# Patient Record
Sex: FEMALE | Race: OTHER | Employment: UNEMPLOYED | URBAN - METROPOLITAN AREA
[De-identification: names, ages, dates, MRNs, and addresses within clinical notes are randomized per-mention and may not be internally consistent; named-entity substitution may affect disease eponyms.]

---

## 2024-11-10 ENCOUNTER — APPOINTMENT (EMERGENCY)
Dept: RADIOLOGY | Facility: HOSPITAL | Age: 74
DRG: 240 | End: 2024-11-10
Payer: COMMERCIAL

## 2024-11-10 ENCOUNTER — HOSPITAL ENCOUNTER (INPATIENT)
Facility: HOSPITAL | Age: 74
LOS: 2 days | Discharge: HOME | DRG: 240 | End: 2024-11-14
Attending: EMERGENCY MEDICINE | Admitting: FAMILY MEDICINE
Payer: COMMERCIAL

## 2024-11-10 DIAGNOSIS — K92.1 BLOOD IN STOOL: ICD-10-CM

## 2024-11-10 DIAGNOSIS — K62.89 RECTAL PAIN: ICD-10-CM

## 2024-11-10 DIAGNOSIS — R82.90 ABNORMAL URINALYSIS: ICD-10-CM

## 2024-11-10 DIAGNOSIS — K62.89 RECTAL MASS: Primary | ICD-10-CM

## 2024-11-10 DIAGNOSIS — Z87.19 HISTORY OF ANAL FISSURES: ICD-10-CM

## 2024-11-10 DIAGNOSIS — K62.89 PAIN, RECTAL: ICD-10-CM

## 2024-11-10 DIAGNOSIS — E11.9 TYPE 2 DIABETES MELLITUS WITHOUT COMPLICATION, WITHOUT LONG-TERM CURRENT USE OF INSULIN (HCC): ICD-10-CM

## 2024-11-10 LAB
ALBUMIN SERPL BCG-MCNC: 3.4 G/DL (ref 3.5–5)
ALP SERPL-CCNC: 47 U/L (ref 34–104)
ALT SERPL W P-5'-P-CCNC: 14 U/L (ref 7–52)
ANION GAP SERPL CALCULATED.3IONS-SCNC: 7 MMOL/L (ref 4–13)
AST SERPL W P-5'-P-CCNC: 12 U/L (ref 13–39)
BASOPHILS # BLD AUTO: 0.03 THOUSANDS/ÂΜL (ref 0–0.1)
BASOPHILS NFR BLD AUTO: 0 % (ref 0–1)
BILIRUB SERPL-MCNC: 0.25 MG/DL (ref 0.2–1)
BUN SERPL-MCNC: 27 MG/DL (ref 5–25)
CALCIUM ALBUM COR SERPL-MCNC: 9.2 MG/DL (ref 8.3–10.1)
CALCIUM SERPL-MCNC: 8.7 MG/DL (ref 8.4–10.2)
CHLORIDE SERPL-SCNC: 104 MMOL/L (ref 96–108)
CO2 SERPL-SCNC: 27 MMOL/L (ref 21–32)
CREAT SERPL-MCNC: 0.77 MG/DL (ref 0.6–1.3)
EOSINOPHIL # BLD AUTO: 0.03 THOUSAND/ÂΜL (ref 0–0.61)
EOSINOPHIL NFR BLD AUTO: 0 % (ref 0–6)
ERYTHROCYTE [DISTWIDTH] IN BLOOD BY AUTOMATED COUNT: 15.5 % (ref 11.6–15.1)
GFR SERPL CREATININE-BSD FRML MDRD: 76 ML/MIN/1.73SQ M
GLUCOSE SERPL-MCNC: 155 MG/DL (ref 65–140)
GLUCOSE SERPL-MCNC: 220 MG/DL (ref 65–140)
HCT VFR BLD AUTO: 32.9 % (ref 34.8–46.1)
HGB BLD-MCNC: 10.5 G/DL (ref 11.5–15.4)
IMM GRANULOCYTES # BLD AUTO: 0.05 THOUSAND/UL (ref 0–0.2)
IMM GRANULOCYTES NFR BLD AUTO: 0 % (ref 0–2)
LYMPHOCYTES # BLD AUTO: 1 THOUSANDS/ÂΜL (ref 0.6–4.47)
LYMPHOCYTES NFR BLD AUTO: 7 % (ref 14–44)
MAGNESIUM SERPL-MCNC: 1.8 MG/DL (ref 1.9–2.7)
MCH RBC QN AUTO: 27.1 PG (ref 26.8–34.3)
MCHC RBC AUTO-ENTMCNC: 31.9 G/DL (ref 31.4–37.4)
MCV RBC AUTO: 85 FL (ref 82–98)
MONOCYTES # BLD AUTO: 0.9 THOUSAND/ÂΜL (ref 0.17–1.22)
MONOCYTES NFR BLD AUTO: 7 % (ref 4–12)
NEUTROPHILS # BLD AUTO: 11.69 THOUSANDS/ÂΜL (ref 1.85–7.62)
NEUTS SEG NFR BLD AUTO: 86 % (ref 43–75)
NRBC BLD AUTO-RTO: 0 /100 WBCS
PLATELET # BLD AUTO: 309 THOUSANDS/UL (ref 149–390)
PMV BLD AUTO: 8.7 FL (ref 8.9–12.7)
POTASSIUM SERPL-SCNC: 4.2 MMOL/L (ref 3.5–5.3)
PROT SERPL-MCNC: 6.8 G/DL (ref 6.4–8.4)
RBC # BLD AUTO: 3.87 MILLION/UL (ref 3.81–5.12)
SODIUM SERPL-SCNC: 138 MMOL/L (ref 135–147)
WBC # BLD AUTO: 13.7 THOUSAND/UL (ref 4.31–10.16)

## 2024-11-10 PROCEDURE — 74177 CT ABD & PELVIS W/CONTRAST: CPT

## 2024-11-10 PROCEDURE — 83735 ASSAY OF MAGNESIUM: CPT

## 2024-11-10 PROCEDURE — 99285 EMERGENCY DEPT VISIT HI MDM: CPT | Performed by: EMERGENCY MEDICINE

## 2024-11-10 PROCEDURE — 80053 COMPREHEN METABOLIC PANEL: CPT | Performed by: EMERGENCY MEDICINE

## 2024-11-10 PROCEDURE — 85025 COMPLETE CBC W/AUTO DIFF WBC: CPT | Performed by: EMERGENCY MEDICINE

## 2024-11-10 PROCEDURE — 99285 EMERGENCY DEPT VISIT HI MDM: CPT

## 2024-11-10 PROCEDURE — 82948 REAGENT STRIP/BLOOD GLUCOSE: CPT

## 2024-11-10 PROCEDURE — 36415 COLL VENOUS BLD VENIPUNCTURE: CPT | Performed by: EMERGENCY MEDICINE

## 2024-11-10 PROCEDURE — 83036 HEMOGLOBIN GLYCOSYLATED A1C: CPT

## 2024-11-10 RX ORDER — SODIUM CHLORIDE 9 MG/ML
100 INJECTION, SOLUTION INTRAVENOUS CONTINUOUS
Status: DISCONTINUED | OUTPATIENT
Start: 2024-11-10 | End: 2024-11-12

## 2024-11-10 RX ORDER — DIPHENHYDRAMINE HCL 25 MG
12.5 TABLET ORAL EVERY 6 HOURS PRN
Status: DISCONTINUED | OUTPATIENT
Start: 2024-11-10 | End: 2024-11-14 | Stop reason: HOSPADM

## 2024-11-10 RX ORDER — INSULIN LISPRO 100 [IU]/ML
1-5 INJECTION, SOLUTION INTRAVENOUS; SUBCUTANEOUS
Status: DISCONTINUED | OUTPATIENT
Start: 2024-11-10 | End: 2024-11-14 | Stop reason: HOSPADM

## 2024-11-10 RX ORDER — KETOROLAC TROMETHAMINE 30 MG/ML
15 INJECTION, SOLUTION INTRAMUSCULAR; INTRAVENOUS ONCE
Status: COMPLETED | OUTPATIENT
Start: 2024-11-10 | End: 2024-11-10

## 2024-11-10 RX ORDER — POLYETHYLENE GLYCOL 3350 17 G/17G
17 POWDER, FOR SOLUTION ORAL DAILY PRN
Status: DISCONTINUED | OUTPATIENT
Start: 2024-11-10 | End: 2024-11-14 | Stop reason: HOSPADM

## 2024-11-10 RX ADMIN — SODIUM CHLORIDE 100 ML/HR: 0.9 INJECTION, SOLUTION INTRAVENOUS at 23:37

## 2024-11-10 RX ADMIN — KETOROLAC TROMETHAMINE 15 MG: 30 INJECTION, SOLUTION INTRAMUSCULAR; INTRAVENOUS at 23:37

## 2024-11-10 RX ADMIN — INSULIN LISPRO 1 UNITS: 100 INJECTION, SOLUTION INTRAVENOUS; SUBCUTANEOUS at 23:44

## 2024-11-10 RX ADMIN — IOHEXOL 100 ML: 350 INJECTION, SOLUTION INTRAVENOUS at 19:27

## 2024-11-10 NOTE — ED PROVIDER NOTES
Time reflects when diagnosis was documented in both MDM as applicable and the Disposition within this note       Time User Action Codes Description Comment    11/10/2024 10:00 PM Viviana Moody Add [K62.89] Rectal mass     11/10/2024 10:01 PM Viviana Moody Add [K62.89] Rectal pain     11/10/2024 10:01 PM Viviana Moody Add [K92.1] Blood in stool           ED Disposition       ED Disposition   Admit    Condition   Stable    Date/Time   Sun Nov 10, 2024 10:18 PM    Comment   Case was discussed with FP and the patient's admission status was agreed to be Admission Status: observation status to the service of Dr. Orosco.               Assessment & Plan       Medical Decision Making  Pt is a 73yo F who presents with rectal pain.    Differential diagnosis to include but not limited to abscess, hemorrhoid, fissure.  Will plan for labs and imaging.  See ED course for results and details.    Plan to admit pt to FP. Pt discussed with admitting team and admission orders placed. Pt admitted without incident.       Amount and/or Complexity of Data Reviewed  Labs: ordered. Decision-making details documented in ED Course.  Radiology: ordered. Decision-making details documented in ED Course.    Risk  Prescription drug management.  Decision regarding hospitalization.        ED Course as of 11/10/24 2220   Sun Nov 10, 2024   1847 WBC(!): 13.70  Mildly elevated. Non-diagnostic.    1848 Hemoglobin(!): 10.5  Mild anemia. No prior available for comparison.    1848 CBC and differential(!)  Reviewed and without actionable derangement.    1903 Comprehensive metabolic panel(!)  Reviewed and without actionable derangement.    2054 Awaiting CT read.    2142 CT being read.    2148 Per rads, necrotic mass of the rectum invading gluteal space.    2156 CT abdomen pelvis with contrast  Heterogeneous peripherally enhancing mass/malignancy involving the rectum and infiltrating the left ischiorectal and gluteal fat measuring approximately  7.7 x 7.5 x 8.6 cm with areas of fluid density compatible with internal necrosis.   2200 Colorectal contacted via [facility] for recs.    2210 Per colorectal, next step is colonoscopy. Does not require transfer. Will plan for admission here for GI consult.    2214 Pt and family made aware of all results and plan for admission. All agreeable.        Medications   iohexol (OMNIPAQUE) 350 MG/ML injection (MULTI-DOSE) 100 mL (100 mL Intravenous Given 11/10/24 1927)       ED Risk Strat Scores                           SBIRT 22yo+      Flowsheet Row Most Recent Value   Initial Alcohol Screen: US AUDIT-C     1. How often do you have a drink containing alcohol? 0 Filed at: 11/10/2024 1941   2. How many drinks containing alcohol do you have on a typical day you are drinking?  0 Filed at: 11/10/2024 1941   3b. FEMALE Any Age, or MALE 65+: How often do you have 4 or more drinks on one occassion? 0 Filed at: 11/10/2024 1941   Audit-C Score 0 Filed at: 11/10/2024 1941   FELA: How many times in the past year have you...    Used an illegal drug or used a prescription medication for non-medical reasons? Never Filed at: 11/10/2024 1941                            History of Present Illness       Chief Complaint   Patient presents with    Rectal Problems     Dx with anal fissure a about a month ago that seemed to get better, now having some bleeding and pain in area. Able to move bowels. No vomiting, also having weight loss per family       Past Medical History:   Diagnosis Date    Diabetes mellitus (HCC)       History reviewed. No pertinent surgical history.   History reviewed. No pertinent family history.   Social History     Tobacco Use    Smoking status: Never    Smokeless tobacco: Never   Vaping Use    Vaping status: Never Used   Substance Use Topics    Alcohol use: Not Currently    Drug use: Not Currently      E-Cigarette/Vaping    E-Cigarette Use Never User       E-Cigarette/Vaping Substances      I have reviewed and agree with  the history as documented.     Pt is a 73yo F who presents for rectal pain.  Much of history provided by patient's family.  Family reports that approximately 1 month ago patient was diagnosed with an anal fissure while she was living in [place].  Patient was given some topical medication and had improvement but now it is worsening again.  Patient reporting rectal pain particularly on the left side.  She also reports blood with stool.  They have also noticed some drainage from the area that is foul-smelling.  Patient has not had any constipation or diarrhea.  Patient does not have any urinary symptoms.  Patient does not have any abdominal pain, nausea, or vomiting.  She has not had any fevers or chills.  Patient has been taking etoricoxib for pain which was initially helping but now is not.  Patient is not currently on any medications as approximately 1 year ago she got Alex Argueta's from a sulfa antibiotic and has been wary about medication since that time.  Patient is now living here with family but has not yet established care here.  Daughter also reports that patient's brother had colon cancer.    Pt is [place]ian speaking only and opted for son in law to translate.            Objective       ED Triage Vitals [11/10/24 1735]   Temperature Pulse Blood Pressure Respirations SpO2 Patient Position - Orthostatic VS   99.5 °F (37.5 °C) 86 158/71 20 97 % Sitting      Temp Source Heart Rate Source BP Location FiO2 (%) Pain Score    Tympanic Monitor Right arm -- 10 - Worst Possible Pain      Vitals      Date and Time Temp Pulse SpO2 Resp BP Pain Score FACES Pain Rating User   11/10/24 2145 -- 80 95 % 16 150/69 -- -- AM   11/10/24 2115 -- 80 96 % 18 172/72 -- -- AM   11/10/24 2045 -- 80 96 % 16 161/67 -- -- AM   11/10/24 1908 -- 78 96 % 18 143/67 -- -- AM   11/10/24 1735 99.5 °F (37.5 °C) 86 97 % 20 158/71 10 - Worst Possible Pain -- LS            Physical Exam  Vitals reviewed.   Constitutional:       General: She is not  in acute distress.     Appearance: She is well-developed. She is not toxic-appearing or diaphoretic.   HENT:      Head: Normocephalic and atraumatic.      Right Ear: External ear normal.      Left Ear: External ear normal.      Nose: Nose normal.      Mouth/Throat:      Pharynx: Oropharynx is clear.   Eyes:      Extraocular Movements: Extraocular movements intact.      Pupils: Pupils are equal, round, and reactive to light.   Cardiovascular:      Rate and Rhythm: Normal rate and regular rhythm.      Heart sounds: Normal heart sounds. No murmur heard.  Pulmonary:      Effort: Pulmonary effort is normal. No respiratory distress.      Breath sounds: Normal breath sounds.   Abdominal:      General: There is no distension.      Palpations: Abdomen is soft.      Tenderness: There is no abdominal tenderness.   Genitourinary:     Rectum: External hemorrhoid (small, non-thrombosed) present.   Musculoskeletal:         General: Normal range of motion.      Cervical back: Normal range of motion and neck supple.   Skin:     General: Skin is warm and dry.      Capillary Refill: Capillary refill takes less than 2 seconds.          Neurological:      Mental Status: She is alert and oriented to person, place, and time.   Psychiatric:         Speech: Speech normal.         Behavior: Behavior is cooperative.         Results Reviewed       Procedure Component Value Units Date/Time    Comprehensive metabolic panel [[id number]]  (Abnormal) Collected: 11/10/24 1842    Lab Status: Final result Specimen: Blood from Arm, Left Updated: 11/10/24 1902     Sodium 138 mmol/L      Potassium 4.2 mmol/L      Chloride 104 mmol/L      CO2 27 mmol/L      ANION GAP 7 mmol/L      BUN 27 mg/dL      Creatinine 0.77 mg/dL      Glucose 220 mg/dL      Calcium 8.7 mg/dL      Corrected Calcium 9.2 mg/dL      AST 12 U/L      ALT 14 U/L      Alkaline Phosphatase 47 U/L      Total Protein 6.8 g/dL      Albumin 3.4 g/dL      Total Bilirubin 0.25 mg/dL      eGFR 76  ml/min/1.73sq m     Narrative:      National Kidney Disease Foundation guidelines for Chronic Kidney Disease (CKD):     Stage 1 with normal or high GFR (GFR > 90 mL/min/1.73 square meters)    Stage 2 Mild CKD (GFR = 60-89 mL/min/1.73 square meters)    Stage 3A Moderate CKD (GFR = 45-59 mL/min/1.73 square meters)    Stage 3B Moderate CKD (GFR = 30-44 mL/min/1.73 square meters)    Stage 4 Severe CKD (GFR = 15-29 mL/min/1.73 square meters)    Stage 5 End Stage CKD (GFR <15 mL/min/1.73 square meters)  Note: GFR calculation is accurate only with a steady state creatinine    CBC and differential [[id number]]  (Abnormal) Collected: 11/10/24 1842    Lab Status: Final result Specimen: Blood from Arm, Left Updated: 11/10/24 1846     WBC 13.70 Thousand/uL      RBC 3.87 Million/uL      Hemoglobin 10.5 g/dL      Hematocrit 32.9 %      MCV 85 fL      MCH 27.1 pg      MCHC 31.9 g/dL      RDW 15.5 %      MPV 8.7 fL      Platelets 309 Thousands/uL      nRBC 0 /100 WBCs      Segmented % 86 %      Immature Grans % 0 %      Lymphocytes % 7 %      Monocytes % 7 %      Eosinophils Relative 0 %      Basophils Relative 0 %      Absolute Neutrophils 11.69 Thousands/µL      Absolute Immature Grans 0.05 Thousand/uL      Absolute Lymphocytes 1.00 Thousands/µL      Absolute Monocytes 0.90 Thousand/µL      Eosinophils Absolute 0.03 Thousand/µL      Basophils Absolute 0.03 Thousands/µL             CT abdomen pelvis with contrast   Final Interpretation by Steve Jewell MD (11/10 2153)      Heterogeneous peripherally enhancing mass/malignancy involving the rectum and infiltrating the left ischiorectal and gluteal fat measuring approximately 7.7 x 7.5 x 8.6 cm with areas of fluid density compatible with internal necrosis.      I personally discussed this study with ALMA ROSA ROMAN on 11/10/2024 9:49 PM.      Workstation performed: [id number]             Procedures    ED Medication and Procedure Management   None     Patient's Medications    No  medications on file     No discharge procedures on file.  ED SEPSIS DOCUMENTATION   Time reflects when diagnosis was documented in both MDM as applicable and the Disposition within this note       Time User Action Codes Description Comment    11/10/2024 10:00 PM Viviana Moody [K62.89] Rectal mass     11/10/2024 10:01 PM Viviana Moody [K62.89] Rectal pain     11/10/2024 10:01 PM Viviana Moody [K92.1] Blood in stool                  Viviana Moody MD  11/10/24 8854

## 2024-11-11 PROBLEM — E87.8 ELECTROLYTE ABNORMALITY: Status: ACTIVE | Noted: 2024-11-11

## 2024-11-11 PROBLEM — Z87.19 HISTORY OF ANAL FISSURES: Status: ACTIVE | Noted: 2024-11-11

## 2024-11-11 PROBLEM — K62.5 RECTAL BLEEDING: Status: ACTIVE | Noted: 2024-11-11

## 2024-11-11 PROBLEM — R10.9 ABDOMINAL PAIN: Status: ACTIVE | Noted: 2024-11-11

## 2024-11-11 PROBLEM — K62.89 PAIN, RECTAL: Status: ACTIVE | Noted: 2024-11-11

## 2024-11-11 PROBLEM — D72.829 LEUKOCYTOSIS: Status: ACTIVE | Noted: 2024-11-11

## 2024-11-11 PROBLEM — D50.9 IRON DEFICIENCY ANEMIA: Status: ACTIVE | Noted: 2024-11-11

## 2024-11-11 LAB
ANION GAP SERPL CALCULATED.3IONS-SCNC: 6 MMOL/L (ref 4–13)
BASOPHILS # BLD AUTO: 0.03 THOUSANDS/ÂΜL (ref 0–0.1)
BASOPHILS NFR BLD AUTO: 0 % (ref 0–1)
BUN SERPL-MCNC: 17 MG/DL (ref 5–25)
CALCIUM SERPL-MCNC: 7.9 MG/DL (ref 8.4–10.2)
CHLORIDE SERPL-SCNC: 109 MMOL/L (ref 96–108)
CO2 SERPL-SCNC: 23 MMOL/L (ref 21–32)
CREAT SERPL-MCNC: 0.55 MG/DL (ref 0.6–1.3)
EOSINOPHIL # BLD AUTO: 0.13 THOUSAND/ÂΜL (ref 0–0.61)
EOSINOPHIL NFR BLD AUTO: 1 % (ref 0–6)
ERYTHROCYTE [DISTWIDTH] IN BLOOD BY AUTOMATED COUNT: 15.4 % (ref 11.6–15.1)
EST. AVERAGE GLUCOSE BLD GHB EST-MCNC: 192 MG/DL
FERRITIN SERPL-MCNC: 80 NG/ML (ref 11–307)
GFR SERPL CREATININE-BSD FRML MDRD: 92 ML/MIN/1.73SQ M
GLUCOSE P FAST SERPL-MCNC: 134 MG/DL (ref 65–99)
GLUCOSE SERPL-MCNC: 111 MG/DL (ref 65–140)
GLUCOSE SERPL-MCNC: 116 MG/DL (ref 65–140)
GLUCOSE SERPL-MCNC: 134 MG/DL (ref 65–140)
GLUCOSE SERPL-MCNC: 154 MG/DL (ref 65–140)
GLUCOSE SERPL-MCNC: 158 MG/DL (ref 65–140)
HBA1C MFR BLD: 8.3 %
HCT VFR BLD AUTO: 31.9 % (ref 34.8–46.1)
HGB BLD-MCNC: 10 G/DL (ref 11.5–15.4)
IMM GRANULOCYTES # BLD AUTO: 0.03 THOUSAND/UL (ref 0–0.2)
IMM GRANULOCYTES NFR BLD AUTO: 0 % (ref 0–2)
IRON SATN MFR SERPL: 5 % (ref 15–50)
IRON SERPL-MCNC: 11 UG/DL (ref 50–212)
LYMPHOCYTES # BLD AUTO: 0.93 THOUSANDS/ÂΜL (ref 0.6–4.47)
LYMPHOCYTES NFR BLD AUTO: 7 % (ref 14–44)
MAGNESIUM SERPL-MCNC: 2.1 MG/DL (ref 1.9–2.7)
MCH RBC QN AUTO: 27 PG (ref 26.8–34.3)
MCHC RBC AUTO-ENTMCNC: 31.3 G/DL (ref 31.4–37.4)
MCV RBC AUTO: 86 FL (ref 82–98)
MONOCYTES # BLD AUTO: 0.93 THOUSAND/ÂΜL (ref 0.17–1.22)
MONOCYTES NFR BLD AUTO: 7 % (ref 4–12)
NEUTROPHILS # BLD AUTO: 10.54 THOUSANDS/ÂΜL (ref 1.85–7.62)
NEUTS SEG NFR BLD AUTO: 85 % (ref 43–75)
NRBC BLD AUTO-RTO: 0 /100 WBCS
PLATELET # BLD AUTO: 296 THOUSANDS/UL (ref 149–390)
PMV BLD AUTO: 8.8 FL (ref 8.9–12.7)
POTASSIUM SERPL-SCNC: 3.7 MMOL/L (ref 3.5–5.3)
RBC # BLD AUTO: 3.7 MILLION/UL (ref 3.81–5.12)
SODIUM SERPL-SCNC: 138 MMOL/L (ref 135–147)
TIBC SERPL-MCNC: 240 UG/DL (ref 250–450)
UIBC SERPL-MCNC: 229 UG/DL (ref 155–355)
WBC # BLD AUTO: 12.59 THOUSAND/UL (ref 4.31–10.16)

## 2024-11-11 PROCEDURE — 83735 ASSAY OF MAGNESIUM: CPT

## 2024-11-11 PROCEDURE — 83550 IRON BINDING TEST: CPT

## 2024-11-11 PROCEDURE — 80048 BASIC METABOLIC PNL TOTAL CA: CPT

## 2024-11-11 PROCEDURE — 99244 OFF/OP CNSLTJ NEW/EST MOD 40: CPT | Performed by: INTERNAL MEDICINE

## 2024-11-11 PROCEDURE — 83540 ASSAY OF IRON: CPT

## 2024-11-11 PROCEDURE — 82948 REAGENT STRIP/BLOOD GLUCOSE: CPT

## 2024-11-11 PROCEDURE — 82728 ASSAY OF FERRITIN: CPT

## 2024-11-11 PROCEDURE — 85025 COMPLETE CBC W/AUTO DIFF WBC: CPT

## 2024-11-11 RX ORDER — MAGNESIUM CARB/ALUMINUM HYDROX 105-160MG
296 TABLET,CHEWABLE ORAL ONCE
Status: COMPLETED | OUTPATIENT
Start: 2024-11-11 | End: 2024-11-11

## 2024-11-11 RX ORDER — POTASSIUM CHLORIDE 1500 MG/1
40 TABLET, EXTENDED RELEASE ORAL ONCE
Status: COMPLETED | OUTPATIENT
Start: 2024-11-11 | End: 2024-11-11

## 2024-11-11 RX ORDER — POTASSIUM CHLORIDE 1500 MG/1
20 TABLET, EXTENDED RELEASE ORAL ONCE
Status: COMPLETED | OUTPATIENT
Start: 2024-11-11 | End: 2024-11-11

## 2024-11-11 RX ORDER — MAGNESIUM SULFATE 1 G/100ML
1 INJECTION INTRAVENOUS ONCE
Status: COMPLETED | OUTPATIENT
Start: 2024-11-11 | End: 2024-11-11

## 2024-11-11 RX ORDER — SODIUM CHLORIDE, SODIUM LACTATE, POTASSIUM CHLORIDE, CALCIUM CHLORIDE 600; 310; 30; 20 MG/100ML; MG/100ML; MG/100ML; MG/100ML
75 INJECTION, SOLUTION INTRAVENOUS CONTINUOUS
Status: CANCELLED | OUTPATIENT
Start: 2024-11-11

## 2024-11-11 RX ORDER — PANTOPRAZOLE SODIUM 40 MG/10ML
40 INJECTION, POWDER, LYOPHILIZED, FOR SOLUTION INTRAVENOUS EVERY 12 HOURS SCHEDULED
Status: DISCONTINUED | OUTPATIENT
Start: 2024-11-11 | End: 2024-11-14 | Stop reason: HOSPADM

## 2024-11-11 RX ADMIN — INSULIN LISPRO 1 UNITS: 100 INJECTION, SOLUTION INTRAVENOUS; SUBCUTANEOUS at 16:50

## 2024-11-11 RX ADMIN — MAJOR MAGNESIUM CITRATE ORAL SOLUTION - LEMON 296 ML: 1.75 LIQUID ORAL at 12:47

## 2024-11-11 RX ADMIN — PANTOPRAZOLE SODIUM 40 MG: 40 INJECTION, POWDER, FOR SOLUTION INTRAVENOUS at 02:29

## 2024-11-11 RX ADMIN — POLYETHYLENE GLYCOL 3350, SODIUM SULFATE ANHYDROUS, SODIUM BICARBONATE, SODIUM CHLORIDE, POTASSIUM CHLORIDE 4000 ML: 236; 22.74; 6.74; 5.86; 2.97 POWDER, FOR SOLUTION ORAL at 16:49

## 2024-11-11 RX ADMIN — MAJOR MAGNESIUM CITRATE ORAL SOLUTION - LEMON 296 ML: 1.75 LIQUID ORAL at 14:42

## 2024-11-11 RX ADMIN — POTASSIUM CHLORIDE 40 MEQ: 1500 TABLET, EXTENDED RELEASE ORAL at 09:14

## 2024-11-11 RX ADMIN — SODIUM CHLORIDE 100 ML/HR: 0.9 INJECTION, SOLUTION INTRAVENOUS at 10:29

## 2024-11-11 RX ADMIN — PANTOPRAZOLE SODIUM 40 MG: 40 INJECTION, POWDER, FOR SOLUTION INTRAVENOUS at 14:37

## 2024-11-11 RX ADMIN — INSULIN LISPRO 1 UNITS: 100 INJECTION, SOLUTION INTRAVENOUS; SUBCUTANEOUS at 22:09

## 2024-11-11 RX ADMIN — POTASSIUM CHLORIDE 20 MEQ: 1500 TABLET, EXTENDED RELEASE ORAL at 06:59

## 2024-11-11 RX ADMIN — SODIUM CHLORIDE 100 ML/HR: 0.9 INJECTION, SOLUTION INTRAVENOUS at 22:14

## 2024-11-11 RX ADMIN — PSYLLIUM HUSK 1 PACKET: 3.4 POWDER ORAL at 09:15

## 2024-11-11 RX ADMIN — MAGNESIUM SULFATE HEPTAHYDRATE 1 G: 1 INJECTION, SOLUTION INTRAVENOUS at 01:18

## 2024-11-11 NOTE — ASSESSMENT & PLAN NOTE
Chronic - noting 2 month hx of regular rectal bleeding unrelated to bowel movements  Endorsing that she was seen by GI in [place] with colonoscopy but did not receive results. No baseline labs to compare, was diagnosed with anal fissures previously. In ED her Hgb was 10.5 and MCV/Cr WNL, BUN 27.    Plan:  -GI consulted, appreciate recommendations   -NPO  -Pending iron panel  -IV Protonix 40 mg BID  -Monitor Hgb, record stool at bedside

## 2024-11-11 NOTE — ASSESSMENT & PLAN NOTE
Chronic - noting 2 month hx of regular rectal bleeding unrelated to bowel movements  Endorsing that she was seen by GI in [place] with colonoscopy but did not receive results. No baseline labs to compare, was diagnosed with anal fissures previously    In ED: BUN 27, Cr WNL, Hgb 10.5, MCV WNL    Plan:  -GI consulted, appreciate recommendations   -NPO  -Pending iron panel  -IV Protonix 40 mg BID  -Monitor Hgb, record stool at bedside

## 2024-11-11 NOTE — ASSESSMENT & PLAN NOTE
Chronic, no known HgbA1c - home mediations are unknown as patient has recently immigrated from [place] - endorses taking unknown diabetic medication.    Plan:  -Carb control when diet started   -Insulin sliding scale   -monitor for hypoglycemia  -Hold oral agents during hospitalization

## 2024-11-11 NOTE — ASSESSMENT & PLAN NOTE
Patient endorses history of anal fissures as diagnosed with [place], noting she applied unknown topical jelly to her rectum with some relief from discomfort.     Plan:  -Continue metamucil   -GI consulted, appreciate recommendations

## 2024-11-11 NOTE — ASSESSMENT & PLAN NOTE
Patient presented with rectal pain and rectal bleeding which has been going on for 2 months but has had increasing severity of symptoms. Patient reports blood is on toilet tissue when she wipes. Denies any blood in stool or black tarry stool. Patient reports that she had a bowel movement today. Patient reports colonoscopy in [place] approximately 1 month ago she is not aware of the results.CT abdomen and pelvis with contrast showed Heterogeneous peripherally enhancing mass/malignancy involving the rectum and infiltrating the left ischiorectal and gluteal fat measuring approximately 7.7 x 7.5 x 8.6 cm with areas of fluid density compatible with internal necrosis.      -Monitor hemoglobin  -Transfuse blood products as needed to keep hemoglobin delayed greater than 7  -Monitor for signs of rectal bleeding

## 2024-11-11 NOTE — PROGRESS NOTES
"Progress Note - Family Medicine   Name: Miriam Smith 74 y.o. female I MRN: [id number]  Unit/Bed#: [address] [place] [address] I Date of Admission: 11/10/2024   Date of Service: 11/11/2024 I Hospital Day: 0     Assessment & Plan  Rectal mass  Patient endorsing gluteal tenderness x 2 months with \"feeling something\", abdominal pain, weight loss, rectal bleeding.     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    Plan:  -GI consulted, appreciate recommendations   -Getting bowel prep today and will do colonoscopy tomorrow  Pain, rectal  Patient presented to ED endorsing abdominal pain, rectal bleeding, weight loss x 2 months - recently immigrated from [place] and was getting care there. Reporting reluctance to take medications in setting of hx of drug intolerance/allergies. Denies fevers/chills/N/V/D.     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 Fluid-filled distal small bowel loops which may be due to superimposed enteritis.    Plan:  -Continue PRN Toradol  -Rest of plan per rectal mass  Rectal bleeding  Chronic - noting 2 month hx of regular rectal bleeding unrelated to bowel movements  Endorsing that she was seen by GI in [place] with colonoscopy but did not receive results. No baseline labs to compare, was diagnosed with anal fissures previously. In ED her Hgb was 10.5 and MCV/Cr WNL, BUN 27.    Plan:  -GI consulted, appreciate recommendations   -NPO  -Pending iron panel  -IV Protonix 40 mg BID  -Monitor Hgb, record stool at bedside  Type 2 diabetes mellitus (HCC)  Chronic, no known HgbA1c - " home mediations are unknown as patient has recently immigrated from [place] - endorses taking unknown diabetic medication.    Plan:  -Carb control when diet started   -Insulin sliding scale   -monitor for hypoglycemia  -Hold oral agents during hospitalization  History of anal fissures  Patient endorses history of anal fissures as diagnosed with [place], noting she applied unknown topical jelly to her rectum with some relief from discomfort.     Plan:  -Continue metamucil   -GI consulted, appreciate recommendations  Electrolyte abnormality  Mg 1.8 on admission, continue to monitor and replete as indicated.    VTE Pharmacologic Prophylaxis:    prophylaxis contraindicated in setting of rectal bleeding.    Mobility:   Basic Mobility Inpatient Raw Score: 24  JH-HLM Goal: 8: Walk 250 feet or more  JH-HLM Achieved: 8: Walk 250 feet ot more  JH-HLM Goal achieved. Continue to encourage appropriate mobility.    Patient Centered Rounds: I performed bedside rounds with nursing staff today.   Discussions with Specialists or Other Care Team Provider: GI  Discussions with Family Members: Discussed with daughter at bedside.  Current Length of Stay: 0 day(s)  Current Patient Status: Observation   Certification Statement: The patient will continue to require additional inpatient hospital stay due to further workup needed.  Discharge Plan: Anticipate discharge in 48-72 hrs to home.    Code Status: Level 1 - Full Code    Subjective   Patient was resting comfortably in bed while being assessed this morning. Needed . She endorses no pain and feels much better this morning. Denies H/A, CP, N/B, SOB, abdominal pain, urinary symptoms, last bowel movement was 2 days ago. She notes there is minimal pink blood when she wipes but no gross, red blood in toilet bowl.    Review of Systems   Constitutional:  Negative for chills, fatigue and fever.   HENT:  Negative for hearing loss and sore throat.    Eyes:  Negative for visual  disturbance.   Respiratory:  Negative for cough, chest tightness and shortness of breath.    Cardiovascular:  Negative for chest pain.   Gastrointestinal:  Positive for anal bleeding (blood on toilet paper when she wipes). Negative for abdominal pain, constipation, diarrhea, nausea and vomiting.   Genitourinary:  Negative for dysuria and hematuria.   Musculoskeletal:  Negative for back pain.   Skin:  Negative for rash and wound.   Neurological:  Negative for weakness, light-headedness and headaches.   All other systems reviewed and are negative.      Objective :  Temp:  [97.9 °F (36.6 °C)-99.5 °F (37.5 °C)] 97.9 °F (36.6 °C)  HR:  [69-86] 69  BP: (126-172)/(56-87) 126/56  Resp:  [16-20] 18  SpO2:  [93 %-97 %] 93 %  O2 Device: None (Room air)    Body mass index is 20.96 kg/m².     Input and Output Summary (last 24 hours):   No intake or output data in the 24 hours ending 11/11/24 1331    Physical Exam  Vitals and nursing note reviewed.   Constitutional:       General: She is not in acute distress.     Appearance: Normal appearance.   HENT:      Head: Normocephalic and atraumatic.      Right Ear: External ear normal.      Left Ear: External ear normal.      Nose: Nose normal.      Mouth/Throat:      Mouth: Mucous membranes are moist.      Pharynx: Oropharynx is clear.   Eyes:      Extraocular Movements: Extraocular movements intact.   Cardiovascular:      Rate and Rhythm: Normal rate and regular rhythm.      Pulses: Normal pulses.      Heart sounds: Normal heart sounds.   Pulmonary:      Effort: Pulmonary effort is normal.      Breath sounds: Normal breath sounds.   Abdominal:      General: Abdomen is flat. Bowel sounds are normal. There is no distension.      Palpations: Abdomen is soft.      Tenderness: There is no abdominal tenderness.   Musculoskeletal:         General: No tenderness. Normal range of motion.      Right lower leg: No edema.      Left lower leg: No edema.   Skin:     General: Skin is warm and dry.       Capillary Refill: Capillary refill takes less than 2 seconds.   Neurological:      General: No focal deficit present.      Mental Status: She is alert and oriented to person, place, and time.   Psychiatric:         Mood and Affect: Mood normal.         Behavior: Behavior normal.         Lab Results: I have reviewed the following results:   Results from last 7 days   Lab Units 11/11/24  0442   WBC Thousand/uL 12.59*   HEMOGLOBIN g/dL 10.0*   HEMATOCRIT % 31.9*   PLATELETS Thousands/uL 296   SEGS PCT % 85*   LYMPHO PCT % 7*   MONO PCT % 7   EOS PCT % 1     Results from last 7 days   Lab Units 11/11/24  0442 11/10/24  1842   SODIUM mmol/L 138 138   POTASSIUM mmol/L 3.7 4.2   CHLORIDE mmol/L 109* 104   CO2 mmol/L 23 27   BUN mg/dL 17 27*   CREATININE mg/dL 0.55* 0.77   ANION GAP mmol/L 6 7   CALCIUM mg/dL 7.9* 8.7   ALBUMIN g/dL  --  3.4*   TOTAL BILIRUBIN mg/dL  --  0.25   ALK PHOS U/L  --  47   ALT U/L  --  14   AST U/L  --  12*   GLUCOSE RANDOM mg/dL 134 220*         Results from last 7 days   Lab Units 11/11/24  1215 11/11/24  0731 11/10/24  2342   POC GLUCOSE mg/dl 116 111 155*       Imaging Results Review: I reviewed radiology reports from this admission including: CT abdomen/pelvis.  Other Study Results Review: No additional pertinent studies reviewed.    Last 24 Hours Medication List:     Current Facility-Administered Medications:     diphenhydrAMINE (BENADRYL) tablet 12.5 mg, Q6H PRN    insulin lispro (HumALOG/ADMELOG) 100 units/mL subcutaneous injection 1-5 Units, 4x Daily (AC & HS) **AND** Fingerstick Glucose (POCT), 4x Daily AC and at bedtime    magnesium citrate (CITROMA) oral solution 296 mL, Once    pantoprazole (PROTONIX) injection 40 mg, Q12H TEGAN    polyethylene glycol (GOLYTELY) bowel prep 4,000 mL, Once    polyethylene glycol (MIRALAX) packet 17 g, Daily PRN    psyllium (METAMUCIL) 1 packet, Daily    sodium chloride 0.9 % infusion, Continuous, Last Rate: 100 mL/hr (11/11/24  1029)    Administrative Statements   Today, Patient Was Seen By: Dalton Leblanc, DO  I have spent a total time of >30 minutes in caring for this patient on the day of the visit/encounter including Counseling / Coordination of care, Documenting in the medical record, Reviewing / ordering tests, medicine, procedures  , Obtaining or reviewing history  , and Communicating with other healthcare professionals .    **Please Note: This note may have been constructed using a voice recognition system.**

## 2024-11-11 NOTE — PLAN OF CARE
Problem: PAIN - ADULT  Goal: Verbalizes/displays adequate comfort level or baseline comfort level  Description: Interventions:  - Encourage patient to monitor pain and request assistance  - Assess pain using appropriate pain scale  - Administer analgesics based on type and severity of pain and evaluate response  - Implement non-pharmacological measures as appropriate and evaluate response  - Consider cultural and social influences on pain and pain management  - Notify physician/advanced practitioner if interventions unsuccessful or patient reports new pain  Outcome: Progressing     Problem: INFECTION - ADULT  Goal: Absence or prevention of progression during hospitalization  Description: INTERVENTIONS:  - Assess and monitor for signs and symptoms of infection  - Monitor lab/diagnostic results  - Monitor all insertion sites, i.e. indwelling lines, tubes, and drains  - Monitor endotracheal if appropriate and nasal secretions for changes in amount and color  - [place] appropriate cooling/warming therapies per order  - Administer medications as ordered  - Instruct and encourage patient and family to use good hand hygiene technique  - Identify and instruct in appropriate isolation precautions for identified infection/condition  Outcome: Progressing  Goal: Absence of fever/infection during neutropenic period  Description: INTERVENTIONS:  - Monitor WBC    Outcome: Progressing     Problem: SAFETY ADULT  Goal: Patient will remain free of falls  Description: INTERVENTIONS:  - Educate patient/family on patient safety including physical limitations  - Instruct patient to call for assistance with activity   - Consult OT/PT to assist with strengthening/mobility   - Keep Call bell within reach  - Keep bed low and locked with side rails adjusted as appropriate  - Keep care items and personal belongings within reach  - Initiate and maintain comfort rounds  - Make Fall Risk Sign visible to staff  - Offer Toileting every 2 Hours,  in advance of need  - Initiate/Maintain bed alarm  - Obtain necessary fall risk management equipment:   - Apply yellow socks and bracelet for high fall risk patients  - Consider moving patient to room near nurses station  Outcome: Progressing  Goal: Maintain or return to baseline ADL function  Description: INTERVENTIONS:  -  Assess patient's ability to carry out ADLs; assess patient's baseline for ADL function and identify physical deficits which impact ability to perform ADLs (bathing, care of mouth/teeth, toileting, grooming, dressing, etc.)  - Assess/evaluate cause of self-care deficits   - Assess range of motion  - Assess patient's mobility; develop plan if impaired  - Assess patient's need for assistive devices and provide as appropriate  - Encourage maximum independence but intervene and supervise when necessary  - Involve family in performance of ADLs  - Assess for home care needs following discharge   - Consider OT consult to assist with ADL evaluation and planning for discharge  - Provide patient education as appropriate  Outcome: Progressing  Goal: Maintains/Returns to pre admission functional level  Description: INTERVENTIONS:  - Perform AM-PAC 6 Click Basic Mobility/ Daily Activity assessment daily.  - Set and communicate daily mobility goal to care team and patient/family/caregiver.   - Collaborate with rehabilitation services on mobility goals if consulted  - Perform Range of Motion 3 times a day.  - Reposition patient every 3 hours.  - Dangle patient 3 times a day  - Stand patient 3 times a day  - Ambulate patient 3 times a day  - Out of bed to chair 3 times a day   - Out of bed for meals 3 times a day  - Out of bed for toileting  - Record patient progress and toleration of activity level   Outcome: Progressing     Problem: DISCHARGE PLANNING  Goal: Discharge to home or other facility with appropriate resources  Description: INTERVENTIONS:  - Identify barriers to discharge w/patient and caregiver  -  Arrange for needed discharge resources and transportation as appropriate  - Identify discharge learning needs (meds, wound care, etc.)  - Arrange for interpretive services to assist at discharge as needed  - Refer to Case Management Department for coordinating discharge planning if the patient needs post-hospital services based on physician/advanced practitioner order or complex needs related to functional status, cognitive ability, or social support system  Outcome: Progressing     Problem: Knowledge Deficit  Goal: Patient/family/caregiver demonstrates understanding of disease process, treatment plan, medications, and discharge instructions  Description: Complete learning assessment and assess knowledge base.  Interventions:  - Provide teaching at level of understanding  - Provide teaching via preferred learning methods  Outcome: Progressing

## 2024-11-11 NOTE — CONSULTS
Consultation - Gastroenterology   Name: Miriam Smith 74 y.o. 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     -Clear liquid diet today no red food.  -Magnesium citrate 11:00 and 15:00 daily.  -GoLytely starting at 17:00 today  -Scheduled for colonoscopy tomorrow.  Prep and procedure explained to patient and daughter further recommendations pending results of colonoscopy.  -Patient most likely will need colorectal consult and transfer.   Rectal bleeding  Patient presented with rectal pain and rectal bleeding which has been going on for 2 months but has had increasing severity of symptoms. Patient reports blood is on toilet tissue when she wipes. Denies any blood in stool or black tarry stool. Patient reports that she had a bowel movement today. 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      -Monitor hemoglobin  -Transfuse blood products as needed to keep hemoglobin delayed greater than 7  -Monitor for signs of rectal bleeding  Pain,  rectal  Patient presented with rectal pain and rectal bleeding which has been going on for 2 months but has had increasing severity of symptoms.  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      -Pain management per attending team        History of Present Illness   HPI:  Miriam Smith is a 74 y.o. female with past medical history of anal fissure, diabetes mellitus type 2, and  who presents to hospital on 11/10 with report of rectal pain and rectal bleeding x 2 months.  Patient speaks Telugu only daughter is at bedside and is interpreting for patient.  Patient recently immigrated to the  from [place].  Patient reports 2-month history of worsening of symptoms with pain unrelieved by Etoricoxib.  Patient did have a colonoscopy in [place] approximately 1 month ago but was never notified of results of colonoscopy.  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  Labs done 11/10 showed hemoglobin 10.5, white blood count 13.70 platelets 309.  CMP with in normal limits except glucose 220, BUN 27,  AST 12, and albumin 3.4.  Iron panel done today pending.      Patient denies any upper GI symptoms.  Patient denies nausea, vomiting, acid reflux, heartburn, epigastric or  abdomen pain.  Denies fever, chills, or shortness of breath.  Patient does report rectal bleeding and rectal pain.  Patient reports blood is on toilet tissue when she wipes.  Denies any  blood in stool or black tarry stool.  Patient reports that she had a bowel movement today.  Patient is on no blood thinners or antiplatelet medication at home. Patient denies any overuse of NSAIDs.    Patient does not smoke.  Patient does not drink alcohol.  Patient denies illicit drug use or marijuana use..  Positive family history of colon cancer sister and stomach cancer Sister.      Review of Systems   Constitutional:  Negative for chills and fever.   HENT:  Negative for ear pain and sore throat.    Eyes:  Negative for pain and visual disturbance.   Respiratory:  Negative for cough and shortness of breath.    Cardiovascular:  Negative for chest pain and palpitations.   Gastrointestinal:  Positive for anal bleeding. Negative for abdominal pain and vomiting.        Rectal pain   Genitourinary:  Negative for dysuria and hematuria.   Musculoskeletal:  Negative for arthralgias and back pain.   Skin:  Negative for color change and rash.   Neurological:  Negative for seizures and syncope.   All other systems reviewed and are negative.    I have reviewed the patient's PMH, PSH, Social History, Family History, Meds, and Allergies  Historical Information   Past Medical History:   Diagnosis Date    Diabetes mellitus (HCC)      History reviewed. No pertinent surgical history.  Social History     Tobacco Use    Smoking status: Never    Smokeless tobacco: Never   Vaping Use    Vaping status: Never Used   Substance and Sexual Activity    Alcohol use: Not Currently    Drug use: Not Currently    Sexual activity: Not on file     E-Cigarette/Vaping    E-Cigarette Use Never User      E-Cigarette/Vaping Substances     History reviewed. No pertinent family history.  Social History     Tobacco Use    Smoking status: Never    Smokeless tobacco: Never   Vaping Use    Vaping status: Never Used   Substance and Sexual Activity    Alcohol use: Not Currently    Drug use: Not Currently    Sexual activity: Not on file       Current  Facility-Administered Medications:     diphenhydrAMINE (BENADRYL) tablet 12.5 mg, Q6H PRN    insulin lispro (HumALOG/ADMELOG) 100 units/mL subcutaneous injection 1-5 Units, 4x Daily (AC & HS) **AND** Fingerstick Glucose (POCT), 4x Daily AC and at bedtime    magnesium citrate (CITROMA) oral solution 296 mL, Once    magnesium citrate (CITROMA) oral solution 296 mL, Once    pantoprazole (PROTONIX) injection 40 mg, Q12H TEGAN    polyethylene glycol (GOLYTELY) bowel prep 4,000 mL, Once    polyethylene glycol (MIRALAX) packet 17 g, Daily PRN    psyllium (METAMUCIL) 1 packet, Daily    sodium chloride 0.9 % infusion, Continuous, Last Rate: 100 mL/hr (11/11/24 1029)  None       Objective :  Temp:  [97.9 °F (36.6 °C)-99.5 °F (37.5 °C)] 97.9 °F (36.6 °C)  HR:  [69-86] 69  BP: (126-172)/(56-87) 126/56  Resp:  [16-20] 18  SpO2:  [93 %-97 %] 93 %  O2 Device: None (Room air)    Physical Exam  Vitals and nursing note reviewed.   Constitutional:       General: She is not in acute distress.     Appearance: She is well-developed.   HENT:      Head: Normocephalic and atraumatic.   Eyes:      General: No scleral icterus.     Conjunctiva/sclera: Conjunctivae normal.   Cardiovascular:      Rate and Rhythm: Normal rate and regular rhythm.      Heart sounds: No murmur heard.  Pulmonary:      Effort: Pulmonary effort is normal. No respiratory distress.      Breath sounds: Normal breath sounds. No stridor. No wheezing, rhonchi or rales.   Abdominal:      General: Bowel sounds are normal. There is no distension.      Palpations: Abdomen is soft. There is no mass.      Tenderness: There is no abdominal tenderness. There is no guarding or rebound.   Musculoskeletal:         General: No swelling.      Cervical back: Neck supple.      Right lower leg: No edema.      Left lower leg: No edema.   Skin:     General: Skin is warm and dry.      Capillary Refill: Capillary refill takes less than 2 seconds.      Coloration: Skin is not jaundiced or pale.  "  Neurological:      Mental Status: She is alert and oriented to person, place, and time.   Psychiatric:         Mood and Affect: Mood normal.           Lab Results: I have reviewed the following results:CBC/BMP:   .     11/11/24  0442   WBC 12.59*   HGB 10.0*   HCT 31.9*      SODIUM 138   K 3.7   *   CO2 23   BUN 17   CREATININE 0.55*   GLUC 134   MG 2.1    , LFTs:   .     11/10/24  1842   AST 12*   ALT 14   ALB 3.4*   TBILI 0.25   ALKPHOS 47    , PTT/INR:No new results in last 24 hours. , Procalcitonin: No results found for: \"PROCALCITONI\"    Imaging Results Review: I reviewed radiology reports from this admission including: CT abdomen/pelvis.        "

## 2024-11-11 NOTE — ASSESSMENT & PLAN NOTE
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     -Clear liquid diet today no red food.  -Magnesium citrate 11:00 and 15:00 daily.  -GoLytely starting at 17:00 today  -Scheduled for colonoscopy tomorrow.  Prep and procedure explained to patient and daughter further recommendations pending results of colonoscopy.  -Patient most likely will need colorectal consult and transfer.

## 2024-11-11 NOTE — ASSESSMENT & PLAN NOTE
Patient presented to ED endorsing abdominal pain, rectal bleeding, weight loss x 2 months - recently immigrated from [place] and was getting care there. Reporting reluctance to take medications in setting of hx of drug intolerance/allergies. Denies fevers/chills/N/V/D.     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 Fluid-filled distal small bowel loops which may be due to superimposed enteritis.    Plan:  -Continue PRN Toradol  -Rest of plan per rectal mass

## 2024-11-11 NOTE — PLAN OF CARE
Problem: PAIN - ADULT  Goal: Verbalizes/displays adequate comfort level or baseline comfort level  Description: Interventions:  - Encourage patient to monitor pain and request assistance  - Assess pain using appropriate pain scale  - Administer analgesics based on type and severity of pain and evaluate response  - Implement non-pharmacological measures as appropriate and evaluate response  - Consider cultural and social influences on pain and pain management  - Notify physician/advanced practitioner if interventions unsuccessful or patient reports new pain  Outcome: Progressing     Problem: INFECTION - ADULT  Goal: Absence or prevention of progression during hospitalization  Description: INTERVENTIONS:  - Assess and monitor for signs and symptoms of infection  - Monitor lab/diagnostic results  - Monitor all insertion sites, i.e. indwelling lines, tubes, and drains  - Monitor endotracheal if appropriate and nasal secretions for changes in amount and color  - [place] appropriate cooling/warming therapies per order  - Administer medications as ordered  - Instruct and encourage patient and family to use good hand hygiene technique  - Identify and instruct in appropriate isolation precautions for identified infection/condition  Outcome: Progressing  Goal: Absence of fever/infection during neutropenic period  Description: INTERVENTIONS:  - Monitor WBC    Outcome: Progressing     Problem: SAFETY ADULT  Goal: Patient will remain free of falls  Description: INTERVENTIONS:  - Educate patient/family on patient safety including physical limitations  - Instruct patient to call for assistance with activity   - Consult OT/PT to assist with strengthening/mobility   - Keep Call bell within reach  - Keep bed low and locked with side rails adjusted as appropriate  - Keep care items and personal belongings within reach  - Initiate and maintain comfort rounds  - Make Fall Risk Sign visible to staff  - Offer Toileting every 2 Hours,  in advance of need  - Initiate/Maintain bedalarm  - Obtain necessary fall risk management equipment:   - Apply yellow socks and bracelet for high fall risk patients  - Consider moving patient to room near nurses station  Outcome: Progressing  Goal: Maintain or return to baseline ADL function  Description: INTERVENTIONS:  -  Assess patient's ability to carry out ADLs; assess patient's baseline for ADL function and identify physical deficits which impact ability to perform ADLs (bathing, care of mouth/teeth, toileting, grooming, dressing, etc.)  - Assess/evaluate cause of self-care deficits   - Assess range of motion  - Assess patient's mobility; develop plan if impaired  - Assess patient's need for assistive devices and provide as appropriate  - Encourage maximum independence but intervene and supervise when necessary  - Involve family in performance of ADLs  - Assess for home care needs following discharge   - Consider OT consult to assist with ADL evaluation and planning for discharge  - Provide patient education as appropriate  Outcome: Progressing     Problem: DISCHARGE PLANNING  Goal: Discharge to home or other facility with appropriate resources  Description: INTERVENTIONS:  - Identify barriers to discharge w/patient and caregiver  - Arrange for needed discharge resources and transportation as appropriate  - Identify discharge learning needs (meds, wound care, etc.)  - Arrange for interpretive services to assist at discharge as needed  - Refer to Case Management Department for coordinating discharge planning if the patient needs post-hospital services based on physician/advanced practitioner order or complex needs related to functional status, cognitive ability, or social support system  Outcome: Progressing     Problem: Knowledge Deficit  Goal: Patient/family/caregiver demonstrates understanding of disease process, treatment plan, medications, and discharge instructions  Description: Complete learning  assessment and assess knowledge base.  Interventions:  - Provide teaching at level of understanding  - Provide teaching via preferred learning methods  Outcome: Progressing

## 2024-11-11 NOTE — H&P
"H&P - Family Medicine   Name: Miriam Smith 74 y.o. female I MRN: [id number]  Unit/Bed#: [address] [place] [address] Date of Admission: 11/10/2024   Date of Service: 11/11/2024 I Hospital Day: 0     Assessment & Plan  Rectal mass  Patient endorsing gluteal tenderness x 2 months with \"feeling something\", abdominal pain, weight loss, rectal bleeding.     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    Plan:  -GI consulted, appreciate recommendations   -NPO after midnight   Type 2 diabetes mellitus (HCC)  Chronic, no known HgbA1c - home mediations are unknown as patient has recently immigrated from [place] - endorses taking unknown diabetic medication.    Plan:  -Carb control when diet started   -Insulin sliding scale   -monitor for hypoglycemia  -Hold oral agents during hospitalization  Rectal bleeding  Chronic - noting 2 month hx of regular rectal bleeding unrelated to bowel movements  Endorsing that she was seen by GI in [place] with colonoscopy but did not receive results. No baseline labs to compare, was diagnosed with anal fissures previously    In ED: BUN 27, Cr WNL, Hgb 10.5, MCV WNL    Plan:  -GI consulted, appreciate recommendations   -NPO  -Pending iron panel  -IV Protonix 40 mg BID  -Monitor Hgb, record stool at bedside  History of anal fissures  Patient endorses history of anal fissures as diagnosed with [place], noting she applied unknown topical jelly to her rectum with some relief from discomfort.     Plan:  -Continue metamucil   -GI consulted, appreciate recommendations  Electrolyte abnormality  Mag 1.8 on admission, continue to monitor and replete as indicated  Abdominal pain  Patient presented to ED endorsing abdominal pain, rectal bleeding, weight loss x 2 months - recently immigrated from " [place] and was getting care there. Reporting reluctance to take medications in setting of hx of drug intolerance/allergies. Denies fevers/chills/N/V/D.     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 Fluid-filled distal small bowel loops which may be due to superimposed enteritis.    Plan:  -Continue PRN Toradol  -Rest of plan per rectal mass    Code Status: Level 1 - Full Code  Admission Status: INPATIENT   Disposition: Patient requires Med Surg      History of Present Illness   Miriam Smith is a 74 year old female who presents to the ED with abdominal pain, weight loss, rectal bleeding, gluteal tenderness x 2 months. Past medical history includes anal fissures, T2DM, otherwise limited as patient is a recent immigrant to the  from [place]. Patient endorsing 2 month history of worsening hx of symptoms with pain unrelieved by Etoricoxib. Noting she has a hx of consistent rectal bleeding unrelated to BM and rectal tenderness which she attributed to fissures. Noting she had a colonoscopy in [place] but is unaware of results. Surgical hx includes  only, denies any other surgeries. Endorses strong family hx of colon cancer. Denies fevers, chills, N/V, melena, hematemesis.     Review of Systems   Constitutional:  Positive for unexpected weight change. Negative for activity change, chills, fatigue and fever.   HENT: Negative.     Respiratory: Negative.     Cardiovascular: Negative.    Gastrointestinal:  Positive for abdominal pain, anal bleeding and rectal pain. Negative for blood in stool, constipation, diarrhea, nausea and vomiting.   Musculoskeletal: Negative.    Skin: Negative.        Objective :  Temp:  [98.3 °F (36.8 °C)-99.5 °F (37.5 °C)] 98.3 °F (36.8 °C)  HR:  [70-86] 70  BP:  "(136-172)/(67-87) 136/87  Resp:  [16-20] 18  SpO2:  [94 %-97 %] 96 %  O2 Device: None (Room air)    Intake & Output:  I/O       None          Weights:   IBW (Ideal Body Weight): 50.1 kg    Body mass index is 20.96 kg/m².  Weight (last 2 days)       Date/Time Weight    11/10/24 2300 52 (114.6)    11/10/24 1735 53.7 (118.4)          Physical Exam  Constitutional:       Appearance: Normal appearance.   HENT:      Head: Normocephalic and atraumatic.      Right Ear: External ear normal.      Left Ear: External ear normal.      Mouth/Throat:      Pharynx: Oropharynx is clear.   Eyes:      Conjunctiva/sclera: Conjunctivae normal.   Cardiovascular:      Rate and Rhythm: Normal rate and regular rhythm.      Heart sounds: Normal heart sounds.   Pulmonary:      Effort: Pulmonary effort is normal.      Breath sounds: Normal breath sounds.   Abdominal:      General: There is no distension.      Palpations: Abdomen is soft.      Tenderness: There is no abdominal tenderness.   Musculoskeletal:      Right lower leg: No edema.      Left lower leg: No edema.   Skin:     General: Skin is warm and dry.      Capillary Refill: Capillary refill takes less than 2 seconds.   Neurological:      General: No focal deficit present.      Mental Status: She is alert.         Lab Results: I have reviewed the following results:  Recent Labs     11/10/24  1842   WBC 13.70*   HGB 10.5*   HCT 32.9*      SODIUM 138   K 4.2      CO2 27   BUN 27*   CREATININE 0.77   GLUC 220*   MG 1.8*   AST 12*   ALT 14   ALB 3.4*   TBILI 0.25   ALKPHOS 47     Micro:  No results found for: \"BLOODCX\", \"URINECX\", \"WOUNDCULT\", \"SPUTUMCULTUR\"          Currently Ordered Meds:   Current Facility-Administered Medications:     diphenhydrAMINE (BENADRYL) tablet 12.5 mg, Q6H PRN    insulin lispro (HumALOG/ADMELOG) 100 units/mL subcutaneous injection 1-5 Units, 4x Daily (AC & HS) **AND** Fingerstick Glucose (POCT), 4x Daily AC and at bedtime    pantoprazole (PROTONIX) " "injection 40 mg, Q12H TEGAN    polyethylene glycol (MIRALAX) packet 17 g, Daily PRN    psyllium (METAMUCIL) 1 packet, Daily    sodium chloride 0.9 % infusion, Continuous, Last Rate: 100 mL/hr (11/10/24 4157)  VTE Pharmacologic Prophylaxis: Reason for no pharmacologic prophylaxis contraindicated in setting of rectal bleeding  VTE Mechanical Prophylaxis: sequential compression device    Administrative Statements     Portions of the record may have been created with voice recognition software.  Occasional wrong word or \"sound a like\" substitutions may have occurred due to the inherent limitations of voice recognition software.  Read the chart carefully and recognize, using context, where substitutions have occurred.  ==  Suki Merlos DO  [facility]  [facility] Residency PGY-2  "

## 2024-11-11 NOTE — CASE MANAGEMENT
Case Management Assessment & Discharge Planning Note    Patient name Miriam Smith  Location [address] [place] [address] MRN [id number]  : 1950 Date 2024       Current Admission Date: 11/10/2024  Current Admission Diagnosis:Rectal mass   Patient Active Problem List    Diagnosis Date Noted Date Diagnosed    Rectal bleeding 2024     History of anal fissures 2024     Electrolyte abnormality 2024     Pain, rectal 2024     Rectal mass 11/10/2024     Type 2 diabetes mellitus (HCC) 11/10/2024       LOS (days): 0  Geometric Mean LOS (GMLOS) (days):   Days to GMLOS:     OBJECTIVE:         Current admission status: Observation  Referral Reason: Other (Discharge planning)    Preferred Pharmacy:   [id number] - [place] - [address] [facility]  [address] [facility] [address]  Phone: [phone] Fax: [phone]    Primary Care Provider: No primary care provider on file.    Primary Insurance:   Secondary Insurance:     ASSESSMENT:  Active Health Care Proxies    There are no active Health Care Proxies on file.        Obs Notice Signed: 11/10/24    Readmission Root Cause  30 Day Readmission: No    Patient Information  Admitted from:: Home  Mental Status: Alert  During Assessment patient was accompanied by: Daughter  Assessment information provided by:: Daughter  Primary Caregiver: Self  Support Systems: Daughter, Family members  County of Residence: [place]  What city do you live in?: [place]  Home entry access options. Select all that apply.: Stairs  Number of steps to enter home.: 3  Do the steps have railings?: No  Type of Current Residence: [address] [place] [address] home  Upon entering residence, is there a bedroom on the main floor (no further steps)?: No  A bedroom is located on the following floor levels of residence (select all that apply):: Basement  Upon entering residence, is there a bathroom on the main floor (no further steps)?: Yes  Number of steps to  basement from main floor: One Flight (Bedroom and bathroom in finished basement)  Living Arrangements: Lives w/ Daughter  Is patient a ?: No    Activities of Daily Living Prior to Admission  Functional Status: Independent  Completes ADLs independently?: Yes  Ambulates independently?: Yes  Does patient use assisted devices?: No  Does patient currently own DME?: No  Does patient have a history of Outpatient Therapy (PT/OT)?: No  Does the patient have a history of Short-Term Rehab?: No  Does patient have a history of HHC?: No  Does patient currently have HHC?: No    Patient Information Continued  Income Source: Unemployed  Does patient have prescription coverage?: No  Does patient receive dialysis treatments?: No    Means of Transportation  Means of Transport to Appts:: Family transport      DISCHARGE DETAILS:    Discharge planning discussed with:: Daughter Coco  Freedom of Choice: Yes    Comments - Freedom of Choice: CAL spoke with daughter Coco at bedside to introduce role of CM, conduct assessment and discuss discharge planning.  Patient arrived in the [place] recently from [place] and is currently living with her daughter in [place] while she completes immigration paperwork.  She is uninsured and [facility] will reach out per the hospital financial counselor.  SW notified daughter that [facility] will contact her regarding patient's eligibility for shayna care and Coco verbalized understanding.      Patient is independent at baseline and plan is for patient to return home with daughter when medically cleared.  Primary team was made aware of patient's uninsured status.  SW will continue to follow.      CM contacted family/caregiver?: Yes  Were Treatment Team discharge recommendations reviewed with patient/caregiver?: Yes  Did patient/caregiver verbalize understanding of patient care needs?: Yes  Were patient/caregiver advised of the risks associated with not following Treatment Team discharge recommendations?:  Yes    Contacts  Patient Contacts: Coco Weir (daughter)  Relationship to Patient:: Family  Contact Method: In Person  Reason/Outcome: Emergency Contact, Discharge Planning    Requested Home Health Care         Is the patient interested in HHC at discharge?: No    DME Referral Provided  Referral made for DME?: No    Other Referral/Resources/Interventions Provided:  Interventions: None Indicated    Would you like to participate in our [facility] service program?  : No - Declined    Treatment Team Recommendation: Home  Discharge Destination Plan:: Home  Transport at Discharge : Family

## 2024-11-11 NOTE — ASSESSMENT & PLAN NOTE
On admission WBC 13.7. Although mildly elevated white count, most likely 2/2 rectal mass which was suspicious for necrosis. WBC decreased to 12.59 without antibiotics.    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    Plan:  Pending UA  Will continue to monitor labs

## 2024-11-11 NOTE — ASSESSMENT & PLAN NOTE
Patient presented to ED endorsing abdominal pain, rectal bleeding, weight loss x 2 months - recently immigrated from [place] and was getting care there. Reporting reluctance to take medications in setting of hx of drug intolerance/allergies. Denies fevers/chills/N/V/D.     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 Fluid-filled distal small bowel loops which may be due to superimposed enteritis.    Plan:  -Continue PRN Toradol  -Rest of plan per rectal mass

## 2024-11-11 NOTE — UTILIZATION REVIEW
Initial Clinical Review    Observation 11/10/24 @ 2218, converted to inpatient admission 11/12/24 @ 1249 for continued care & tx for rectal mass.    Admission: Date/Time/Statement:   Admission Orders (From admission, onward)       Ordered        11/12/24 1249  INPATIENT ADMISSION  Once            11/10/24 2218  Place in Observation  Once                          Orders Placed This Encounter   Procedures    INPATIENT ADMISSION     Standing Status:   Standing     Number of Occurrences:   1     Order Specific Question:   Level of Care     Answer:   Med Surg [16]     Order Specific Question:   Estimated length of stay     Answer:   More than 2 Midnights     Order Specific Question:   Certification     Answer:   I certify that inpatient services are medically necessary for this patient for a duration of greater than two midnights. See H&P and MD Progress Notes for additional information about the patient's course of treatment.     ED Arrival Information       Expected   -    Arrival   11/10/2024 17:23    Acuity   Urgent              Means of arrival   Walk-In    Escorted by   Family Member    Service   Family Medicine    Admission type   Emergency              Arrival complaint   rectal pain             Chief Complaint   Patient presents with    Rectal Problems     Dx with anal fissure a about a month ago that seemed to get better, now having some bleeding and pain in area. Able to move bowels. No vomiting, also having weight loss per family       Initial Presentation:  74 yof to ER from home for rectal pain, blood in stool, foul smelling drainage. Family reports that approximately 1 month ago patient was diagnosed with an anal fissure while she was living in [place]. Patient was given some topical medication and had improvement but now it is worsening again. Presents with low grade fever, external hemorrhoid, non-thrombosed. Admission CT a/p: Heterogeneous peripherally enhancing mass/malignancy involving the rectum and  infiltrating the left ischiorectal and gluteal fat measuring approximately 7.7 x 7.5 x 8.6 cm with areas of fluid density compatible with internal necrosis. Labs: WBC 13.70, Hgb 10.5, BUN 27, Mg 1.8, ast 12, alb 3.4.  Placed in observation status for rectal mass. Started on IV PPI, IVF. GI consulted.    11/11/24- observation:  Rectal pain 2nd rectal mass, GI consulted, colonoscopy planned for tomorrow.     Observation to  admission 11/12/24:  Bowel prep completed. Colonoscopy planned for this afternoon.  IV PPI continued. IVF maintained. K repletion given. IV Venofer infusion given for iron deficiency anemia.     Date: 11/13/24  DAY 2:  S/p colonoscopy. Reports rectal pain and there was some dried blood on the pad. Using IV Toradol for pain. MRI rectum ordered, oncology consulted. IV Venofer infusions continued, IVF maintained.       ED Treatment-Medication Administration from 11/10/2024 1723 to 11/10/2024 2301         Date/Time Order Dose Route Action     11/10/2024 1927 iohexol (OMNIPAQUE) 350 MG/ML injection (MULTI-DOSE) 100 mL 100 mL Intravenous Given            Scheduled Medications:  Medications 11/03 11/04 11/05 11/06 11/07 11/08 11/09 11/10 11/11 11/12    insulin lispro (HumALOG/ADMELOG) 100 units/mL subcutaneous injection 1-5 Units  Dose: 1-5 Units  Freq: 4 times daily (before meals and at bedtime) Route: SC  Start: 11/10/24 2300   Admin Instructions:              2344      (0810)     (1234)     1650     2209      (0803)     1210)     1600     2205        iron sucrose (VENOFER) 200 mg in sodium chloride 0.9 % 100 mL IVPB  Dose: 200 mg  Freq: Daily Route: IV  Last Dose: 200 mg (11/12/24 0911)  Start: 11/12/24 0900 End: 11/15/24 0859             0911        ketorolac (TORADOL) injection 15 mg  Dose: 15 mg  Freq: Once Route: IV  Start: 11/10/24 2315 End: 11/10/24 2337   Admin Instructions:              2337          lidocaine (LIDODERM) 5 % patch 1 patch  Dose: 1 patch  Freq: Daily Route: TP  Start:  11/12/24 0900   Admin Instructions:      Order specific questions:                0911 [C]     2111        magnesium citrate (CITROMA) oral solution 296 mL  Dose: 296 mL  Freq: Once Route: PO  Indications of Use: COLONOSCOPY  Start: 11/11/24 1500 End: 11/11/24 1442   Admin Instructions:               1442         magnesium citrate (CITROMA) oral solution 296 mL  Dose: 296 mL  Freq: Once Route: PO  Indications of Use: COLONOSCOPY  Start: 11/11/24 1115 End: 11/11/24 1247   Admin Instructions:               1247         magnesium sulfate IVPB (premix) SOLN 1 g  Dose: 1 g  Freq: Once Route: IV  Start: 11/11/24 0100 End: 11/11/24 0218   Admin Instructions:               0118         pantoprazole (PROTONIX) injection 40 mg  Dose: 40 mg  Freq: Every 12 hours scheduled Route: IV  Start: 11/11/24 0200   Admin Instructions:               0229     1437      0300     1400        polyethylene glycol (GOLYTELY) bowel prep 4,000 mL  Dose: 4,000 mL  Freq: Once Route: PO  Indications of Use: COLONOSCOPY  Start: 11/11/24 1700 End: 11/11/24 1649   Admin Instructions:               1649         potassium chloride (Klor-Con M20) CR tablet 20 mEq  Dose: 20 mEq  Freq: Once Route: PO  Start: 11/11/24 0645 End: 11/11/24 0659   Admin Instructions:               0659         potassium chloride (Klor-Con M20) CR tablet 40 mEq  Dose: 40 mEq  Freq: Once Route: PO  Start: 11/12/24 0615 End: 11/12/24 0645   Admin Instructions:                0645        potassium chloride (Klor-Con M20) CR tablet 40 mEq  Dose: 40 mEq  Freq: Once Route: PO  Start: 11/11/24 0800 End: 11/11/24 0914   Admin Instructions:               0914         psyllium (METAMUCIL) 1 packet  Dose: 1 packet  Freq: Daily Route: PO  Start: 11/11/24 0900   Admin Instructions:               0915 0859        Legend:       [id number]/0611/0711/0811/0911/1011/1111/12        Continuous Meds Sorted by Name  for Miriam Dunham as of 11/03/24 through  11/12/24  Legend:       Medications 11/03 11/04 11/05 11/06 11/07 11/08 11/09 11/10 11/11 11/12   multi-electrolyte (PLASMALYTE-A/ISOLYTE-S PH 7.4) IV solution  Rate: 100 mL/hr Dose: 100 mL/hr  Freq: Continuous Route: IV  Indications of Use: IV Hydration  Last Dose: 100 mL/hr (11/12/24 1146)  Start: 11/12/24 1015             1146        sodium chloride 0.9 % infusion  Rate: 100 mL/hr Dose: 100 mL/hr  Freq: Continuous Route: IV  Indications of Use: IV Hydration  Last Dose: Stopped (11/12/24 1147)  Start: 11/10/24 2300 End: 11/12/24 1009           2337      1029     2214      1009-D/C'd  1147                    PRN Meds Sorted by Name  for Miriam Dunham as of 11/03/24 through 11/12/24  Legend:       Medications 11/03 11/04 11/05 11/06 11/07 11/08 11/09 11/10 11/11 11/12   diphenhydrAMINE (BENADRYL) tablet 12.5 mg  Dose: 12.5 mg  Freq: Every 6 hours PRN Route: PO  PRN Reason: itching  Start: 11/10/24 2253                iohexol (OMNIPAQUE) 350 MG/ML injection (MULTI-DOSE) 100 mL  Dose: 100 mL  Freq: Once in imaging Route: IV  PRN Reason: contrast  Start: 11/10/24 1927 End: 11/10/24 1927           1927          polyethylene glycol (MIRALAX) packet 17 g  Dose: 17 g  Freq: Daily PRN Route: PO  PRN Reason: constipation  Start: 11/10/24 2249   Admin Instructions:                                 ED Triage Vitals [11/10/24 1735]   Temperature Pulse Respirations Blood Pressure SpO2 Pain Score   99.5 °F (37.5 °C) 86 20 158/71 97 % 10 - Worst Possible Pain     Weight (last 2 days)       None            Vital Signs (last 3 days)       Date/Time Temp Pulse Resp BP MAP (mmHg) SpO2 O2 Device Patient Position - Orthostatic VS [place] Coma Scale Score Pain    11/13/24 1300 -- -- -- -- -- -- -- -- 15 --    11/13/24 0850 -- -- -- -- -- -- -- -- -- 7    11/13/24 07:52:49 98.1 °F (36.7 °C) 76 -- 133/50 78 94 % -- -- -- --    11/12/24 22:15:51 98 °F (36.7 °C) 75 -- 133/50 78 95 % -- -- -- --    11/12/24 2112 -- -- -- -- -- -- --  -- -- 5    11/12/24 2010 -- -- -- -- -- -- None (Room air) -- 15 --    11/12/24 19:48:33 98.4 °F (36.9 °C) 81 -- 137/48 78 97 % -- -- -- --    11/12/24 18:11:12 98.1 °F (36.7 °C) 74 19 138/59 85 95 % -- -- -- --    11/12/24 15:30:54 97.3 °F (36.3 °C) 64 19 102/81 88 92 % -- -- -- --    11/12/24 1511 -- 68 18 146/66 -- 98 % -- -- -- --    11/12/24 1502 -- 82 18 163/69 -- 97 % None (Room air) -- -- --    11/12/24 1452 -- 78 18 188/84 -- 97 % None (Room air) -- -- --    11/12/24 1342 98 °F (36.7 °C) 68 18 147/65 -- 96 % None (Room air) -- -- No Pain    11/12/24 0859 98.2 °F (36.8 °C) 75 18 133/58 83 94 % None (Room air) Lying -- No Pain    11/12/24 08:58:35 98.2 °F (36.8 °C) 86 -- 133/58 83 94 % -- -- -- --    11/11/24 23:01:23 98 °F (36.7 °C) 86 -- 142/58 86 98 % -- -- -- --    11/11/24 20:21:25 98.6 °F (37 °C) 93 -- 129/60 83 97 % None (Room air) -- 15 No Pain    11/11/24 18:04:30 98.6 °F (37 °C) 77 18 131/60 84 95 % -- -- -- --    11/11/24 15:15:55 98.5 °F (36.9 °C) 80 20 130/58 82 95 % -- -- -- --    11/11/24 0732 -- -- -- -- -- -- None (Room air) -- 15 No Pain    11/11/24 07:30:14 97.9 °F (36.6 °C) 69 18 126/56 79 93 % -- -- -- --    11/11/24 01:18:16 98.3 °F (36.8 °C) 70 18 136/87 103 96 % -- -- -- --    11/11/24 00:48:40 -- 73 -- -- -- 94 % -- -- -- --    11/11/24 0039 -- -- -- -- -- -- -- -- 15 --    11/10/24 2337 -- -- -- -- -- -- -- -- -- 3    11/10/24 2300 -- -- -- -- -- -- -- -- -- No Pain    11/10/24 2245 -- 82 16 167/74 107 97 % None (Room air) Lying -- --    11/10/24 2215 -- 80 18 168/83 119 97 % None (Room air) Lying -- --    11/10/24 2145 -- 80 16 150/69 99 95 % None (Room air) Lying -- --    11/10/24 2115 -- 80 18 172/72 103 96 % -- Lying -- --    11/10/24 2045 -- 80 16 161/67 96 96 % None (Room air) Lying -- --    11/10/24 1917 -- -- -- -- -- -- None (Room air) -- -- --    11/10/24 1916 -- -- -- -- -- -- -- -- 15 --    11/10/24 1908 -- 78 18 143/67 97 96 % None (Room air) Lying -- --    11/10/24 1735  99.5 °F (37.5 °C) 86 20 158/71 -- 97 % None (Room air) Sitting -- 10 - Worst Possible Pain              Pertinent Labs/Diagnostic Test Results:   Radiology:  CT chest wo contrast   Final Interpretation by Chastity Sher MD (11/13 3374)      No lung metastases.      Severe coronary artery calcification indicating atherosclerotic heart disease.      Cholelithiasis.         Workstation performed: [id number]         CT abdomen pelvis with contrast   Final Interpretation by Steve Jewell MD (11/10 3133)      Heterogeneous peripherally enhancing mass/malignancy involving the rectum and infiltrating the left ischiorectal and gluteal fat measuring approximately 7.7 x 7.5 x 8.6 cm with areas of fluid density compatible with internal necrosis.      I personally discussed this study with ALMA ROSA ROMAN on 11/10/2024 9:49 PM.      Workstation performed: [id number]         MRI inpatient order    (Results Pending)     Cardiology:  No orders to display     GI:  Colonoscopy   Final Result by Morris Tom MD (11/12 1480)   Single malignant-appearing, friable, fungating and ulcerated mass    measuring 15 cm in the rectum and anal region, covering one half of the    circumference; performed cold forceps biopsy with partial removal            RECOMMENDATION:   Await pathology results    Repeat colonoscopy in 1 year, due: 11/12/2025   Inadequate bowel preparation          Check CEA levels.   Patient will need colorectal surgery evaluation.   Resume diet as tolerated.   Communicated exam results with patient's daughter who was at bedside.             Morris Tom MD                  Results from last 7 days   Lab Units 11/13/24  0512 11/12/24  0436 11/11/24  0442 11/10/24  1842   WBC Thousand/uL 9.91 13.47* 12.59* 13.70*   HEMOGLOBIN g/dL 9.4* 10.5* 10.0* 10.5*   HEMATOCRIT % 29.7* 33.2* 31.9* 32.9*   PLATELETS Thousands/uL 288 315 296 309   TOTAL NEUT ABS Thousands/µL 7.94* 11.41* 10.54* 11.69*         Results from last  "7 days   Lab Units 11/13/24  0512 11/12/24  0436 11/11/24  0442 11/10/24  1842   SODIUM mmol/L 138 141 138 138   POTASSIUM mmol/L 4.1 3.7 3.7 4.2   CHLORIDE mmol/L 109* 109* 109* 104   CO2 mmol/L 18* 19* 23 27   ANION GAP mmol/L 11 13 6 7   BUN mg/dL 12 12 17 27*   CREATININE mg/dL 0.37* 0.42* 0.55* 0.77   EGFR ml/min/1.73sq m 105 101 92 76   CALCIUM mg/dL 7.2* 8.3* 7.9* 8.7   MAGNESIUM mg/dL  --  2.0 2.1 1.8*     Results from last 7 days   Lab Units 11/10/24  1842   AST U/L 12*   ALT U/L 14   ALK PHOS U/L 47   TOTAL PROTEIN g/dL 6.8   ALBUMIN g/dL 3.4*   TOTAL BILIRUBIN mg/dL 0.25     Results from last 7 days   Lab Units 11/13/24  1136 11/13/24  0727 11/12/24  1952 11/12/24  1538 11/12/24  1215 11/12/24  0722 11/11/24  2018 11/11/24  1608 11/11/24  1215 11/11/24  0731 11/10/24  2342   POC GLUCOSE mg/dl 235* 87 172* 122 117 126 158* 154* 116 111 155*     Results from last 7 days   Lab Units 11/13/24  0512 11/12/24  0436 11/11/24  0442 11/10/24  1842   GLUCOSE RANDOM mg/dL 79 99 134 220*         Results from last 7 days   Lab Units 11/10/24  1842   HEMOGLOBIN A1C % 8.3*   EAG mg/dl 192     No results found for: \"BETA-HYDROXYBUTYRATE\"                           Results from last 7 days   Lab Units 11/12/24  0436   PROTIME seconds 15.4*   INR  1.17                             Results from last 7 days   Lab Units 11/11/24  0442   FERRITIN ng/mL 80   IRON SATURATION % 5*   IRON ug/dL 11*   TIBC ug/dL 240*                         Results from last 7 days   Lab Units 11/13/24  0512   CEA ng/mL 5.6*                                                           Past Medical History:   Diagnosis Date    Diabetes mellitus (HCC)      Present on Admission:  **None**      Admitting Diagnosis: Blood in stool [K92.1]  Rectal pain [K62.89]  Rectal mass [K62.89]  Rectal discomfort [K62.89]  Age/Sex: 74 y.o. female    Network Utilization Review Department  ATTENTION: Please call with any questions or concerns to [phone] and carefully " listen to the prompts so that you are directed to the right person. All voicemails are confidential.   For Discharge needs, contact Care Management DC Support Team at [phone] opt. 2  Send all requests for admission clinical reviews, approved or denied determinations and any other requests to dedicated fax number below belonging to the campus where the patient is receiving treatment. List of dedicated fax numbers for the Facilities:  FACILITY NAME UR FAX NUMBER   ADMISSION DENIALS (Administrative/Medical Necessity) [phone]   DISCHARGE SUPPORT TEAM (NETWORK) [phone]   PARENT CHILD HEALTH (Maternity/NICU/Pediatrics) [phone]   [facility] [phone]   [facility] [phone]   [facility] [phone]   [facility] [phone]   [facility] [phone]   [facility] [phone]   [facility] [phone]   [facility] [phone]   [facility] [phone]   [facility] [phone]   [facility] [phone]   [facility] [phone]

## 2024-11-11 NOTE — ASSESSMENT & PLAN NOTE
"Patient endorsing gluteal tenderness x 2 months with \"feeling something\", abdominal pain, weight loss, rectal bleeding.     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    Plan:  -GI consulted, appreciate recommendations   -NPO after midnight   "

## 2024-11-11 NOTE — ASSESSMENT & PLAN NOTE
"Patient endorsing gluteal tenderness x 2 months with \"feeling something\", abdominal pain, weight loss, rectal bleeding.     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    Plan:  -GI consulted, appreciate recommendations   -Getting bowel prep today and will do colonoscopy tomorrow  "

## 2024-11-11 NOTE — ASSESSMENT & PLAN NOTE
Hgb on admission 10.5. Unsure of baseline hgb d/t recent move from [place] and having no access of previous medical records.     Iron panel: Iron sat 5 %, TIBC 240, Iron 11    Plan:  Ordered Venofer infusions (11/11-)  Continue to monitor labs and symptoms

## 2024-11-11 NOTE — ASSESSMENT & PLAN NOTE
Patient presented with rectal pain and rectal bleeding which has been going on for 2 months but has had increasing severity of symptoms.  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      -Pain management per attending team

## 2024-11-12 ENCOUNTER — ANESTHESIA (INPATIENT)
Dept: PERIOP | Facility: HOSPITAL | Age: 74
DRG: 240 | End: 2024-11-12
Payer: COMMERCIAL

## 2024-11-12 ENCOUNTER — APPOINTMENT (OUTPATIENT)
Dept: PERIOP | Facility: HOSPITAL | Age: 74
DRG: 240 | End: 2024-11-12
Payer: COMMERCIAL

## 2024-11-12 ENCOUNTER — APPOINTMENT (INPATIENT)
Dept: RADIOLOGY | Facility: HOSPITAL | Age: 74
DRG: 240 | End: 2024-11-12
Payer: COMMERCIAL

## 2024-11-12 ENCOUNTER — ANESTHESIA EVENT (INPATIENT)
Dept: PERIOP | Facility: HOSPITAL | Age: 74
DRG: 240 | End: 2024-11-12
Payer: COMMERCIAL

## 2024-11-12 PROBLEM — M54.2 NECK PAIN: Status: ACTIVE | Noted: 2024-11-12

## 2024-11-12 LAB
ANION GAP SERPL CALCULATED.3IONS-SCNC: 13 MMOL/L (ref 4–13)
BASOPHILS # BLD AUTO: 0.04 THOUSANDS/ÂΜL (ref 0–0.1)
BASOPHILS NFR BLD AUTO: 0 % (ref 0–1)
BUN SERPL-MCNC: 12 MG/DL (ref 5–25)
CALCIUM SERPL-MCNC: 8.3 MG/DL (ref 8.4–10.2)
CHLORIDE SERPL-SCNC: 109 MMOL/L (ref 96–108)
CO2 SERPL-SCNC: 19 MMOL/L (ref 21–32)
CREAT SERPL-MCNC: 0.42 MG/DL (ref 0.6–1.3)
EOSINOPHIL # BLD AUTO: 0.06 THOUSAND/ÂΜL (ref 0–0.61)
EOSINOPHIL NFR BLD AUTO: 0 % (ref 0–6)
ERYTHROCYTE [DISTWIDTH] IN BLOOD BY AUTOMATED COUNT: 15.4 % (ref 11.6–15.1)
GFR SERPL CREATININE-BSD FRML MDRD: 101 ML/MIN/1.73SQ M
GLUCOSE P FAST SERPL-MCNC: 99 MG/DL (ref 65–99)
GLUCOSE SERPL-MCNC: 117 MG/DL (ref 65–140)
GLUCOSE SERPL-MCNC: 122 MG/DL (ref 65–140)
GLUCOSE SERPL-MCNC: 126 MG/DL (ref 65–140)
GLUCOSE SERPL-MCNC: 172 MG/DL (ref 65–140)
GLUCOSE SERPL-MCNC: 99 MG/DL (ref 65–140)
HCT VFR BLD AUTO: 33.2 % (ref 34.8–46.1)
HGB BLD-MCNC: 10.5 G/DL (ref 11.5–15.4)
IMM GRANULOCYTES # BLD AUTO: 0.05 THOUSAND/UL (ref 0–0.2)
IMM GRANULOCYTES NFR BLD AUTO: 0 % (ref 0–2)
INR PPP: 1.17 (ref 0.85–1.19)
LYMPHOCYTES # BLD AUTO: 0.89 THOUSANDS/ÂΜL (ref 0.6–4.47)
LYMPHOCYTES NFR BLD AUTO: 7 % (ref 14–44)
MAGNESIUM SERPL-MCNC: 2 MG/DL (ref 1.9–2.7)
MCH RBC QN AUTO: 27.7 PG (ref 26.8–34.3)
MCHC RBC AUTO-ENTMCNC: 31.6 G/DL (ref 31.4–37.4)
MCV RBC AUTO: 88 FL (ref 82–98)
MONOCYTES # BLD AUTO: 1.02 THOUSAND/ÂΜL (ref 0.17–1.22)
MONOCYTES NFR BLD AUTO: 8 % (ref 4–12)
NEUTROPHILS # BLD AUTO: 11.41 THOUSANDS/ÂΜL (ref 1.85–7.62)
NEUTS SEG NFR BLD AUTO: 85 % (ref 43–75)
NRBC BLD AUTO-RTO: 0 /100 WBCS
PLATELET # BLD AUTO: 315 THOUSANDS/UL (ref 149–390)
PMV BLD AUTO: 8.9 FL (ref 8.9–12.7)
POTASSIUM SERPL-SCNC: 3.7 MMOL/L (ref 3.5–5.3)
PROTHROMBIN TIME: 15.4 SECONDS (ref 12.3–15)
RBC # BLD AUTO: 3.79 MILLION/UL (ref 3.81–5.12)
SODIUM SERPL-SCNC: 141 MMOL/L (ref 135–147)
WBC # BLD AUTO: 13.47 THOUSAND/UL (ref 4.31–10.16)

## 2024-11-12 PROCEDURE — 88305 TISSUE EXAM BY PATHOLOGIST: CPT | Performed by: STUDENT IN AN ORGANIZED HEALTH CARE EDUCATION/TRAINING PROGRAM

## 2024-11-12 PROCEDURE — 99232 SBSQ HOSP IP/OBS MODERATE 35: CPT | Performed by: FAMILY MEDICINE

## 2024-11-12 PROCEDURE — 45380 COLONOSCOPY AND BIOPSY: CPT | Performed by: INTERNAL MEDICINE

## 2024-11-12 PROCEDURE — 80048 BASIC METABOLIC PNL TOTAL CA: CPT

## 2024-11-12 PROCEDURE — 0DBP8ZX EXCISION OF RECTUM, VIA NATURAL OR ARTIFICIAL OPENING ENDOSCOPIC, DIAGNOSTIC: ICD-10-PCS | Performed by: INTERNAL MEDICINE

## 2024-11-12 PROCEDURE — 83735 ASSAY OF MAGNESIUM: CPT

## 2024-11-12 PROCEDURE — 85610 PROTHROMBIN TIME: CPT | Performed by: NURSE PRACTITIONER

## 2024-11-12 PROCEDURE — 85025 COMPLETE CBC W/AUTO DIFF WBC: CPT

## 2024-11-12 PROCEDURE — 0DJDXZZ INSPECTION OF LOWER INTESTINAL TRACT, EXTERNAL APPROACH: ICD-10-PCS | Performed by: INTERNAL MEDICINE

## 2024-11-12 PROCEDURE — 71250 CT THORAX DX C-: CPT

## 2024-11-12 PROCEDURE — 82948 REAGENT STRIP/BLOOD GLUCOSE: CPT

## 2024-11-12 RX ORDER — SODIUM CHLORIDE, SODIUM LACTATE, POTASSIUM CHLORIDE, CALCIUM CHLORIDE 600; 310; 30; 20 MG/100ML; MG/100ML; MG/100ML; MG/100ML
INJECTION, SOLUTION INTRAVENOUS CONTINUOUS PRN
Status: DISCONTINUED | OUTPATIENT
Start: 2024-11-12 | End: 2024-11-12

## 2024-11-12 RX ORDER — PROPOFOL 10 MG/ML
INJECTION, EMULSION INTRAVENOUS AS NEEDED
Status: DISCONTINUED | OUTPATIENT
Start: 2024-11-12 | End: 2024-11-12

## 2024-11-12 RX ORDER — POTASSIUM CHLORIDE 1500 MG/1
40 TABLET, EXTENDED RELEASE ORAL ONCE
Status: COMPLETED | OUTPATIENT
Start: 2024-11-12 | End: 2024-11-12

## 2024-11-12 RX ORDER — SODIUM CHLORIDE, SODIUM LACTATE, POTASSIUM CHLORIDE, CALCIUM CHLORIDE 600; 310; 30; 20 MG/100ML; MG/100ML; MG/100ML; MG/100ML
125 INJECTION, SOLUTION INTRAVENOUS CONTINUOUS
Status: DISCONTINUED | OUTPATIENT
Start: 2024-11-12 | End: 2024-11-12

## 2024-11-12 RX ORDER — LIDOCAINE HYDROCHLORIDE 10 MG/ML
INJECTION, SOLUTION EPIDURAL; INFILTRATION; INTRACAUDAL; PERINEURAL AS NEEDED
Status: DISCONTINUED | OUTPATIENT
Start: 2024-11-12 | End: 2024-11-12

## 2024-11-12 RX ORDER — LIDOCAINE 50 MG/G
1 PATCH TOPICAL DAILY
Status: DISCONTINUED | OUTPATIENT
Start: 2024-11-12 | End: 2024-11-14 | Stop reason: HOSPADM

## 2024-11-12 RX ORDER — SODIUM CHLORIDE, SODIUM LACTATE, POTASSIUM CHLORIDE, CALCIUM CHLORIDE 600; 310; 30; 20 MG/100ML; MG/100ML; MG/100ML; MG/100ML
75 INJECTION, SOLUTION INTRAVENOUS CONTINUOUS
Status: DISCONTINUED | OUTPATIENT
Start: 2024-11-12 | End: 2024-11-12

## 2024-11-12 RX ORDER — PROPOFOL 10 MG/ML
INJECTION, EMULSION INTRAVENOUS CONTINUOUS PRN
Status: DISCONTINUED | OUTPATIENT
Start: 2024-11-12 | End: 2024-11-12

## 2024-11-12 RX ORDER — SODIUM CHLORIDE, SODIUM GLUCONATE, SODIUM ACETATE, POTASSIUM CHLORIDE, MAGNESIUM CHLORIDE, SODIUM PHOSPHATE, DIBASIC, AND POTASSIUM PHOSPHATE .53; .5; .37; .037; .03; .012; .00082 G/100ML; G/100ML; G/100ML; G/100ML; G/100ML; G/100ML; G/100ML
75 INJECTION, SOLUTION INTRAVENOUS CONTINUOUS
Status: DISCONTINUED | OUTPATIENT
Start: 2024-11-12 | End: 2024-11-14 | Stop reason: HOSPADM

## 2024-11-12 RX ORDER — KETOROLAC TROMETHAMINE 30 MG/ML
15 INJECTION, SOLUTION INTRAMUSCULAR; INTRAVENOUS EVERY 6 HOURS PRN
Status: DISCONTINUED | OUTPATIENT
Start: 2024-11-12 | End: 2024-11-14 | Stop reason: HOSPADM

## 2024-11-12 RX ADMIN — PROPOFOL 50 MG: 10 INJECTION, EMULSION INTRAVENOUS at 14:41

## 2024-11-12 RX ADMIN — INSULIN LISPRO 1 UNITS: 100 INJECTION, SOLUTION INTRAVENOUS; SUBCUTANEOUS at 21:17

## 2024-11-12 RX ADMIN — POTASSIUM CHLORIDE 40 MEQ: 1500 TABLET, EXTENDED RELEASE ORAL at 06:45

## 2024-11-12 RX ADMIN — KETOROLAC TROMETHAMINE 15 MG: 30 INJECTION, SOLUTION INTRAMUSCULAR at 21:12

## 2024-11-12 RX ADMIN — IRON SUCROSE 200 MG: 20 INJECTION, SOLUTION INTRAVENOUS at 09:11

## 2024-11-12 RX ADMIN — SODIUM CHLORIDE, SODIUM LACTATE, POTASSIUM CHLORIDE, AND CALCIUM CHLORIDE: .6; .31; .03; .02 INJECTION, SOLUTION INTRAVENOUS at 14:11

## 2024-11-12 RX ADMIN — PROPOFOL 100 MG: 10 INJECTION, EMULSION INTRAVENOUS at 14:14

## 2024-11-12 RX ADMIN — PANTOPRAZOLE SODIUM 40 MG: 40 INJECTION, POWDER, FOR SOLUTION INTRAVENOUS at 03:00

## 2024-11-12 RX ADMIN — SODIUM CHLORIDE, SODIUM GLUCONATE, SODIUM ACETATE, POTASSIUM CHLORIDE, MAGNESIUM CHLORIDE, SODIUM PHOSPHATE, DIBASIC, AND POTASSIUM PHOSPHATE 100 ML/HR: .53; .5; .37; .037; .03; .012; .00082 INJECTION, SOLUTION INTRAVENOUS at 11:46

## 2024-11-12 RX ADMIN — PANTOPRAZOLE SODIUM 40 MG: 40 INJECTION, POWDER, FOR SOLUTION INTRAVENOUS at 15:25

## 2024-11-12 RX ADMIN — LIDOCAINE HYDROCHLORIDE 50 MG: 10 INJECTION, SOLUTION EPIDURAL; INFILTRATION; INTRACAUDAL; PERINEURAL at 14:14

## 2024-11-12 RX ADMIN — LIDOCAINE 1 PATCH: 50 PATCH CUTANEOUS at 09:11

## 2024-11-12 RX ADMIN — PROPOFOL 140 MCG/KG/MIN: 10 INJECTION, EMULSION INTRAVENOUS at 14:14

## 2024-11-12 NOTE — ANESTHESIA POSTPROCEDURE EVALUATION
Post-Op Assessment Note    CV Status:  Stable  Pain Score: 0    Pain management: adequate       Mental Status:  Sleepy   Hydration Status:  Stable   PONV Controlled:  None   Airway Patency:  Patent     Post Op Vitals Reviewed: Yes    No anethesia notable event occurred.    Staff: Anesthesiologist, CRNA           Last Filed PACU Vitals:  Vitals Value Taken Time   Temp     Pulse 78 11/12/24 1452   /84 11/12/24 1452   Resp 18 11/12/24 1452   SpO2 97 % 11/12/24 1452       Modified Ivette:  Activity: 2 (11/12/2024  1:41 PM)  Respiration: 2 (11/12/2024  1:41 PM)  Circulation: 2 (11/12/2024  1:41 PM)  Consciousness: 2 (11/12/2024  1:41 PM)  Oxygen Saturation: 2 (11/12/2024  1:41 PM)  Modified Ivette Score: 10 (11/12/2024  1:41 PM)

## 2024-11-12 NOTE — ASSESSMENT & PLAN NOTE
Mg 1.8 on admission, asymptomatic.    Plan:  Ordered IV Plasmalyte 100 ml/hr.  Continue to monitor labs and replete as indicated.

## 2024-11-12 NOTE — PLAN OF CARE
Problem: PAIN - ADULT  Goal: Verbalizes/displays adequate comfort level or baseline comfort level  Description: Interventions:  - Encourage patient to monitor pain and request assistance  - Assess pain using appropriate pain scale  - Administer analgesics based on type and severity of pain and evaluate response  - Implement non-pharmacological measures as appropriate and evaluate response  - Consider cultural and social influences on pain and pain management  - Notify physician/advanced practitioner if interventions unsuccessful or patient reports new pain  Outcome: Progressing     Problem: INFECTION - ADULT  Goal: Absence or prevention of progression during hospitalization  Description: INTERVENTIONS:  - Assess and monitor for signs and symptoms of infection  - Monitor lab/diagnostic results  - Monitor all insertion sites, i.e. indwelling lines, tubes, and drains  - Monitor endotracheal if appropriate and nasal secretions for changes in amount and color  - [place] appropriate cooling/warming therapies per order  - Administer medications as ordered  - Instruct and encourage patient and family to use good hand hygiene technique  - Identify and instruct in appropriate isolation precautions for identified infection/condition  Outcome: Progressing     Problem: SAFETY ADULT  Goal: Patient will remain free of falls  Description: INTERVENTIONS:  - Educate patient/family on patient safety including physical limitations  - Instruct patient to call for assistance with activity   - Consult OT/PT to assist with strengthening/mobility   - Keep Call bell within reach  - Keep bed low and locked with side rails adjusted as appropriate  - Keep care items and personal belongings within reach  - Initiate and maintain comfort rounds  - Make Fall Risk Sign visible to staff  - Offer Toileting every 2  Problem: DISCHARGE PLANNING  Goal: Discharge to home or other facility with appropriate resources  Description: INTERVENTIONS:  -  Identify barriers to discharge w/patient and caregiver  - Arrange for needed discharge resources and transportation as appropriate  - Identify discharge learning needs (meds, wound care, etc.)  - Arrange for interpretive services to assist at discharge as needed  - Refer to Case Management Department for coordinating discharge planning if the patient needs post-hospital services based on physician/advanced practitioner order or complex needs related to functional status, cognitive ability, or social support system  Outcome: Progressing     Problem: Knowledge Deficit  Goal: Patient/family/caregiver demonstrates understanding of disease process, treatment plan, medications, and discharge instructions  Description: Complete learning assessment and assess knowledge base.  Interventions:  - Provide teaching at level of understanding  - Provide teaching via preferred learning methods  Outcome: Progressing     Problem: GASTROINTESTINAL - ADULT  Goal: Minimal or absence of nausea and/or vomiting  Description: INTERVENTIONS:  - Administer IV fluids if ordered to ensure adequate hydration  - Maintain NPO status until nausea and vomiting are resolved  - Nasogastric tube if ordered  - Administer ordered antiemetic medications as needed  - Provide nonpharmacologic comfort measures as appropriate  - Advance diet as tolerated, if ordered  - Consider nutrition services referral to assist patient with adequate nutrition and appropriate food choices  Outcome: Progressing  Goal: Maintains or returns to baseline bowel function  Description: INTERVENTIONS:  - Assess bowel function  - Encourage oral fluids to ensure adequate hydration  - Administer IV fluids if ordered to ensure adequate hydration  - Administer ordered medications as needed  - Encourage mobilization and activity  - Consider nutritional services referral to assist patient with adequate nutrition and appropriate food choices  Outcome: Progressing  Goal: Maintains adequate  nutritional intake  Description: INTERVENTIONS:  - Monitor percentage of each meal consumed  - Identify factors contributing to decreased intake, treat as appropriate  - Assist with meals as needed  - Monitor I&O, weight, and lab values if indicated  - Obtain nutrition services referral as needed  Outcome: Progressing     Problem: HEMATOLOGIC - ADULT  Goal: Maintains hematologic stability  Description: INTERVENTIONS  - Assess for signs and symptoms of bleeding or hemorrhage  - Monitor labs  - Administer supportive blood products/factors as ordered and appropriate  Outcome: Progressing    Hours, in advance of need  - Initiate/Maintain bed alarm  - Apply yellow socks and bracelet for high fall risk patients  - Consider moving patient to room near nurses station  Outcome: Progressing  Goal: Maintain or return to baseline ADL function  Description: INTERVENTIONS:  -  Assess patient's ability to carry out ADLs; assess patient's baseline for ADL function and identify physical deficits which impact ability to perform ADLs (bathing, care of mouth/teeth, toileting, grooming, dressing, etc.)  - Assess/evaluate cause of self-care deficits   - Assess range of motion  - Assess patient's mobility; develop plan if impaired  - Assess patient's need for assistive devices and provide as appropriate  - Encourage maximum independence but intervene and supervise when necessary  - Involve family in performance of ADLs  - Assess for home care needs following discharge   - Consider OT consult to assist with ADL evaluation and planning for discharge  - Provide patient education as appropriate  Outcome: Progressing  Goal: Maintains/Returns to pre admission functional level  Description: INTERVENTIONS:  - Perform AM-PAC 6 Click Basic Mobility/ Daily Activity assessment daily.  - Set and communicate daily mobility goal to care team and patient/family/caregiver.   - Collaborate with rehabilitation services on mobility goals if consulted  - Out  of bed for meals 3 times a day  - Out of bed for toileting  - Record patient progress and toleration of activity level   Outcome: Progressing

## 2024-11-12 NOTE — PLAN OF CARE
Problem: PAIN - ADULT  Goal: Verbalizes/displays adequate comfort level or baseline comfort level  Description: Interventions:  - Encourage patient to monitor pain and request assistance  - Assess pain using appropriate pain scale  - Administer analgesics based on type and severity of pain and evaluate response  - Implement non-pharmacological measures as appropriate and evaluate response  - Consider cultural and social influences on pain and pain management  - Notify physician/advanced practitioner if interventions unsuccessful or patient reports new pain  Outcome: Progressing     Problem: INFECTION - ADULT  Goal: Absence or prevention of progression during hospitalization  Description: INTERVENTIONS:  - Assess and monitor for signs and symptoms of infection  - Monitor lab/diagnostic results  - Monitor all insertion sites, i.e. indwelling lines, tubes, and drains  - Monitor endotracheal if appropriate and nasal secretions for changes in amount and color  - [place] appropriate cooling/warming therapies per order  - Administer medications as ordered  - Instruct and encourage patient and family to use good hand hygiene technique  - Identify and instruct in appropriate isolation precautions for identified infection/condition  Outcome: Progressing  Goal: Absence of fever/infection during neutropenic period  Description: INTERVENTIONS:  - Monitor WBC    Outcome: Progressing     Problem: SAFETY ADULT  Goal: Patient will remain free of falls  Description: INTERVENTIONS:  - Educate patient/family on patient safety including physical limitations  - Instruct patient to call for assistance with activity   - Consult OT/PT to assist with strengthening/mobility   - Keep Call bell within reach  - Keep bed low and locked with side rails adjusted as appropriate  - Keep care items and personal belongings within reach  - Initiate and maintain comfort rounds  - Make Fall Risk Sign visible to staff  - Offer Toileting every 2 Hours,  in advance of need  - Initiate/Maintain bed alarm  - Obtain necessary fall risk management equipment: no slip socks   - Apply yellow socks and bracelet for high fall risk patients  - Consider moving patient to room near nurses station  Outcome: Progressing  Goal: Maintain or return to baseline ADL function  Description: INTERVENTIONS:  -  Assess patient's ability to carry out ADLs; assess patient's baseline for ADL function and identify physical deficits which impact ability to perform ADLs (bathing, care of mouth/teeth, toileting, grooming, dressing, etc.)  - Assess/evaluate cause of self-care deficits   - Assess range of motion  - Assess patient's mobility; develop plan if impaired  - Assess patient's need for assistive devices and provide as appropriate  - Encourage maximum independence but intervene and supervise when necessary  - Involve family in performance of ADLs  - Assess for home care needs following discharge   - Consider OT consult to assist with ADL evaluation and planning for discharge  - Provide patient education as appropriate  Outcome: Progressing  Goal: Maintains/Returns to pre admission functional level  Description: INTERVENTIONS:  - Perform AM-PAC 6 Click Basic Mobility/ Daily Activity assessment daily.  - Set and communicate daily mobility goal to care team and patient/family/caregiver.   - Collaborate with rehabilitation services on mobility goals if consulted  - Perform Range of Motion 3 times a day.  - Reposition patient every 2 hours.  - Dangle patient 3 times a day  - Stand patient 3 times a day  - Ambulate patient 3 times a day  - Out of bed to chair 3 times a day   - Out of bed for meals 3 times a day  - Out of bed for toileting  - Record patient progress and toleration of activity level   Outcome: Progressing     Problem: DISCHARGE PLANNING  Goal: Discharge to home or other facility with appropriate resources  Description: INTERVENTIONS:  - Identify barriers to discharge w/patient and  caregiver  - Arrange for needed discharge resources and transportation as appropriate  - Identify discharge learning needs (meds, wound care, etc.)  - Arrange for interpretive services to assist at discharge as needed  - Refer to Case Management Department for coordinating discharge planning if the patient needs post-hospital services based on physician/advanced practitioner order or complex needs related to functional status, cognitive ability, or social support system  Outcome: Progressing     Problem: Knowledge Deficit  Goal: Patient/family/caregiver demonstrates understanding of disease process, treatment plan, medications, and discharge instructions  Description: Complete learning assessment and assess knowledge base.  Interventions:  - Provide teaching at level of understanding  - Provide teaching via preferred learning methods  Outcome: Progressing

## 2024-11-12 NOTE — ANESTHESIA POSTPROCEDURE EVALUATION
Post-Op Assessment Note    CV Status:  Stable  Pain Score: 0    Pain management: adequate       Mental Status:  Awake   Hydration Status:  Stable   PONV Controlled:  None   Airway Patency:  Patent     Post Op Vitals Reviewed: Yes    No anethesia notable event occurred.    Staff: Anesthesiologist           Last Filed PACU Vitals:  Vitals Value Taken Time   Temp 97.3 °F (36.3 °C) 11/12/24 1530   Pulse 74 11/12/24 1659   /81 11/12/24 1530   Resp 19 11/12/24 1530   SpO2 95 % 11/12/24 1659   Vitals shown include unfiled device data.    Modified Ivette:  Activity: 2 (11/12/2024  3:06 PM)  Respiration: 2 (11/12/2024  3:06 PM)  Circulation: 2 (11/12/2024  3:06 PM)  Consciousness: 1 (11/12/2024  3:06 PM)  Oxygen Saturation: 2 (11/12/2024  3:06 PM)  Modified Ivette Score: 9 (11/12/2024  3:06 PM)

## 2024-11-12 NOTE — ANESTHESIA PREPROCEDURE EVALUATION
Procedure:  COLONOSCOPY    Relevant Problems   ENDO   (+) Type 2 diabetes mellitus (HCC)      GI/HEPATIC   (+) Rectal bleeding      HEMATOLOGY   (+) Iron deficiency anemia      Other   (+) Rectal mass        Physical Exam    Airway    Mallampati score: II  TM Distance: >3 FB  Neck ROM: full     Dental       Cardiovascular  Rhythm: regular, Rate: normal    Pulmonary   Breath sounds clear to auscultation    Other Findings  post-pubertal.      Anesthesia Plan  ASA Score- 2     Anesthesia Type- IV sedation with anesthesia with ASA Monitors.         Additional Monitors:     Airway Plan:            Plan Factors-    Chart reviewed.        Patient is not a current smoker.              Induction- intravenous.    Postoperative Plan-     Perioperative Resuscitation Plan - Level 1 - Full Code.       Informed Consent- Anesthetic plan and risks discussed with patient and daughter.  I personally reviewed this patient with the CRNA. Discussed and agreed on the Anesthesia Plan with the CRNA..

## 2024-11-12 NOTE — ASSESSMENT & PLAN NOTE
Chronic - noting 2 month h/p regular rectal bleeding unrelated to bowel movements  Endorsing that she was seen by GI in [place] with colonoscopy but did not receive results. No baseline labs to compare, was diagnosed with anal fissures previously. In ED her Hgb was 10.5 and MCV/Cr WNL, BUN 27.    Plan:  -GI consulted, appreciate recommendations   -iron panel - consistent with iron deficiency anemia  -IV Protonix 40 mg BID  -Monitor Hgb, record stool at bedside

## 2024-11-12 NOTE — PROGRESS NOTES
"Progress Note - Family Medicine   Name: Miriam Smith 74 y.o. female I MRN: [id number]  Unit/Bed#: [address] [place] [address] I Date of Admission: 11/10/2024   Date of Service: 11/12/2024 I Hospital Day: 0     Assessment & Plan  Rectal mass  Patient endorsing gluteal tenderness x 2 months with \"feeling something\", abdominal pain, weight loss, rectal bleeding.     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    Plan:  -GI consulted, appreciate recommendations   -colonoscopy today   -will update plan based on biopsy results - may need to transfer to [place] for Colorectal  Pain, rectal  Patient presented to ED endorsing abdominal pain, rectal bleeding, weight loss x 2 months - recently immigrated from [place] and was getting care there. Reporting reluctance to take medications in setting of hx of drug intolerance/allergies. Continues to deny fevers/chills/N/V/D.     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 Fluid-filled distal small bowel loops which may be due to superimposed enteritis.    Plan:  -Continue PRN Toradol  -Rest of plan per rectal mass  Rectal bleeding  Chronic - noting 2 month h/p regular rectal bleeding unrelated to bowel movements  Endorsing that she was seen by GI in [place] with colonoscopy but did not receive results. No baseline labs to compare, was diagnosed with anal fissures previously. In ED her Hgb was 10.5 and MCV/Cr WNL, BUN 27.    Plan:  -GI consulted, appreciate recommendations   -iron panel - consistent with iron deficiency anemia  -IV Protonix 40 mg " BID  -Monitor Hgb, record stool at bedside  History of anal fissures  Patient endorses history of anal fissures as diagnosed with [place], noting she applied unknown topical jelly to her rectum with some relief from discomfort.     Plan:  -Continue metamucil   -GI consulted, appreciate recommendations  Iron deficiency anemia  Hgb on admission 10.5. Unsure of baseline hgb d/t recent move from [place] and having no access of previous medical records.     Iron panel: Iron sat 5 %, TIBC 240, Iron 11    Plan:  Ordered Venofer infusions (11/11-)  Continue to monitor labs and symptoms   Leukocytosis  On admission WBC 13.7. Although mildly elevated white count, most likely 2/2 rectal mass which was suspicious for necrosis. WBC decreased to 12.59 without antibiotics.    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    Plan:  Pending UA  Will continue to monitor labs  Type 2 diabetes mellitus (HCC)  Chronic, no known HgbA1c - home mediations are unknown as patient has recently immigrated from [place] - endorses taking unknown diabetic medication.    Plan:  -Carb control when diet started   -Insulin sliding scale   -monitor for hypoglycemia  -Hold oral agents during hospitalization  Electrolyte abnormality  Mg 1.8 on admission, asymptomatic.    Plan:  Ordered IV Plasmalyte 100 ml/hr.  Continue to monitor labs and replete as indicated.  Neck pain  Chronic, stable. She had her wisdom teeth removed many years ago and since then she will have flares of neck pain.    Plan:  Ordered lidocaine patch.  Will continue to monitor symptoms.     VTE Pharmacologic Prophylaxis:  prophylaxis contraindicated in setting of rectal bleeding.    Mobility:   Basic Mobility Inpatient Raw Score: 24  -[facility] Goal: 8: Walk 250 feet or  more  JH-HLM Achieved: 7: Walk 25 feet or more  JH-HLM Goal achieved. Continue to encourage appropriate mobility.    Patient Centered Rounds: I performed bedside rounds with nursing staff today.   Discussions with Specialists or Other Care Team Provider: GI  Discussions with Family Members: Discussed with daughter who was at bedside.  Current Length of Stay: 0 day(s)  Current Patient Status: Observation   Certification Statement: The patient will continue to require additional inpatient hospital stay due to further medical management.  Discharge Plan: Anticipate discharge in 24-48 hrs to home.    Code Status: Level 1 - Full Code    Subjective   Patient was resting comfortably in bed while being assessed this morning.  She has some residual neck pain from a wisdom tooth procedure years ago and would like something for the pain.  Otherwise, no notable overnight events.  She was able to complete the bowel prep and denies CP, fever, chills, H/A, SOB, N/V.  We reviewed today's agenda of getting a colonoscopy and waiting for results.  She was agreeable and understanding of the plan.    Review of Systems   Constitutional:  Negative for chills, fatigue and fever.   HENT:  Negative for sore throat.    Eyes:  Negative for visual disturbance.   Respiratory:  Negative for cough, chest tightness and shortness of breath.    Cardiovascular:  Negative for chest pain, palpitations and leg swelling.   Gastrointestinal:  Positive for anal bleeding. Negative for abdominal pain, constipation, diarrhea, nausea and vomiting.   Genitourinary:  Negative for dysuria, hematuria and pelvic pain.   Musculoskeletal:  Positive for neck pain. Negative for back pain.   Neurological:  Negative for dizziness, weakness, light-headedness and headaches.   All other systems reviewed and are negative.      Objective :  Temp:  [98 °F (36.7 °C)-98.6 °F (37 °C)] 98.2 °F (36.8 °C)  HR:  [75-93] 75  BP: (129-142)/(58-60) 133/58  Resp:  [18-20] 18  SpO2:  [94  %-98 %] 94 %  O2 Device: None (Room air)    Body mass index is 20.96 kg/m².     Input and Output Summary (last 24 hours):   No intake or output data in the 24 hours ending 11/12/24 1212    Physical Exam  Vitals and nursing note reviewed.   Constitutional:       General: She is not in acute distress.     Appearance: Normal appearance.   HENT:      Head: Normocephalic and atraumatic.      Right Ear: External ear normal.      Left Ear: External ear normal.      Nose: Nose normal.      Mouth/Throat:      Mouth: Mucous membranes are dry.      Pharynx: Oropharynx is clear.   Eyes:      Extraocular Movements: Extraocular movements intact.   Cardiovascular:      Rate and Rhythm: Normal rate and regular rhythm.      Pulses: Normal pulses.      Heart sounds: Normal heart sounds.   Pulmonary:      Effort: Pulmonary effort is normal.      Breath sounds: Normal breath sounds.   Abdominal:      General: Abdomen is flat. Bowel sounds are normal. There is no distension.      Palpations: Abdomen is soft.      Tenderness: There is no abdominal tenderness. There is no guarding.   Musculoskeletal:         General: No tenderness. Normal range of motion.      Cervical back: Tenderness: posterior neck pain.      Right lower leg: No edema.      Left lower leg: No edema.   Skin:     General: Skin is warm and dry.      Capillary Refill: Capillary refill takes less than 2 seconds.   Neurological:      General: No focal deficit present.      Mental Status: She is alert and oriented to person, place, and time.   Psychiatric:         Mood and Affect: Mood normal.         Behavior: Behavior normal.         Lab Results: I have reviewed the following results:   Results from last 7 days   Lab Units 11/12/24  0436   WBC Thousand/uL 13.47*   HEMOGLOBIN g/dL 10.5*   HEMATOCRIT % 33.2*   PLATELETS Thousands/uL 315   SEGS PCT % 85*   LYMPHO PCT % 7*   MONO PCT % 8   EOS PCT % 0     Results from last 7 days   Lab Units 11/12/24  0436 11/11/24  0442  11/10/24  1842   SODIUM mmol/L 141   < > 138   POTASSIUM mmol/L 3.7   < > 4.2   CHLORIDE mmol/L 109*   < > 104   CO2 mmol/L 19*   < > 27   BUN mg/dL 12   < > 27*   CREATININE mg/dL 0.42*   < > 0.77   ANION GAP mmol/L 13   < > 7   CALCIUM mg/dL 8.3*   < > 8.7   ALBUMIN g/dL  --   --  3.4*   TOTAL BILIRUBIN mg/dL  --   --  0.25   ALK PHOS U/L  --   --  47   ALT U/L  --   --  14   AST U/L  --   --  12*   GLUCOSE RANDOM mg/dL 99   < > 220*    < > = values in this interval not displayed.     Results from last 7 days   Lab Units 11/12/24  0436   INR  1.17     Results from last 7 days   Lab Units 11/12/24  0722 11/11/24 2018 11/11/24  1608 11/11/24  1215 11/11/24  0731 11/10/24  2342   POC GLUCOSE mg/dl 126 158* 154* 116 111 155*     Results from last 7 days   Lab Units 11/10/24  1842   HEMOGLOBIN A1C % 8.3*         Imaging Results Review: I reviewed radiology reports from this admission including: CT abdomen/pelvis.  Other Study Results Review: No additional pertinent studies reviewed.    Last 24 Hours Medication List:     Current Facility-Administered Medications:     diphenhydrAMINE (BENADRYL) tablet 12.5 mg, Q6H PRN    insulin lispro (HumALOG/ADMELOG) 100 units/mL subcutaneous injection 1-5 Units, 4x Daily (AC & HS) **AND** Fingerstick Glucose (POCT), 4x Daily AC and at bedtime    iron sucrose (VENOFER) 200 mg in sodium chloride 0.9 % 100 mL IVPB, Daily, Last Rate: 200 mg (11/12/24 0911)    lidocaine (LIDODERM) 5 % patch 1 patch, Daily    multi-electrolyte (PLASMALYTE-A/ISOLYTE-S PH 7.4) IV solution, Continuous, Last Rate: 100 mL/hr (11/12/24 1146)    pantoprazole (PROTONIX) injection 40 mg, Q12H TEGAN    polyethylene glycol (MIRALAX) packet 17 g, Daily PRN    psyllium (METAMUCIL) 1 packet, Daily    Administrative Statements   Today, Patient Was Seen By: Dalton Leblanc, DO  I have spent a total time of >30 minutes in caring for this patient on the day of the visit/encounter including Instructions for management, Patient and  family education, Counseling / Coordination of care, Documenting in the medical record, Reviewing / ordering tests, medicine, procedures  , Obtaining or reviewing history  , and Communicating with other healthcare professionals .    **Please Note: This note may have been constructed using a voice recognition system.**

## 2024-11-12 NOTE — ASSESSMENT & PLAN NOTE
"Patient endorsing gluteal tenderness x 2 months with \"feeling something\", abdominal pain, weight loss, rectal bleeding.     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    Plan:  -GI consulted, appreciate recommendations   -colonoscopy today   -will update plan based on biopsy results - may need to transfer to [place] for Colorectal  "

## 2024-11-12 NOTE — ASSESSMENT & PLAN NOTE
Patient presented to ED endorsing abdominal pain, rectal bleeding, weight loss x 2 months - recently immigrated from [place] and was getting care there. Reporting reluctance to take medications in setting of hx of drug intolerance/allergies. Continues to deny fevers/chills/N/V/D.     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 Fluid-filled distal small bowel loops which may be due to superimposed enteritis.    Plan:  -Continue PRN Toradol  -Rest of plan per rectal mass

## 2024-11-12 NOTE — ASSESSMENT & PLAN NOTE
Chronic, stable. She had her wisdom teeth removed many years ago and since then she will have flares of neck pain.    Plan:  Ordered lidocaine patch.  Will continue to monitor symptoms.

## 2024-11-13 PROBLEM — M54.2 NECK PAIN: Status: RESOLVED | Noted: 2024-11-12 | Resolved: 2024-11-13

## 2024-11-13 LAB
ANION GAP SERPL CALCULATED.3IONS-SCNC: 11 MMOL/L (ref 4–13)
BASOPHILS # BLD AUTO: 0.03 THOUSANDS/ÂΜL (ref 0–0.1)
BASOPHILS NFR BLD AUTO: 0 % (ref 0–1)
BUN SERPL-MCNC: 12 MG/DL (ref 5–25)
CALCIUM SERPL-MCNC: 7.2 MG/DL (ref 8.4–10.2)
CEA SERPL-MCNC: 5.6 NG/ML (ref 0–3)
CHLORIDE SERPL-SCNC: 109 MMOL/L (ref 96–108)
CO2 SERPL-SCNC: 18 MMOL/L (ref 21–32)
CREAT SERPL-MCNC: 0.37 MG/DL (ref 0.6–1.3)
EOSINOPHIL # BLD AUTO: 0.09 THOUSAND/ÂΜL (ref 0–0.61)
EOSINOPHIL NFR BLD AUTO: 1 % (ref 0–6)
ERYTHROCYTE [DISTWIDTH] IN BLOOD BY AUTOMATED COUNT: 15.3 % (ref 11.6–15.1)
GFR SERPL CREATININE-BSD FRML MDRD: 105 ML/MIN/1.73SQ M
GLUCOSE SERPL-MCNC: 204 MG/DL (ref 65–140)
GLUCOSE SERPL-MCNC: 235 MG/DL (ref 65–140)
GLUCOSE SERPL-MCNC: 242 MG/DL (ref 65–140)
GLUCOSE SERPL-MCNC: 79 MG/DL (ref 65–140)
GLUCOSE SERPL-MCNC: 87 MG/DL (ref 65–140)
HCT VFR BLD AUTO: 29.7 % (ref 34.8–46.1)
HGB BLD-MCNC: 9.4 G/DL (ref 11.5–15.4)
IMM GRANULOCYTES # BLD AUTO: 0.05 THOUSAND/UL (ref 0–0.2)
IMM GRANULOCYTES NFR BLD AUTO: 1 % (ref 0–2)
LYMPHOCYTES # BLD AUTO: 0.98 THOUSANDS/ÂΜL (ref 0.6–4.47)
LYMPHOCYTES NFR BLD AUTO: 10 % (ref 14–44)
MCH RBC QN AUTO: 27.3 PG (ref 26.8–34.3)
MCHC RBC AUTO-ENTMCNC: 31.6 G/DL (ref 31.4–37.4)
MCV RBC AUTO: 86 FL (ref 82–98)
MONOCYTES # BLD AUTO: 0.82 THOUSAND/ÂΜL (ref 0.17–1.22)
MONOCYTES NFR BLD AUTO: 8 % (ref 4–12)
NEUTROPHILS # BLD AUTO: 7.94 THOUSANDS/ÂΜL (ref 1.85–7.62)
NEUTS SEG NFR BLD AUTO: 80 % (ref 43–75)
NRBC BLD AUTO-RTO: 0 /100 WBCS
PLATELET # BLD AUTO: 288 THOUSANDS/UL (ref 149–390)
PMV BLD AUTO: 8.6 FL (ref 8.9–12.7)
POTASSIUM SERPL-SCNC: 4.1 MMOL/L (ref 3.5–5.3)
RBC # BLD AUTO: 3.44 MILLION/UL (ref 3.81–5.12)
SODIUM SERPL-SCNC: 138 MMOL/L (ref 135–147)
WBC # BLD AUTO: 9.91 THOUSAND/UL (ref 4.31–10.16)

## 2024-11-13 PROCEDURE — 82948 REAGENT STRIP/BLOOD GLUCOSE: CPT

## 2024-11-13 PROCEDURE — 82378 CARCINOEMBRYONIC ANTIGEN: CPT

## 2024-11-13 PROCEDURE — 85025 COMPLETE CBC W/AUTO DIFF WBC: CPT

## 2024-11-13 PROCEDURE — 99223 1ST HOSP IP/OBS HIGH 75: CPT | Performed by: INTERNAL MEDICINE

## 2024-11-13 PROCEDURE — 80048 BASIC METABOLIC PNL TOTAL CA: CPT

## 2024-11-13 PROCEDURE — 99232 SBSQ HOSP IP/OBS MODERATE 35: CPT | Performed by: FAMILY MEDICINE

## 2024-11-13 RX ADMIN — INSULIN LISPRO 1 UNITS: 100 INJECTION, SOLUTION INTRAVENOUS; SUBCUTANEOUS at 22:16

## 2024-11-13 RX ADMIN — KETOROLAC TROMETHAMINE 15 MG: 30 INJECTION, SOLUTION INTRAMUSCULAR at 22:16

## 2024-11-13 RX ADMIN — SODIUM CHLORIDE, SODIUM GLUCONATE, SODIUM ACETATE, POTASSIUM CHLORIDE, MAGNESIUM CHLORIDE, SODIUM PHOSPHATE, DIBASIC, AND POTASSIUM PHOSPHATE 100 ML/HR: .53; .5; .37; .037; .03; .012; .00082 INJECTION, SOLUTION INTRAVENOUS at 02:36

## 2024-11-13 RX ADMIN — PSYLLIUM HUSK 1 PACKET: 3.4 POWDER ORAL at 09:02

## 2024-11-13 RX ADMIN — INSULIN LISPRO 2 UNITS: 100 INJECTION, SOLUTION INTRAVENOUS; SUBCUTANEOUS at 12:13

## 2024-11-13 RX ADMIN — PANTOPRAZOLE SODIUM 40 MG: 40 INJECTION, POWDER, FOR SOLUTION INTRAVENOUS at 02:41

## 2024-11-13 RX ADMIN — INSULIN LISPRO 2 UNITS: 100 INJECTION, SOLUTION INTRAVENOUS; SUBCUTANEOUS at 16:48

## 2024-11-13 RX ADMIN — KETOROLAC TROMETHAMINE 15 MG: 30 INJECTION, SOLUTION INTRAMUSCULAR at 08:50

## 2024-11-13 RX ADMIN — PANTOPRAZOLE SODIUM 40 MG: 40 INJECTION, POWDER, FOR SOLUTION INTRAVENOUS at 15:39

## 2024-11-13 RX ADMIN — LIDOCAINE 1 PATCH: 50 PATCH CUTANEOUS at 08:51

## 2024-11-13 RX ADMIN — IRON SUCROSE 200 MG: 20 INJECTION, SOLUTION INTRAVENOUS at 08:51

## 2024-11-13 NOTE — ASSESSMENT & PLAN NOTE
On admission WBC 13.7. Although mildly elevated white count, most likely 2/2 rectal mass which was suspicious for necrosis. WBC decreased to 12.59 without antibiotics.    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    Plan:  Pending UA  Will continue to monitor labs. WBC decreased to 9.91 today.

## 2024-11-13 NOTE — ASSESSMENT & PLAN NOTE
Hgb on admission 10.5. Unsure of baseline hgb d/t recent move from [place] and having no access of previous medical records.     Iron panel: Iron sat 5 %, TIBC 240, Iron 11    Plan:  Ordered Venofer infusions (11/11-) x3  Continue to monitor labs and symptoms

## 2024-11-13 NOTE — PROGRESS NOTES
"Progress Note - Family Medicine   Name: Miriam Smith 74 y.o. female I MRN: [id number]  Unit/Bed#: [address] [place] [address] Date of Admission: 11/10/2024   Date of Service: 11/13/2024 I Hospital Day: 1     Assessment & Plan  Rectal mass  Patient endorsing gluteal tenderness x 2 months with \"feeling something\", abdominal pain, weight loss, rectal bleeding.     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    CEA: 5.6    Plan:  Colorectal Surgery consulted, appreciate recommendations  Colonoscopy 11/11/24: Single malignant-appearing, friable, fungating and ulcerated mass (traversable) measuring 15 cm in the rectum and anal region, covering one half of the circumference; performed cold forceps biopsy with partial removal. The mass was extending up to the dentate line.   Consult oncology and follow rectal cancer protocol  Order MRI of rectum  GI consulted, appreciate recommendations  Will proceed with rectal cancer protocol  Pending labs and imaging  Pain, rectal  Patient presented to ED endorsing abdominal pain, rectal bleeding, weight loss x 2 months - recently immigrated from [place] and was getting care there. Reporting reluctance to take medications in setting of hx of drug intolerance/allergies. Continues to deny fevers/chills/N/V/D.     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 Fluid-filled distal small bowel loops which may be due to superimposed enteritis.    Plan:  -Continue PRN Toradol  -Rest of plan per rectal mass  Rectal bleeding  Chronic - noting 2 month h/p regular rectal " bleeding unrelated to bowel movements  Endorsing that she was seen by GI in [place] with colonoscopy but did not receive results. No baseline labs to compare, was diagnosed with anal fissures previously. In ED her Hgb was 10.5 and MCV/Cr WNL, BUN 27.    Plan:  -GI consulted, appreciate recommendations   -Iron panel consistent with iron deficiency anemia  -IV Protonix 40 mg BID  -Monitor Hgb, record stool at bedside  Leukocytosis  On admission WBC 13.7. Although mildly elevated white count, most likely 2/2 rectal mass which was suspicious for necrosis. WBC decreased to 12.59 without antibiotics.    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    Plan:  Pending UA  Will continue to monitor labs. WBC decreased to 9.91 today.  Iron deficiency anemia  Hgb on admission 10.5. Unsure of baseline hgb d/t recent move from [place] and having no access of previous medical records.     Iron panel: Iron sat 5 %, TIBC 240, Iron 11    Plan:  Ordered Venofer infusions (11/11-) x3  Continue to monitor labs and symptoms   History of anal fissures  Patient endorses history of anal fissures as diagnosed with [place], noting she applied unknown topical jelly to her rectum with some relief from discomfort.     Plan:  -Continue metamucil  -GI consulted, appreciate recommendations  Type 2 diabetes mellitus (HCC)  Chronic, no known HgbA1c - home mediations are unknown as patient has recently immigrated from [place] - endorses taking unknown diabetic medication.    Plan:  -Carb control diet  -Insulin sliding scale   -monitor for hypoglycemia  -Hold oral agents during hospitalization  Electrolyte abnormality  Mg 1.8 on admission, asymptomatic.    Plan:  IV Plasmalyte 100 ml/hr.  Continue to monitor labs and replete as indicated.  Neck  pain (Resolved: 11/13/2024)  Chronic, stable. She had her wisdom teeth removed many years ago and since then she will have flares of neck pain.    Plan:  Ordered lidocaine patch.  Will continue to monitor symptoms.     VTE Pharmacologic Prophylaxis:    prophylaxis contraindicated d/t rectal bleeding.    Mobility:   Basic Mobility Inpatient Raw Score: 24  JH-HLM Goal: 8: Walk 250 feet or more  JH-HLM Achieved: 8: Walk 250 feet ot more  JH-HLM Goal achieved. Continue to encourage appropriate mobility.    Patient Centered Rounds: I performed bedside rounds with nursing staff today.   Discussions with Specialists or Other Care Team Provider: GI, Colorectal, Oncology  Education and Discussions with Family / Patient: Updated  (daughter) at bedside.  Current Length of Stay: 1 day(s)  Current Patient Status: Inpatient   Certification Statement: The patient will continue to require additional inpatient hospital stay due to further management of symptoms.  Discharge Plan: Anticipate discharge in 24-48 hrs to home.    Code Status: Level 1 - Full Code    Subjective   Patient was resting comfortably in bed this morning.  No overnight events to note.  This morning she endorses some rectal pain and there was some dried blood on the pad.  She denies fever, chills, H/A, SOB, CP, N/V, urinary symptoms.  She no longer notices a pink tinge on the toilet paper when she wipes.     Review of Systems   Constitutional:  Negative for chills, fatigue and fever.   HENT:  Negative for hearing loss and sore throat.    Respiratory:  Negative for choking, chest tightness and shortness of breath.    Cardiovascular:  Negative for chest pain and leg swelling.   Gastrointestinal:  Positive for anal bleeding. Negative for abdominal pain, constipation, diarrhea, nausea and vomiting.   Genitourinary:  Negative for dysuria and hematuria.   Musculoskeletal:  Negative for arthralgias and back pain.   Neurological:  Negative for dizziness,  weakness, light-headedness and headaches.     Objective :  Temp:  [97.3 °F (36.3 °C)-98.4 °F (36.9 °C)] 98.1 °F (36.7 °C)  HR:  [64-82] 76  BP: (102-188)/(48-84) 133/50  Resp:  [18-19] 19  SpO2:  [92 %-98 %] 94 %  O2 Device: None (Room air)    Body mass index is 20.96 kg/m².     Input and Output Summary (last 24 hours):     Intake/Output Summary (Last 24 hours) at 11/13/2024 1306  Last data filed at 11/12/2024 1455  Gross per 24 hour   Intake 400 ml   Output 0 ml   Net 400 ml       Physical Exam  Vitals and nursing note reviewed.   Constitutional:       General: She is not in acute distress.     Appearance: Normal appearance.   HENT:      Head: Normocephalic and atraumatic.      Right Ear: External ear normal.      Left Ear: External ear normal.      Nose: Nose normal.      Mouth/Throat:      Mouth: Mucous membranes are moist.      Pharynx: Oropharynx is clear.   Eyes:      Extraocular Movements: Extraocular movements intact.   Cardiovascular:      Rate and Rhythm: Normal rate and regular rhythm.      Pulses: Normal pulses.      Heart sounds: Normal heart sounds.   Pulmonary:      Effort: Pulmonary effort is normal.      Breath sounds: Normal breath sounds.   Abdominal:      General: Abdomen is flat. Bowel sounds are normal. There is no distension.      Palpations: Abdomen is soft.      Tenderness: There is no abdominal tenderness. There is no guarding.   Musculoskeletal:         General: No tenderness. Normal range of motion.      Cervical back: Normal range of motion.      Right lower leg: No edema.      Left lower leg: No edema.   Skin:     General: Skin is warm and dry.      Capillary Refill: Capillary refill takes less than 2 seconds.   Neurological:      General: No focal deficit present.      Mental Status: She is alert and oriented to person, place, and time.   Psychiatric:         Mood and Affect: Mood normal.         Behavior: Behavior normal.         Lab Results: I have reviewed the following results:    Results from last 7 days   Lab Units 11/13/24  0512   WBC Thousand/uL 9.91   HEMOGLOBIN g/dL 9.4*   HEMATOCRIT % 29.7*   PLATELETS Thousands/uL 288   SEGS PCT % 80*   LYMPHO PCT % 10*   MONO PCT % 8   EOS PCT % 1     Results from last 7 days   Lab Units 11/13/24  0512 11/11/24  0442 11/10/24  1842   SODIUM mmol/L 138   < > 138   POTASSIUM mmol/L 4.1   < > 4.2   CHLORIDE mmol/L 109*   < > 104   CO2 mmol/L 18*   < > 27   BUN mg/dL 12   < > 27*   CREATININE mg/dL 0.37*   < > 0.77   ANION GAP mmol/L 11   < > 7   CALCIUM mg/dL 7.2*   < > 8.7   ALBUMIN g/dL  --   --  3.4*   TOTAL BILIRUBIN mg/dL  --   --  0.25   ALK PHOS U/L  --   --  47   ALT U/L  --   --  14   AST U/L  --   --  12*   GLUCOSE RANDOM mg/dL 79   < > 220*    < > = values in this interval not displayed.     Results from last 7 days   Lab Units 11/12/24  0436   INR  1.17     Results from last 7 days   Lab Units 11/13/24  1136 11/13/24  0727 11/12/24  1952 11/12/24  1538 11/12/24  1215 11/12/24  0722 11/11/24  2018 11/11/24  1608 11/11/24  1215 11/11/24  0731 11/10/24  2342   POC GLUCOSE mg/dl 235* 87 172* 122 117 126 158* 154* 116 111 155*     Results from last 7 days   Lab Units 11/10/24  1842   HEMOGLOBIN A1C % 8.3*       Imaging Results Review: I reviewed radiology reports from this admission including: CT chest and CT abdomen/pelvis.  Other Study Results Review: Other studies reviewed include: Colonoscopy report. Pathology reports reviewed.    Last 24 Hours Medication List:     Current Facility-Administered Medications:     diphenhydrAMINE (BENADRYL) tablet 12.5 mg, Q6H PRN    insulin lispro (HumALOG/ADMELOG) 100 units/mL subcutaneous injection 1-5 Units, 4x Daily (AC & HS) **AND** Fingerstick Glucose (POCT), 4x Daily AC and at bedtime    iron sucrose (VENOFER) 200 mg in sodium chloride 0.9 % 100 mL IVPB, Daily, Last Rate: 200 mg (11/13/24 0861)    ketorolac (TORADOL) injection 15 mg, Q6H PRN    lidocaine (LIDODERM) 5 % patch 1 patch, Daily     multi-electrolyte (PLASMALYTE-A/ISOLYTE-S PH 7.4) IV solution, Continuous, Last Rate: 100 mL/hr (11/13/24 0236)    pantoprazole (PROTONIX) injection 40 mg, Q12H TEGAN    polyethylene glycol (MIRALAX) packet 17 g, Daily PRN    psyllium (METAMUCIL) 1 packet, Daily    Administrative Statements   Today, Patient Was Seen By: Dalton Leblanc, DO  I have spent a total time of >30 minutes in caring for this patient on the day of the visit/encounter including Instructions for management, Patient and family education, Importance of tx compliance, Counseling / Coordination of care, Documenting in the medical record, Reviewing / ordering tests, medicine, procedures  , Obtaining or reviewing history  , and Communicating with other healthcare professionals .    **Please Note: This note may have been constructed using a voice recognition system.**

## 2024-11-13 NOTE — CONSULTS
Miriam Smith  1950    HEMATOLOGY/ONCOLOGY CONSULTATION REPORT    DISCUSSION/SUMMARY:    74-year-old female with weight loss, low abdominal pain, GI bleeding, rectal mass (7.7 x 7.5 x 8.6 cm).  Patient has been seen by GI and colonoscopy is pending.  Obvious concern is a malignancy.    Recent CAT scans do not demonstrate evidence of widespread metastatic disease.  The CEA is not terribly elevated.  We will wait for the pathology results on the colonoscopy/biopsy, possibly this is an anal cancer.  The iron studies and MCV are not consistent with iron deficiency (from bleeding).  There may be other causes for the anemia including the mass.    As above, daughter is working on obtaining information from "[employer].  Pain control is ongoing.    Additional workup and treatment options can be offered when the pathology results are available.    The above was discussed with the patient and daughter; all questions were answered.  Patient and daughter both demonstrated an excellent understanding of the situation.  Will follow with you.  Please do not hesitate to contact me if you have any questions or need additional information.  Thank you for this consult.  ________________________________________________________________________________    Chief Complaint   Patient presents with    Rectal Problems     Dx with anal fissure a about a month ago that seemed to get better, now having some bleeding and pain in area. Able to move bowels. No vomiting, also having weight loss per family     History of Present Illness: 74-year-old female admitted on November 11, 2024.  Patient presented to the emergency room with abdominal pain, history of weight loss, rectal bleeding, lower abdominal tenderness for 2 months.  Patient with a history of anal fissures, diabetes, recently living in [place].  Patient was admitted for workup.    Patient stated feeling about the same as before.  Weight loss is approximately 30 pounds.  No  fevers, chills or sweats.  Patient has had significant change in her bowel movements and blood in the stools.  Lower abdominal pain is about the same as before, tolerable.  Activities are limited, no respiratory issues.      Daughter states that some workup was already performed in [place]; she is working on getting copy of those results.    Review of Systems   Constitutional:  Positive for appetite change, fatigue and unexpected weight change.   HENT: Negative.     Eyes: Negative.    Respiratory: Negative.     Cardiovascular: Negative.    Gastrointestinal: Negative.    Endocrine: Negative.    Genitourinary: Negative.    Musculoskeletal: Negative.    Skin: Negative.    Allergic/Immunologic: Negative.    Neurological: Negative.    Hematological: Negative.    Psychiatric/Behavioral: Negative.     All other systems reviewed and are negative.    Patient Active Problem List   Diagnosis    Rectal mass    Type 2 diabetes mellitus (HCC)    Rectal bleeding    History of anal fissures    Electrolyte abnormality    Pain, rectal    Iron deficiency anemia    Leukocytosis     Past Medical History:   Diagnosis Date    Diabetes mellitus (HCC)      History reviewed. No pertinent surgical history.  History reviewed. No pertinent family history.  Social History     Socioeconomic History    Marital status: /Civil Union     Spouse name: Not on file    Number of children: Not on file    Years of education: Not on file    Highest education level: Not on file   Occupational History    Not on file   Tobacco Use    Smoking status: Never    Smokeless tobacco: Never   Vaping Use    Vaping status: Never Used   Substance and Sexual Activity    Alcohol use: Not Currently    Drug use: Not Currently    Sexual activity: Not on file   Other Topics Concern    Not on file   Social History Narrative    Not on file     Social Drivers of Health     Financial Resource Strain: Not on file   Food Insecurity: Not on file   Transportation Needs: Not on  file   Physical Activity: Not on file   Stress: Not on file   Social Connections: Not on file   Intimate Partner Violence: Not on file   Housing Stability: Not on file       Current Facility-Administered Medications:     diphenhydrAMINE (BENADRYL) tablet 12.5 mg, 12.5 mg, Oral, Q6H PRN, Suki Merlos DO    insulin lispro (HumALOG/ADMELOG) 100 units/mL subcutaneous injection 1-5 Units, 1-5 Units, Subcutaneous, 4x Daily (AC & HS), 2 Units at 11/13/24 1213 **AND** Fingerstick Glucose (POCT), , , 4x Daily AC and at bedtime, Edmond Haider MD    iron sucrose (VENOFER) 200 mg in sodium chloride 0.9 % 100 mL IVPB, 200 mg, Intravenous, Daily, Lina Charles MD, Last Rate: 100 mL/hr at 11/13/24 0851, 200 mg at 11/13/24 0851    ketorolac (TORADOL) injection 15 mg, 15 mg, Intravenous, Q6H PRN, Suki Merlos DO, 15 mg at 11/13/24 0850    lidocaine (LIDODERM) 5 % patch 1 patch, 1 patch, Topical, Daily, Dalton Leblanc DO, 1 patch at 11/13/24 0851    multi-electrolyte (PLASMALYTE-A/ISOLYTE-S PH 7.4) IV solution, 100 mL/hr, Intravenous, Continuous, Lina Charles MD, Last Rate: 100 mL/hr at 11/13/24 0236, 100 mL/hr at 11/13/24 0236    pantoprazole (PROTONIX) injection 40 mg, 40 mg, Intravenous, Q12H TEGAN, Suki Merlos DO, 40 mg at 11/13/24 0241    polyethylene glycol (MIRALAX) packet 17 g, 17 g, Oral, Daily PRN, Edmond Haider MD    psyllium (METAMUCIL) 1 packet, 1 packet, Oral, Daily, Suki Merlos DO, 1 packet at 11/13/24 0902    Allergies   Allergen Reactions    Motrin [Ibuprofen] Itching    Tylenol [Acetaminophen] Itching    Penicillins Rash     Rash and bruise    Sulfa Antibiotics Rash     Price velia syndrome     Vitals:    11/13/24 0752   BP: 133/50   Pulse: 76   Resp:    Temp: 98.1 °F (36.7 °C)   SpO2: 94%     Physical Exam  Constitutional:       Appearance: She is well-developed.      Comments: Relatively well-nourished female, no respiratory distress, no signs of pain   HENT:      Head:  Normocephalic and atraumatic.      Right Ear: External ear normal.      Left Ear: External ear normal.   Eyes:      Conjunctiva/sclera: Conjunctivae normal.      Pupils: Pupils are equal, round, and reactive to light.   Cardiovascular:      Rate and Rhythm: Normal rate and regular rhythm.      Heart sounds: Normal heart sounds.   Pulmonary:      Comments: Fair entry bilaterally, few scattered rhonchi  Abdominal:      General: Bowel sounds are normal.      Palpations: Abdomen is soft.      Comments: + Bowel sounds, nontender, soft, no guarding   Musculoskeletal:         General: Normal range of motion.      Cervical back: Normal range of motion and neck supple.   Skin:     General: Skin is warm.   Neurological:      Mental Status: She is alert and oriented to person, place, and time.      Deep Tendon Reflexes: Reflexes are normal and symmetric.   Psychiatric:         Behavior: Behavior normal.         Thought Content: Thought content normal.         Judgment: Judgment normal.   Extremities: 0-1+ bilateral lower extremity edema, no cords, pulses are 1+  Lymphatics: No adenopathy in the neck, supraclavicular region, axilla bilaterally    Labs    11/13/2024 CEA = 5.6    11/13/2024 WBC = 9.91 hemoglobin = 9.4 hematocrit = 29.7 MCV = 86 platelet = 288 neutrophil = 80% bands = 1% lymphocyte = 10% monocyte = 8%    11/11/2024 iron = 11 iron saturation = 5% TIBC = 240 ferritin = 80    11/10/2024 BUN = 27 creatinine = 0.77 calcium = 8.7 AST = 12 ALT = 14 alkaline phosphatase = 47    Imaging    11/12/2024 CAT scan chest without contrast.  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  Cholelithiasis.    11/10/2024 CAT scan abdomen pelvis with contrast.    IMPRESSION:     Heterogeneous peripherally enhancing mass/malignancy involving the rectum and infiltrating the left ischiorectal and gluteal fat measuring approximately 7.7 x 7.5 x 8.6 cm with areas of  fluid density compatible with internal necrosis.    Pathology    No pathology results presently available yet

## 2024-11-13 NOTE — PLAN OF CARE
Problem: PAIN - ADULT  Goal: Verbalizes/displays adequate comfort level or baseline comfort level  Description: Interventions:  - Encourage patient to monitor pain and request assistance  - Assess pain using appropriate pain scale  - Administer analgesics based on type and severity of pain and evaluate response  - Implement non-pharmacological measures as appropriate and evaluate response  - Consider cultural and social influences on pain and pain management  - Notify physician/advanced practitioner if interventions unsuccessful or patient reports new pain  Outcome: Progressing     Problem: INFECTION - ADULT  Goal: Absence or prevention of progression during hospitalization  Description: INTERVENTIONS:  - Assess and monitor for signs and symptoms of infection  - Monitor lab/diagnostic results  - Monitor all insertion sites, i.e. indwelling lines, tubes, and drains  - Monitor endotracheal if appropriate and nasal secretions for changes in amount and color  - [place] appropriate cooling/warming therapies per order  - Administer medications as ordered  - Instruct and encourage patient and family to use good hand hygiene technique  - Identify and instruct in appropriate isolation precautions for identified infection/condition  Outcome: Progressing  Goal: Absence of fever/infection during neutropenic period  Description: INTERVENTIONS:  - Monitor WBC    Outcome: Progressing     Problem: SAFETY ADULT  Goal: Patient will remain free of falls  Description: INTERVENTIONS:  - Educate patient/family on patient safety including physical limitations  - Instruct patient to call for assistance with activity   - Consult OT/PT to assist with strengthening/mobility   - Keep Call bell within reach  - Keep bed low and locked with side rails adjusted as appropriate  - Keep care items and personal belongings within reach  - Initiate and maintain comfort rounds  - Make Fall Risk Sign visible to staff  - Offer Toileting every 2 Hours,  in advance of need  - Initiate/Maintain call bell for assistance   - Obtain necessary fall risk management equipment: no slip socks, personal items close to patient  - Apply yellow socks and bracelet for high fall risk patients  - Consider moving patient to room near nurses station  Outcome: Progressing  Goal: Maintain or return to baseline ADL function  Description: INTERVENTIONS:  -  Assess patient's ability to carry out ADLs; assess patient's baseline for ADL function and identify physical deficits which impact ability to perform ADLs (bathing, care of mouth/teeth, toileting, grooming, dressing, etc.)  - Assess/evaluate cause of self-care deficits   - Assess range of motion  - Assess patient's mobility; develop plan if impaired  - Assess patient's need for assistive devices and provide as appropriate  - Encourage maximum independence but intervene and supervise when necessary  - Involve family in performance of ADLs  - Assess for home care needs following discharge   - Consider OT consult to assist with ADL evaluation and planning for discharge  - Provide patient education as appropriate  Outcome: Progressing  Goal: Maintains/Returns to pre admission functional level  Description: INTERVENTIONS:  - Perform AM-PAC 6 Click Basic Mobility/ Daily Activity assessment daily.  - Set and communicate daily mobility goal to care team and patient/family/caregiver.   - Collaborate with rehabilitation services on mobility goals if consulted  - Perform Range of Motion 3 times a day.  - Reposition patient every 2 hours.  - Dangle patient 3 times a day  - Stand patient 3 times a day  - Ambulate patient 3 times a day  - Out of bed to chair 3 times a day   - Out of bed for meals 3 times a day  - Out of bed for toileting  - Record patient progress and toleration of activity level   Outcome: Progressing     Problem: DISCHARGE PLANNING  Goal: Discharge to home or other facility with appropriate resources  Description:  INTERVENTIONS:  - Identify barriers to discharge w/patient and caregiver  - Arrange for needed discharge resources and transportation as appropriate  - Identify discharge learning needs (meds, wound care, etc.)  - Arrange for interpretive services to assist at discharge as needed  - Refer to Case Management Department for coordinating discharge planning if the patient needs post-hospital services based on physician/advanced practitioner order or complex needs related to functional status, cognitive ability, or social support system  Outcome: Progressing     Problem: Knowledge Deficit  Goal: Patient/family/caregiver demonstrates understanding of disease process, treatment plan, medications, and discharge instructions  Description: Complete learning assessment and assess knowledge base.  Interventions:  - Provide teaching at level of understanding  - Provide teaching via preferred learning methods  Outcome: Progressing     Problem: GASTROINTESTINAL - ADULT  Goal: Minimal or absence of nausea and/or vomiting  Description: INTERVENTIONS:  - Administer IV fluids if ordered to ensure adequate hydration  - Maintain NPO status until nausea and vomiting are resolved  - Nasogastric tube if ordered  - Administer ordered antiemetic medications as needed  - Provide nonpharmacologic comfort measures as appropriate  - Advance diet as tolerated, if ordered  - Consider nutrition services referral to assist patient with adequate nutrition and appropriate food choices  Outcome: Progressing  Goal: Maintains or returns to baseline bowel function  Description: INTERVENTIONS:  - Assess bowel function  - Encourage oral fluids to ensure adequate hydration  - Administer IV fluids if ordered to ensure adequate hydration  - Administer ordered medications as needed  - Encourage mobilization and activity  - Consider nutritional services referral to assist patient with adequate nutrition and appropriate food choices  Outcome: Progressing  Goal:  Maintains adequate nutritional intake  Description: INTERVENTIONS:  - Monitor percentage of each meal consumed  - Identify factors contributing to decreased intake, treat as appropriate  - Assist with meals as needed  - Monitor I&O, weight, and lab values if indicated  - Obtain nutrition services referral as needed  Outcome: Progressing     Problem: HEMATOLOGIC - ADULT  Goal: Maintains hematologic stability  Description: INTERVENTIONS  - Assess for signs and symptoms of bleeding or hemorrhage  - Monitor labs  - Administer supportive blood products/factors as ordered and appropriate  Outcome: Progressing

## 2024-11-13 NOTE — ASSESSMENT & PLAN NOTE
Mg 1.8 on admission, asymptomatic.    Plan:  IV Plasmalyte 100 ml/hr.  Continue to monitor labs and replete as indicated.

## 2024-11-13 NOTE — ASSESSMENT & PLAN NOTE
Chronic, stable. She had her wisdom teeth removed many years ago and since then she will have flares of neck pain.    Plan:  Ordered lidocaine patch.  Will continue to monitor symptoms.    Detail Level: Detailed

## 2024-11-13 NOTE — ASSESSMENT & PLAN NOTE
Chronic, no known HgbA1c - home mediations are unknown as patient has recently immigrated from [place] - endorses taking unknown diabetic medication.    Plan:  -Carb control diet  -Insulin sliding scale   -monitor for hypoglycemia  -Hold oral agents during hospitalization

## 2024-11-13 NOTE — ASSESSMENT & PLAN NOTE
Chronic - noting 2 month h/p regular rectal bleeding unrelated to bowel movements  Endorsing that she was seen by GI in [place] with colonoscopy but did not receive results. No baseline labs to compare, was diagnosed with anal fissures previously. In ED her Hgb was 10.5 and MCV/Cr WNL, BUN 27.    Plan:  -GI consulted, appreciate recommendations   -Iron panel consistent with iron deficiency anemia  -IV Protonix 40 mg BID  -Monitor Hgb, record stool at bedside   Electrodesiccation Text: The wound bed was treated with electrodesiccation after the biopsy was performed.

## 2024-11-13 NOTE — ASSESSMENT & PLAN NOTE
"Patient endorsing gluteal tenderness x 2 months with \"feeling something\", abdominal pain, weight loss, rectal bleeding.     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    CEA: 5.6    Plan:  Colorectal Surgery consulted, appreciate recommendations  Colonoscopy 11/11/24: Single malignant-appearing, friable, fungating and ulcerated mass (traversable) measuring 15 cm in the rectum and anal region, covering one half of the circumference; performed cold forceps biopsy with partial removal. The mass was extending up to the dentate line.   Consult oncology and follow rectal cancer protocol  Order MRI of rectum  GI consulted, appreciate recommendations  Will proceed with rectal cancer protocol  Pending labs and imaging  "

## 2024-11-13 NOTE — PLAN OF CARE
Problem: PAIN - ADULT  Goal: Verbalizes/displays adequate comfort level or baseline comfort level  Description: Interventions:  - Encourage patient to monitor pain and request assistance  - Assess pain using appropriate pain scale  - Administer analgesics based on type and severity of pain and evaluate response  - Implement non-pharmacological measures as appropriate and evaluate response  - Consider cultural and social influences on pain and pain management  - Notify physician/advanced practitioner if interventions unsuccessful or patient reports new pain  Outcome: Progressing     Problem: INFECTION - ADULT  Goal: Absence or prevention of progression during hospitalization  Description: INTERVENTIONS:  - Assess and monitor for signs and symptoms of infection  - Monitor lab/diagnostic results  - Monitor all insertion sites, i.e. indwelling lines, tubes, and drains  - Monitor endotracheal if appropriate and nasal secretions for changes in amount and color  - [place] appropriate cooling/warming therapies per order  - Administer medications as ordered  - Instruct and encourage patient and family to use good hand hygiene technique  - Identify and instruct in appropriate isolation precautions for identified infection/condition  Outcome: Progressing  Goal: Absence of fever/infection during neutropenic period  Description: INTERVENTIONS:  - Monitor WBC    Outcome: Progressing     Problem: SAFETY ADULT  Goal: Patient will remain free of falls  Description: INTERVENTIONS:  - Educate patient/family on patient safety including physical limitations  - Instruct patient to call for assistance with activity   - Consult OT/PT to assist with strengthening/mobility   - Keep Call bell within reach  - Keep bed low and locked with side rails adjusted as appropriate  - Keep care items and personal belongings within reach  - Initiate and maintain comfort rounds  - Make Fall Risk Sign visible to staff  - Offer Toileting every 2 Hours,  in advance of need  - Initiate/Maintain bed alarm  - Obtain necessary fall risk management equipment: call bell   - Apply yellow socks and bracelet for high fall risk patients  - Consider moving patient to room near nurses station  Outcome: Progressing  Goal: Maintain or return to baseline ADL function  Description: INTERVENTIONS:  -  Assess patient's ability to carry out ADLs; assess patient's baseline for ADL function and identify physical deficits which impact ability to perform ADLs (bathing, care of mouth/teeth, toileting, grooming, dressing, etc.)  - Assess/evaluate cause of self-care deficits   - Assess range of motion  - Assess patient's mobility; develop plan if impaired  - Assess patient's need for assistive devices and provide as appropriate  - Encourage maximum independence but intervene and supervise when necessary  - Involve family in performance of ADLs  - Assess for home care needs following discharge   - Consider OT consult to assist with ADL evaluation and planning for discharge  - Provide patient education as appropriate  Outcome: Progressing  Goal: Maintains/Returns to pre admission functional level  Description: INTERVENTIONS:  - Perform AM-PAC 6 Click Basic Mobility/ Daily Activity assessment daily.  - Set and communicate daily mobility goal to care team and patient/family/caregiver.   - Collaborate with rehabilitation services on mobility goals if consulted  - Perform Range of Motion 3 times a day.  - Reposition patient every 3 hours.  - Dangle patient 3 times a day  - Stand patient 3 times a day  - Ambulate patient 3 times a day  - Out of bed to chair 3 times a day   - Out of bed for meals 3 times a day  - Out of bed for toileting  - Record patient progress and toleration of activity level   Outcome: Progressing     Problem: DISCHARGE PLANNING  Goal: Discharge to home or other facility with appropriate resources  Description: INTERVENTIONS:  - Identify barriers to discharge w/patient and  caregiver  - Arrange for needed discharge resources and transportation as appropriate  - Identify discharge learning needs (meds, wound care, etc.)  - Arrange for interpretive services to assist at discharge as needed  - Refer to Case Management Department for coordinating discharge planning if the patient needs post-hospital services based on physician/advanced practitioner order or complex needs related to functional status, cognitive ability, or social support system  Outcome: Progressing     Problem: Knowledge Deficit  Goal: Patient/family/caregiver demonstrates understanding of disease process, treatment plan, medications, and discharge instructions  Description: Complete learning assessment and assess knowledge base.  Interventions:  - Provide teaching at level of understanding  - Provide teaching via preferred learning methods  Outcome: Progressing     Problem: GASTROINTESTINAL - ADULT  Goal: Minimal or absence of nausea and/or vomiting  Description: INTERVENTIONS:  - Administer IV fluids if ordered to ensure adequate hydration  - Maintain NPO status until nausea and vomiting are resolved  - Nasogastric tube if ordered  - Administer ordered antiemetic medications as needed  - Provide nonpharmacologic comfort measures as appropriate  - Advance diet as tolerated, if ordered  - Consider nutrition services referral to assist patient with adequate nutrition and appropriate food choices  Outcome: Progressing  Goal: Maintains or returns to baseline bowel function  Description: INTERVENTIONS:  - Assess bowel function  - Encourage oral fluids to ensure adequate hydration  - Administer IV fluids if ordered to ensure adequate hydration  - Administer ordered medications as needed  - Encourage mobilization and activity  - Consider nutritional services referral to assist patient with adequate nutrition and appropriate food choices  Outcome: Progressing  Goal: Maintains adequate nutritional intake  Description:  INTERVENTIONS:  - Monitor percentage of each meal consumed  - Identify factors contributing to decreased intake, treat as appropriate  - Assist with meals as needed  - Monitor I&O, weight, and lab values if indicated  - Obtain nutrition services referral as needed  Outcome: Progressing     Problem: HEMATOLOGIC - ADULT  Goal: Maintains hematologic stability  Description: INTERVENTIONS  - Assess for signs and symptoms of bleeding or hemorrhage  - Monitor labs  - Administer supportive blood products/factors as ordered and appropriate  Outcome: Progressing

## 2024-11-13 NOTE — ASSESSMENT & PLAN NOTE
Patient presented to ED endorsing abdominal pain, rectal bleeding, weight loss x 2 months - recently immigrated from [place] and was getting care there. Reporting reluctance to take medications in setting of hx of drug intolerance/allergies. Continues to deny fevers/chills/N/V/D.     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 Fluid-filled distal small bowel loops which may be due to superimposed enteritis.    Plan:  -Continue PRN Toradol  -Rest of plan per rectal mass

## 2024-11-14 ENCOUNTER — TELEPHONE (OUTPATIENT)
Dept: HEMATOLOGY ONCOLOGY | Facility: CLINIC | Age: 74
End: 2024-11-14

## 2024-11-14 ENCOUNTER — DOCUMENTATION (OUTPATIENT)
Dept: HEMATOLOGY ONCOLOGY | Facility: CLINIC | Age: 74
End: 2024-11-14

## 2024-11-14 VITALS
SYSTOLIC BLOOD PRESSURE: 140 MMHG | WEIGHT: 114.6 LBS | BODY MASS INDEX: 21.09 KG/M2 | HEART RATE: 84 BPM | TEMPERATURE: 99.8 F | OXYGEN SATURATION: 93 % | DIASTOLIC BLOOD PRESSURE: 66 MMHG | HEIGHT: 62 IN | RESPIRATION RATE: 18 BRPM

## 2024-11-14 LAB
ALBUMIN SERPL BCG-MCNC: 2.7 G/DL (ref 3.5–5)
ALP SERPL-CCNC: 55 U/L (ref 34–104)
ALT SERPL W P-5'-P-CCNC: 18 U/L (ref 7–52)
ANION GAP SERPL CALCULATED.3IONS-SCNC: 6 MMOL/L (ref 4–13)
AST SERPL W P-5'-P-CCNC: 9 U/L (ref 13–39)
BACTERIA UR QL AUTO: ABNORMAL /HPF
BASOPHILS # BLD AUTO: 0.03 THOUSANDS/ÂΜL (ref 0–0.1)
BASOPHILS NFR BLD AUTO: 0 % (ref 0–1)
BILIRUB SERPL-MCNC: 0.23 MG/DL (ref 0.2–1)
BILIRUB UR QL STRIP: NEGATIVE
BUN SERPL-MCNC: 12 MG/DL (ref 5–25)
CALCIUM ALBUM COR SERPL-MCNC: 8.9 MG/DL (ref 8.3–10.1)
CALCIUM SERPL-MCNC: 7.9 MG/DL (ref 8.4–10.2)
CHLORIDE SERPL-SCNC: 107 MMOL/L (ref 96–108)
CLARITY UR: ABNORMAL
CO2 SERPL-SCNC: 25 MMOL/L (ref 21–32)
COLOR UR: YELLOW
CREAT SERPL-MCNC: 0.52 MG/DL (ref 0.6–1.3)
EOSINOPHIL # BLD AUTO: 0.13 THOUSAND/ÂΜL (ref 0–0.61)
EOSINOPHIL NFR BLD AUTO: 1 % (ref 0–6)
ERYTHROCYTE [DISTWIDTH] IN BLOOD BY AUTOMATED COUNT: 15.1 % (ref 11.6–15.1)
FLUAV RNA RESP QL NAA+PROBE: NEGATIVE
FLUBV RNA RESP QL NAA+PROBE: NEGATIVE
GFR SERPL CREATININE-BSD FRML MDRD: 94 ML/MIN/1.73SQ M
GLUCOSE SERPL-MCNC: 135 MG/DL (ref 65–140)
GLUCOSE SERPL-MCNC: 165 MG/DL (ref 65–140)
GLUCOSE SERPL-MCNC: 272 MG/DL (ref 65–140)
GLUCOSE SERPL-MCNC: 273 MG/DL (ref 65–140)
GLUCOSE UR STRIP-MCNC: ABNORMAL MG/DL
HCT VFR BLD AUTO: 30.3 % (ref 34.8–46.1)
HGB BLD-MCNC: 10 G/DL (ref 11.5–15.4)
HGB UR QL STRIP.AUTO: ABNORMAL
IMM GRANULOCYTES # BLD AUTO: 0.04 THOUSAND/UL (ref 0–0.2)
IMM GRANULOCYTES NFR BLD AUTO: 0 % (ref 0–2)
KETONES UR STRIP-MCNC: NEGATIVE MG/DL
LEUKOCYTE ESTERASE UR QL STRIP: ABNORMAL
LYMPHOCYTES # BLD AUTO: 1.13 THOUSANDS/ÂΜL (ref 0.6–4.47)
LYMPHOCYTES NFR BLD AUTO: 12 % (ref 14–44)
MAGNESIUM SERPL-MCNC: 1.7 MG/DL (ref 1.9–2.7)
MCH RBC QN AUTO: 28.1 PG (ref 26.8–34.3)
MCHC RBC AUTO-ENTMCNC: 33 G/DL (ref 31.4–37.4)
MCV RBC AUTO: 85 FL (ref 82–98)
MONOCYTES # BLD AUTO: 1.01 THOUSAND/ÂΜL (ref 0.17–1.22)
MONOCYTES NFR BLD AUTO: 10 % (ref 4–12)
MUCOUS THREADS UR QL AUTO: ABNORMAL
NEUTROPHILS # BLD AUTO: 7.52 THOUSANDS/ÂΜL (ref 1.85–7.62)
NEUTS SEG NFR BLD AUTO: 77 % (ref 43–75)
NITRITE UR QL STRIP: NEGATIVE
NON-SQ EPI CELLS URNS QL MICRO: ABNORMAL /HPF
NRBC BLD AUTO-RTO: 0 /100 WBCS
PH UR STRIP.AUTO: 7.5 [PH]
PLATELET # BLD AUTO: 323 THOUSANDS/UL (ref 149–390)
PMV BLD AUTO: 9 FL (ref 8.9–12.7)
POTASSIUM SERPL-SCNC: 3.7 MMOL/L (ref 3.5–5.3)
PROCALCITONIN SERPL-MCNC: 0.3 NG/ML
PROT SERPL-MCNC: 5.9 G/DL (ref 6.4–8.4)
PROT UR STRIP-MCNC: NEGATIVE MG/DL
RBC # BLD AUTO: 3.56 MILLION/UL (ref 3.81–5.12)
RBC #/AREA URNS AUTO: ABNORMAL /HPF
RSV RNA RESP QL NAA+PROBE: NEGATIVE
SARS-COV-2 RNA RESP QL NAA+PROBE: NEGATIVE
SODIUM SERPL-SCNC: 138 MMOL/L (ref 135–147)
SP GR UR STRIP.AUTO: 1.02 (ref 1–1.03)
UROBILINOGEN UR STRIP-ACNC: <2 MG/DL
WBC # BLD AUTO: 9.86 THOUSAND/UL (ref 4.31–10.16)
WBC #/AREA URNS AUTO: ABNORMAL /HPF
WBC CLUMPS # UR AUTO: PRESENT /UL

## 2024-11-14 PROCEDURE — 85025 COMPLETE CBC W/AUTO DIFF WBC: CPT

## 2024-11-14 PROCEDURE — 84145 PROCALCITONIN (PCT): CPT | Performed by: FAMILY MEDICINE

## 2024-11-14 PROCEDURE — 87077 CULTURE AEROBIC IDENTIFY: CPT

## 2024-11-14 PROCEDURE — 87186 SC STD MICRODIL/AGAR DIL: CPT

## 2024-11-14 PROCEDURE — 0241U HB NFCT DS VIR RESP RNA 4 TRGT: CPT

## 2024-11-14 PROCEDURE — 87086 URINE CULTURE/COLONY COUNT: CPT

## 2024-11-14 PROCEDURE — 83735 ASSAY OF MAGNESIUM: CPT

## 2024-11-14 PROCEDURE — 99239 HOSP IP/OBS DSCHRG MGMT >30: CPT | Performed by: FAMILY MEDICINE

## 2024-11-14 PROCEDURE — 82948 REAGENT STRIP/BLOOD GLUCOSE: CPT

## 2024-11-14 PROCEDURE — 81001 URINALYSIS AUTO W/SCOPE: CPT

## 2024-11-14 PROCEDURE — 80053 COMPREHEN METABOLIC PANEL: CPT

## 2024-11-14 RX ORDER — CEFTRIAXONE 1 G/50ML
1000 INJECTION, SOLUTION INTRAVENOUS ONCE
Status: DISCONTINUED | OUTPATIENT
Start: 2024-11-14 | End: 2024-11-14

## 2024-11-14 RX ORDER — POTASSIUM CHLORIDE 1500 MG/1
40 TABLET, EXTENDED RELEASE ORAL ONCE
Status: COMPLETED | OUTPATIENT
Start: 2024-11-14 | End: 2024-11-14

## 2024-11-14 RX ORDER — CEFTRIAXONE 1 G/50ML
1000 INJECTION, SOLUTION INTRAVENOUS EVERY 24 HOURS
Status: DISCONTINUED | OUTPATIENT
Start: 2024-11-14 | End: 2024-11-14

## 2024-11-14 RX ORDER — POLYETHYLENE GLYCOL 3350 17 G/17G
17 POWDER, FOR SOLUTION ORAL DAILY
Start: 2024-11-14

## 2024-11-14 RX ORDER — CEFDINIR 300 MG/1
300 CAPSULE ORAL EVERY 12 HOURS SCHEDULED
Qty: 8 CAPSULE | Refills: 0 | Status: SHIPPED | OUTPATIENT
Start: 2024-11-14 | End: 2024-11-18

## 2024-11-14 RX ORDER — MAGNESIUM SULFATE HEPTAHYDRATE 40 MG/ML
4 INJECTION, SOLUTION INTRAVENOUS ONCE
Status: COMPLETED | OUTPATIENT
Start: 2024-11-14 | End: 2024-11-14

## 2024-11-14 RX ADMIN — INSULIN LISPRO 2 UNITS: 100 INJECTION, SOLUTION INTRAVENOUS; SUBCUTANEOUS at 12:00

## 2024-11-14 RX ADMIN — POTASSIUM CHLORIDE 40 MEQ: 1500 TABLET, EXTENDED RELEASE ORAL at 08:27

## 2024-11-14 RX ADMIN — MAGNESIUM SULFATE HEPTAHYDRATE 4 G: 40 INJECTION, SOLUTION INTRAVENOUS at 08:27

## 2024-11-14 RX ADMIN — PANTOPRAZOLE SODIUM 40 MG: 40 INJECTION, POWDER, FOR SOLUTION INTRAVENOUS at 15:35

## 2024-11-14 RX ADMIN — INSULIN LISPRO 2 UNITS: 100 INJECTION, SOLUTION INTRAVENOUS; SUBCUTANEOUS at 16:59

## 2024-11-14 RX ADMIN — SODIUM CHLORIDE, SODIUM GLUCONATE, SODIUM ACETATE, POTASSIUM CHLORIDE, MAGNESIUM CHLORIDE, SODIUM PHOSPHATE, DIBASIC, AND POTASSIUM PHOSPHATE 75 ML/HR: .53; .5; .37; .037; .03; .012; .00082 INJECTION, SOLUTION INTRAVENOUS at 02:15

## 2024-11-14 RX ADMIN — IRON SUCROSE 200 MG: 20 INJECTION, SOLUTION INTRAVENOUS at 11:59

## 2024-11-14 RX ADMIN — CEFTRIAXONE 1000 MG: 1 INJECTION, SOLUTION INTRAVENOUS at 15:36

## 2024-11-14 RX ADMIN — LIDOCAINE 1 PATCH: 50 PATCH CUTANEOUS at 08:41

## 2024-11-14 RX ADMIN — PSYLLIUM HUSK 1 PACKET: 3.4 POWDER ORAL at 08:41

## 2024-11-14 RX ADMIN — PANTOPRAZOLE SODIUM 40 MG: 40 INJECTION, POWDER, FOR SOLUTION INTRAVENOUS at 02:14

## 2024-11-14 NOTE — CASE MANAGEMENT
Case Management Progress Note    Patient name Miriam Smith  Location [address] [address]/[address]-* MRN [id number]  : 1950 Date 2024       LOS (days): 2  Geometric Mean LOS (GMLOS) (days): 3.6  Days to GMLOS:1.5        OBJECTIVE:        Current admission status: Inpatient  Preferred Pharmacy:   [id number] [place] [facility] [address] [address]  Phone: [phone] Fax: [phone]    Primary Care Provider: No primary care provider on file.    Primary Insurance: HEALTHCARE ASSISTANCE PENDING  Secondary Insurance:     PROGRESS NOTE:    E-mail sent to oncology patient  regarding patient's uninsured status and need for outpatient follow-up.  E-mail was routed to oncology financial counselor/advocate Carolina Calderon and she will reach out to family.

## 2024-11-14 NOTE — ASSESSMENT & PLAN NOTE
Chronic, no known HgbA1c - home mediations are unknown as patient has recently immigrated from [place] - endorses taking unknown diabetic medication.    Plan:  Carb control diet  Insulin sliding scale  Monitor for hypoglycemia  Hold oral agents during hospitalization  Initiate regimen once discharged

## 2024-11-14 NOTE — PROGRESS NOTES
"Progress Note - Family Medicine   Name: Miriam Smith 74 y.o. female I MRN: [id number]  Unit/Bed#: [address] [place] [address] I Date of Admission: 11/10/2024   Date of Service: 11/14/2024 I Hospital Day: 2   { ?Quick Links I Problem List I JASMIN I Billing Tip:59421}  Assessment & Plan  Rectal mass  Patient endorsing gluteal tenderness x 2 months with \"feeling something\", abdominal pain, weight loss, rectal bleeding.     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    CEA: 5.6    Plan:  Heme Onc consulted, appreciate recommendations  Wait for biopsy results and then additional workup and treatment options can be discussed.  Continue pain control regimen.  Colorectal Surgery consulted, appreciate recommendations  Colonoscopy 11/11/24: Single malignant-appearing, friable, fungating and ulcerated mass (traversable) measuring 15 cm in the rectum and anal region, covering one half of the circumference; performed cold forceps biopsy with partial removal. The mass was extending up to the dentate line.   Waiting for biopsy results and Heme/Onc recommendations  GI consulted, appreciate recommendations  Will proceed with rectal cancer protocol  Pending labs and imaging  Pain, rectal  Patient presented to ED endorsing abdominal pain, rectal bleeding, weight loss x 2 months - recently immigrated from [place] and was getting care there. Reporting reluctance to take medications in setting of hx of drug intolerance/allergies. Continues to deny fevers/chills/N/V/D.     -CT abd/pelvis w contrast: Heterogeneous peripherally enhancing mass/malignancy involving the rectum and infiltrating the left ischiorectal and gluteal fat measuring approximately 7.7 x 7.5 x 8.6 cm with areas of fluid density compatible with internal " necrosis. Fluid-filled distal small bowel loops which may be due to superimposed enteritis.    Plan:  Continue PRN Toradol  Rest of plan per rectal mass  Rectal bleeding  Chronic - noting 2 month h/p regular rectal bleeding unrelated to bowel movements  Endorsing that she was seen by GI in [place] with colonoscopy but did not receive results. No baseline labs to compare, was diagnosed with anal fissures previously. In ED her Hgb was 10.5 and MCV/Cr WNL, BUN 27.    Plan:  GI consulted, appreciate recommendations   Iron panel consistent with iron deficiency anemia  IV Protonix 40 mg BID  Monitor Hgb, record stool at bedside  Leukocytosis  On admission WBC 13.7. Although mildly elevated white count, most likely 2/2 rectal mass which was suspicious for necrosis. WBC decreased to 12.59 without antibiotics.    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    Plan:  Pending UA  Will continue to monitor labs. WBC decreased to 9.91 today.  Iron deficiency anemia  Hgb on admission 10.5. Unsure of baseline hgb d/t recent move from [place] and having no access of previous medical records. Per Heme/Onc note, labs not consistent with iron deficiency anemia d/t bleeding and could be more so d/t rectal mass or chronic disease.    Iron panel: Iron sat 5 %, TIBC 240, Iron 11    Plan:  Received 3 doses of Venofer - today is last day  Continue to monitor labs and symptoms   History of anal fissures  Patient endorses history of anal fissures as diagnosed with [place], noting she applied unknown topical jelly to her rectum with some relief from discomfort.     Plan:  Continue metamucil  GI consulted, appreciate recommendations  Type 2 diabetes mellitus (HCC)  Chronic, no known HgbA1c - home mediations are unknown as patient has  recently immigrated from [place] - endorses taking unknown diabetic medication.    Plan:  Carb control diet  Insulin sliding scale  Monitor for hypoglycemia  Hold oral agents during hospitalization  Initiate regimen once discharged  Electrolyte abnormality  Mg 1.8 on admission, asymptomatic.    Plan:  IV Plasmalyte 75 ml/hr.  Continue to monitor labs and replete as indicated.    VTE Pharmacologic Prophylaxis:    prophylaxis contraindicated in setting of rectal bleeding.    Mobility:   Basic Mobility Inpatient Raw Score: 24  JH-HLM Goal: 8: Walk 250 feet or more  JH-HLM Achieved: 8: Walk 250 feet ot more  JH-HLM Goal achieved. Continue to encourage appropriate mobility.    Patient Centered Rounds: I performed bedside rounds with nursing staff today.   Discussions with Specialists or Other Care Team Provider: Oncology    Education and Discussions with Family / Patient: Updated  (son) at bedside.    Current Length of Stay: 2 day(s)  Current Patient Status: Inpatient   Certification Statement: The patient will continue to require additional inpatient hospital stay due to further management of cancer.  Discharge Plan: Anticipate discharge in 24-48 hrs to home.    Code Status: Level 1 - Full Code    Subjective   Patient was resting comfortably in bed this morning with son at bedside.  She continues to have rectal pain and noticed blood in the toilet bowl when she went to the bathroom yesterday.  Discussed that the pain and blood are normal following the procedure.  Reassured patient that we will continue to monitor and if she has any other complaints we can address them.  Denies H/A, fever, chills, vomiting, nausea, urinary symptoms, CP, SOB.    Review of Systems   Constitutional:  Negative for chills, fatigue and fever.   HENT:  Negative for hearing loss, rhinorrhea and sore throat.    Eyes:  Negative for visual disturbance.   Respiratory:  Negative for cough and shortness of breath.    Cardiovascular:   Negative for chest pain, palpitations and leg swelling.   Gastrointestinal:  Positive for rectal pain. Negative for abdominal pain, constipation, diarrhea, nausea and vomiting.   Genitourinary:  Negative for dysuria and hematuria.   Musculoskeletal:  Negative for arthralgias and back pain.   Skin:  Negative for rash and wound.   Neurological:  Negative for weakness, light-headedness and headaches.   All other systems reviewed and are negative.    Objective :{?Quick Links I ICU Summary I Vitals I I/Os I LDAs I Mobility (PT/OT) I Code Status / ACP   ?Quick Links I Active Meds I Pain Meds I Antibiotics I Anticoagulants:30469}  Temp:  [98.4 °F (36.9 °C)-100.9 °F (38.3 °C)] 99.1 °F (37.3 °C)  HR:  [81-90] 81  BP: (122-145)/(53-64) 122/57  Resp:  [18] 18  SpO2:  [92 %-95 %] 93 %  O2 Device: None (Room air)    Body mass index is 20.96 kg/m².     Input and Output Summary (last 24 hours):     Intake/Output Summary (Last 24 hours) at 11/14/2024 0817  Last data filed at 11/13/2024 2000  Gross per 24 hour   Intake 500 ml   Output 650 ml   Net -150 ml       Physical Exam  Vitals and nursing note reviewed.   Constitutional:       General: She is not in acute distress.     Appearance: Normal appearance.   HENT:      Head: Normocephalic and atraumatic.      Right Ear: External ear normal.      Left Ear: External ear normal.      Nose: Nose normal.      Mouth/Throat:      Mouth: Mucous membranes are dry.      Pharynx: Oropharynx is clear.   Eyes:      Extraocular Movements: Extraocular movements intact.   Cardiovascular:      Rate and Rhythm: Normal rate and regular rhythm.      Pulses: Normal pulses.      Heart sounds: Normal heart sounds.   Pulmonary:      Effort: Pulmonary effort is normal.      Breath sounds: Normal breath sounds.   Abdominal:      General: Abdomen is flat. Bowel sounds are normal. There is no distension.      Palpations: Abdomen is soft.      Tenderness: There is no abdominal tenderness. There is no guarding.    Musculoskeletal:         General: No tenderness. Normal range of motion.      Cervical back: Normal range of motion.      Right lower leg: No edema.      Left lower leg: No edema.   Skin:     General: Skin is warm and dry.      Capillary Refill: Capillary refill takes less than 2 seconds.   Neurological:      General: No focal deficit present.      Mental Status: She is alert and oriented to person, place, and time.   Psychiatric:         Mood and Affect: Mood normal.         Behavior: Behavior normal.       {?Quick Links I Lab Review I Micro Results I Radiology I Cardiology:46104}  Lab Results: I have reviewed the following results:   Results from last 7 days   Lab Units 11/14/24  0402   WBC Thousand/uL 9.86   HEMOGLOBIN g/dL 10.0*   HEMATOCRIT % 30.3*   PLATELETS Thousands/uL 323   SEGS PCT % 77*   LYMPHO PCT % 12*   MONO PCT % 10   EOS PCT % 1     Results from last 7 days   Lab Units 11/14/24  0402   SODIUM mmol/L 138   POTASSIUM mmol/L 3.7   CHLORIDE mmol/L 107   CO2 mmol/L 25   BUN mg/dL 12   CREATININE mg/dL 0.52*   ANION GAP mmol/L 6   CALCIUM mg/dL 7.9*   ALBUMIN g/dL 2.7*   TOTAL BILIRUBIN mg/dL 0.23   ALK PHOS U/L 55   ALT U/L 18   AST U/L 9*   GLUCOSE RANDOM mg/dL 165*     Results from last 7 days   Lab Units 11/12/24  0436   INR  1.17     Results from last 7 days   Lab Units 11/14/24  0744 11/13/24  2117 11/13/24  1603 11/13/24  1136 11/13/24  0727 11/12/24  1952 11/12/24  1538 11/12/24  1215 11/12/24  0722 11/11/24  2018 11/11/24  1608 11/11/24  1215   POC GLUCOSE mg/dl 135 204* 242* 235* 87 172* 122 117 126 158* 154* 116     Results from last 7 days   Lab Units 11/10/24  1842   HEMOGLOBIN A1C % 8.3*       Imaging Results Review: I reviewed radiology reports from this admission including: CT chest, CT abdomen/pelvis, and MRI rectum.  Other Study Results Review: Other studies reviewed include: Colonoscopy    Last 24 Hours Medication List:     Current Facility-Administered Medications:      diphenhydrAMINE (BENADRYL) tablet 12.5 mg, Q6H PRN    insulin lispro (HumALOG/ADMELOG) 100 units/mL subcutaneous injection 1-5 Units, 4x Daily (AC & HS) **AND** Fingerstick Glucose (POCT), 4x Daily AC and at bedtime    iron sucrose (VENOFER) 200 mg in sodium chloride 0.9 % 100 mL IVPB, Daily, Last Rate: 200 mg (11/13/24 0851)    ketorolac (TORADOL) injection 15 mg, Q6H PRN    lidocaine (LIDODERM) 5 % patch 1 patch, Daily    magnesium sulfate 4 g/100 mL IVPB (premix) 4 g, Once    multi-electrolyte (PLASMALYTE-A/ISOLYTE-S PH 7.4) IV solution, Continuous, Last Rate: 75 mL/hr (11/14/24 0215)    pantoprazole (PROTONIX) injection 40 mg, Q12H TEGAN    polyethylene glycol (MIRALAX) packet 17 g, Daily PRN    potassium chloride (Klor-Con M20) CR tablet 40 mEq, Once    psyllium (METAMUCIL) 1 packet, Daily    Administrative Statements   Today, Patient Was Seen By: Dalton Leblanc, DO  I have spent a total time of >30 minutes in caring for this patient on the day of the visit/encounter including Instructions for management, Patient and family education, Counseling / Coordination of care, Documenting in the medical record, Reviewing / ordering tests, medicine, procedures  , Obtaining or reviewing history  , and Communicating with other healthcare professionals .    **Please Note: This note may have been constructed using a voice recognition system.**

## 2024-11-14 NOTE — NURSING NOTE
Pt discharged home. All pt education completed with pt, son, and daughter. All gave verbal understanding. All belongings given to pt.

## 2024-11-14 NOTE — PLAN OF CARE
Problem: PAIN - ADULT  Goal: Verbalizes/displays adequate comfort level or baseline comfort level  Description: Interventions:  - Encourage patient to monitor pain and request assistance  - Assess pain using appropriate pain scale  - Administer analgesics based on type and severity of pain and evaluate response  - Implement non-pharmacological measures as appropriate and evaluate response  - Consider cultural and social influences on pain and pain management  - Notify physician/advanced practitioner if interventions unsuccessful or patient reports new pain  Outcome: Progressing     Problem: INFECTION - ADULT  Goal: Absence or prevention of progression during hospitalization  Description: INTERVENTIONS:  - Assess and monitor for signs and symptoms of infection  - Monitor lab/diagnostic results  - Monitor all insertion sites, i.e. indwelling lines, tubes, and drains  - Monitor endotracheal if appropriate and nasal secretions for changes in amount and color  - [place] appropriate cooling/warming therapies per order  - Administer medications as ordered  - Instruct and encourage patient and family to use good hand hygiene technique  - Identify and instruct in appropriate isolation precautions for identified infection/condition  Outcome: Progressing  Goal: Absence of fever/infection during neutropenic period  Description: INTERVENTIONS:  - Monitor WBC    Outcome: Progressing     Problem: SAFETY ADULT  Goal: Patient will remain free of falls  Description: INTERVENTIONS:  - Educate patient/family on patient safety including physical limitations  - Instruct patient to call for assistance with activity   - Consult OT/PT to assist with strengthening/mobility   - Keep Call bell within reach  - Keep bed low and locked with side rails adjusted as appropriate  - Keep care items and personal belongings within reach  - Initiate and maintain comfort rounds  - Make Fall Risk Sign visible to staff  - Offer Toileting every 2 Hours,  in advance of need  - Initiate/Maintain bed and chair alarm  - Obtain necessary fall risk management equipment: bed and chair alarm/yellow socks and bracelet   - Apply yellow socks and bracelet for high fall risk patients  - Consider moving patient to room near nurses station  Outcome: Progressing  Goal: Maintain or return to baseline ADL function  Description: INTERVENTIONS:  -  Assess patient's ability to carry out ADLs; assess patient's baseline for ADL function and identify physical deficits which impact ability to perform ADLs (bathing, care of mouth/teeth, toileting, grooming, dressing, etc.)  - Assess/evaluate cause of self-care deficits   - Assess range of motion  - Assess patient's mobility; develop plan if impaired  - Assess patient's need for assistive devices and provide as appropriate  - Encourage maximum independence but intervene and supervise when necessary  - Involve family in performance of ADLs  - Assess for home care needs following discharge   - Consider OT consult to assist with ADL evaluation and planning for discharge  - Provide patient education as appropriate  Outcome: Progressing  Goal: Maintains/Returns to pre admission functional level  Description: INTERVENTIONS:  - Perform AM-PAC 6 Click Basic Mobility/ Daily Activity assessment daily.  - Set and communicate daily mobility goal to care team and patient/family/caregiver.   - Collaborate with rehabilitation services on mobility goals if consulted  - Perform Range of Motion 3 times a day.  - Reposition patient every 2 hours.  - Dangle patient 3 times a day  - Stand patient 3 times a day  - Ambulate patient 3 times a day  - Out of bed to chair 3 times a day   - Out of bed for meals 3 times a day  - Out of bed for toileting  - Record patient progress and toleration of activity level   Outcome: Progressing     Problem: DISCHARGE PLANNING  Goal: Discharge to home or other facility with appropriate resources  Description: INTERVENTIONS:  -  Identify barriers to discharge w/patient and caregiver  - Arrange for needed discharge resources and transportation as appropriate  - Identify discharge learning needs (meds, wound care, etc.)  - Arrange for interpretive services to assist at discharge as needed  - Refer to Case Management Department for coordinating discharge planning if the patient needs post-hospital services based on physician/advanced practitioner order or complex needs related to functional status, cognitive ability, or social support system  Outcome: Progressing     Problem: Knowledge Deficit  Goal: Patient/family/caregiver demonstrates understanding of disease process, treatment plan, medications, and discharge instructions  Description: Complete learning assessment and assess knowledge base.  Interventions:  - Provide teaching at level of understanding  - Provide teaching via preferred learning methods  Outcome: Progressing     Problem: GASTROINTESTINAL - ADULT  Goal: Minimal or absence of nausea and/or vomiting  Description: INTERVENTIONS:  - Administer IV fluids if ordered to ensure adequate hydration  - Maintain NPO status until nausea and vomiting are resolved  - Nasogastric tube if ordered  - Administer ordered antiemetic medications as needed  - Provide nonpharmacologic comfort measures as appropriate  - Advance diet as tolerated, if ordered  - Consider nutrition services referral to assist patient with adequate nutrition and appropriate food choices  Outcome: Progressing  Goal: Maintains or returns to baseline bowel function  Description: INTERVENTIONS:  - Assess bowel function  - Encourage oral fluids to ensure adequate hydration  - Administer IV fluids if ordered to ensure adequate hydration  - Administer ordered medications as needed  - Encourage mobilization and activity  - Consider nutritional services referral to assist patient with adequate nutrition and appropriate food choices  Outcome: Progressing  Goal: Maintains adequate  nutritional intake  Description: INTERVENTIONS:  - Monitor percentage of each meal consumed  - Identify factors contributing to decreased intake, treat as appropriate  - Assist with meals as needed  - Monitor I&O, weight, and lab values if indicated  - Obtain nutrition services referral as needed  Outcome: Progressing     Problem: HEMATOLOGIC - ADULT  Goal: Maintains hematologic stability  Description: INTERVENTIONS  - Assess for signs and symptoms of bleeding or hemorrhage  - Monitor labs  - Administer supportive blood products/factors as ordered and appropriate  Outcome: Progressing

## 2024-11-14 NOTE — ASSESSMENT & PLAN NOTE
"Patient endorsing gluteal tenderness x 2 months with \"feeling something\", abdominal pain, weight loss, rectal bleeding.     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    CEA: 5.6    Plan:  Heme Onc consulted, appreciate recommendations  Wait for biopsy results and then additional workup and treatment options can be discussed.  Continue pain control regimen.  Colorectal Surgery consulted, appreciate recommendations  Colonoscopy 11/11/24: Single malignant-appearing, friable, fungating and ulcerated mass (traversable) measuring 15 cm in the rectum and anal region, covering one half of the circumference; performed cold forceps biopsy with partial removal. The mass was extending up to the dentate line.   Waiting for biopsy results and Heme/Onc recommendations  GI consulted, appreciate recommendations  Will proceed with rectal cancer protocol  Pending labs and imaging  "

## 2024-11-14 NOTE — ASSESSMENT & PLAN NOTE
Patient endorses history of anal fissures as diagnosed with [place], noting she applied unknown topical jelly to her rectum with some relief from discomfort.     Plan:  Continue metamucil  GI consulted, appreciate recommendations

## 2024-11-14 NOTE — HOSPITAL COURSE
Miriam Smith is a 74-year-old female w/ PMH of T2DM, chronic gastritis, anal fissures who presented to the hospital on 11/10/2024 with progressively worsening ~2 month history of abdominal pain, weight loss, rectal bleeding, and gluteal tenderness. CT scan on initial presentation revealed a rectal mass. GI was consulted and recommended further evaluation with a colonoscopy. The colonoscopy was performed on 11/12/2024 without complication. Oncology was consulted, and recommended outpatient follow up and MRI of rectum.  Patient was assessed and determined to be stable for discharge on 11/14/24, with instructions for close follow-up outpatient.

## 2024-11-14 NOTE — DISCHARGE SUMMARY
Discharge Summary - Internal Medicine   Name: Miriam Smith 74 y.o. female I MRN: [id number]  Unit/Bed#: [address] [place] [address] Date of Admission: 11/10/2024   Date of Service: 11/14/2024 I Hospital Day: 2    Medical Problems       Resolved Problems  Never Reviewed          Resolved    Neck pain 11/13/2024     Resolved by  Dalton Leblanc DO        Discharging Physician / Practitioner: Dalton Leblanc DO  PCP: No primary care provider on file.  Admission Date:   Admission Orders (From admission, onward)       Ordered        11/12/24 1249  INPATIENT ADMISSION  Once            11/10/24 2218  Place in Observation  Once                          Discharge Date: 11/14/24    Consultations During Hospital Stay:  GI, Colorectal, Heme Oncology    Procedures Performed:   Colonoscopy    Significant Findings / Test Results:   11/14: Mg 1.7 K 3.7 Cr 0.52 Hgb 10.0 Procal 0.30  11/13: CEA 5.6, Cl 109, Ca 7.2, Cr 0.37, Hgb 9.4  11/12: Mg 2.0 WBC 13.47 hgb 10.5 Cr 0.42 PT 15.4 INR WNL PT 15.4 INR 1.17  11/11: A1c 8.3 WBC 12.59 hgb 10.0 TIBC/iron/iron sat LOW ferritin WNL  11/10: WBC 13.70, hgb 10.5, MCV WNL, BUN 27, glucose 220, mag 1.8   CT abd/pelvis: Heterogeneous peripherally enhancing mass/malignancy involving the rectum and infiltrating the left ischiorectal and gluteal fat measuring approximately 7.7 x 7.5 x 8.6 cm with areas of fluid density compatible with internal necrosis.  FLU/COVID/RSV: negative  UA: large leukocytes, 1% glucose, small occult blood  Urine Micro: WBC 20-30, moderate bacteria, occasional mucus threads    Incidental Findings:   CT abdomen pelvis with contrast: Heterogeneous peripherally enhancing mass/malignancy involving the rectum and infiltrating the left ischiorectal and gluteal fat measuring approximately 7.7 x 7.5 x 8.6 cm with areas of fluid density compatible with internal necrosis.  I reviewed the above mentioned incidental findings with the patient and/or family and they expressed understanding.    Test  "Results Pending at Discharge (will require follow up):   Urine culture, Biopsy report     Outpatient Tests Requested:  MRI rectum    Complications:  None    Reason for Admission: rectal bleeding 2/2 rectal mass    Things to Follow Up On:  Have you followed up with Hematology Oncology? Have you followed up with Colorectal specialists?   Have you established care with a new PCP?  Have you been able to get financial support?  Has the bleeding resolved or have your symptoms worsened?    Hospital Course:   Miriam Smith is a 74 y.o. female patient who originally presented to the hospital on 11/10/2024 due to rectal bleeding 2/2 rectal mass.    Hospital Course: Miriam Smith is a 74-year-old female w/ PMH of T2DM, chronic gastritis, anal fissures who presented to the hospital on 11/10/2024 with progressively worsening ~2 month history of abdominal pain, weight loss, rectal bleeding, and gluteal tenderness. CT scan on initial presentation revealed a rectal mass. GI was consulted and recommended further evaluation with a colonoscopy. The colonoscopy was performed on 11/12/2024 without complication. Oncology was consulted, and recommended outpatient follow up and MRI of rectum.  Patient was assessed and determined to be stable for discharge on 11/14/24, with instructions for close follow-up outpatient.     Rectal mass  Patient endorsing gluteal tenderness x 2 months with \"feeling something\", abdominal pain, weight loss, rectal bleeding.     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    CEA: 5.6    Plan:  Heme Onc consulted, appreciate recommendations  Wait for biopsy results and then additional workup and treatment options can be discussed.  Continue pain control regimen.  Follow up outpatient " for care and imaging.  Colorectal Surgery consulted, appreciate recommendations  Colonoscopy 11/11/24: Single malignant-appearing, friable, fungating and ulcerated mass (traversable) measuring 15 cm in the rectum and anal region, covering one half of the circumference; performed cold forceps biopsy with partial removal. The mass was extending up to the dentate line.   Waiting for biopsy results and Heme/Onc recommendations.  GI consulted, appreciate recommendations  Will proceed with rectal cancer protocol    Pain, rectal  Patient presented to ED endorsing abdominal pain, rectal bleeding, weight loss x 2 months - recently immigrated from [place] and was getting care there. Reporting reluctance to take medications in setting of hx of drug intolerance/allergies. Continues to deny fevers/chills/N/V/D.     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 Fluid-filled distal small bowel loops which may be due to superimposed enteritis.    Plan:  Plan per rectal mass    Rectal bleeding  Chronic - noting 2 month h/p regular rectal bleeding unrelated to bowel movements  Endorsing that she was seen by GI in [place] with colonoscopy but did not receive results. No baseline labs to compare, was diagnosed with anal fissures previously. In ED her Hgb was 10.5 and MCV/Cr WNL, BUN 27.    Leukocytosis  On admission WBC 13.7. Although mildly elevated white count, most likely 2/2 rectal mass which was suspicious for necrosis. WBC decreased to 12.59 without antibiotics.    CT abd pelvis w contrast : Heterogeneous peripherally enhancing mass/malignancy involving the rectum and infiltrating the left ischiorectal and gluteal fat measuring approximately 7.7 x 7.5 x 8.6 cm with areas of fluid density compatible with internal  necrosis.    Plan:  Pending UA    Abnormal Urinalysis  UA: large leukocytes, 1% glucose, small occult blood  Urine Micro: WBC 20-30, moderate bacteria, occasional mucus threads    Currently asymptomatic.    Plan:  Received 1 dose of IV Rocephin (11/14/24).  Will be discharged on Cefdinir 300 mg for 4 days.    Iron deficiency anemia  Hgb on admission 10.5. Unsure of baseline hgb d/t recent move from [place] and having no access of previous medical records. Per Heme/Onc note, labs not consistent with iron deficiency anemia d/t bleeding and could be more so d/t rectal mass or chronic disease.    Iron panel: Iron sat 5 %, TIBC 240, Iron 11    Plan:  Received 3 doses of Venofer     History of anal fissures  Patient endorses history of anal fissures as diagnosed with [place], noting she applied unknown topical jelly to her rectum with some relief from discomfort.     Plan:  Continue metamucil    Type 2 diabetes mellitus (HCC)  Chronic, no known HgbA1c - home mediations are unknown as patient has recently immigrated from [place] - endorses taking unknown diabetic medication.    Plan:  Carb control diet  Initiate new diabetic regimen - Metformin 1,000 mg BID.    Electrolyte abnormality  Mg 1.8 on admission, asymptomatic and repleted during hospital stay.    Please see above list of diagnoses and related plan for additional information.     Condition at Discharge: stable    Discharge Day Visit / Exam:   Subjective:  Patient was resting comfortably in bed this morning with son at bedside.  She continues to have rectal pain and noticed blood in the toilet bowl when she went to the bathroom yesterday.  Discussed that the pain and blood are normal following the procedure.  Reassured patient that we will continue to monitor and if she has any other complaints we can address them.  Denies H/A, fever, chills, vomiting, nausea, urinary symptoms, CP, SOB.    Review of Systems  Constitutional:  Negative for chills, fatigue and fever.  "  HENT:  Negative for hearing loss, rhinorrhea and sore throat.    Eyes:  Negative for visual disturbance.   Respiratory:  Negative for cough and shortness of breath.    Cardiovascular:  Negative for chest pain, palpitations and leg swelling.   Gastrointestinal:  Positive for rectal pain. Negative for abdominal pain, constipation, diarrhea, nausea and vomiting.   Genitourinary:  Negative for dysuria and hematuria.   Musculoskeletal:  Negative for arthralgias and back pain.   Skin:  Negative for rash and wound.   Neurological:  Negative for weakness, light-headedness and headaches.   All other systems reviewed and are negative.    Vitals: Blood Pressure: 140/66 (11/14/24 1628)  Pulse: 84 (11/14/24 1628)  Temperature: 99.8 °F (37.7 °C) (11/14/24 1628)  Temp Source: Oral (11/12/24 1948)  Respirations: 18 (11/13/24 2206)  Height: 5' 2\" (157.5 cm) (11/10/24 2300)  Weight - Scale: 52 kg (114 lb 9.6 oz) (11/10/24 2300)  SpO2: 93 % (11/14/24 1628)    Physical Exam   Vitals and nursing note reviewed.   Constitutional:       General: She is not in acute distress.     Appearance: Normal appearance.   HENT:      Head: Normocephalic and atraumatic.      Right Ear: External ear normal.      Left Ear: External ear normal.      Nose: Nose normal.      Mouth/Throat:      Mouth: Mucous membranes are dry.      Pharynx: Oropharynx is clear.   Eyes:      Extraocular Movements: Extraocular movements intact.   Cardiovascular:      Rate and Rhythm: Normal rate and regular rhythm.      Pulses: Normal pulses.      Heart sounds: Normal heart sounds.   Pulmonary:      Effort: Pulmonary effort is normal.      Breath sounds: Normal breath sounds.   Abdominal:      General: Abdomen is flat. Bowel sounds are normal. There is no distension.      Palpations: Abdomen is soft.      Tenderness: There is no abdominal tenderness. There is no guarding.   Musculoskeletal:         General: No tenderness. Normal range of motion.      Cervical back: Normal " range of motion.      Right lower leg: No edema.      Left lower leg: No edema.   Skin:     General: Skin is warm and dry.      Capillary Refill: Capillary refill takes less than 2 seconds.   Neurological:      General: No focal deficit present.      Mental Status: She is alert and oriented to person, place, and time.   Psychiatric:         Mood and Affect: Mood normal.         Behavior: Behavior normal.     Discussion with Family: Updated  (son) at bedside.    Discharge instructions/Information to patient and family:   See after visit summary for information provided to patient and family.      Provisions for Follow-Up Care:  See after visit summary for information related to follow-up care and any pertinent home health orders.      Mobility at time of Discharge:   Basic Mobility Inpatient Raw Score: 24  JH-HLM Goal: 8: Walk 250 feet or more  JH-HLM Achieved: 8: Walk 250 feet ot more  HLM Goal achieved. Continue to encourage appropriate mobility.     Disposition:   Home    Planned Readmission: No    Discharge Medications:  See after visit summary for reconciled discharge medications provided to patient and/or family.      Administrative Statements   Discharge Statement:  I have spent a total time of >30 minutes in caring for this patient on the day of the visit/encounter. >30 minutes of time was spent on: Diagnostic results, Prognosis, Instructions for management, Patient and family education, Counseling / Coordination of care, Documenting in the medical record, Reviewing / ordering tests, medicine, procedures  , and Communicating with other healthcare professionals .    **Please Note: This note may have been constructed using a voice recognition system**

## 2024-11-14 NOTE — ASSESSMENT & PLAN NOTE
Mg 1.8 on admission, asymptomatic.    Plan:  IV Plasmalyte 75 ml/hr.  Continue to monitor labs and replete as indicated.

## 2024-11-14 NOTE — DISCHARGE INSTR - AVS FIRST PAGE
Please follow up with Heme Oncology, [facility], and GI.    Please make sure to get your MRI of the rectum within 1 weeks of discharge.    Please take the antibiotics for the full course of 4 days. Please pick them up from [facility]. Please start omnicef on 11/15/24.    Please  you Metformin 1000 mg 60 tablets from [facility].     If your symptoms worsen please return to the ED.

## 2024-11-14 NOTE — INCIDENTAL FINDINGS
Finding: CT abdomen pelvis with contrast: Heterogeneous peripherally enhancing mass/malignancy involving the rectum and infiltrating the left ischiorectal and gluteal fat measuring approximately 7.7 x 7.5 x 8.6 cm with areas of fluid density compatible with internal necrosis.     Discussed findings with family and colonoscopy was done during this inpatient admission which confirmed the presence of a single malignant-appearing, friable, fungating and ulcerated mass (traversable) measuring 15 cm in the rectum and anal region, covering one half of the circumference. Family and patient are understanding of findings and will be following up with Heme Onc outpatient and getting imaging outpatient.

## 2024-11-14 NOTE — ASSESSMENT & PLAN NOTE
Chronic - noting 2 month h/p regular rectal bleeding unrelated to bowel movements  Endorsing that she was seen by GI in [place] with colonoscopy but did not receive results. No baseline labs to compare, was diagnosed with anal fissures previously. In ED her Hgb was 10.5 and MCV/Cr WNL, BUN 27.    Plan:  GI consulted, appreciate recommendations   Iron panel consistent with iron deficiency anemia  IV Protonix 40 mg BID  Monitor Hgb, record stool at bedside

## 2024-11-14 NOTE — PROGRESS NOTES
Oncology Finance Advocacy Intake and Intervention  Oncology Finance Counselor/Advocate placed call to patient. This writer informed patient that this writer is here to assist patient with billing questions, financial assistance, payment/payment plans, quotes, copayment assistance, insurance optimization, and insurance navigation.    This writer spoke with patients daughter Coco who stated they are currently working with Rafael Rodrigez regarding CC application as the patient is not eligible for [facility] due to residency. She feels all her concerns are currently being addressed and will contact me with future concerns.    Information above was review thoroughly with patient and patient was advise of possible assistance programs/interventions. If any question arise patient can contact this writer at below information. This information was given to patient at time of contact.      Carolina Calderon  Phone:[phone]  Email: [email or web address]

## 2024-11-14 NOTE — TELEPHONE ENCOUNTER
"Soft Intake Form   Patient Details   Miriam Smith     1950     Reason For Appointment   Who is Calling? Ashley Whitten   If not patient, Name? NA    DID YOU CONFIRM INSURANCE WITH PATIENT? Okd per finance, routed to finance. Pt must be seen in [place]   Who is the Referring Doctor? Dr. Perea   What is the diagnosis? weight loss, low abdominal pain, GI bleeding, rectal mass (7.7 x 7.5 x 8.6 cm)   Has this diagnosis been confirmed by a biopsy/surgery?    If yes, what is the date it was done? Rectal bx taken on 11/12   Biopsy done at [facility]?  If not, where was it done? Yes   Was imaging done, and was it done at [facility]?  If not, where was it done? Yes-[facility]. Also, per the notes, the patient had some work up in [place] and the family is working on getting copies.    Have you been seen by another Oncologist?  If so, who and where (name of facility, city and state) No   For 2nd Opinions Only: Are you currently undergoing treatment, or are you scheduled to start treatment?  If yes, name of facility, city and state  NA   For \"History Of\" only: Have you completed treatment?  NA   Have you had Genetic Testing done in the past?  If so, advise to bring test results to their visit NA   Record Gathering Information   Did you advise to have records faxed to [phone]?  See above   Did you advise to have disks sent to the proper address with imaging? (\"History of\" Patients)  5 years of imaging for breast patients-Mammos, US etc See above   Scheduling Information   Did you send new patient paperwork?  Email or mail? NA   What is the best call back number?   (If the [facility] is calling, please use their number) NA   Miscellaneous Information      The patient is scheduled for her HFU appointment with Dr. Shaw on 11/27 at 0900 in the [place] office.      "

## 2024-11-14 NOTE — ASSESSMENT & PLAN NOTE
Patient presented to ED endorsing abdominal pain, rectal bleeding, weight loss x 2 months - recently immigrated from [place] and was getting care there. Reporting reluctance to take medications in setting of hx of drug intolerance/allergies. Continues to deny fevers/chills/N/V/D.     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 Fluid-filled distal small bowel loops which may be due to superimposed enteritis.    Plan:  Continue PRN Toradol  Rest of plan per rectal mass

## 2024-11-14 NOTE — ASSESSMENT & PLAN NOTE
Hgb on admission 10.5. Unsure of baseline hgb d/t recent move from [place] and having no access of previous medical records. Per Heme/Onc note, labs not consistent with iron deficiency anemia d/t bleeding and could be more so d/t rectal mass or chronic disease.    Iron panel: Iron sat 5 %, TIBC 240, Iron 11    Plan:  Received 3 doses of Venofer - today is last day  Continue to monitor labs and symptoms

## 2024-11-16 LAB — BACTERIA UR CULT: ABNORMAL

## 2024-11-18 ENCOUNTER — PATIENT OUTREACH (OUTPATIENT)
Dept: HEMATOLOGY ONCOLOGY | Facility: CLINIC | Age: 74
End: 2024-11-18

## 2024-11-18 NOTE — PROGRESS NOTES
Left a message with pts daughter to touch base on retrieving pts records from [place]. Left my contact information for her to call me back

## 2024-11-20 ENCOUNTER — RESULTS FOLLOW-UP (OUTPATIENT)
Dept: GASTROENTEROLOGY | Facility: CLINIC | Age: 74
End: 2024-11-20

## 2024-11-20 ENCOUNTER — DOCUMENTATION (OUTPATIENT)
Dept: HEMATOLOGY ONCOLOGY | Facility: CLINIC | Age: 74
End: 2024-11-20

## 2024-11-20 PROCEDURE — 88305 TISSUE EXAM BY PATHOLOGIST: CPT | Performed by: STUDENT IN AN ORGANIZED HEALTH CARE EDUCATION/TRAINING PROGRAM

## 2024-11-20 NOTE — PROGRESS NOTES
"Chart rvw'd on 11/20/2024    Referring provider-  HFU patient    Colonoscopy date-  11/12/2024    Impression-  Single malignant-appearing, friable, fungating and ulcerated mass measuring 15 cm in the rectum and anal region, covering one half of the circumference; performed cold forceps biopsy with partial removal     Pathology-  11/12/2024-  Final Diagnosis   A. 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     11/12/2024 CT Chest wo contrast-  No lung metastases.     Severe coronary artery calcification indicating atherosclerotic heart disease.     Cholelithiasis.    Scheduled appointments-  MRI pelvis rectal cancer staging wo contrast scheduled on 12/6/2024  Pt is scheduled with Dr. Twan Perea (medical oncology) on 11/27/2024    Surgical date-  N/A    Post surgical pathology-  N/A  "

## 2024-11-21 ENCOUNTER — OFFICE VISIT (OUTPATIENT)
Dept: GASTROENTEROLOGY | Facility: CLINIC | Age: 74
End: 2024-11-21

## 2024-11-21 VITALS
HEART RATE: 73 BPM | WEIGHT: 118 LBS | SYSTOLIC BLOOD PRESSURE: 117 MMHG | HEIGHT: 60 IN | BODY MASS INDEX: 23.16 KG/M2 | DIASTOLIC BLOOD PRESSURE: 52 MMHG

## 2024-11-21 DIAGNOSIS — K62.89 PAIN, RECTAL: ICD-10-CM

## 2024-11-21 DIAGNOSIS — K62.89 RECTAL MASS: Primary | ICD-10-CM

## 2024-11-21 DIAGNOSIS — K62.5 RECTAL BLEEDING: ICD-10-CM

## 2024-11-21 PROCEDURE — 99214 OFFICE O/P EST MOD 30 MIN: CPT | Performed by: NURSE PRACTITIONER

## 2024-11-21 NOTE — PROGRESS NOTES
Name: Miriam Smith      : 1950      MRN: [id number]  Encounter Provider: CHIP Qiu  Encounter Date: 2024   Encounter department: [facility] GASTROENTEROLOGY [address]  :  Assessment & Plan  Rectal mass  Patient has history of rectal mass.  Patient present with family who is  for patient.  Reviewed results of colonoscopy, colonoscopy biopsy and CT imaging with family and patient.  Biopsy of colon rectal mass showed fragments of tubular villous adenoma with features suggestive of traditional serrated adenoma.  Negative for high-grade dysplasia or carcinoma.  Orders:    Ambulatory Referral to Colorectal Surgery; Future  -Follow-up with Dr. Perea oncologist  -Obtain MRI of rectum as previously ordered  -Patient will need repeat colonoscopy in 1 year placed on recall list  Pain, rectal  Patient denies any rectal pain.  Patient was having rectal pain and discomfort while in hospital. Patient reports that rectal pain has resolved.       Rectal bleeding  Patient was having rectal bleeding for approximately 2 months prior to hospitalization.  Patient reported that she would see blood on toilet tissue when she wiped.  Currently patient is denying any blood in stool, blood from rectal area, or black tarry stool.       Follow-up in 1 year prior to colonoscopy    History of Present Illness     HPI  Miriam Smith is a 74 y.o. female with past medical history of anal fissure, diabetes mellitus type 2 and  who presents to office for follow-up after hospitalization.  Patient was recently in the hospital due to rectal pain associated with rectal bleeding and rectal mass was found on CT of the abdomen and pelvis.CT abdomen and pelvis with contrast showed Heterogeneous peripherally enhancing mass/malignancy involving the rectum and infiltrating the left ischiorectal and gluteal fat measuring approximately 7.7 x 7.5 x 8.6 cm with areas of  fluid density compatible with internal necrosis.     Patient reports that rectal pain and bleeding has since resolved.  Patient denies nausea, vomiting, acid reflux, heartburn, epigastric or abdominal pain.  Patient denies blood in stool, blood from rectal area, or black tarry stool.  Patient is moving her bowels daily and taking MiraLAX and Metamucil to keep bowels regular.  Patient denies any overuse of NSAIDs she is on no antiplatelet or  Anticoagulation medication.  Hemoglobin 10 on 11/14/2024 and stable throughout hospitalization.  Iron panel showed iron sat 5, TIBC 240, iron 11, UIBC 229 and ferritin 80 done 11/11/2024    Positive family history of colon cancer sister.  Colonoscopy done 11/12/2024 showed single malignant-appearing, friable, fungating and ulcerated mass (traversable) measuring 15 cm in the rectum and anal region, covering one half of the circumference; performed cold forceps biopsy with partial removal. The mass was extending up to the dentate line. Biopsy of colon rectal mass showed fragments of tubular villous adenoma with features suggestive of traditional serrated adenoma.  Negative for high-grade dysplasia or carcinoma.        Review of Systems   Constitutional:  Negative for chills and fever.   HENT:  Negative for ear pain and sore throat.    Eyes:  Negative for pain and visual disturbance.   Respiratory:  Negative for cough and shortness of breath.    Cardiovascular:  Negative for chest pain and palpitations.   Gastrointestinal:  Negative for abdominal pain and vomiting.   Genitourinary:  Negative for dysuria and hematuria.   Musculoskeletal:  Negative for arthralgias and back pain.   Skin:  Negative for color change and rash.   Neurological:  Negative for seizures and syncope.   All other systems reviewed and are negative.    Medical History Reviewed by provider this encounter:     .  Past Medical History   Past Medical History:   Diagnosis Date    Diabetes mellitus (HCC)      Past  Surgical History:   Procedure Laterality Date    COLONOSCOPY       Family History   Problem Relation Age of Onset    Colon cancer Sister       reports that she has never smoked. She has never used smokeless tobacco. She reports that she does not currently use alcohol. She reports that she does not currently use drugs.  Current Outpatient Medications on File Prior to Visit   Medication Sig Dispense Refill    metFORMIN (GLUCOPHAGE) 1000 MG tablet Take 1 tablet (1,000 mg total) by mouth 2 (two) times a day with meals 60 tablet 0    polyethylene glycol (MIRALAX) 17 g packet Take 17 g by mouth daily      psyllium (METAMUCIL) packet Take 1 packet by mouth daily       No current facility-administered medications on file prior to visit.     Allergies   Allergen Reactions    Motrin [Ibuprofen] Itching    Tylenol [Acetaminophen] Itching    Penicillins Rash     Rash and bruise    Sulfa Antibiotics Rash     Price velia syndrome      Current Outpatient Medications on File Prior to Visit   Medication Sig Dispense Refill    metFORMIN (GLUCOPHAGE) 1000 MG tablet Take 1 tablet (1,000 mg total) by mouth 2 (two) times a day with meals 60 tablet 0    polyethylene glycol (MIRALAX) 17 g packet Take 17 g by mouth daily      psyllium (METAMUCIL) packet Take 1 packet by mouth daily       No current facility-administered medications on file prior to visit.      Social History     Tobacco Use    Smoking status: Never    Smokeless tobacco: Never   Vaping Use    Vaping status: Never Used   Substance and Sexual Activity    Alcohol use: Not Currently    Drug use: Not Currently    Sexual activity: Not on file        Objective   /52 (Patient Position: Sitting, Cuff Size: Standard)   Pulse 73   Ht 5' (1.524 m)   Wt 53.5 kg (118 lb)   BMI 23.05 kg/m²      Physical Exam  Vitals and nursing note reviewed.   Constitutional:       General: She is not in acute distress.     Appearance: She is well-developed.   HENT:      Head: Normocephalic  and atraumatic.   Eyes:      Conjunctiva/sclera: Conjunctivae normal.   Cardiovascular:      Rate and Rhythm: Normal rate and regular rhythm.      Pulses: Normal pulses.      Heart sounds: Normal heart sounds. No murmur heard.  Pulmonary:      Effort: Pulmonary effort is normal. No respiratory distress.      Breath sounds: Normal breath sounds. No stridor. No wheezing, rhonchi or rales.   Abdominal:      General: There is no distension.      Palpations: Abdomen is soft. There is no mass.      Tenderness: There is no abdominal tenderness. There is no guarding or rebound.   Musculoskeletal:         General: No swelling.      Cervical back: Neck supple.      Right lower leg: No edema.      Left lower leg: No edema.   Skin:     General: Skin is warm and dry.      Capillary Refill: Capillary refill takes less than 2 seconds.   Neurological:      Mental Status: She is alert and oriented to person, place, and time.   Psychiatric:         Mood and Affect: Mood normal.

## 2024-11-21 NOTE — TELEPHONE ENCOUNTER
Patient son in law returned office call about appt she is korey for today at [address]

## 2024-11-21 NOTE — ASSESSMENT & PLAN NOTE
Patient was having rectal bleeding for approximately 2 months prior to hospitalization.  Patient reported that she would see blood on toilet tissue when she wiped.  Currently patient is denying any blood in stool, blood from rectal area, or black tarry stool.

## 2024-11-21 NOTE — TELEPHONE ENCOUNTER
Patients GI provider:  Dr. Tom    Number to return call: [phone]    Reason for call: Pt's son in law calling to get results. Patient does not have a consent on file. Advised him we can only speak with her until e have one on file.  Please return call to patient herself to give results   needed    Scheduled procedure/appointment date if applicable: 11/12/24

## 2024-11-21 NOTE — RESULT ENCOUNTER NOTE
I called patient's emergency contact and daughter Coco but could only reach her voicemail.  I left her message to call us back at the office at [phone] so we can discuss the test results.  I will try to reach the patient's daughter again later this week.

## 2024-11-21 NOTE — ASSESSMENT & PLAN NOTE
Patient has history of rectal mass.  Patient present with family who is  for patient.  Reviewed results of colonoscopy, colonoscopy biopsy and CT imaging with family and patient.  Biopsy of colon rectal mass showed fragments of tubular villous adenoma with features suggestive of traditional serrated adenoma.  Negative for high-grade dysplasia or carcinoma.  Orders:    Ambulatory Referral to Colorectal Surgery; Future  -Follow-up with Dr. Perea oncologist  -Obtain MRI of rectum as previously ordered  -Patient will need repeat colonoscopy in 1 year placed on recall list

## 2024-11-21 NOTE — ASSESSMENT & PLAN NOTE
Patient denies any rectal pain.  Patient was having rectal pain and discomfort while in hospital. Patient reports that rectal pain has resolved.

## 2024-11-22 NOTE — RESULT ENCOUNTER NOTE
Patient had an appointment with Martha Borden earlier today in the office.  I discussed the test results with Martha prior to the office appointment earlier today as well as further management plan including referral to colorectal surgery.through [facility] secure chat.  The results of the pathology report were discussed with the patient during today's office visit and further management plan was discussed with the patient as well.

## 2024-11-25 ENCOUNTER — NURSE TRIAGE (OUTPATIENT)
Age: 74
End: 2024-11-25

## 2024-11-25 NOTE — TELEPHONE ENCOUNTER
Regarding: rectal mass  ----- Message from Janice MILLS sent at 11/25/2024  4:53 PM EST -----  Pt's daughter called to schedule an appointment with [facility] for rectal mass. First available appointments are in January. Pt was referred by Martha Borden.

## 2024-11-26 NOTE — TELEPHONE ENCOUNTER
Left voicemail on Aurora's phone with date and time for appt. Requested a call back to confirm appt.

## 2024-11-27 ENCOUNTER — OFFICE VISIT (OUTPATIENT)
Dept: HEMATOLOGY ONCOLOGY | Facility: MEDICAL CENTER | Age: 74
End: 2024-11-27

## 2024-11-27 VITALS
SYSTOLIC BLOOD PRESSURE: 102 MMHG | TEMPERATURE: 98.4 F | HEART RATE: 72 BPM | HEIGHT: 60 IN | DIASTOLIC BLOOD PRESSURE: 56 MMHG | BODY MASS INDEX: 22.78 KG/M2 | WEIGHT: 116 LBS | RESPIRATION RATE: 17 BRPM | OXYGEN SATURATION: 100 %

## 2024-11-27 DIAGNOSIS — K62.89 RECTAL MASS: Primary | ICD-10-CM

## 2024-11-27 PROCEDURE — 99215 OFFICE O/P EST HI 40 MIN: CPT | Performed by: INTERNAL MEDICINE

## 2024-11-27 NOTE — PROGRESS NOTES
Miriam Smith  1950  [facility]  [address] [place] [id number]    DISCUSSION/SUMMARY:    74-year-old female recently admitted to the hospital with abdominal pain and weight loss.  Workup demonstrated a rectal mass (the mass was originally diagnosed in [place]).  Patient was seen by GI and biopsy (pathology results are listed below) did not demonstrate rectal or anal carcinoma: Results were consistent with adenoma.    Mrs. José Miguel Smith feels well and clinically there are no concerning findings.  Patient already has a pending appointment with rectal surgery.  Patient has not had a GYN exam in a long time.  To be absolutely certain, patient has been referred to gynecology for evaluation.    The above was discussed at length with patient, son and daughter.  The son and daughter speak Gambian and English quite well, demonstrated an excellent understanding of the situation.  In Gambian, patient also understood quite well.  Family understands that additional biopsies may demonstrate a different pathology result, possibly a malignant tumor requiring additional workup, different treatment etc.    Recent CAT scan the chest demonstrated severe coronary artery calcification.  Patient needs to establish care with a PCP here, may also may need to be seen by a cardiologist.  Patient denies any chest pain or prior cardiac issues.    Patient was given a 6-week follow-up visit but this may change depending upon the above.  Patient/family know to call the hematology/oncology office if there are any other questions or concerns.    Carefully review your medication list and verify that the list is accurate and up-to-date. Please call the hematology/oncology office if there are medications missing from the list, medications on the list that you are not currently taking or if there is a dosage or instruction that is different from how you're  taking that medication.    Patient goals and areas of care: See colorectal surgery, see gynecology, establish care with PCP  Barriers to care: None  Patient is able to self-care with help of family members  _____________________________________________________________________________________    Chief Complaint   Patient presents with    Follow-up    Rectal mass     History of Present Illness: 74-year-old female first seen in the hospital on November 13, 2024.  Patient was admitted with weight loss, low abdominal pain.  Workup demonstrated a rectal mass.  Patient was being seen by GI and a colonoscopy was pending.  Patient returns for follow-up.    Presently Mrs. José Miguel Smith states feeling okay, baseline.  Pain is controlled.  Patient still sees occasional blood in her stools.  No  bleeding.  Appetite is okay, weight is more or less stable.  No respiratory problems.  No shortness of breath or dyspnea exertion, no chest pain or pressure.  No abdominal pain.    Review of Systems   Constitutional:  Positive for fatigue.   HENT: Negative.     Eyes: Negative.    Respiratory: Negative.     Cardiovascular: Negative.    Gastrointestinal: Negative.    Endocrine: Negative.    Genitourinary: Negative.    Musculoskeletal: Negative.    Skin: Negative.    Allergic/Immunologic: Negative.    Neurological: Negative.    Hematological: Negative.    Psychiatric/Behavioral: Negative.     All other systems reviewed and are negative.    Patient Active Problem List   Diagnosis    Rectal mass    Type 2 diabetes mellitus (HCC)    Rectal bleeding    History of anal fissures    Electrolyte abnormality    Pain, rectal    Iron deficiency anemia    Leukocytosis     Past Medical History:   Diagnosis Date    Diabetes mellitus (HCC)      Past Surgical History:   Procedure Laterality Date    COLONOSCOPY       Family History   Problem Relation Age of Onset    Colon cancer Sister      Social History     Socioeconomic History    Marital status:  /Civil Union     Spouse name: Not on file    Number of children: Not on file    Years of education: Not on file    Highest education level: Not on file   Occupational History    Not on file   Tobacco Use    Smoking status: Never    Smokeless tobacco: Never   Vaping Use    Vaping status: Never Used   Substance and Sexual Activity    Alcohol use: Not Currently    Drug use: Not Currently    Sexual activity: Not on file   Other Topics Concern    Not on file   Social History Narrative    Not on file     Social Drivers of Health     Financial Resource Strain: Not on file   Food Insecurity: No Food Insecurity (2024)    Nursing - Inadequate Food Risk Classification     Worried About Running Out of Food in the Last Year: Not on file     Ran Out of Food in the Last Year: Not on file     Ran Out of Food in the Last Year: 1   Transportation Needs: No Transportation Needs (2024)    Nursing - Transportation Risk Classification     Lack of Transportation: Not on file     Lack of Transportation: 2   Physical Activity: Not on file   Stress: Not on file   Social Connections: Not on file   Intimate Partner Violence: Unknown (2024)    Nursing IPS     Feels Physically and Emotionally Safe: Not on file     Physically Hurt by Someone: Not on file     Humiliated or Emotionally Abused by Someone: Not on file     Physically Hurt by Someone: 2     Hurt or Threatened by Someone: 2   Housing Stability: Unknown (2024)    Nursing: Inadequate Housing Risk Classification     Has Housing: Not on file     Worried About Losing Housing: Not on file     Unable to Get Utilities: Not on file     Unable to Pay for Housing in the Last Year: 2     Has Housin       Current Outpatient Medications:     metFORMIN (GLUCOPHAGE) 1000 MG tablet, Take 1 tablet (1,000 mg total) by mouth 2 (two) times a day with meals, Disp: 60 tablet, Rfl: 0    polyethylene glycol (MIRALAX) 17 g packet, Take 17 g by mouth daily, Disp: , Rfl:      psyllium (METAMUCIL) packet, Take 1 packet by mouth daily, Disp: , Rfl:     Allergies   Allergen Reactions    Motrin [Ibuprofen] Itching    Tylenol [Acetaminophen] Itching    Penicillins Rash     Rash and bruise    Sulfa Antibiotics Rash     Price velia syndrome       Vitals:    11/27/24 0846   BP: 102/56   Pulse: 72   Resp: 17   Temp: 98.4 °F (36.9 °C)   SpO2: 100%     Physical Exam  Constitutional:       Appearance: She is well-developed.   HENT:      Head: Normocephalic and atraumatic.      Right Ear: External ear normal.      Left Ear: External ear normal.   Eyes:      Conjunctiva/sclera: Conjunctivae normal.      Pupils: Pupils are equal, round, and reactive to light.   Cardiovascular:      Rate and Rhythm: Normal rate and regular rhythm.      Heart sounds: Normal heart sounds.   Pulmonary:      Effort: Pulmonary effort is normal.      Breath sounds: Normal breath sounds.   Abdominal:      General: Bowel sounds are normal.      Palpations: Abdomen is soft.   Musculoskeletal:         General: Normal range of motion.      Cervical back: Normal range of motion and neck supple.   Skin:     General: Skin is warm.   Neurological:      Mental Status: She is alert and oriented to person, place, and time.      Deep Tendon Reflexes: Reflexes are normal and symmetric.   Psychiatric:         Behavior: Behavior normal.         Thought Content: Thought content normal.         Judgment: Judgment normal.     Extremities: No lower extreme edema bilaterally, no cords, pulses 1+  Lymphatics: No adenopathy in the neck, supraclavicular region, axilla bilaterally    Performance Status: 1 - Symptomatic but completely ambulatory    Labs    11/14/2024 WBC = 9.86 hemoglobin = 10 hematocrit = 30 MCV = 85 RDW = 15.1 platelet = 323 neutrophil = 77% lymphocyte = 12% monocyte = 10% eosinophil = 1%    11/14/2024 BUN = 12 creatinine = 0.52 calcium = 7.9 AST = 9 ALT = 18 alkaline phosphatase = 55 total protein = 5.9 albumin = 2.7 total  "bilirubin = 0.23    11/13/2024 CEA = 5.6    Imaging    11/12/2024 CT chest without contrast.  Impression stated no lung metastases, severe coronary artery calcification indicating atherosclerotic heart disease, cholelithiasis.    11/10/2024 CT abdomen pelvis with contrast    IMPRESSION:     Heterogeneous peripherally enhancing mass/malignancy involving the rectum and infiltrating the left ischiorectal and gluteal fat measuring approximately 7.7 x 7.5 x 8.6 cm with areas of fluid density compatible with internal necrosis.    Pathology    Case Report   Surgical Pathology Report                         Case: [id number]                                   Authorizing Provider:  Morris Tom MD         Collected:           11/12/2024 1439               Ordering Location:     [facility] hospital Received:            11/12/2024 1649                                      [address]                                                                       Pathologist:           Nemo Peterson MD                                                         Specimen:    Rectum, rectal mass r/o malignancy , cancer                                                Final Diagnosis   A. 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 Deeper sections of the specimen have been evaluated. A higher grade lesion cannot be excluded due to the superficial nature of the biopsy; repeat biopsy is recommended as clinically indicated.        "

## 2024-12-02 ENCOUNTER — TELEPHONE (OUTPATIENT)
Age: 74
End: 2024-12-02

## 2024-12-02 NOTE — TELEPHONE ENCOUNTER
Called and spoke to Rocky. Provided patient a sooner appointment with Dr. Quinonez on 12/6/2024 at 9:00 am in [place].

## 2024-12-02 NOTE — TELEPHONE ENCOUNTER
Rocky, verified on consent list.  Called back to schedule consult for patient. Referral requests urgent available slot in 7 days. Next available was 12/13 at 1515. Please see if a sooner appt is available within referral guidelines, thank you.

## 2024-12-03 ENCOUNTER — PATIENT OUTREACH (OUTPATIENT)
Dept: HEMATOLOGY ONCOLOGY | Facility: CLINIC | Age: 74
End: 2024-12-03

## 2024-12-03 DIAGNOSIS — Z76.89 ENCOUNTER TO ESTABLISH CARE: Primary | ICD-10-CM

## 2024-12-03 NOTE — PROGRESS NOTES
#[id number]    Outreach to pt with use of .  Voicemail left stating she was scheduled with the wrong specialty and appt for 12/6 is cancelled and new referral for Gynecology has been placed.  Provided number to call and schedule.

## 2024-12-06 ENCOUNTER — HOSPITAL ENCOUNTER (OUTPATIENT)
Dept: RADIOLOGY | Facility: HOSPITAL | Age: 74
Discharge: HOME | End: 2024-12-06
Payer: SUBSIDIZED

## 2024-12-06 DIAGNOSIS — K62.89 RECTAL PAIN: ICD-10-CM

## 2024-12-06 DIAGNOSIS — K62.89 PAIN, RECTAL: ICD-10-CM

## 2024-12-06 DIAGNOSIS — K92.1 BLOOD IN STOOL: ICD-10-CM

## 2024-12-06 DIAGNOSIS — K62.89 RECTAL MASS: ICD-10-CM

## 2024-12-06 PROCEDURE — 72195 MRI PELVIS W/O DYE: CPT

## 2024-12-12 NOTE — PROGRESS NOTES
Name: Miriam Smith      : 1950      MRN: [id number]  Encounter Provider: Trino Knox MD  Encounter Date: 2024   Encounter department: [facility]  :  Assessment & Plan           History of Present Illness {?Quick Links Encounters * My Last Note * Last Note in Specialty * Snapshot * Since Last Visit * History :69013}    Miriam Smith is a 74 y.o. year old female who presents for a rectal mass. Referred by CHIP Patel.    Patient was seen by Hematology / Oncology on 2024    CT abdomen pelvis - 11/10/2024  Impression:   - Heterogeneous peripherally enhancing mass/malignancy involving the rectum and infiltrating the left ischiorectal and gluteal fat measuring approximately 7.7 x 7.5 x 8.6 cm with areas of fluid density compatible with internal necrosis.    MRI pelvis rectal cancer staging - 2024  Impression:  Please note that although the current biopsy results does not demonstrate definite cancer, the imaging characteristics of this tumor is clearly invasive and will be described as a rectal cancer.  Large polypoid, predominately mucinous low rectal cancer, 2.2 cm from the anal verge, 7.5 cm long (series 2 image 19, series 3 images 9-32.)  On the left tumor invades the top of the internal sphincter and extends into intersphincteric space (ISS) (series 9 images 19-22.)  Also separately, the tumor broadly penetrates through the posterior rectal wall (series 3 images 11-18) fills the posterior mesorectal space, penetrates through the left posterolateral levator ani (series 3 images 21-26,) and extends into the left ischioanal fossa and invades the left gluteus shae (series 3 images 26-31.)  Right anterolaterally, tumor penetrates through the mesorectal fascia to involve the right posterolateral vaginal wall (series 3 image 21 and series 2 image 27.)  Overall MRI stage: T4b, N2, Low rectal cancer  MRF: Not applicable for  "T4 tumor.  Sphincter involvement: Yes.  Suspicious extra mesorectal lymph nodes: No.  EMVI: No.  For the purpose of radiation therapy planning, series 3 would be most useful.    Last colonoscopy was performed on 11/12/2024 by Dr. Morris Tom with a 1 year recall.  - Single malignant-appearing, friable, fungating and ulcerated mass measuring 15 cm in the rectum and anal region, covering one half of the circumference; performed cold forceps biopsy with partial removal     Final Diagnosis  A. Colonic mucosa, \"rectal mass\"; biopsy:    - Fragments of tubulovillous adenoma with features suggestive of traditional serrated adenoma, see note    - Negative for high-grade dysplasia or carcinoma    Lab Results   Component Value Date    CEA 5.6 (H) 11/13/2024     Lab Results   Component Value Date    SODIUM 138 11/14/2024    K 3.7 11/14/2024     11/14/2024    CO2 25 11/14/2024    AGAP 6 11/14/2024    BUN 12 11/14/2024    CREATININE 0.52 (L) 11/14/2024    GLUC 165 (H) 11/14/2024    GLUF 99 11/12/2024    CALCIUM 7.9 (L) 11/14/2024    AST 9 (L) 11/14/2024    ALT 18 11/14/2024    ALKPHOS 55 11/14/2024    TP 5.9 (L) 11/14/2024    TBILI 0.23 11/14/2024    EGFR 94 11/14/2024     Lab Results   Component Value Date    WBC 9.86 11/14/2024    HGB 10.0 (L) 11/14/2024    HCT 30.3 (L) 11/14/2024    MCV 85 11/14/2024     11/14/2024     HPI  Review of Systems    Objective {?Quick Links Trend Vitals * Enter New Vitals * Results Review * Timeline (Adult) * Labs * Imaging * Cardiology * Procedures * Lung Cancer Screening * Surgical eConsent :44830}  There were no vitals taken for this visit.     Physical Exam  {Administrative / Billing Section (Optional):75242}  "

## 2024-12-16 NOTE — PROGRESS NOTES
Name: Miriam Smith      : 1950      MRN: [id number]  Encounter Provider: Trino Knox MD  Encounter Date: 2024   Encounter department: [facility]  :  Assessment & Plan  Rectal mass    Orders:    Ambulatory Referral to Colorectal Surgery    Ambulatory Referral to Palliative Care; Future    Rectal cancer (HCC)  74-year-old female, Persian speaking with family in full translation today, 20lb weight loss.  She states that in October she was diagnosed with rectal mass/tumor in [place].  This started after some changes in bowel habits in 2024.    Sister with colon cancer by family report, she has not had prior colonoscopy.    She had colonoscopy last month with long rectal mass extending to dentate line.    CT scan with concerning findings for tumor, MRI T4b N2 with invasion into the left levator, possible posterior vaginal wall.    On examination she has left buttock swelling due to likely tumor ingrowth into the ischiorectal space.  Tumor extends to the dentate line with sphincter involvement on digital rectal examination.    She has a quite a lot of cancer related pain, family states that she has reaction to Tylenol/Advil.  She has seen oncology last month.    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    Of note we also discussed the superficial biopsies with adenomatous findings.  We did discuss that there is an obvious cancer diagnosis by imaging findings, I would like to save her an additional procedure with additional biopsies and have asked that they relay  "the [facility] pathology records if this can help expedite her start of care.    -I have sent messages by secure chat to nurse navigation and her oncologist of record.  -Pain/palliative care stat referral placed  -Tumor conference discussion will be posted as soon as possible  CC:  CHIP Dawn Dr., CANDELAIRA Camarena  GI Tumor Conference                History of Present Illness     Miriam Smith is a 74 y.o. year old female who presents for a rectal mass. Referred by CHIP Patel.    CT abdomen pelvis 11/10/2024  Impression:   - Heterogeneous peripherally enhancing mass/malignancy involving the rectum and infiltrating the left ischiorectal and gluteal fat measuring approximately 7.7 x 7.5 x 8.6 cm with areas of fluid density compatible with internal necrosis.    Colonoscopy was performed on 11/12/2024 by Dr. Morris Tom with a 1 year recall.  - Single malignant-appearing, friable, fungating and ulcerated mass measuring 15 cm in the rectum and anal region, covering one half of the circumference; performed cold forceps biopsy with partial removal   Final Diagnosis:  A. Colonic mucosa, \"rectal mass\"; biopsy:    - Fragments of tubulovillous adenoma with features suggestive of traditional serrated adenoma, see note    - Negative for high-grade dysplasia or carcinoma    Patient was seen by Hematology / Oncology on 11/27/2024.    MRI pelvis rectal cancer staging 12/6/2024  Impression:  Please note that although the current biopsy results does not demonstrate definite cancer, the imaging characteristics of this tumor is clearly invasive and will be described as a rectal cancer.  Large polypoid, predominately mucinous low rectal cancer, 2.2 cm from the anal verge, 7.5 cm long (series 2 image 19, series 3 images 9-32.)  On the left tumor invades the top of the internal sphincter and extends into intersphincteric space (ISS) (series 9 images 19-22.)  Also separately, the " tumor broadly penetrates through the posterior rectal wall (series 3 images 11-18) fills the posterior mesorectal space, penetrates through the left posterolateral levator ani (series 3 images 21-26,) and extends into the left ischioanal fossa and invades the left gluteus shae (series 3 images 26-31.)  Right anterolaterally, tumor penetrates through the mesorectal fascia to involve the right posterolateral vaginal wall (series 3 image 21 and series 2 image 27.)  Overall MRI stage: T4b, N2, Low rectal cancer  MRF: Not applicable for T4 tumor.  Sphincter involvement: Yes.  Suspicious extra mesorectal lymph nodes: No.  EMVI: No.  For the purpose of radiation therapy planning, series 3 would be most useful.    The patient presents with her daughter and son in law who are assisting in translating.     The patient reports continued pain/discomfort and bleeding.     Her daughter and son and law report that she has experienced unintentional weight loss.    Lab Results   Component Value Date    CEA 5.6 (H) 11/13/2024     Lab Results   Component Value Date    SODIUM 138 11/14/2024    K 3.7 11/14/2024     11/14/2024    CO2 25 11/14/2024    AGAP 6 11/14/2024    BUN 12 11/14/2024    CREATININE 0.52 (L) 11/14/2024    GLUC 165 (H) 11/14/2024    GLUF 99 11/12/2024    CALCIUM 7.9 (L) 11/14/2024    AST 9 (L) 11/14/2024    ALT 18 11/14/2024    ALKPHOS 55 11/14/2024    TP 5.9 (L) 11/14/2024    TBILI 0.23 11/14/2024    EGFR 94 11/14/2024     Lab Results   Component Value Date    WBC 9.86 11/14/2024    HGB 10.0 (L) 11/14/2024    HCT 30.3 (L) 11/14/2024    MCV 85 11/14/2024     11/14/2024     HPI  Review of Systems    Objective   /60   Ht 5' (1.524 m)   BMI 22.65 kg/m²      Physical Exam  Constitutional:       Comments: uncomfortable   HENT:      Head: Normocephalic.   Eyes:      Pupils: Pupils are equal, round, and reactive to light.   Cardiovascular:      Rate and Rhythm: Normal rate and regular rhythm.    Pulmonary:      Effort: Pulmonary effort is normal.      Breath sounds: Normal breath sounds.   Abdominal:      General: Abdomen is flat.      Palpations: Abdomen is soft.   Genitourinary:     Comments: Left buttock/perianal/ischiorectal induration without cellulitis, left lateral tumor mass palpated, fixed to dentate line/sphincter mechanism.  Neurological:      Mental Status: She is alert.

## 2024-12-17 ENCOUNTER — OFFICE VISIT (OUTPATIENT)
Age: 74
End: 2024-12-17

## 2024-12-17 ENCOUNTER — HOSPITAL ENCOUNTER (EMERGENCY)
Facility: HOSPITAL | Age: 74
Discharge: HOME | End: 2024-12-17
Attending: STUDENT IN AN ORGANIZED HEALTH CARE EDUCATION/TRAINING PROGRAM
Payer: SUBSIDIZED

## 2024-12-17 ENCOUNTER — DOCUMENTATION (OUTPATIENT)
Dept: HEMATOLOGY ONCOLOGY | Facility: CLINIC | Age: 74
End: 2024-12-17

## 2024-12-17 ENCOUNTER — TELEPHONE (OUTPATIENT)
Dept: PALLIATIVE MEDICINE | Facility: CLINIC | Age: 74
End: 2024-12-17

## 2024-12-17 VITALS
BODY MASS INDEX: 22.58 KG/M2 | SYSTOLIC BLOOD PRESSURE: 142 MMHG | WEIGHT: 115 LBS | OXYGEN SATURATION: 97 % | HEIGHT: 60 IN | HEART RATE: 95 BPM | TEMPERATURE: 98.5 F | DIASTOLIC BLOOD PRESSURE: 59 MMHG | RESPIRATION RATE: 18 BRPM

## 2024-12-17 VITALS — BODY MASS INDEX: 22.65 KG/M2 | SYSTOLIC BLOOD PRESSURE: 104 MMHG | DIASTOLIC BLOOD PRESSURE: 60 MMHG | HEIGHT: 60 IN

## 2024-12-17 DIAGNOSIS — K62.89 RECTAL MASS: Primary | ICD-10-CM

## 2024-12-17 DIAGNOSIS — K62.89 RECTAL MASS: ICD-10-CM

## 2024-12-17 DIAGNOSIS — C20 RECTAL CANCER (HCC): ICD-10-CM

## 2024-12-17 DIAGNOSIS — G89.3 CANCER ASSOCIATED PAIN: Primary | ICD-10-CM

## 2024-12-17 PROCEDURE — 99245 OFF/OP CONSLTJ NEW/EST HI 55: CPT | Performed by: COLON & RECTAL SURGERY

## 2024-12-17 PROCEDURE — 99283 EMERGENCY DEPT VISIT LOW MDM: CPT

## 2024-12-17 PROCEDURE — 99284 EMERGENCY DEPT VISIT MOD MDM: CPT | Performed by: STUDENT IN AN ORGANIZED HEALTH CARE EDUCATION/TRAINING PROGRAM

## 2024-12-17 RX ORDER — OXYCODONE HYDROCHLORIDE 5 MG/1
2.5 TABLET ORAL EVERY 6 HOURS PRN
Qty: 8 TABLET | Refills: 0 | Status: SHIPPED | OUTPATIENT
Start: 2024-12-17 | End: 2024-12-18

## 2024-12-17 RX ADMIN — Medication 2.5 MG: at 10:37

## 2024-12-17 NOTE — ASSESSMENT & PLAN NOTE
Orders:    Ambulatory Referral to Colorectal Surgery    Ambulatory Referral to Palliative Care; Future

## 2024-12-17 NOTE — DISCHARGE INSTRUCTIONS
As discussed please keep your appointment for tomorrow.  Please take the oxycodone as needed.  Please follow-up with your other physicians.  Return to the emergency room for any worsening pain, fevers, constipation, or any other new or concerning symptoms.

## 2024-12-17 NOTE — TELEPHONE ENCOUNTER
Called Pt's daughter and spoke to Pt's Son-in-law and made consult appt for 12/18/24 at 2:30 pm with Dr. Alba in [facility].

## 2024-12-17 NOTE — PROGRESS NOTES
In-basket message received from Dr. Knox to add patient to the GI [facility] on 12/19/2024. Chart reviewed and prep completed.

## 2024-12-17 NOTE — ASSESSMENT & PLAN NOTE
74-year-old female, Angolan speaking with family in full translation today, 20lb weight loss.  She states that in October she was diagnosed with rectal mass/tumor in [place].  This started after some changes in bowel habits in September 2024.    Sister with colon cancer by family report, she has not had prior colonoscopy.    She had colonoscopy last month with long rectal mass extending to dentate line.    CT scan with concerning findings for tumor, MRI T4b N2 with invasion into the left levator, possible posterior vaginal wall.    On examination she has left buttock swelling due to likely tumor ingrowth into the ischiorectal space.  Tumor extends to the dentate line with sphincter involvement on digital rectal examination.    She has a quite a lot of cancer related pain, family states that she has reaction to Tylenol/Advil.  She has seen oncology last month.    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    Of note we also discussed the superficial biopsies with adenomatous findings.  We did discuss that there is an obvious cancer diagnosis by imaging findings, I would like to save her an additional procedure with additional biopsies and have asked that they relay the [place] pathology records if this can help expedite her start of care.    -I have sent messages by secure chat to nurse navigation and her oncologist of record.  -Pain/palliative care stat referral placed  -Tumor conference discussion will be posted as soon as possible  CC:  CHIP Dawn Dr., RN Sherry Moyer  GI Tumor Conference

## 2024-12-17 NOTE — ED PROVIDER NOTES
Time reflects when diagnosis was documented in both MDM as applicable and the Disposition within this note       Time User Action Codes Description Comment    12/17/2024 11:12 AM Darrius Santana Add [G89.3] Cancer associated pain     12/17/2024 11:13 AM Darrius Santana Add [K62.89] Rectal mass           ED Disposition       ED Disposition   Discharge    Condition   Stable    Date/Time   Tue Dec 17, 2024 10:11 AM    Comment   Miriam Smith discharge to home/self care.                   Assessment & Plan       Medical Decision Making  Patient is a 74 y.o. female who presents to the ED for pain medication refill.  Patient is nontoxic, well-appearing.     Pain medication requested is a selective Lowe 2 inhibitor is not FDA approved in [place].    Differential includes but is not limited to: Cancer/mass pain.  Presentation not consistent with bowel obstruction.    Plan: Discussed with patient and family about starting oxycodone for pain.  They are in agreement.  Will trial in the ED and if patient tolerates will discharge.  Discussed follow-up.  Patient and family verbalized understanding and agreed to plan of care.                   Risk  Prescription drug management.             Medications   oxyCODONE (ROXICODONE) split tablet 2.5 mg (2.5 mg Oral Given 12/17/24 1037)       ED Risk Strat Scores                          SBIRT 22yo+      Flowsheet Row Most Recent Value   Initial Alcohol Screen: US AUDIT-C     1. How often do you have a drink containing alcohol? 0 Filed at: 12/17/2024 0951   2. How many drinks containing alcohol do you have on a typical day you are drinking?  0 Filed at: 12/17/2024 0951   3a. Male UNDER 65: How often do you have five or more drinks on one occasion? 0 Filed at: 12/17/2024 0951   3b. FEMALE Any Age, or MALE 65+: How often do you have 4 or more drinks on one occassion? 0 Filed at: 12/17/2024 0951   Audit-C Score 0 Filed at: 12/17/2024 0951   FELA: How many times in the past  year have you...    Used an illegal drug or used a prescription medication for non-medical reasons? Never Filed at: 12/17/2024 0951                            History of Present Illness       Chief Complaint   Patient presents with    Pain     Recent rectal cancer dx and starts treatment in 2 weeks, would like something for pain to get to that appt.        Past Medical History:   Diagnosis Date    Diabetes mellitus (HCC)       Past Surgical History:   Procedure Laterality Date    COLONOSCOPY        Family History   Problem Relation Age of Onset    Colon cancer Sister       Social History     Tobacco Use    Smoking status: Never    Smokeless tobacco: Never   Vaping Use    Vaping status: Never Used   Substance Use Topics    Alcohol use: Not Currently    Drug use: Not Currently      E-Cigarette/Vaping    E-Cigarette Use Never User       E-Cigarette/Vaping Substances    Nicotine No     THC No     CBD No     Flavoring No     Other No     Unknown No       I have reviewed and agree with the history as documented.     Patient is a 74-year-old female, recently diagnosed rectal mass, who presents to the emergency room and for refill of her pain medication.  Presents to family.  States that she has been taking a selective Lowe 2 inhibitor that she got from [place].  Ran out yesterday.  Is now requesting refills.  No new symptoms.  No constipation.  Currently feels fine.  No fevers or chills.  No other complaints or concerns.          Review of Systems   All other systems reviewed and are negative.          Objective       ED Triage Vitals [12/17/24 0949]   Temperature Pulse Blood Pressure Respirations SpO2 Patient Position - Orthostatic VS   98.5 °F (36.9 °C) 95 142/59 18 97 % --      Temp src Heart Rate Source BP Location FiO2 (%) Pain Score    -- -- -- -- 10 - Worst Possible Pain      Vitals      Date and Time Temp Pulse SpO2 Resp BP Pain Score FACES Pain Rating User   12/17/24 0949 98.5 °F (36.9 °C) 95 97 % 18 142/59 10 -  Worst Possible Pain -- JEFFERSON            Physical Exam  Vitals and nursing note reviewed.   Constitutional:       General: She is not in acute distress.     Appearance: She is well-developed. She is not ill-appearing, toxic-appearing or diaphoretic.   HENT:      Head: Normocephalic and atraumatic.      Right Ear: External ear normal.      Left Ear: External ear normal.      Nose: Nose normal.   Eyes:      General: Lids are normal. No scleral icterus.  Cardiovascular:      Rate and Rhythm: Normal rate and regular rhythm.   Pulmonary:      Effort: Pulmonary effort is normal. No respiratory distress.      Breath sounds: Normal breath sounds. No wheezing or rales.   Abdominal:      Palpations: Abdomen is soft.      Tenderness: There is no abdominal tenderness. There is no guarding or rebound.   Musculoskeletal:         General: No deformity. Normal range of motion.      Cervical back: Normal range of motion and neck supple.   Skin:     General: Skin is warm and dry.   Neurological:      General: No focal deficit present.      Mental Status: She is alert.   Psychiatric:         Mood and Affect: Mood normal.         Behavior: Behavior normal.         Results Reviewed       None            No orders to display       Procedures    ED Medication and Procedure Management   Prior to Admission Medications   Prescriptions Last Dose Informant Patient Reported? Taking?   metFORMIN (GLUCOPHAGE) 1000 MG tablet  Self No No   Sig: Take 1 tablet (1,000 mg total) by mouth 2 (two) times a day with meals   polyethylene glycol (MIRALAX) 17 g packet  Self No No   Sig: Take 17 g by mouth daily   psyllium (METAMUCIL) packet  Self No No   Sig: Take 1 packet by mouth daily      Facility-Administered Medications: None     Discharge Medication List as of 12/17/2024 11:14 AM        START taking these medications    Details   naloxone (NARCAN) 4 mg/0.1 mL nasal spray Administer 1 spray into a nostril. If no response after 2-3 minutes, give another dose  in the other nostril using a new spray., Normal      oxyCODONE (ROXICODONE) 5 immediate release tablet Take 0.5 tablets (2.5 mg total) by mouth every 6 (six) hours as needed for moderate pain Max Daily Amount: 10 mg, Starting Tue 12/17/2024, Normal           CONTINUE these medications which have NOT CHANGED    Details   metFORMIN (GLUCOPHAGE) 1000 MG tablet Take 1 tablet (1,000 mg total) by mouth 2 (two) times a day with meals, Starting Thu 11/14/2024, Normal      polyethylene glycol (MIRALAX) 17 g packet Take 17 g by mouth daily, Starting Thu 11/14/2024, No Print      psyllium (METAMUCIL) packet Take 1 packet by mouth daily, Starting Fri 11/15/2024, No Print           No discharge procedures on file.  ED SEPSIS DOCUMENTATION   Time reflects when diagnosis was documented in both MDM as applicable and the Disposition within this note       Time User Action Codes Description Comment    12/17/2024 11:12 AM Darrius Santana [G89.3] Cancer associated pain     12/17/2024 11:13 AM Darrius Santana [K62.89] Rectal mass                  Darrius Santana, DO  12/17/24 0258

## 2024-12-18 ENCOUNTER — TELEPHONE (OUTPATIENT)
Dept: HEMATOLOGY ONCOLOGY | Facility: MEDICAL CENTER | Age: 74
End: 2024-12-18

## 2024-12-18 ENCOUNTER — SOCIAL WORK (OUTPATIENT)
Dept: PALLIATIVE MEDICINE | Facility: CLINIC | Age: 74
End: 2024-12-18

## 2024-12-18 ENCOUNTER — PATIENT OUTREACH (OUTPATIENT)
Dept: HEMATOLOGY ONCOLOGY | Facility: CLINIC | Age: 74
End: 2024-12-18

## 2024-12-18 ENCOUNTER — CONSULT (OUTPATIENT)
Dept: PALLIATIVE MEDICINE | Facility: CLINIC | Age: 74
End: 2024-12-18

## 2024-12-18 VITALS
DIASTOLIC BLOOD PRESSURE: 60 MMHG | TEMPERATURE: 98.3 F | WEIGHT: 111.5 LBS | HEIGHT: 60 IN | BODY MASS INDEX: 21.89 KG/M2 | HEART RATE: 98 BPM | OXYGEN SATURATION: 95 % | SYSTOLIC BLOOD PRESSURE: 120 MMHG

## 2024-12-18 DIAGNOSIS — Z71.89 COUNSELING AND COORDINATION OF CARE: Primary | ICD-10-CM

## 2024-12-18 DIAGNOSIS — Z51.5 PALLIATIVE CARE BY SPECIALIST: Primary | ICD-10-CM

## 2024-12-18 DIAGNOSIS — K62.89 RECTAL MASS: ICD-10-CM

## 2024-12-18 DIAGNOSIS — K62.89 RECTAL PAIN: ICD-10-CM

## 2024-12-18 DIAGNOSIS — G89.3 CANCER ASSOCIATED PAIN: ICD-10-CM

## 2024-12-18 PROCEDURE — NC001 PR NO CHARGE

## 2024-12-18 PROCEDURE — 99244 OFF/OP CNSLTJ NEW/EST MOD 40: CPT | Performed by: STUDENT IN AN ORGANIZED HEALTH CARE EDUCATION/TRAINING PROGRAM

## 2024-12-18 RX ORDER — SENNOSIDES 8.6 MG
8.6 TABLET ORAL
Qty: 30 TABLET | Refills: 1 | Status: SHIPPED | OUTPATIENT
Start: 2024-12-18 | End: 2024-12-27

## 2024-12-18 RX ORDER — OXYCODONE HYDROCHLORIDE 5 MG/1
5 TABLET ORAL EVERY 4 HOURS PRN
Qty: 30 TABLET | Refills: 0 | Status: SHIPPED | OUTPATIENT
Start: 2024-12-18

## 2024-12-18 NOTE — PATIENT INSTRUCTIONS
It was a pleasure speaking with you today.    As discussed:  -Continue your medications as prescribed.  -Continue with a bowel regimen to avoid constipation.    Follow-up in: 1 week for recheck on symptoms    Please do not hesitate to call me sooner should you have any questions/concerns or needs.    Medication safety issues - Do not drive under the influence of narcotics (including opioids), watch for adverse effects including confusion / altered mental status / respiratory depression (slowed breathing), keep medications stored in a safe/locked environment, do not use alcohol while opioids or other narcotics are in your system. Do not travel with more than the minimum number of tablets or capsules required for the trip.    ER Precautions  Watch for red flag symptoms including, but not limited to fevers, chills, chest pain, shortness of breath, intractable nausea/vomiting/diarrhea, or acute intractable pain (especially if pain is new or has changed).

## 2024-12-18 NOTE — ASSESSMENT & PLAN NOTE
[employer]  for [place] was offered, however, patient declined as she had her daughter (Coco) and granddaughter accompanying with her to serve as .    Patient is following Medical Oncology (Dr. Perea) and Dr. Knox of Colorectal Surgery)    Consult to palliative care today for cancer related pain as there is a concern for a rectal mass per imaging and provider notes.     Orders:    Ambulatory Referral to Palliative Care    oxyCODONE (Roxicodone) 5 immediate release tablet; Take 1 tablet (5 mg total) by mouth every 4 (four) hours as needed for moderate pain Max Daily Amount: 30 mg    naloxone (NARCAN) 4 mg/0.1 mL nasal spray; Administer 1 spray into a nostril. If no response after 2-3 minutes, give another dose in the other nostril using a new spray. For use in emergencies for opioid / pain medication reversal for accidental ingestion, respiratory depression, sedation or concerns for overdose.    senna (SENOKOT) 8.6 mg; Take 1 tablet (8.6 mg total) by mouth daily at bedtime as needed for constipation (Patient not taking: Reported on 12/19/2024)

## 2024-12-18 NOTE — ASSESSMENT & PLAN NOTE
Increase pain regimen at this time due to rectal pain.  Previously taking 2.5mg (half tablet) every 6 hours without any relief to the rectal area.  Has not tried 5mg dosing yet. Patient denies confusion or sedation with oxycodone since starting.    Plan:  1) Increase to OxyIR 5mg q4 hours PRN  -reviewed instructions with patient and family today with understanding.   -patient / family knows to call me if the 5mg dosing is not as effective. If not, we will titration as needed, however, to avoid unwanted side effects. Patient is fatigued at this time which could be related to her poor sleep due to the pain, pain medication, vs malignancy related fatigue.  -Narcan script provided and reviewed its use for emergencies.   For use in emergencies for opioid / pain medication reversal for accidental ingestion, respiratory depression, sedation or concerns for overdose.    2) Bowel regimen to avoid OIC  -Script for Senna provided today (8.6mg qHS PRN to avoid OIC)    3) patient with close follow-up to see if she improves with her pain. Will consider titration based on her response with a higher dose, if needed, will direct titration accordingly.  -patient denies use of illicit substances or alcohol products    ER Precautions  Watch for red flag symptoms including, but not limited to fevers, chills, chest pain, shortness of breath, intractable nausea/vomiting/diarrhea, or acute intractable pain (especially if pain is new or has changed).    Medication safety issues - Do not drive under the influence of narcotics (including opioids), watch for adverse effects including confusion / altered mental status / respiratory depression (slowed breathing), keep medications stored in a safe/locked environment, do not use alcohol while opioids or other narcotics are in your system. Do not travel with more than the minimum number of tablets or capsules required for the trip.        Orders:    oxyCODONE (Roxicodone) 5 immediate release  tablet; Take 1 tablet (5 mg total) by mouth every 4 (four) hours as needed for moderate pain Max Daily Amount: 30 mg    naloxone (NARCAN) 4 mg/0.1 mL nasal spray; Administer 1 spray into a nostril. If no response after 2-3 minutes, give another dose in the other nostril using a new spray. For use in emergencies for opioid / pain medication reversal for accidental ingestion, respiratory depression, sedation or concerns for overdose.    senna (SENOKOT) 8.6 mg; Take 1 tablet (8.6 mg total) by mouth daily at bedtime as needed for constipation (Patient not taking: Reported on 12/19/2024)

## 2024-12-18 NOTE — PROGRESS NOTES
I reached out to Miriam  to complete the Distress Thermometer, review for any barriers to care and to offer any supportive services that may be needed. I left VM with the reason for my call including my direct phone number .

## 2024-12-18 NOTE — ASSESSMENT & PLAN NOTE
Psychosocial   Supportive listening provided  Normalized experience of patient/family  Provided anxiety containment     Referrals Placed / Medical Equipment Ordered  -None     Follow-Up Recommendations  -Follow-up with PCP and current medical specialists  -Follow-up with palliative care: 1 week    Orders:    oxyCODONE (Roxicodone) 5 immediate release tablet; Take 1 tablet (5 mg total) by mouth every 4 (four) hours as needed for moderate pain Max Daily Amount: 30 mg    senna (SENOKOT) 8.6 mg; Take 1 tablet (8.6 mg total) by mouth daily at bedtime as needed for constipation (Patient not taking: Reported on 12/19/2024)

## 2024-12-18 NOTE — PROGRESS NOTES
Palliative Outpatient Assessment of Need    LSW completed an assessment of need which was completed with (patient, family, or both) in the office or via phone/video conference    Per pt's request, pt's dtr Coco assisted with translation as pt is primarily Uzbek-speaking.    Relationship status:   Duration of relationship:   Name of significant other:  Children and Ages: 2 dtrs and 1 son   Pets:   Other important family information: Pt's  and additional family are in [place]. Pt came to live with her dtr in the [place] 2 [place] ago  Living situation (where and whom): Resides with dtr and additional family members    Patient's primary caregiver: Dtr assists  Any limitations of caregiver:  Environmental concerns or barriers:   history: N/A  Employment history/source of income:   Disability:    Concerns regarding literacy: Uzbek is primary language   Spirituality/ Tenriism: Presybeterian    Patient's strengths, social supports, and resources: Strong family support  Cultural information: Resided in [place] until 2 months ago   Mental Health current or previous: None  Substance use or history: None   Sleep: Interrupted due to pain  Exercise: Limited due to pain  Diet/nutrition: Denies N/V  Durable Medical Equipment needs: S/c  Transportation: Family transports  Financial concerns: Pt does not have insurance coverage. Dtr states pt has already been approved for [facility].  Advanced Directive: None  Other medical or social work providers involved: SurgOnc; Oncology  Patient/caregiver current level of coping: Pt feels she is coping well emotionally at this time. Denies any feelings of depression/anxiety. Has strong family support. Reviewed role of [facility] SW for on-going support.    Understanding: Pt/family appear understanding of current medical status  Patient/family concerns and areas of need: Pain--medication regimen adjustment made in office today by Dr. Alba. Pt will f/u in 1 week. Appt scheduled with  oncology team for tomorrow 12/19 to discuss treatment plan options.  Patient's interests: Reading     I have spent 40 minutes with Patient and family today in which greater than 50% of this time was spent in counseling/coordination of care.    *All questions may not be answered due to constraints.  Follow-up discussions may need to occur

## 2024-12-18 NOTE — PROGRESS NOTES
Name: Miriam Smith      : 1950      MRN: [id number]  Encounter Provider: Derek Alba DO  Encounter Date: 2024   Encounter department: [facility]  :  Assessment & Plan  Rectal mass  CyraCom  for [place] was offered, however, patient declined as she had her daughter (Coco) and granddaughter accompanying with her to serve as .    Patient is following Medical Oncology (Dr. Perea) and Dr. Knox of Colorectal Surgery)    Consult to palliative care today for cancer related pain as there is a concern for a rectal mass per imaging and provider notes.     Orders:    Ambulatory Referral to Palliative Care    oxyCODONE (Roxicodone) 5 immediate release tablet; Take 1 tablet (5 mg total) by mouth every 4 (four) hours as needed for moderate pain Max Daily Amount: 30 mg    naloxone (NARCAN) 4 mg/0.1 mL nasal spray; Administer 1 spray into a nostril. If no response after 2-3 minutes, give another dose in the other nostril using a new spray. For use in emergencies for opioid / pain medication reversal for accidental ingestion, respiratory depression, sedation or concerns for overdose.    senna (SENOKOT) 8.6 mg; Take 1 tablet (8.6 mg total) by mouth daily at bedtime as needed for constipation (Patient not taking: Reported on 2024)    Cancer associated pain  Increase pain regimen at this time due to rectal pain.  Previously taking 2.5mg (half tablet) every 6 hours without any relief to the rectal area.  Has not tried 5mg dosing yet. Patient denies confusion or sedation with oxycodone since starting.    Plan:  1) Increase to OxyIR 5mg q4 hours PRN  -reviewed instructions with patient and family today with understanding.   -patient / family knows to call me if the 5mg dosing is not as effective. If not, we will titration as needed, however, to avoid unwanted side effects. Patient is fatigued at this time which could be related to her poor sleep due to the  pain, pain medication, vs malignancy related fatigue.  -Narcan script provided and reviewed its use for emergencies.   For use in emergencies for opioid / pain medication reversal for accidental ingestion, respiratory depression, sedation or concerns for overdose.    2) Bowel regimen to avoid OIC  -Script for Senna provided today (8.6mg qHS PRN to avoid OIC)    3) patient with close follow-up to see if she improves with her pain. Will consider titration based on her response with a higher dose, if needed, will direct titration accordingly.  -patient denies use of illicit substances or alcohol products    ER Precautions  Watch for red flag symptoms including, but not limited to fevers, chills, chest pain, shortness of breath, intractable nausea/vomiting/diarrhea, or acute intractable pain (especially if pain is new or has changed).    Medication safety issues - Do not drive under the influence of narcotics (including opioids), watch for adverse effects including confusion / altered mental status / respiratory depression (slowed breathing), keep medications stored in a safe/locked environment, do not use alcohol while opioids or other narcotics are in your system. Do not travel with more than the minimum number of tablets or capsules required for the trip.        Orders:    oxyCODONE (Roxicodone) 5 immediate release tablet; Take 1 tablet (5 mg total) by mouth every 4 (four) hours as needed for moderate pain Max Daily Amount: 30 mg    naloxone (NARCAN) 4 mg/0.1 mL nasal spray; Administer 1 spray into a nostril. If no response after 2-3 minutes, give another dose in the other nostril using a new spray. For use in emergencies for opioid / pain medication reversal for accidental ingestion, respiratory depression, sedation or concerns for overdose.    senna (SENOKOT) 8.6 mg; Take 1 tablet (8.6 mg total) by mouth daily at bedtime as needed for constipation (Patient not taking: Reported on 12/19/2024)    Palliative care by  specialist  Psychosocial   Supportive listening provided  Normalized experience of patient/family  Provided anxiety containment     Referrals Placed / Medical Equipment Ordered  -None     Follow-Up Recommendations  -Follow-up with PCP and current medical specialists  -Follow-up with palliative care: 1 week    Orders:    oxyCODONE (Roxicodone) 5 immediate release tablet; Take 1 tablet (5 mg total) by mouth every 4 (four) hours as needed for moderate pain Max Daily Amount: 30 mg    senna (SENOKOT) 8.6 mg; Take 1 tablet (8.6 mg total) by mouth daily at bedtime as needed for constipation (Patient not taking: Reported on 12/19/2024)    Rectal pain  Pain regimen as above  Can also consider topical lidocaine patch to the low back as she has had referred pain to the low back.    OTC lidocaine patches to trial.  Advised on avoiding direct heat over topical analgesic therapies including patches to avoid excess diffusion of medication to system.    Patient advised to watch for any allergic reactions to topical medications.      ER Precautions  Watch for red flag symptoms including, but not limited to fevers, chills, chest pain, shortness of breath, intractable nausea/vomiting/diarrhea, or acute intractable pain (especially if pain is new or has changed).    Orders:    oxyCODONE (Roxicodone) 5 immediate release tablet; Take 1 tablet (5 mg total) by mouth every 4 (four) hours as needed for moderate pain Max Daily Amount: 30 mg    senna (SENOKOT) 8.6 mg; Take 1 tablet (8.6 mg total) by mouth daily at bedtime as needed for constipation (Patient not taking: Reported on 12/19/2024)        Decisional apparatus: Patient is competent on my exam today. If competence is lost, patient's substitute decision maker would default to spouse by [place] Act 169.   Advance Directive / Living Will / POLST: None on file - will revisit at next office appointment     PDMP Review: I have reviewed the patient's controlled substance dispensing history in the  Prescription Drug Monitoring Program in compliance with the [place] regulations before prescribing any controlled substances.    History of Present Illness   Miriam Smith is a 74 y.o. female who presents for consultation.    Palliative Diagnosis - pending, concerning Rectal mass at this time with imaging concerning for cancer    PMHx - iron deficiency anemia, diabetes mellitus    Patient is seen today in office. Alert, oriented, pleasantly conversant. In discomfort due to rectal pain 2/2 to mass.  Patient is seen accompanied by her daughter and granddaughter.    Appetite has been poor lately.     Pain rated 10 out of 10 to the rectum related to his mass.    Patient is well supported by daughter and grandchildren.  She is ,  is residing in [place] at this time.  Patient has a daughter in [place] as well along with one son in the .    Patient's main caregiver is her daughter, Coco and grand children. Patient resides with her daughter, Coco, at this time.    Spiritual - Jainism.    MRI Pelvis rectal 12/6/2024  Unenhanced MRI of the Pelvis (Rectal Protocol)     Please note that although the current biopsy results does not demonstrate definite cancer, the imaging characteristics of this tumor is clearly invasive and will be described as a rectal cancer.     Large polypoid, predominately mucinous low rectal cancer, 2.2 cm from the anal verge, 7.5 cm long (series 2 image 19, series 3 images 9-32.)     On the left tumor invades the top of the internal sphincter and extends into intersphincteric space (ISS) (series 9 images 19-22.)     Also separately, the tumor broadly penetrates through the posterior rectal wall (series 3 images 11-18) fills the posterior mesorectal space, penetrates through the left posterolateral levator ani (series 3 images 21-26,) and extends into the left   ischioanal fossa and invades the left gluteus shae (series 3 images 26-31.)     Right anterolaterally, tumor  penetrates through the mesorectal fascia to involve the right posterolateral vaginal wall (series 3 image 21 and series 2 image 27.)     Overall MRI stage: T4b, N2, Low rectal cancer  MRF: Not applicable for T4 tumor.  Sphincter involvement: Yes.  Suspicious extra mesorectal lymph nodes: No.  EMVI: No.     For the purpose of radiation therapy planning, series 3 would be most useful.    Medical History Reviewed by provider this encounter:  Tobacco  Allergies  Meds  Problems  Med Hx  Surg Hx  Fam Hx     .  Past Medical History   Past Medical History:   Diagnosis Date    Diabetes mellitus (HCC)      Past Surgical History:   Procedure Laterality Date    COLONOSCOPY       Family History   Problem Relation Age of Onset    Colon cancer Sister       reports that she has never smoked. She has never used smokeless tobacco. She reports that she does not currently use alcohol. She reports that she does not currently use drugs.  Current Outpatient Medications on File Prior to Visit   Medication Sig Dispense Refill    metFORMIN (GLUCOPHAGE) 1000 MG tablet Take 1 tablet (1,000 mg total) by mouth 2 (two) times a day with meals 60 tablet 0    polyethylene glycol (MIRALAX) 17 g packet Take 17 g by mouth daily      psyllium (METAMUCIL) packet Take 1 packet by mouth daily       No current facility-administered medications on file prior to visit.     Allergies   Allergen Reactions    Motrin [Ibuprofen] Itching    Tylenol [Acetaminophen] Itching    Penicillins Rash     Rash and bruise    Sulfa Antibiotics Rash     Price velia syndrome      Current Outpatient Medications on File Prior to Visit   Medication Sig Dispense Refill    metFORMIN (GLUCOPHAGE) 1000 MG tablet Take 1 tablet (1,000 mg total) by mouth 2 (two) times a day with meals 60 tablet 0    polyethylene glycol (MIRALAX) 17 g packet Take 17 g by mouth daily      psyllium (METAMUCIL) packet Take 1 packet by mouth daily       No current facility-administered medications  on file prior to visit.      Social History     Tobacco Use    Smoking status: Never    Smokeless tobacco: Never   Vaping Use    Vaping status: Never Used   Substance and Sexual Activity    Alcohol use: Not Currently    Drug use: Not Currently    Sexual activity: Not on file        Objective   /60 (BP Location: Left arm, Patient Position: Sitting, Cuff Size: Standard)   Pulse 98   Temp 98.3 °F (36.8 °C) (Temporal)   Ht 5' (1.524 m)   Wt 50.6 kg (111 lb 8 oz)   SpO2 95%   BMI 21.78 kg/m²     Physical Exam  Vitals reviewed.   Constitutional:       General: She is not in acute distress.     Appearance: She is not ill-appearing (chronically), toxic-appearing or diaphoretic.      Comments: Discomfort noted while seated due to rectal pain. Otherwise, pleasant on examination.   HENT:      Head: Normocephalic and atraumatic.      Nose: Nose normal.      Mouth/Throat:      Mouth: Mucous membranes are moist.   Eyes:      General:         Right eye: No discharge.         Left eye: No discharge.   Cardiovascular:      Rate and Rhythm: Normal rate.   Pulmonary:      Effort: Pulmonary effort is normal. No respiratory distress.   Abdominal:      General: Abdomen is flat. There is no distension.   Musculoskeletal:         General: No swelling.   Skin:     General: Skin is warm and dry.      Coloration: Skin is not jaundiced or pale.   Neurological:      General: No focal deficit present.      Mental Status: She is alert. Mental status is at baseline.   Psychiatric:         Mood and Affect: Mood normal.         Behavior: Behavior normal.         Thought Content: Thought content normal.         Judgment: Judgment normal.         Recent labs:  Lab Results   Component Value Date/Time    SODIUM 138 11/14/2024 04:02 AM    K 3.7 11/14/2024 04:02 AM    BUN 12 11/14/2024 04:02 AM    CREATININE 0.52 (L) 11/14/2024 04:02 AM    GLUC 165 (H) 11/14/2024 04:02 AM    CALCIUM 7.9 (L) 11/14/2024 04:02 AM    AST 9 (L) 11/14/2024 04:02 AM  "   ALT 18 11/14/2024 04:02 AM    ALB 2.7 (L) 11/14/2024 04:02 AM    TP 5.9 (L) 11/14/2024 04:02 AM    EGFR 94 11/14/2024 04:02 AM     Lab Results   Component Value Date/Time    HGB 10.0 (L) 11/14/2024 04:02 AM    WBC 9.86 11/14/2024 04:02 AM     11/14/2024 04:02 AM    INR 1.17 11/12/2024 04:36 AM     No results found for: \"[id number]\"    Recent Imaging:  Procedure: MRI pelvis rectal cancer staging wo contrast  Result Date: 12/6/2024  Narrative: MRI PELVIS - WITHOUT CONTRAST (RECTAL CANCER STAGING) INDICATION: K62.89: Other specified diseases of anus and rectum K92.1: Melena K62.89: Other specified diseases of anus and rectum. Based on colonoscopy report from 11/12/2024, the tumor is a single ulcerated mass measuring 15 cm in the rectum and anal region covering 1/2 the circumference. Pathology demonstrated fragments of tubovillous adenoma with features suggestive of traditional serrated adenoma. Samples were negative for high-grade dysplasia or carcinoma. Patient has not had preoperative chemoradiation treatment. COMPARISON: None TECHNIQUE: Multiplanar/multisequence MRI of the pelvis without contrast was performed using rectal cancer imaging protocol. An additional small field of view, axial oblique T2-weighted sequence was performed through the tumor, perpendicular to the long axis of the rectum at that level. IV contrast was not given. FINDINGS: TUMOR LOCATION AND CHARACTERISTICS -  Tumor location: Low rectal cancer (0 to 5 cm), 2.2 cm from the anal verge (series 2 image 19.) -  Distance of the lowest extent of tumor from the top of the anal sphincter: 0 cm. -  Relationship to the sigmoid takeoff (STO): Completely below sigmoid takeoff. -  Relationship to anterior peritoneal reflection: Below. -  Morphology: Polypoid. -  Circumference (percent, relative to wall): The base of the polypoid mass is difficult to determine. The polypoid mass fills the entire lumen of the rectum. -  Tumor craniocaudal length: " 7.5 cm. -  Mucinous: Mostly mucin. T-STAGING: T4b* (tumor invades or adherent to adjacent organs or structures). This is based on invasion of the levator ani described below. Also, right anterolaterally, tumor penetrates through the mesorectal fascia to involve the right posterolateral vaginal wall (series 3 image 21 and series 2 image 27.) ANAL SPHINCTER INVOLVEMENT (FOR LOW RECTAL CA): On the left tumor invades the top of the IS and extends into intersphincteric space (ISS) (series 9 images 19-22.) Also separately, the tumor broadly penetrates through the posterior rectal wall (series 3 images 11-18) fills the posterior mesorectal space, penetrates through the left posterolateral levator ani (series 3 images 21-26,) and extends into the left ischioanal fossa and invades the left gluteus shae (series 3 images 26-31.) EXTRAMURAL VASCULAR INVASION: EMVI is not present. MESORECTAL FASCIA (MRF) STATUS: - Not applicable for T4 tumors. TUMOR DEPOSITS: No. LYMPH NODES: There are 4 or greater suspicious locoregional nodes (N2). Suspicious mesorectal/superior rectal lymph nodes described on series 7 and 3: Image 13 series 7, Superior rectal, 9 mm. Image 1 series 3, Superior rectal, 6 mm. This node is mucinous evidenced by T2 hyperintensity. Image 2 series 3, Superior rectal, 6 mm. This node is mucinous Image 9 series 3, right mesorectal, 3 mm. This node is mucinous Suspicious extra-mesorectal locoregional nodes: None. Suspicious non-locoregional nodes: None REST OF THE PELVIS: REPRODUCTIVE STRUCTURES: Small uterine fibroids. See above regarding vaginal involvement. BLADDER: Normal. OTHER BOWEL LOOPS: Unremarkable MRI appearance. PELVIC CAVITY: Unremarkable. VASCULAR STRUCTURES: Limited evaluation of the visualized vasculature on this non-contrast MRI is unremarkable. PELVIC WALL: Unremarkable. OSSEOUS STRUCTURES: No osseous destruction.     Impression: Unenhanced MRI of the Pelvis (Rectal Protocol) Please note that  although the current biopsy results does not demonstrate definite cancer, the imaging characteristics of this tumor is clearly invasive and will be described as a rectal cancer. Large polypoid, predominately mucinous low rectal cancer, 2.2 cm from the anal verge, 7.5 cm long (series 2 image 19, series 3 images 9-32.) On the left tumor invades the top of the internal sphincter and extends into intersphincteric space (ISS) (series 9 images 19-22.) Also separately, the tumor broadly penetrates through the posterior rectal wall (series 3 images 11-18) fills the posterior mesorectal space, penetrates through the left posterolateral levator ani (series 3 images 21-26,) and extends into the left ischioanal fossa and invades the left gluteus shae (series 3 images 26-31.) Right anterolaterally, tumor penetrates through the mesorectal fascia to involve the right posterolateral vaginal wall (series 3 image 21 and series 2 image 27.) Overall MRI stage: T4b, N2, Low rectal cancer MRF: Not applicable for T4 tumor. Sphincter involvement: Yes. Suspicious extra mesorectal lymph nodes: No. EMVI: No. For the purpose of radiation therapy planning, series 3 would be most useful. Workstation performed: [id number]     Procedure: CT chest wo contrast  Result Date: 11/13/2024  Narrative: CT CHEST WITHOUT IV CONTRAST INDICATION:   Cancer, staging, abnorm ct abd. per my review of the medical record, patient presented with left gluteal pain with rectal mass. COMPARISON: Abdomen CT 11/10/2024. TECHNIQUE: Chest CT without intravenous contrast.  Axial, sagittal, coronal 2D reformats and coronal MIPS from source data. Radiation dose length product (DLP):  155.25 mGy-cm . Radiation dose exposure minimized using iterative reconstruction and automated exposure control. FINDINGS: LUNGS: No metastases. No acute disease. Minimal benign bilateral lower lobe subsegmental atelectasis. Benign calcified granulomas. AIRWAYS: No significant filling  defects. PLEURA:  Unremarkable. HEART/GREAT VESSELS: Mild cardiomegaly. Severe coronary artery calcification indicating atherosclerotic heart disease. MEDIASTINUM AND YOVANY:  Unremarkable. CHEST WALL AND LOWER NECK: Unremarkable. UPPER ABDOMEN: Cholelithiasis. OSSEOUS STRUCTURES: Mild degenerative disease in the spine. Benign bone islands in the right humeral head.     Impression: No lung metastases. Severe coronary artery calcification indicating atherosclerotic heart disease. Cholelithiasis. Workstation performed: [id number]     Procedure: Colonoscopy  Result Date: 11/12/2024  Narrative: Table formatting from the original result was not included. [facility] Operating Room [address] [facility] [id number] [phone] DATE OF SERVICE: 11/12/24 PHYSICIAN(S): Attending: Morris Tom MD Fellow: No Staff Documented INDICATION: Rectal mass POST-OP DIAGNOSIS: See the impression below. HISTORY: Prior colonoscopy: No prior colonoscopy. BOWEL PREPARATION: Miralax/Magnesium Citrate; Golytely/Colyte/Trilyte PREPROCEDURE: Informed consent was obtained for the procedure, including sedation. Risks including but not limited to bleeding, infection, perforation, adverse drug reaction and aspiration were explained in detail. Also explained about less than 100% sensitivity with the exam and other alternatives. The patient was placed in the left lateral decubitus position. Procedure: Colonoscopy DETAILS OF PROCEDURE: Patient was taken to the procedure room where a time out was performed to confirm correct patient and correct procedure. The patient underwent monitored anesthesia care, which was administered by an anesthesia professional. The patient's blood pressure, ECG, ETCO2, heart rate, level of consciousness, oxygen and respirations were monitored throughout the procedure. A digital rectal exam was performed. The scope was introduced through the anus and advanced to the cecum. Retroflexion was performed in  the rectum. The quality of bowel preparation was evaluated using the [place] Bowel Preparation Scale with scores of: right colon = 1, transverse colon = 1, left colon = 1. The total BBPS score was 3. Bowel prep was not adequate. The patient experienced no blood loss. The procedure was not difficult. The patient tolerated the procedure well. There were no apparent adverse events. ANESTHESIA INFORMATION: ASA: II Anesthesia Type: IV Sedation with Anesthesia MEDICATIONS: No administrations occurring from 1406 to 1450 on 11/12/24 FINDINGS: Single malignant-appearing, friable, fungating and ulcerated mass (traversable) measuring 15 cm in the rectum and anal region, covering one half of the circumference; performed cold forceps biopsy with partial removal. The mass was extending up to the dentate line. EVENTS: Procedure Events Event Event Time ENDO CECUM REACHED 11/12/2024  2:28 PM ENDO SCOPE OUT TIME 11/12/2024  2:45 PM SPECIMENS: ID Type Source Tests Collected by Time Destination 1 : rectal mass r/o malignancy , cancer Tissue Rectum TISSUE EXAM Morris Tom MD 11/12/2024  2:39 PM  EQUIPMENT: Colonoscope -     Impression: Single malignant-appearing, friable, fungating and ulcerated mass measuring 15 cm in the rectum and anal region, covering one half of the circumference; performed cold forceps biopsy with partial removal RECOMMENDATION: Await pathology results Repeat colonoscopy in 1 year, due: 11/12/2025 Inadequate bowel preparation  Check CEA levels. Patient will need colorectal surgery evaluation. Resume diet as tolerated. Communicated exam results with patient's daughter who was at bedside.  Morris Tom MD     Procedure: CT abdomen pelvis with contrast  Result Date: 11/10/2024  Narrative: CT ABDOMEN AND PELVIS WITH IV CONTRAST INDICATION: L gluteal pain. . COMPARISON: None. TECHNIQUE: CT examination of the abdomen and pelvis was performed. Multiplanar 2D reformatted images were created from the source data.  This examination, like all CT scans performed in the [facility], was performed utilizing techniques to minimize radiation dose exposure, including the use of iterative reconstruction and automated exposure control. Radiation dose length product (DLP) for this visit: 534 mGy-cm IV Contrast: 100 mL of iohexol (OMNIPAQUE) Enteric Contrast: Not administered. FINDINGS: ABDOMEN LOWER CHEST: No clinically significant abnormality in the visualized lower chest. LIVER/BILIARY TREE: Unremarkable. GALLBLADDER: Cholelithiasis without findings of acute cholecystitis. SPLEEN: Unremarkable. PANCREAS: Unremarkable. ADRENAL GLANDS: Unremarkable. KIDNEYS/URETERS: No hydronephrosis or urinary tract calculi. Subcentimeter hypoattenuating renal lesion(s), too small to characterize but statistically likely benign, which do not warrant follow-up (Radiology June 2019). STOMACH AND BOWEL: There is a heterogeneous peripherally enhancing lesion involving the rectum measuring approximately 7.7 x 7.5 x 8.6 cm with central fluid density which may reflect mass/malignancy with necrosis (series 2, image 160). There is soft tissue infiltration of the perirectal and left ischio rectal and gluteal space where there is a lobulated soft tissue (series 2, image 165). Fluid-filled distal small bowel loops which may be due to superimposed enteritis. APPENDIX: No findings to suggest appendicitis. ABDOMINOPELVIC CAVITY: No ascites. No pneumoperitoneum. No significant lymphadenopathy. A few perirectal lymph nodes measure 7 mm in short axis.. VESSELS: Unremarkable for patient's age. PELVIS REPRODUCTIVE ORGANS: Subcentimeter calcifications in the bladder may be due to fibroids. URINARY BLADDER: Unremarkable. ABDOMINAL WALL/INGUINAL REGIONS: Unremarkable. BONES: No acute fracture or suspicious osseous lesion.     Impression: Heterogeneous peripherally enhancing mass/malignancy involving the rectum and infiltrating the left ischiorectal and  gluteal fat measuring approximately 7.7 x 7.5 x 8.6 cm with areas of fluid density compatible with internal necrosis. I personally discussed this study with ALMA ROSA ROMAN on 11/10/2024 9:49 PM. Workstation performed: [id number]       Administrative Statements   I have spent a total time of 48 minutes in caring for this patient on the day of the visit/encounter including Risks and benefits of tx options, Instructions for management, Patient and family education, Importance of tx compliance, Risk factor reductions, Impressions, Counseling / Coordination of care, Documenting in the medical record, Reviewing / ordering tests, medicine, procedures  , and Obtaining or reviewing history  . Topics discussed with the patient / family include symptom assessment and management, medication review, medication adjustment, psychosocial support, opioid titration, supportive listening, and anticipatory guidance.

## 2024-12-18 NOTE — TELEPHONE ENCOUNTER
Spoke with patient's son Rocky  He is actively pursuing obtaining results of biopsy done in [place]  Rocky aware that treatment cannot begin until biopsy results are known per Dr Perea

## 2024-12-19 ENCOUNTER — OFFICE VISIT (OUTPATIENT)
Age: 74
End: 2024-12-19

## 2024-12-19 ENCOUNTER — DOCUMENTATION (OUTPATIENT)
Dept: HEMATOLOGY ONCOLOGY | Facility: CLINIC | Age: 74
End: 2024-12-19

## 2024-12-19 ENCOUNTER — OFFICE VISIT (OUTPATIENT)
Dept: HEMATOLOGY ONCOLOGY | Facility: MEDICAL CENTER | Age: 74
End: 2024-12-19

## 2024-12-19 VITALS
DIASTOLIC BLOOD PRESSURE: 65 MMHG | SYSTOLIC BLOOD PRESSURE: 124 MMHG | BODY MASS INDEX: 22.15 KG/M2 | TEMPERATURE: 97.4 F | OXYGEN SATURATION: 98 % | HEART RATE: 84 BPM | RESPIRATION RATE: 17 BRPM | HEIGHT: 60 IN

## 2024-12-19 VITALS
DIASTOLIC BLOOD PRESSURE: 68 MMHG | OXYGEN SATURATION: 99 % | HEART RATE: 86 BPM | WEIGHT: 113.4 LBS | BODY MASS INDEX: 22.26 KG/M2 | HEIGHT: 60 IN | RESPIRATION RATE: 19 BRPM | SYSTOLIC BLOOD PRESSURE: 119 MMHG | TEMPERATURE: 98 F

## 2024-12-19 DIAGNOSIS — E11.9 TYPE 2 DIABETES MELLITUS WITHOUT COMPLICATION, WITHOUT LONG-TERM CURRENT USE OF INSULIN (HCC): ICD-10-CM

## 2024-12-19 DIAGNOSIS — C20 RECTAL CANCER (HCC): Primary | ICD-10-CM

## 2024-12-19 DIAGNOSIS — Z11.59 NEED FOR HEPATITIS C SCREENING TEST: ICD-10-CM

## 2024-12-19 DIAGNOSIS — K62.5 RECTAL BLEEDING: ICD-10-CM

## 2024-12-19 DIAGNOSIS — Z12.31 ENCOUNTER FOR SCREENING MAMMOGRAM FOR BREAST CANCER: ICD-10-CM

## 2024-12-19 DIAGNOSIS — C20 RECTAL CANCER (HCC): ICD-10-CM

## 2024-12-19 DIAGNOSIS — D50.0 IRON DEFICIENCY ANEMIA DUE TO CHRONIC BLOOD LOSS: ICD-10-CM

## 2024-12-19 DIAGNOSIS — G89.3 CANCER ASSOCIATED PAIN: ICD-10-CM

## 2024-12-19 DIAGNOSIS — E11.9 TYPE 2 DIABETES MELLITUS WITHOUT COMPLICATION, UNSPECIFIED WHETHER LONG TERM INSULIN USE (HCC): Primary | ICD-10-CM

## 2024-12-19 PROCEDURE — 99215 OFFICE O/P EST HI 40 MIN: CPT | Performed by: INTERNAL MEDICINE

## 2024-12-19 PROCEDURE — 99204 OFFICE O/P NEW MOD 45 MIN: CPT | Performed by: FAMILY MEDICINE

## 2024-12-19 RX ORDER — ATORVASTATIN CALCIUM 40 MG/1
40 TABLET, FILM COATED ORAL DAILY
Qty: 30 TABLET | Refills: 3 | Status: SHIPPED | OUTPATIENT
Start: 2024-12-19

## 2024-12-19 RX ORDER — BLOOD-GLUCOSE METER
KIT MISCELLANEOUS
Qty: 1 KIT | Refills: 0 | Status: SHIPPED | OUTPATIENT
Start: 2024-12-19

## 2024-12-19 RX ORDER — LANCETS 33 GAUGE
EACH MISCELLANEOUS
Qty: 100 EACH | Refills: 3 | Status: SHIPPED | OUTPATIENT
Start: 2024-12-19

## 2024-12-19 RX ORDER — BLOOD SUGAR DIAGNOSTIC
STRIP MISCELLANEOUS
Qty: 100 EACH | Refills: 3 | Status: SHIPPED | OUTPATIENT
Start: 2024-12-19

## 2024-12-19 NOTE — PROGRESS NOTES
Miriam Smith  1950  [facility] [address] [address]    DISCUSSION/SUMMARY:    74-year-old female recently admitted to the hospital with abdominal pain and weight loss.  Workup demonstrated a rectal mass (the mass was originally diagnosed in [place]).  Issues:    Rectal cancer.  Pathology results from [place] are listed below, there results demonstrated adenocarcinoma.  Patient was recently seen by colorectal surgery.  Patient was also recently presented at the GI tumor board, consensus report is still pending.    Patient/son/grandson demonstrated an excellent understanding of the situation.  Mrs. José Miguel Smith understands that she has rectal cancer and is in need of treatment.  Patient understands that the protocol is somewhat complicated.  Recent MRI demonstrated T4b, N2, low rectal carcinoma, + sphincter involvement, no suspicious extra mesorectal lymph nodes, no evidence of extramural venous invasion.  AJCC staging eighth edition = IIIC.  Other recent scans did not demonstrate any evidence for metastatic disease.    NCCN guidelines 4.2024 state that for patients with T4 any N, preliminary treatment is to check pMMR.  At this moment, patient is not scheduled for any additional biopsies - so there likely will not be any additional tissue to send for evaluation.  The plan is for patient to receive 16 weeks of FOLFOX.  Patient will eventually be evaluated by radiation oncology for eventual long course chemo RT (with infusional 5-FU).  Afterwards, patient will undergo rescanning and reevaluation by colorectal surgery.    Nursing staff spent time with patient and family going over the specifics of the FOLFOX chemotherapy.  Patient will be referred to IR for port placement.    GYN evaluation is pending.    Medical issues.  Recent CAT scan the chest demonstrated severe coronary artery calcification.  Patient needs to  establish care with a PCP here, may also may need to be seen by a cardiologist.  Patient denies any chest pain or prior cardiac issues.    Patient was given a 4-week follow-up visit but this will change..  Patient/family know to call the hematology/oncology office if there are any other questions or concerns.    Carefully review your medication list and verify that the list is accurate and up-to-date. Please call the hematology/oncology office if there are medications missing from the list, medications on the list that you are not currently taking or if there is a dosage or instruction that is different from how you're taking that medication.    Patient goals and areas of care: Connect with a new PCP, eventual radiation oncology evaluation, waiting for the GI tumor board consensus report  Barriers to care: None  Patient is able to self-care with help of family members  _____________________________________________________________________________________    Chief Complaint   Patient presents with    Follow-up    Rectal cancer, new diagnosis     History of Present Illness: 74-year-old female first seen in the hospital on November 13, 2024.  Patient was admitted with weight loss, low abdominal pain.  Workup demonstrated a rectal mass.  Patient was being seen by GI and a colonoscopy was pending.  Patient returns for follow-up.    Patient is from [place], has been worked up in [place] for this rectal mass.  When patient was seen initially, no pathology results were available.  The [facility] pathology result is listed below.    The biopsy demonstrated grade 1 adenocarcinoma without evidence of lymphovascular invasion.    Patient presents with her son and grandson.  Mrs. José Miguel Smith states feeling okay, about the same as before.  Patient has been seen by palliative care, pain is controlled.  Appetite is okay, patient has lost some weight but it has been more steady recently.  No fevers, chills or sweats.  No recent  obvious/significant GI bleeding.  Activities are limited.  No abdominal pain, no respiratory issues.    Review of Systems   Constitutional:  Positive for fatigue.   HENT: Negative.     Eyes: Negative.    Respiratory: Negative.     Cardiovascular: Negative.    Gastrointestinal: Negative.    Endocrine: Negative.    Genitourinary: Negative.    Musculoskeletal: Negative.    Skin: Negative.    Allergic/Immunologic: Negative.    Neurological: Negative.    Hematological: Negative.    Psychiatric/Behavioral: Negative.     All other systems reviewed and are negative.    Patient Active Problem List   Diagnosis    Rectal mass    Type 2 diabetes mellitus (HCC)    Rectal bleeding    History of anal fissures    Electrolyte abnormality    Pain, rectal    Iron deficiency anemia    Leukocytosis    Rectal cancer (HCC)    Cancer associated pain    Palliative care by specialist    Rectal pain     Past Medical History:   Diagnosis Date    Diabetes mellitus (HCC)      Past Surgical History:   Procedure Laterality Date    COLONOSCOPY       Family History   Problem Relation Age of Onset    Colon cancer Sister      Social History     Socioeconomic History    Marital status: /Civil Union     Spouse name: Not on file    Number of children: Not on file    Years of education: Not on file    Highest education level: Not on file   Occupational History    Not on file   Tobacco Use    Smoking status: Never    Smokeless tobacco: Never   Vaping Use    Vaping status: Never Used   Substance and Sexual Activity    Alcohol use: Not Currently    Drug use: Not Currently    Sexual activity: Not on file   Other Topics Concern    Not on file   Social History Narrative    Not on file     Social Drivers of Health     Financial Resource Strain: Low Risk  (12/19/2024)    Overall Financial Resource Strain (CARDIA)     Difficulty of Paying Living Expenses: Not hard at all   Food Insecurity: No Food Insecurity (12/19/2024)    Hunger Vital Sign     Worried  About Running Out of Food in the Last Year: Never true     Ran Out of Food in the Last Year: Never true   Transportation Needs: No Transportation Needs (12/19/2024)    PRAPARE - Transportation     Lack of Transportation (Medical): No     Lack of Transportation (Non-Medical): No   Physical Activity: Not on file   Stress: Not on file   Social Connections: Not on file   Intimate Partner Violence: Unknown (11/14/2024)    Nursing IPS     Feels Physically and Emotionally Safe: Not on file     Physically Hurt by Someone: Not on file     Humiliated or Emotionally Abused by Someone: Not on file     Physically Hurt by Someone: 2     Hurt or Threatened by Someone: 2   Housing Stability: Low Risk  (12/19/2024)    Housing Stability Vital Sign     Unable to Pay for Housing in the Last Year: No     Number of Times Moved in the Last Year: 0     Homeless in the Last Year: No       Current Outpatient Medications:     atorvastatin (LIPITOR) 40 mg tablet, Take 1 tablet (40 mg total) by mouth daily, Disp: 30 tablet, Rfl: 3    Blood Glucose Monitoring Suppl (OneTouch Verio Reflect) w/Device KIT, Check blood sugars once daily. Please substitute with appropriate alternative as covered by patient's insurance. Dx: E11.65, Disp: 1 kit, Rfl: 0    glucose blood (OneTouch Verio) test strip, Check blood sugars once daily. Please substitute with appropriate alternative as covered by patient's insurance. Dx: E11.65, Disp: 100 each, Rfl: 3    metFORMIN (GLUCOPHAGE) 1000 MG tablet, Take 1 tablet (1,000 mg total) by mouth 2 (two) times a day with meals, Disp: 60 tablet, Rfl: 0    naloxone (NARCAN) 4 mg/0.1 mL nasal spray, Administer 1 spray into a nostril. If no response after 2-3 minutes, give another dose in the other nostril using a new spray. For use in emergencies for opioid / pain medication reversal for accidental ingestion, respiratory depression, sedation or concerns for overdose., Disp: 1 each, Rfl: 1    OneTouch Delica Lancets 33G MISC,  Check blood sugars once daily. Please substitute with appropriate alternative as covered by patient's insurance. Dx: E11.65, Disp: 100 each, Rfl: 3    oxyCODONE (Roxicodone) 5 immediate release tablet, Take 1 tablet (5 mg total) by mouth every 4 (four) hours as needed for moderate pain Max Daily Amount: 30 mg, Disp: 30 tablet, Rfl: 0    polyethylene glycol (MIRALAX) 17 g packet, Take 17 g by mouth daily, Disp: , Rfl:     psyllium (METAMUCIL) packet, Take 1 packet by mouth daily, Disp: , Rfl:     senna (SENOKOT) 8.6 mg, Take 1 tablet (8.6 mg total) by mouth daily at bedtime as needed for constipation (Patient not taking: Reported on 12/19/2024), Disp: 30 tablet, Rfl: 1    Allergies   Allergen Reactions    Motrin [Ibuprofen] Itching    Tylenol [Acetaminophen] Itching    Penicillins Rash     Rash and bruise    Sulfa Antibiotics Rash     Price velia syndrome       Vitals:    12/19/24 1018   Resp: 17     Physical Exam  Constitutional:       Appearance: She is well-developed.   HENT:      Head: Normocephalic and atraumatic.      Right Ear: External ear normal.      Left Ear: External ear normal.   Eyes:      Conjunctiva/sclera: Conjunctivae normal.      Pupils: Pupils are equal, round, and reactive to light.   Cardiovascular:      Rate and Rhythm: Normal rate and regular rhythm.      Heart sounds: Normal heart sounds.   Pulmonary:      Effort: Pulmonary effort is normal.      Breath sounds: Normal breath sounds.   Abdominal:      General: Bowel sounds are normal.      Palpations: Abdomen is soft.   Musculoskeletal:         General: Normal range of motion.      Cervical back: Normal range of motion and neck supple.   Skin:     General: Skin is warm.   Neurological:      Mental Status: She is alert and oriented to person, place, and time.      Deep Tendon Reflexes: Reflexes are normal and symmetric.   Psychiatric:         Behavior: Behavior normal.         Thought Content: Thought content normal.         Judgment:  Judgment normal.   Extremities: No lower extreme edema bilaterally, no cords, pulses 1+  Lymphatics: No adenopathy in the neck, supraclavicular region, axilla bilaterally    Performance Status: 1 - Symptomatic but completely ambulatory    Labs    11/14/2024 WBC = 9.86 hemoglobin = 10 hematocrit = 30 MCV = 85 RDW = 15.1 platelet = 323 neutrophil = 77% lymphocyte = 12% monocyte = 10% eosinophil = 1%    11/14/2024 BUN = 12 creatinine = 0.52 calcium = 7.9 AST = 9 ALT = 18 alkaline phosphatase = 55 total protein = 5.9 albumin = 2.7 total bilirubin = 0.23    11/13/2024 CEA = 5.6    Imaging    12/6/2024 MRI pelvis rectal cancer staging without contrast    Overall MRI stage: T4b, N2, Low rectal cancer  MRF: Not applicable for T4 tumor.  Sphincter involvement: Yes.  Suspicious extra mesorectal lymph nodes: No.  EMVI: No.           11/12/2024 CT chest without contrast.  Impression stated no lung metastases, severe coronary artery calcification indicating atherosclerotic heart disease, cholelithiasis.    11/10/2024 CT abdomen pelvis with contrast    IMPRESSION:     Heterogeneous peripherally enhancing mass/malignancy involving the rectum and infiltrating the left ischiorectal and gluteal fat measuring approximately 7.7 x 7.5 x 8.6 cm with areas of fluid density compatible with internal necrosis.    Pathology    Case Report   Surgical Pathology Report                         Case: [id number]                                   Authorizing Provider:  Morris Tom MD         Collected:           11/12/2024 1439               Ordering Location:     [facility] Received:            11/12/2024 2249                                      [address]                                                                       Pathologist:           Nemo Peterson MD                                                         Specimen:    Rectum, rectal mass r/o malignancy , cancer                                                Final  "Diagnosis   A. 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 Deeper sections of the specimen have been evaluated. A higher grade lesion cannot be excluded due to the superficial nature of the biopsy; repeat biopsy is recommended as clinically indicated.                    "

## 2024-12-19 NOTE — ASSESSMENT & PLAN NOTE
Orders:    Ambulatory Referral to Colorectal Surgery; Future  Has a follow-up with gastro enterology  In addition she is seeing a gynecologist as requested by Dr. Perea  Also follows up with hematology   I offered her vaccination today including pneumococcal and influenza vaccine.  Family said patient reluctant at this point of time.  However they will reconsider and come back for potential nurse visit for vaccination

## 2024-12-19 NOTE — ASSESSMENT & PLAN NOTE
Patient was noted to be anemic and received 3 infusion of Venofer during last admission  Will recheck iron panel    Orders:    Iron Panel (Includes Ferritin, Iron Sat%, Iron, and TIBC); Future

## 2024-12-19 NOTE — PROGRESS NOTES
"RECTAL/GI MULTIDISCIPLINARY CASE REVIEW  NEW RECTAL CANCER DIAGNOSIS    DATE: 12/19/24      PRESENTING DOCTOR: Dr Knox      DIAGNOSIS: rectal adenocarcinoma, T4b, N2      Miriam Smith was presented at the Rectal/GI Multidisciplinary Conference today.    BIOPSY DATE/RESULT: 11/12/24  A. Colonic mucosa, \"rectal mass\"; biopsy:       - Fragments of tubulovillous adenoma with features suggestive of traditional serrated adenoma, see note       - Negative for high-grade dysplasia or carcinoma    PATHOLOGY FROM OUTSIDE COUNTRY REPORT IN Arabic SCANNED UNDER MEDIA-STATES \"ADENOCARCINOMA\"     IMAGING DONE:  11/10/24 CT abdomen pelvis w contrast  Heterogeneous peripherally enhancing mass/malignancy involving the rectum and infiltrating the left ischiorectal and gluteal fat measuring approximately 7.7 x 7.5 x 8.6 cm with areas of fluid density compatible with internal necrosis.     11/12/24 CT chest wo contrast  No lung metastases.  Severe coronary artery calcification indicating atherosclerotic heart disease.  Cholelithiasis.    12/6/24 MRI pelvis rectal cancer staging wo contrast  Unenhanced MRI of the Pelvis (Rectal Protocol)     Please note that although the current biopsy results does not demonstrate definite cancer, the imaging characteristics of this tumor is clearly invasive and will be described as a rectal cancer.     Large polypoid, predominately mucinous low rectal cancer, 2.2 cm from the anal verge, 7.5 cm long (series 2 image 19, series 3 images 9-32.)     On the left tumor invades the top of the internal sphincter and extends into intersphincteric space (ISS) (series 9 images 19-22.)     Also separately, the tumor broadly penetrates through the posterior rectal wall (series 3 images 11-18) fills the posterior mesorectal space, penetrates through the left posterolateral levator ani (series 3 images 21-26,) and extends into the left   ischioanal fossa and invades the left gluteus shae (series 3 " images 26-31.)     Right anterolaterally, tumor penetrates through the mesorectal fascia to involve the right posterolateral vaginal wall (series 3 image 21 and series 2 image 27.)     Overall MRI stage: T4b, N2, Low rectal cancer  MRF: Not applicable for T4 tumor.  Sphincter involvement: Yes.  Suspicious extra mesorectal lymph nodes: No.  EMVI: No.     For the purpose of radiation therapy planning, series 3 would be most useful.    WAS IMAGING REVIEWED TODAY: yes    CEA RESULT/DATE/REVIEWED: 11/13/24, 5.6, reviewed    EXPECTED DATE OF FIRST TREATMENT: January 2025    COLONOSCOPY REVIEWED: yes    IF METASTATIC, WERE BIOPSIES OF METASTATIC SITES AVAILABLE FOR REVIEW: N/A    PRE TREATMENT CLINICAL STAGE: T4b, N2      PHYSICIAN RECOMMENDED PLAN:  -Total Neoadjuvant Treatment   -Chemotherapy (FOLFOX) x4 months. Apt scheduled 12/19/24 with Dr Perea (medical oncology).  -Concurrent chemo (5FU)/ RT x6 weeks  -Re-imaging with CT CAP and MRI pelvis for rectal cancer staging post neoadjuvant treatment.  -Visit with surgeon to discuss/evaluate for surgery. Surgery to be completed abdomino-perineal resection with possible posterior exenteration.      Team agreed to plan.    The final treatment plan will be left to the discretion of the patient and the treating physician.     DISCLAIMERS:  TO THE TREATING PHYSICIAN:  This conference is a meeting of clinicians from various specialty areas who evaluate and discuss patients for whom a multidisciplinary treatment approach is being considered. Please note that the above opinion was a consensus of the conference attendees and is intended only to assist in quality care of the discussed patient.  The responsibility for follow up on the input given during the conference, along with any final decisions regarding plan of care, is that of the patient and the patient's provider. Accordingly, appointments have only been recommended based on this information and have NOT been scheduled unless  otherwise noted.      TO THE PATIENT:  This summary is a brief record of major aspects of your cancer treatment. You may choose to share a copy with any of your doctors or nurses. However, this is not a detailed or comprehensive record of your care.      NCCN guidelines were readily available for review at this discussion

## 2024-12-19 NOTE — ASSESSMENT & PLAN NOTE
Lab Results   Component Value Date    HGBA1C 8.3 (H) 11/10/2024   Patient currently not on metformin 1000mg BID.   At this time appetite is poor and she is eating little.  Her last A1c was more than a month ago and it was 8.3  In view of her decreased appetite as well as not eating well, I have asked her to keep a blood sugar log.  This will help with the adjusting her medication for better sugar control.  Also educated about diabetes diet      Orders:    [facility] Diabetic eye exam    atorvastatin (LIPITOR) 40 mg tablet; Take 1 tablet (40 mg total) by mouth daily    Lipid panel; Future    Blood Glucose Monitoring Suppl (OneTouch Verio Reflect) w/Device KIT; Check blood sugars once daily. Please substitute with appropriate alternative as covered by patient's insurance. Dx: E11.65    glucose blood (OneTouch Verio) test strip; Check blood sugars once daily. Please substitute with appropriate alternative as covered by patient's insurance. Dx: E11.65    OneTouch Delica Lancets 33G MISC; Check blood sugars once daily. Please substitute with appropriate alternative as covered by patient's insurance. Dx: E11.65

## 2024-12-19 NOTE — PROGRESS NOTES
Name: Miriam Smith      : 1950      MRN: [id number]  Encounter Provider: Yumiko Thomas MD  Encounter Date: 2024   Encounter department: [facility]  :  Assessment & Plan  Type 2 diabetes mellitus without complication, unspecified whether long term insulin use (HCC)    Lab Results   Component Value Date    HGBA1C 8.3 (H) 11/10/2024   Patient currently not on metformin 1000mg BID.   At this time appetite is poor and she is eating little.  Her last A1c was more than a month ago and it was 8.3  In view of her decreased appetite as well as not eating well, I have asked her to keep a blood sugar log.  This will help with the adjusting her medication for better sugar control.  Also educated about diabetes diet      Orders:    IRIS Diabetic eye exam    atorvastatin (LIPITOR) 40 mg tablet; Take 1 tablet (40 mg total) by mouth daily    Lipid panel; Future    Blood Glucose Monitoring Suppl (OneTouch Verio Reflect) w/Device KIT; Check blood sugars once daily. Please substitute with appropriate alternative as covered by patient's insurance. Dx: E11.65    glucose blood (OneTouch Verio) test strip; Check blood sugars once daily. Please substitute with appropriate alternative as covered by patient's insurance. Dx: E11.65    OneTouch Delica Lancets 33G MISC; Check blood sugars once daily. Please substitute with appropriate alternative as covered by patient's insurance. Dx: E11.65    Need for hepatitis C screening test    Orders:    Hepatitis C Antibody; Future    HEPATITIS C AB W/RFL RNA, PCR W/RFL GENOTYPE,LIPA ([facility]); Future    Encounter for screening mammogram for breast cancer    Orders:    Mammo screening bilateral w 3d and cad; Future    Type 2 diabetes mellitus without complication, without long-term current use of insulin (HCC)    Lab Results   Component Value Date    HGBA1C 8.3 (H) 11/10/2024       Orders:    metFORMIN (GLUCOPHAGE) 1000 MG tablet;  Take 1 tablet (1,000 mg total) by mouth 2 (two) times a day with meals    Rectal cancer (HCC)    Orders:    Ambulatory Referral to Colorectal Surgery; Future  Has a follow-up with gastro enterology  In addition she is seeing a gynecologist as requested by Dr. Perea  Also follows up with hematology   I offered her vaccination today including pneumococcal and influenza vaccine.  Family said patient reluctant at this point of time.  However they will reconsider and come back for potential nurse visit for vaccination  Iron deficiency anemia due to chronic blood loss  Patient was noted to be anemic and received 3 infusion of Venofer during last admission  Will recheck iron panel    Orders:    Iron Panel (Includes Ferritin, Iron Sat%, Iron, and TIBC); Future    Cancer associated pain  Patient is a hospice and palliative care recently and is on immediate release oxycodone.  She reports some pain relief.  Using donut cushion for sitting down.  She may additionally benefit from long acting opioids if pain continues       .      Depression Screening and Follow-up Plan: Patient was screened for depression during today's encounter. They screened negative with a PHQ-2 score of 0.    History of Present Illness     HPI patient is here with his son and grandson has a follow-up for recent hospital admission..  She was diagnosed to have a rectal cancer in October in [place] .  He subsequently came to [facility] and was admitted at the hospital.  Even the initial biopsy was not conclusive it was assumed that she has a rectal cancer.  She has follow-up scheduled with him at oncology as well as gastroenterology.  She met with palliative care for pain control    Review of Systems   Constitutional:  Positive for appetite change and fatigue. Negative for fever.   HENT:  Negative for ear pain and sinus pain.    Eyes:  Negative for pain.   Respiratory:  Negative for chest tightness.    Cardiovascular:  Negative for chest pain.    Gastrointestinal:  Positive for anal bleeding and rectal pain. Negative for abdominal distention, abdominal pain, blood in stool, constipation, diarrhea and vomiting.   Genitourinary:  Negative for dysuria.   Musculoskeletal:  Negative for back pain.   Neurological:  Positive for weakness.   Psychiatric/Behavioral:  Positive for dysphoric mood. Negative for agitation and behavioral problems.        Objective   /68 (BP Location: Left arm, Patient Position: Sitting, Cuff Size: Standard)   Pulse 86   Temp 98 °F (36.7 °C) (Tympanic)   Resp 19   Ht 5' (1.524 m)   Wt 51.4 kg (113 lb 6.4 oz)   SpO2 99%   BMI 22.15 kg/m²      Physical Exam  Vitals and nursing note reviewed.   Constitutional:       General: She is not in acute distress.     Appearance: She is well-developed.   HENT:      Head: Normocephalic and atraumatic.      Right Ear: Tympanic membrane normal.      Ears:      Comments: TM Hole     Nose: Nose normal.      Mouth/Throat:      Mouth: Mucous membranes are moist.   Eyes:      Conjunctiva/sclera: Conjunctivae normal.   Cardiovascular:      Rate and Rhythm: Normal rate and regular rhythm.      Heart sounds: No murmur heard.  Pulmonary:      Effort: Pulmonary effort is normal. No respiratory distress.      Breath sounds: Normal breath sounds.   Abdominal:      General: Bowel sounds are normal. There is no distension.      Palpations: Abdomen is soft. There is no mass.      Tenderness: There is no abdominal tenderness. There is no guarding or rebound.   Musculoskeletal:         General: No swelling.      Cervical back: Neck supple.   Skin:     General: Skin is warm and dry.      Capillary Refill: Capillary refill takes less than 2 seconds.   Neurological:      Mental Status: She is alert.   Psychiatric:         Mood and Affect: Mood normal.

## 2024-12-19 NOTE — H&P (VIEW-ONLY)
Miriam Smith  1950  [facility] [address] [address]    DISCUSSION/SUMMARY:    74-year-old female recently admitted to the hospital with abdominal pain and weight loss.  Workup demonstrated a rectal mass (the mass was originally diagnosed in [place]).  Issues:    Rectal cancer.  Pathology results from [place] are listed below, there results demonstrated adenocarcinoma.  Patient was recently seen by colorectal surgery.  Patient was also recently presented at the GI tumor board, consensus report is still pending.    Patient/son/grandson demonstrated an excellent understanding of the situation.  Mrs. José Miguel Smith understands that she has rectal cancer and is in need of treatment.  Patient understands that the protocol is somewhat complicated.  Recent MRI demonstrated T4b, N2, low rectal carcinoma, + sphincter involvement, no suspicious extra mesorectal lymph nodes, no evidence of extramural venous invasion.  AJCC staging eighth edition = IIIC.  Other recent scans did not demonstrate any evidence for metastatic disease.    NCCN guidelines 4.2024 state that for patients with T4 any N, preliminary treatment is to check pMMR.  At this moment, patient is not scheduled for any additional biopsies - so there likely will not be any additional tissue to send for evaluation.  The plan is for patient to receive 16 weeks of FOLFOX.  Patient will eventually be evaluated by radiation oncology for eventual long course chemo RT (with infusional 5-FU).  Afterwards, patient will undergo rescanning and reevaluation by colorectal surgery.    Nursing staff spent time with patient and family going over the specifics of the FOLFOX chemotherapy.  Patient will be referred to IR for port placement.    GYN evaluation is pending.    Medical issues.  Recent CAT scan the chest demonstrated severe coronary artery calcification.  Patient needs to  establish care with a PCP here, may also may need to be seen by a cardiologist.  Patient denies any chest pain or prior cardiac issues.    Patient was given a 4-week follow-up visit but this will change..  Patient/family know to call the hematology/oncology office if there are any other questions or concerns.    Carefully review your medication list and verify that the list is accurate and up-to-date. Please call the hematology/oncology office if there are medications missing from the list, medications on the list that you are not currently taking or if there is a dosage or instruction that is different from how you're taking that medication.    Patient goals and areas of care: Connect with a new PCP, eventual radiation oncology evaluation, waiting for the GI tumor board consensus report  Barriers to care: None  Patient is able to self-care with help of family members  _____________________________________________________________________________________    Chief Complaint   Patient presents with    Follow-up    Rectal cancer, new diagnosis     History of Present Illness: 74-year-old female first seen in the hospital on November 13, 2024.  Patient was admitted with weight loss, low abdominal pain.  Workup demonstrated a rectal mass.  Patient was being seen by GI and a colonoscopy was pending.  Patient returns for follow-up.    Patient is from [place], has been worked up in [place] for this rectal mass.  When patient was seen initially, no pathology results were available.  [facility] pathology result is listed below.    The biopsy demonstrated grade 1 adenocarcinoma without evidence of lymphovascular invasion.    Patient presents with her son and grandson.  Mrs. José Miguel Smith states feeling okay, about the same as before.  Patient has been seen by palliative care, pain is controlled.  Appetite is okay, patient has lost some weight but it has been more steady recently.  No fevers, chills or sweats.  No recent  obvious/significant GI bleeding.  Activities are limited.  No abdominal pain, no respiratory issues.    Review of Systems   Constitutional:  Positive for fatigue.   HENT: Negative.     Eyes: Negative.    Respiratory: Negative.     Cardiovascular: Negative.    Gastrointestinal: Negative.    Endocrine: Negative.    Genitourinary: Negative.    Musculoskeletal: Negative.    Skin: Negative.    Allergic/Immunologic: Negative.    Neurological: Negative.    Hematological: Negative.    Psychiatric/Behavioral: Negative.     All other systems reviewed and are negative.    Patient Active Problem List   Diagnosis    Rectal mass    Type 2 diabetes mellitus (HCC)    Rectal bleeding    History of anal fissures    Electrolyte abnormality    Pain, rectal    Iron deficiency anemia    Leukocytosis    Rectal cancer (HCC)    Cancer associated pain    Palliative care by specialist    Rectal pain     Past Medical History:   Diagnosis Date    Diabetes mellitus (HCC)      Past Surgical History:   Procedure Laterality Date    COLONOSCOPY       Family History   Problem Relation Age of Onset    Colon cancer Sister      Social History     Socioeconomic History    Marital status: /Civil Union     Spouse name: Not on file    Number of children: Not on file    Years of education: Not on file    Highest education level: Not on file   Occupational History    Not on file   Tobacco Use    Smoking status: Never    Smokeless tobacco: Never   Vaping Use    Vaping status: Never Used   Substance and Sexual Activity    Alcohol use: Not Currently    Drug use: Not Currently    Sexual activity: Not on file   Other Topics Concern    Not on file   Social History Narrative    Not on file     Social Drivers of Health     Financial Resource Strain: Low Risk  (12/19/2024)    Overall Financial Resource Strain (CARDIA)     Difficulty of Paying Living Expenses: Not hard at all   Food Insecurity: No Food Insecurity (12/19/2024)    Hunger Vital Sign     Worried  About Running Out of Food in the Last Year: Never true     Ran Out of Food in the Last Year: Never true   Transportation Needs: No Transportation Needs (12/19/2024)    PRAPARE - Transportation     Lack of Transportation (Medical): No     Lack of Transportation (Non-Medical): No   Physical Activity: Not on file   Stress: Not on file   Social Connections: Not on file   Intimate Partner Violence: Unknown (11/14/2024)    Nursing IPS     Feels Physically and Emotionally Safe: Not on file     Physically Hurt by Someone: Not on file     Humiliated or Emotionally Abused by Someone: Not on file     Physically Hurt by Someone: 2     Hurt or Threatened by Someone: 2   Housing Stability: Low Risk  (12/19/2024)    Housing Stability Vital Sign     Unable to Pay for Housing in the Last Year: No     Number of Times Moved in the Last Year: 0     Homeless in the Last Year: No       Current Outpatient Medications:     atorvastatin (LIPITOR) 40 mg tablet, Take 1 tablet (40 mg total) by mouth daily, Disp: 30 tablet, Rfl: 3    Blood Glucose Monitoring Suppl (OneTouch Verio Reflect) w/Device KIT, Check blood sugars once daily. Please substitute with appropriate alternative as covered by patient's insurance. Dx: E11.65, Disp: 1 kit, Rfl: 0    glucose blood (OneTouch Verio) test strip, Check blood sugars once daily. Please substitute with appropriate alternative as covered by patient's insurance. Dx: E11.65, Disp: 100 each, Rfl: 3    metFORMIN (GLUCOPHAGE) 1000 MG tablet, Take 1 tablet (1,000 mg total) by mouth 2 (two) times a day with meals, Disp: 60 tablet, Rfl: 0    naloxone (NARCAN) 4 mg/0.1 mL nasal spray, Administer 1 spray into a nostril. If no response after 2-3 minutes, give another dose in the other nostril using a new spray. For use in emergencies for opioid / pain medication reversal for accidental ingestion, respiratory depression, sedation or concerns for overdose., Disp: 1 each, Rfl: 1    OneTouch Delica Lancets 33G MISC,  Check blood sugars once daily. Please substitute with appropriate alternative as covered by patient's insurance. Dx: E11.65, Disp: 100 each, Rfl: 3    oxyCODONE (Roxicodone) 5 immediate release tablet, Take 1 tablet (5 mg total) by mouth every 4 (four) hours as needed for moderate pain Max Daily Amount: 30 mg, Disp: 30 tablet, Rfl: 0    polyethylene glycol (MIRALAX) 17 g packet, Take 17 g by mouth daily, Disp: , Rfl:     psyllium (METAMUCIL) packet, Take 1 packet by mouth daily, Disp: , Rfl:     senna (SENOKOT) 8.6 mg, Take 1 tablet (8.6 mg total) by mouth daily at bedtime as needed for constipation (Patient not taking: Reported on 12/19/2024), Disp: 30 tablet, Rfl: 1    Allergies   Allergen Reactions    Motrin [Ibuprofen] Itching    Tylenol [Acetaminophen] Itching    Penicillins Rash     Rash and bruise    Sulfa Antibiotics Rash     Price velia syndrome       Vitals:    12/19/24 1018   Resp: 17     Physical Exam  Constitutional:       Appearance: She is well-developed.   HENT:      Head: Normocephalic and atraumatic.      Right Ear: External ear normal.      Left Ear: External ear normal.   Eyes:      Conjunctiva/sclera: Conjunctivae normal.      Pupils: Pupils are equal, round, and reactive to light.   Cardiovascular:      Rate and Rhythm: Normal rate and regular rhythm.      Heart sounds: Normal heart sounds.   Pulmonary:      Effort: Pulmonary effort is normal.      Breath sounds: Normal breath sounds.   Abdominal:      General: Bowel sounds are normal.      Palpations: Abdomen is soft.   Musculoskeletal:         General: Normal range of motion.      Cervical back: Normal range of motion and neck supple.   Skin:     General: Skin is warm.   Neurological:      Mental Status: She is alert and oriented to person, place, and time.      Deep Tendon Reflexes: Reflexes are normal and symmetric.   Psychiatric:         Behavior: Behavior normal.         Thought Content: Thought content normal.         Judgment:  Judgment normal.   Extremities: No lower extreme edema bilaterally, no cords, pulses 1+  Lymphatics: No adenopathy in the neck, supraclavicular region, axilla bilaterally    Performance Status: 1 - Symptomatic but completely ambulatory    Labs    11/14/2024 WBC = 9.86 hemoglobin = 10 hematocrit = 30 MCV = 85 RDW = 15.1 platelet = 323 neutrophil = 77% lymphocyte = 12% monocyte = 10% eosinophil = 1%    11/14/2024 BUN = 12 creatinine = 0.52 calcium = 7.9 AST = 9 ALT = 18 alkaline phosphatase = 55 total protein = 5.9 albumin = 2.7 total bilirubin = 0.23    11/13/2024 CEA = 5.6    Imaging    12/6/2024 MRI pelvis rectal cancer staging without contrast    Overall MRI stage: T4b, N2, Low rectal cancer  MRF: Not applicable for T4 tumor.  Sphincter involvement: Yes.  Suspicious extra mesorectal lymph nodes: No.  EMVI: No.           11/12/2024 CT chest without contrast.  Impression stated no lung metastases, severe coronary artery calcification indicating atherosclerotic heart disease, cholelithiasis.    11/10/2024 CT abdomen pelvis with contrast    IMPRESSION:     Heterogeneous peripherally enhancing mass/malignancy involving the rectum and infiltrating the left ischiorectal and gluteal fat measuring approximately 7.7 x 7.5 x 8.6 cm with areas of fluid density compatible with internal necrosis.    Pathology    Case Report   Surgical Pathology Report                         Case: [id number]                                   Authorizing Provider:  Morris Tom MD         Collected:           11/12/2024 1439               Ordering Location:     [facility] Received:            11/12/2024 6859                                      [address]                                                                       Pathologist:           Nemo Peterson MD                                                         Specimen:    Rectum, rectal mass r/o malignancy , cancer                                                Final  "Diagnosis   A. 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 Deeper sections of the specimen have been evaluated. A higher grade lesion cannot be excluded due to the superficial nature of the biopsy; repeat biopsy is recommended as clinically indicated.                    "

## 2024-12-19 NOTE — PATIENT INSTRUCTIONS
Patient Education     Diabetes tipo 2   Conceptos Básicos   Redactado por los médicos y editores de [employer]   ¿Qué es la diabetes tipo 2? -- La diabetes tipo 2 es un trastorno que afecta la forma en que el cuerpo utiliza el azúcar. A veces se lo llama diabetes mellitus tipo 2.  Todas las células del cuerpo necesitan azúcar para funcionar normalmente. El azúcar entra en las células con la ayuda de josephine hormona llamada insulina. La insulina se produce en el páncreas, un órgano ubicado en el área del estómago. Si no hay suficiente insulina o si el cuerpo guillermina de responder a la insulina, el azúcar se acumula en la caryn. [place] es lo que les sucede a las personas con diabetes.  Existen dos tipos de diabetes:   Diabetes tipo 1 - En la diabetes tipo 1, el páncreas no produce insulina o produce muy poca.   Diabetes tipo 2 - En la mayoría de los casos de diabetes tipo 2, el cuerpo guillermina de responder a la insulina normalmente. Con el tiempo, el páncreas guillermina de producir suficiente insulina.  Tener exceso de peso corporal u obesidad aumenta el riesgo de desarrollar diabetes tipo 2. Sin embargo, las personas que no tienen exceso de peso corporal también pueden desarrollar diabetes.  ¿Cuáles son los síntomas de la diabetes tipo 2? -- En general, la diabetes tipo 2 no provoca síntomas. Cuando aparecen síntomas, son los siguientes, entre otros:   Necesidad de orinar con frecuencia   Sed intensa   Visión borrosa  ¿La diabetes puede ocasionar otros problemas de catherine? -- Sí. Es posible que la diabetes tipo 2 no le cause malestar, fermin si no la controla, con el tiempo puede ocasionar problemas graves, por ejemplo:   Infartos   Accidentes cerebrovasculares (derrames)   Enfermedad renal   Problemas de visión (o incluso ceguera)   Dolor o pérdida de sensibilidad en las rob y los pies   Necesidad de cortar (amputar) los dedos de las rob, los dedos de los pies u otras partes del cuerpo  ¿Cómo puedo saber si tengo diabetes tipo  "2? -- Para averiguar si tiene diabetes tipo 2, el médico o enfermero puede realizar josephine prueba de caryn. Existen dos pruebas que pueden usarse con sunshine fin. Ambas consisten en medir la cantidad de azúcar en la caryn, llamada \"azúcar en caryn\" o \"glucosa en caryn\":   Josephine de las pruebas mide el azúcar en caryn en el momento en que se extrae la muestra. Esta prueba se realiza por la mañana. No podrá comer ni beber nada keira agua rickie un mínimo de 8 horas antes de la prueba.   La otra prueba indica el promedio de azúcar en caryn de los últimos 2 a 3 meses. Esta prueba de caryn se llama \"hemoglobina A1C\" o simplemente \"A1C\". Se puede revisar en cualquier momento del día, incluso si ha comido recientemente.  ¿Cómo se trata la diabetes tipo 2? -- Las metas del tratamiento son manejar el azúcar en caryn y reducir el riesgo de problemas futuros que pueden desarrollar las personas que tienen diabetes.  El tratamiento podría incluir:   Cambios en el estilo de denice - Es un aspecto importante del manejo de la diabetes. Incluye comer alimentos saludables y hacer suficiente actividad física.   Medicinas - Existen algunas medicinas que ayudan a disminuir el azúcar en caryn. Algunas personas deben wilda píldoras que permiten que el organismo genere más insulina o que posibilitan que la insulina cumpla con apple función; otras deben aplicarse inyecciones de insulina.  Según las medicinas que tome, es posible que tenga que revisarse el azúcar en caryn periódicamente en apple hogar, fermin no todas las personas que tienen diabetes tipo 2 necesitan hacerlo. El médico o enfermero le dirá si debe revisarse el azúcar en caryn, y cuándo y cómo hacerlo.  A veces, las personas con diabetes tipo 2 también deben wilda medicinas para ayudar a prevenir los problemas que causa la enfermedad. Por ejemplo, las medicinas que se usan para bajar la presión arterial pueden disminuir las posibilidades de sufrir un infarto o un accidente " cerebrovascular (derrame).   Atención médica general - También es importante cuidar otros aspectos de apple ctaherine. [place] incluye vigilar apple presión arterial y parth niveles de colesterol. También debe recibir ciertas vacunas, isis las vacunas para protegerse de la gripe y de la enfermedad por coronavirus 2019 (“COVID-19”). Algunas personas también necesitan josephine vacuna para prevenir la neumonía.  ¿La diabetes tipo 2 se puede prevenir? -- Sí. Para reducir parth posibilidades de desarrollar diabetes tipo 2, lo más importante que puede hacer es tener josephine alimentación saludable y realizar mucha actividad física. [place] puede ayudarlo a bajar de peso, si tiene sobrepeso. Sin embargo, comer fady y hacer actividad también es berger para apple catherine general. Incluso la actividad leve, isis caminar, tiene beneficios.  Si fuma, dejar de fumar también puede reducir apple riesgo de desarrollar diabetes tipo 2. Dejar de fumar puede ser difícil, fermin apple médico o enfermero puede ayudar.  Todos los artículos se actualizan a medida que se descubre nueva evidencia y culmina nuestro proceso de evaluación por homólogos   Brittany artículo se recuperó de UpToDate el: Apr 24, 2024.  Artículo 30407 Versión 19.0.es-419.1  Release: 32.3.2 - C32.113  © 2024 UpToDate, Inc. Todos los derechos reservados.  Exención de responsabilidad y uso de la información del consumidor   Descargo de responsabilidad: esta información generalizada es un resumen limitado de información sobre el diagnóstico, el tratamiento y/o los medicamentos. No pretende ser exhaustiva y se debe utilizar isis herramienta para ayudar al usuario a comprender y/o evaluar las posibles opciones de diagnóstico y tratamiento. No incluye toda la información sobre afecciones, tratamientos, medicamentos, efectos secundarios o riesgos puedan ser aplicables a un paciente específico. No tiene el propósito de servir isis recomendación médica ni de sustituir la recomendación médica, el diagnóstico o el  tratamiento de un profesional de atención médica que se base en el examen y la evaluación de sunshine profesional de la catherine respecto a las circunstancias específicas y únicas del paciente. Los pacientes deben hablar con un profesional de atención médica para obtener información completa sobre apple catherine, cuestiones médicas y opciones de tratamiento, incluidos los riesgos o los beneficios relacionados con el uso de medicamentos. Esta información no certifica que los tratamientos o medicamentos susan seguros, eficaces o estén aprobados para tratar a un paciente específico. [employer] y parth afiliados renuncian a cualquier garantía o responsabilidad relacionada con esta información o el uso de la misma.El uso de esta información está sujeto a las Condiciones de uso, disponibles en https://www.[employer].com/en/know/clinical-effectiveness-terms. 2024© [employer] y parth afiliados y/o licenciantes. Todos los derechos reservados.  Copyright   © 2024 [employer] Todos los derechos reservados.

## 2024-12-19 NOTE — ASSESSMENT & PLAN NOTE
Patient is a hospice and palliative care recently and is on immediate release oxycodone.  She reports some pain relief.  Using donut cushion for sitting down.  She may additionally benefit from long acting opioids if pain continues

## 2024-12-19 NOTE — ASSESSMENT & PLAN NOTE
Lab Results   Component Value Date    HGBA1C 8.3 (H) 11/10/2024       Orders:    metFORMIN (GLUCOPHAGE) 1000 MG tablet; Take 1 tablet (1,000 mg total) by mouth 2 (two) times a day with meals

## 2024-12-20 ENCOUNTER — TELEPHONE (OUTPATIENT)
Age: 74
End: 2024-12-20

## 2024-12-20 ENCOUNTER — TELEPHONE (OUTPATIENT)
Dept: PALLIATIVE MEDICINE | Facility: CLINIC | Age: 74
End: 2024-12-20

## 2024-12-20 ENCOUNTER — DOCUMENTATION (OUTPATIENT)
Dept: HEMATOLOGY ONCOLOGY | Facility: CLINIC | Age: 74
End: 2024-12-20

## 2024-12-20 DIAGNOSIS — G89.3 CANCER ASSOCIATED PAIN: ICD-10-CM

## 2024-12-20 DIAGNOSIS — K62.89 RECTAL MASS: ICD-10-CM

## 2024-12-20 DIAGNOSIS — Z51.5 PALLIATIVE CARE BY SPECIALIST: ICD-10-CM

## 2024-12-20 DIAGNOSIS — K62.89 RECTAL MASS: Primary | ICD-10-CM

## 2024-12-20 DIAGNOSIS — K62.89 RECTAL PAIN: ICD-10-CM

## 2024-12-20 RX ORDER — OXYCODONE HYDROCHLORIDE 5 MG/1
5 TABLET ORAL EVERY 4 HOURS PRN
Qty: 30 TABLET | Refills: 0 | Status: CANCELLED | OUTPATIENT
Start: 2024-12-20

## 2024-12-20 NOTE — PROGRESS NOTES
Referral from M/O to R/O for discussion of concurrent chemo RT for rectal cancer. Pt to rcv about 16 wks of therapy, referral has been deferred.

## 2024-12-20 NOTE — TELEPHONE ENCOUNTER
Son and daughter in law stated they have gone to the pharmacy and they do not have the prescription order

## 2024-12-20 NOTE — TELEPHONE ENCOUNTER
I called patient to follow-up to make sure if they were able to get patient to the hospital.     Son states that she is doing well now and not sleepy/drowsy. She was tired during the daytime, however, confirms that patient is not having any issues with trouble breathing and not having issues with waking-up.    States his wife is with the patient throughout the day and that patient is easily awaken.     Patient states since we spoke earlier on the phone, patient is doing well and better with her pain. He states she didn't feel that she needed to go to the hospital given that she felt better.    I reiterated the importance of ER precautions and low threshold for bringing her to hospital should there be any concerns with her mental status, breathing, or other issues in which patient is not acting at her baseline.    They did inquire about Narcan prescription and stated that they did not  the medication. I confirmed the pharmacy of [facility] in [place] at the correct location in which son states they will pick it up. Reviewed that its use is for emergency opioid reversal in the event of concerning symptoms not limited to those aforementioned and associated with adverse effect with opioids such as the Oxycodone she is taking. I did stress the importance of continuously monitoring patient while she is needing opioids for cancer related pain management.    Patient's family states no further questions or concerns at this time.    Derek Alba, DO  Palliative Care

## 2024-12-20 NOTE — TELEPHONE ENCOUNTER
Call received from patients son Rocky. Stated her oxycodone is not helping her pain as much as it should be. She is taking medication every 4 hours and said her pain is between 7-10. Patient has also been very sleepy as well taking this medication, this has been more common since patient started taking this every 4 hours. When asking if patient is able to easily be woken up and aroused, son was unable to answer due to not living with patient but will call the family members staying with patient and let us know.     Son requesting call back at [phone]

## 2024-12-20 NOTE — TELEPHONE ENCOUNTER
Patient was prescribed oxycodone & it is not helping with the pain & it's making her very drowsy.  Her grandson called in but he wasn't on the consent form so he asked if you can call her partner back to discuss other options for her.

## 2024-12-22 ENCOUNTER — TELEPHONE (OUTPATIENT)
Dept: OTHER | Facility: OTHER | Age: 74
End: 2024-12-22

## 2024-12-22 RX ORDER — ONDANSETRON 8 MG/1
8 TABLET, FILM COATED ORAL EVERY 8 HOURS PRN
Qty: 20 TABLET | Refills: 4 | Status: SHIPPED | OUTPATIENT
Start: 2024-12-22

## 2024-12-22 RX ORDER — LIDOCAINE/PRILOCAINE 2.5 %-2.5%
CREAM (GRAM) TOPICAL AS NEEDED
Qty: 30 G | Refills: 4 | Status: SHIPPED | OUTPATIENT
Start: 2024-12-22

## 2024-12-22 NOTE — TELEPHONE ENCOUNTER
PT's son in law called in requesting a call back regarding PT's anal fissures. Please call back, looking for an appt.

## 2024-12-23 ENCOUNTER — APPOINTMENT (OUTPATIENT)
Dept: LAB | Facility: HOSPITAL | Age: 74
End: 2024-12-23
Attending: FAMILY MEDICINE
Payer: SUBSIDIZED

## 2024-12-23 ENCOUNTER — HOSPITAL ENCOUNTER (INPATIENT)
Facility: HOSPITAL | Age: 74
LOS: 4 days | Discharge: HOME | DRG: 330 | End: 2024-12-27
Attending: COLON & RECTAL SURGERY | Admitting: COLON & RECTAL SURGERY
Payer: COMMERCIAL

## 2024-12-23 ENCOUNTER — DOCUMENTATION (OUTPATIENT)
Dept: EMERGENCY DEPT | Facility: HOSPITAL | Age: 74
End: 2024-12-23

## 2024-12-23 ENCOUNTER — HOSPITAL ENCOUNTER (EMERGENCY)
Facility: HOSPITAL | Age: 74
DRG: 231 | End: 2024-12-23
Attending: STUDENT IN AN ORGANIZED HEALTH CARE EDUCATION/TRAINING PROGRAM
Payer: SUBSIDIZED

## 2024-12-23 ENCOUNTER — TELEPHONE (OUTPATIENT)
Age: 74
End: 2024-12-23

## 2024-12-23 ENCOUNTER — APPOINTMENT (EMERGENCY)
Dept: RADIOLOGY | Facility: HOSPITAL | Age: 74
DRG: 231 | End: 2024-12-23
Payer: SUBSIDIZED

## 2024-12-23 ENCOUNTER — PREP FOR PROCEDURE (OUTPATIENT)
Age: 74
End: 2024-12-23

## 2024-12-23 ENCOUNTER — ANESTHESIA EVENT (INPATIENT)
Dept: PERIOP | Facility: HOSPITAL | Age: 74
DRG: 330 | End: 2024-12-23

## 2024-12-23 VITALS
OXYGEN SATURATION: 96 % | WEIGHT: 108.03 LBS | DIASTOLIC BLOOD PRESSURE: 66 MMHG | RESPIRATION RATE: 22 BRPM | SYSTOLIC BLOOD PRESSURE: 148 MMHG | HEART RATE: 68 BPM | TEMPERATURE: 98.3 F | BODY MASS INDEX: 21.1 KG/M2

## 2024-12-23 DIAGNOSIS — L02.91 ABSCESS: Primary | ICD-10-CM

## 2024-12-23 DIAGNOSIS — Z11.59 NEED FOR HEPATITIS C SCREENING TEST: ICD-10-CM

## 2024-12-23 DIAGNOSIS — D50.0 IRON DEFICIENCY ANEMIA DUE TO CHRONIC BLOOD LOSS: ICD-10-CM

## 2024-12-23 DIAGNOSIS — C20 RECTAL CANCER (HCC): Primary | ICD-10-CM

## 2024-12-23 DIAGNOSIS — C20 RECTAL CANCER (HCC): ICD-10-CM

## 2024-12-23 DIAGNOSIS — E11.9 TYPE 2 DIABETES MELLITUS WITHOUT COMPLICATION, UNSPECIFIED WHETHER LONG TERM INSULIN USE (HCC): ICD-10-CM

## 2024-12-23 PROBLEM — K61.1 PERIRECTAL ABSCESS: Status: ACTIVE | Noted: 2024-12-23

## 2024-12-23 LAB
ABO GROUP BLD: NORMAL
ALBUMIN SERPL BCG-MCNC: 3.2 G/DL (ref 3.5–5)
ALP SERPL-CCNC: 68 U/L (ref 34–104)
ALT SERPL W P-5'-P-CCNC: 27 U/L (ref 7–52)
ANION GAP SERPL CALCULATED.3IONS-SCNC: 9 MMOL/L (ref 4–13)
AST SERPL W P-5'-P-CCNC: 16 U/L (ref 13–39)
BASOPHILS # BLD AUTO: 0.04 THOUSANDS/ÂΜL (ref 0–0.1)
BASOPHILS NFR BLD AUTO: 1 % (ref 0–1)
BILIRUB SERPL-MCNC: 0.38 MG/DL (ref 0.2–1)
BLD GP AB SCN SERPL QL: NEGATIVE
BLD GP AB SCN SERPL QL: NEGATIVE
BUN SERPL-MCNC: 15 MG/DL (ref 5–25)
CALCIUM ALBUM COR SERPL-MCNC: 9.7 MG/DL (ref 8.3–10.1)
CALCIUM SERPL-MCNC: 9.1 MG/DL (ref 8.4–10.2)
CHLORIDE SERPL-SCNC: 98 MMOL/L (ref 96–108)
CHOLEST SERPL-MCNC: 142 MG/DL (ref ?–200)
CO2 SERPL-SCNC: 26 MMOL/L (ref 21–32)
CREAT SERPL-MCNC: 0.56 MG/DL (ref 0.6–1.3)
EOSINOPHIL # BLD AUTO: 0.08 THOUSAND/ÂΜL (ref 0–0.61)
EOSINOPHIL NFR BLD AUTO: 1 % (ref 0–6)
ERYTHROCYTE [DISTWIDTH] IN BLOOD BY AUTOMATED COUNT: 15.8 % (ref 11.6–15.1)
FERRITIN SERPL-MCNC: 590 NG/ML (ref 11–307)
GFR SERPL CREATININE-BSD FRML MDRD: 92 ML/MIN/1.73SQ M
GLUCOSE SERPL-MCNC: 217 MG/DL (ref 65–140)
GLUCOSE SERPL-MCNC: 239 MG/DL (ref 65–140)
HCT VFR BLD AUTO: 36.5 % (ref 34.8–46.1)
HCV AB SER QL: NORMAL
HDLC SERPL-MCNC: 26 MG/DL
HGB BLD-MCNC: 11.9 G/DL (ref 11.5–15.4)
IMM GRANULOCYTES # BLD AUTO: 0.03 THOUSAND/UL (ref 0–0.2)
IMM GRANULOCYTES NFR BLD AUTO: 0 % (ref 0–2)
IRON SATN MFR SERPL: 13 % (ref 15–50)
IRON SERPL-MCNC: 35 UG/DL (ref 50–212)
LACTATE SERPL-SCNC: 1.2 MMOL/L (ref 0.5–2)
LDLC SERPL CALC-MCNC: 89 MG/DL (ref 0–100)
LYMPHOCYTES # BLD AUTO: 1.03 THOUSANDS/ÂΜL (ref 0.6–4.47)
LYMPHOCYTES NFR BLD AUTO: 12 % (ref 14–44)
MCH RBC QN AUTO: 28.1 PG (ref 26.8–34.3)
MCHC RBC AUTO-ENTMCNC: 32.6 G/DL (ref 31.4–37.4)
MCV RBC AUTO: 86 FL (ref 82–98)
MONOCYTES # BLD AUTO: 0.84 THOUSAND/ÂΜL (ref 0.17–1.22)
MONOCYTES NFR BLD AUTO: 10 % (ref 4–12)
NEUTROPHILS # BLD AUTO: 6.44 THOUSANDS/ÂΜL (ref 1.85–7.62)
NEUTS SEG NFR BLD AUTO: 76 % (ref 43–75)
NONHDLC SERPL-MCNC: 116 MG/DL
NRBC BLD AUTO-RTO: 0 /100 WBCS
PLATELET # BLD AUTO: 309 THOUSANDS/UL (ref 149–390)
PLATELET # BLD AUTO: 321 THOUSANDS/UL (ref 149–390)
PMV BLD AUTO: 9.1 FL (ref 8.9–12.7)
PMV BLD AUTO: 9.1 FL (ref 8.9–12.7)
POTASSIUM SERPL-SCNC: 3.8 MMOL/L (ref 3.5–5.3)
PROT SERPL-MCNC: 7.5 G/DL (ref 6.4–8.4)
RBC # BLD AUTO: 4.24 MILLION/UL (ref 3.81–5.12)
RH BLD: NEGATIVE
SODIUM SERPL-SCNC: 133 MMOL/L (ref 135–147)
SPECIMEN EXPIRATION DATE: NORMAL
SPECIMEN EXPIRATION DATE: NORMAL
TIBC SERPL-MCNC: 260.4 UG/DL (ref 250–450)
TRANSFERRIN SERPL-MCNC: 186 MG/DL (ref 203–362)
TRIGL SERPL-MCNC: 134 MG/DL (ref ?–150)
UIBC SERPL-MCNC: 225 UG/DL (ref 155–355)
WBC # BLD AUTO: 8.46 THOUSAND/UL (ref 4.31–10.16)

## 2024-12-23 PROCEDURE — 83605 ASSAY OF LACTIC ACID: CPT | Performed by: STUDENT IN AN ORGANIZED HEALTH CARE EDUCATION/TRAINING PROGRAM

## 2024-12-23 PROCEDURE — 86850 RBC ANTIBODY SCREEN: CPT

## 2024-12-23 PROCEDURE — 87205 SMEAR GRAM STAIN: CPT | Performed by: STUDENT IN AN ORGANIZED HEALTH CARE EDUCATION/TRAINING PROGRAM

## 2024-12-23 PROCEDURE — 87186 SC STD MICRODIL/AGAR DIL: CPT | Performed by: STUDENT IN AN ORGANIZED HEALTH CARE EDUCATION/TRAINING PROGRAM

## 2024-12-23 PROCEDURE — 82948 REAGENT STRIP/BLOOD GLUCOSE: CPT

## 2024-12-23 PROCEDURE — 85025 COMPLETE CBC W/AUTO DIFF WBC: CPT | Performed by: STUDENT IN AN ORGANIZED HEALTH CARE EDUCATION/TRAINING PROGRAM

## 2024-12-23 PROCEDURE — 87077 CULTURE AEROBIC IDENTIFY: CPT | Performed by: STUDENT IN AN ORGANIZED HEALTH CARE EDUCATION/TRAINING PROGRAM

## 2024-12-23 PROCEDURE — 83550 IRON BINDING TEST: CPT

## 2024-12-23 PROCEDURE — 83540 ASSAY OF IRON: CPT

## 2024-12-23 PROCEDURE — 86803 HEPATITIS C AB TEST: CPT

## 2024-12-23 PROCEDURE — 86900 BLOOD TYPING SEROLOGIC ABO: CPT

## 2024-12-23 PROCEDURE — 85049 AUTOMATED PLATELET COUNT: CPT

## 2024-12-23 PROCEDURE — 87070 CULTURE OTHR SPECIMN AEROBIC: CPT | Performed by: STUDENT IN AN ORGANIZED HEALTH CARE EDUCATION/TRAINING PROGRAM

## 2024-12-23 PROCEDURE — 86901 BLOOD TYPING SEROLOGIC RH(D): CPT | Performed by: STUDENT IN AN ORGANIZED HEALTH CARE EDUCATION/TRAINING PROGRAM

## 2024-12-23 PROCEDURE — 87040 BLOOD CULTURE FOR BACTERIA: CPT | Performed by: STUDENT IN AN ORGANIZED HEALTH CARE EDUCATION/TRAINING PROGRAM

## 2024-12-23 PROCEDURE — 96365 THER/PROPH/DIAG IV INF INIT: CPT

## 2024-12-23 PROCEDURE — 99285 EMERGENCY DEPT VISIT HI MDM: CPT

## 2024-12-23 PROCEDURE — 86901 BLOOD TYPING SEROLOGIC RH(D): CPT

## 2024-12-23 PROCEDURE — 86900 BLOOD TYPING SEROLOGIC ABO: CPT | Performed by: STUDENT IN AN ORGANIZED HEALTH CARE EDUCATION/TRAINING PROGRAM

## 2024-12-23 PROCEDURE — 82728 ASSAY OF FERRITIN: CPT

## 2024-12-23 PROCEDURE — 99285 EMERGENCY DEPT VISIT HI MDM: CPT | Performed by: STUDENT IN AN ORGANIZED HEALTH CARE EDUCATION/TRAINING PROGRAM

## 2024-12-23 PROCEDURE — 86850 RBC ANTIBODY SCREEN: CPT | Performed by: STUDENT IN AN ORGANIZED HEALTH CARE EDUCATION/TRAINING PROGRAM

## 2024-12-23 PROCEDURE — 96367 TX/PROPH/DG ADDL SEQ IV INF: CPT

## 2024-12-23 PROCEDURE — 36415 COLL VENOUS BLD VENIPUNCTURE: CPT | Performed by: STUDENT IN AN ORGANIZED HEALTH CARE EDUCATION/TRAINING PROGRAM

## 2024-12-23 PROCEDURE — 80061 LIPID PANEL: CPT

## 2024-12-23 PROCEDURE — 80053 COMPREHEN METABOLIC PANEL: CPT | Performed by: STUDENT IN AN ORGANIZED HEALTH CARE EDUCATION/TRAINING PROGRAM

## 2024-12-23 PROCEDURE — 96375 TX/PRO/DX INJ NEW DRUG ADDON: CPT

## 2024-12-23 PROCEDURE — 74177 CT ABD & PELVIS W/CONTRAST: CPT

## 2024-12-23 PROCEDURE — NC001 PR NO CHARGE: Performed by: COLON & RECTAL SURGERY

## 2024-12-23 RX ORDER — ATORVASTATIN CALCIUM 40 MG/1
40 TABLET, FILM COATED ORAL DAILY
Status: DISCONTINUED | OUTPATIENT
Start: 2024-12-24 | End: 2024-12-27 | Stop reason: HOSPADM

## 2024-12-23 RX ORDER — SODIUM CHLORIDE, SODIUM GLUCONATE, SODIUM ACETATE, POTASSIUM CHLORIDE, MAGNESIUM CHLORIDE, SODIUM PHOSPHATE, DIBASIC, AND POTASSIUM PHOSPHATE .53; .5; .37; .037; .03; .012; .00082 G/100ML; G/100ML; G/100ML; G/100ML; G/100ML; G/100ML; G/100ML
100 INJECTION, SOLUTION INTRAVENOUS CONTINUOUS
Status: DISCONTINUED | OUTPATIENT
Start: 2024-12-23 | End: 2024-12-25

## 2024-12-23 RX ORDER — OXYCODONE HYDROCHLORIDE 5 MG/1
5 TABLET ORAL EVERY 4 HOURS PRN
Status: DISCONTINUED | OUTPATIENT
Start: 2024-12-23 | End: 2024-12-27 | Stop reason: HOSPADM

## 2024-12-23 RX ORDER — HYDROMORPHONE HCL/PF 1 MG/ML
0.5 SYRINGE (ML) INJECTION ONCE
Refills: 0 | Status: COMPLETED | OUTPATIENT
Start: 2024-12-23 | End: 2024-12-23

## 2024-12-23 RX ORDER — CEFEPIME HYDROCHLORIDE 2 G/50ML
2000 INJECTION, SOLUTION INTRAVENOUS ONCE
Status: COMPLETED | OUTPATIENT
Start: 2024-12-23 | End: 2024-12-23

## 2024-12-23 RX ORDER — METRONIDAZOLE 500 MG/100ML
500 INJECTION, SOLUTION INTRAVENOUS
Status: DISCONTINUED | OUTPATIENT
Start: 2024-12-24 | End: 2024-12-24

## 2024-12-23 RX ORDER — HYDROMORPHONE HCL IN WATER/PF 6 MG/30 ML
0.2 PATIENT CONTROLLED ANALGESIA SYRINGE INTRAVENOUS EVERY 4 HOURS PRN
Status: DISCONTINUED | OUTPATIENT
Start: 2024-12-23 | End: 2024-12-27 | Stop reason: HOSPADM

## 2024-12-23 RX ORDER — POLYETHYLENE GLYCOL 3350 17 G/17G
17 POWDER, FOR SOLUTION ORAL
Status: DISCONTINUED | OUTPATIENT
Start: 2024-12-23 | End: 2024-12-23 | Stop reason: HOSPADM

## 2024-12-23 RX ORDER — CEFAZOLIN SODIUM 2 G/50ML
2000 SOLUTION INTRAVENOUS
Status: DISCONTINUED | OUTPATIENT
Start: 2024-12-24 | End: 2024-12-24

## 2024-12-23 RX ORDER — METRONIDAZOLE 500 MG/100ML
500 INJECTION, SOLUTION INTRAVENOUS EVERY 8 HOURS
Status: DISCONTINUED | OUTPATIENT
Start: 2024-12-23 | End: 2024-12-27 | Stop reason: HOSPADM

## 2024-12-23 RX ORDER — METRONIDAZOLE 500 MG/100ML
500 INJECTION, SOLUTION INTRAVENOUS EVERY 8 HOURS
Status: DISCONTINUED | OUTPATIENT
Start: 2024-12-23 | End: 2024-12-23 | Stop reason: HOSPADM

## 2024-12-23 RX ORDER — HEPARIN SODIUM 5000 [USP'U]/ML
5000 INJECTION, SOLUTION INTRAVENOUS; SUBCUTANEOUS EVERY 8 HOURS SCHEDULED
Status: DISCONTINUED | OUTPATIENT
Start: 2024-12-23 | End: 2024-12-26

## 2024-12-23 RX ORDER — ONDANSETRON 2 MG/ML
4 INJECTION INTRAMUSCULAR; INTRAVENOUS EVERY 4 HOURS PRN
Status: DISCONTINUED | OUTPATIENT
Start: 2024-12-23 | End: 2024-12-27 | Stop reason: HOSPADM

## 2024-12-23 RX ORDER — METRONIDAZOLE 500 MG/100ML
500 INJECTION, SOLUTION INTRAVENOUS
Status: DISCONTINUED | OUTPATIENT
Start: 2024-12-24 | End: 2024-12-23

## 2024-12-23 RX ORDER — INSULIN LISPRO 100 [IU]/ML
1-5 INJECTION, SOLUTION INTRAVENOUS; SUBCUTANEOUS EVERY 6 HOURS SCHEDULED
Status: DISCONTINUED | OUTPATIENT
Start: 2024-12-23 | End: 2024-12-25

## 2024-12-23 RX ADMIN — SODIUM CHLORIDE, SODIUM GLUCONATE, SODIUM ACETATE, POTASSIUM CHLORIDE, MAGNESIUM CHLORIDE, SODIUM PHOSPHATE, DIBASIC, AND POTASSIUM PHOSPHATE 100 ML/HR: .53; .5; .37; .037; .03; .012; .00082 INJECTION, SOLUTION INTRAVENOUS at 20:00

## 2024-12-23 RX ADMIN — IOHEXOL 100 ML: 350 INJECTION, SOLUTION INTRAVENOUS at 09:28

## 2024-12-23 RX ADMIN — CEFEPIME HYDROCHLORIDE 2000 MG: 2 INJECTION, SOLUTION INTRAVENOUS at 12:20

## 2024-12-23 RX ADMIN — POLYETHYLENE GLYCOL 3350 17 G: 17 POWDER, FOR SOLUTION ORAL at 15:57

## 2024-12-23 RX ADMIN — Medication 3 MG: at 22:46

## 2024-12-23 RX ADMIN — HEPARIN SODIUM 5000 UNITS: 5000 INJECTION, SOLUTION INTRAVENOUS; SUBCUTANEOUS at 22:46

## 2024-12-23 RX ADMIN — HYDROMORPHONE HYDROCHLORIDE 0.5 MG: 1 INJECTION, SOLUTION INTRAMUSCULAR; INTRAVENOUS; SUBCUTANEOUS at 09:38

## 2024-12-23 RX ADMIN — POLYETHYLENE GLYCOL 3350 17 G: 17 POWDER, FOR SOLUTION ORAL at 14:19

## 2024-12-23 RX ADMIN — METRONIDAZOLE 500 MG: 500 INJECTION, SOLUTION INTRAVENOUS at 11:42

## 2024-12-23 RX ADMIN — POLYETHYLENE GLYCOL 3350 17 G: 17 POWDER, FOR SOLUTION ORAL at 12:17

## 2024-12-23 RX ADMIN — METRONIDAZOLE 500 MG: 500 INJECTION, SOLUTION INTRAVENOUS at 22:46

## 2024-12-23 NOTE — ED PROVIDER NOTES
Time reflects when diagnosis was documented in both MDM as applicable and the Disposition within this note       Time User Action Codes Description Comment    12/23/2024 11:13 AM Darrius Santana Add [L02.91] Abscess           ED Disposition       ED Disposition   Transfer to Another Facility-In Network    Condition   --    Date/Time   Mon Dec 23, 2024 11:13 AM    Comment   Miriam Smith should be transferred out to [facility].               Assessment & Plan       Medical Decision Making  Patient is a 74 y.o. female who presents to the ED for an.  Patient is nontoxic, well-appearing.     Differential includes but is not limited to: Abscess, fistula, cellulitis.  Low suspicion for NSTI.    Plan: Labs, CT scan, reassess                   Amount and/or Complexity of Data Reviewed  Labs: ordered.  Radiology: ordered. Decision-making details documented in ED Course.    Risk  OTC drugs.  Prescription drug management.        ED Course as of 12/23/24 1453   Mon Dec 23, 2024   1021 CT abdomen pelvis w contrast  1. Locally invasive lower rectal cancer invading the sphincters, left levator ani and gluteus shae muscle, similar to recent MRI, but progressed from 11/10/2024.     2. New rim-enhancing collection/abscess in the left ischioanal fat measuring 5.7 x 5.2 x 8.2 cm, with fistulization into the overlying gluteal skin. The collection extends into the tumor with development of multiple internal foci of air, likely related   to fistulization of the tumor or superinfection.     3. 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     1205 Discussed case with colorectal at [facility], as well as general surgery here.  Patient will need incision and drainage, fecal diversion.  Can be done here or [place].  Family initially concerned about insurance.  Patient was evaluated at bedside by general surgery, Dr. Leal.  Family agreeable to transfer to [place].       Medications  "  polyethylene glycol (MIRALAX) packet 17 g (17 g Oral Given 12/23/24 1419)   metroNIDAZOLE (FLAGYL) IVPB (premix) 500 mg 100 mL (0 mg Intravenous Stopped 12/23/24 1220)   iohexol (OMNIPAQUE) 350 MG/ML injection (MULTI-DOSE) 100 mL (100 mL Intravenous Given 12/23/24 0928)   HYDROmorphone (DILAUDID) injection 0.5 mg (0.5 mg Intravenous Given 12/23/24 0938)   cefepime (MAXIPIME) IVPB (premix in dextrose) 2,000 mg 50 mL (0 mg Intravenous Stopped 12/23/24 1305)       ED Risk Strat Scores                                              History of Present Illness       Chief Complaint   Patient presents with    Rectal Bleeding     Bright red blood 'with some residue\" noted mixed with stool 2 days ago       Past Medical History:   Diagnosis Date    Diabetes mellitus (HCC)       Past Surgical History:   Procedure Laterality Date    COLONOSCOPY        Family History   Problem Relation Age of Onset    Colon cancer Sister       Social History     Tobacco Use    Smoking status: Never    Smokeless tobacco: Never   Vaping Use    Vaping status: Never Used   Substance Use Topics    Alcohol use: Not Currently    Drug use: Not Currently      E-Cigarette/Vaping    E-Cigarette Use Never User       E-Cigarette/Vaping Substances    Nicotine No     THC No     CBD No     Flavoring No     Other No     Unknown No       I have reviewed and agree with the history as documented.     Patient is a 74-year-old female, past medical history including diabetes, and rectal cancer, who presents to the emergency department for a wound to her left buttock.  First noticed a couple of days ago.  Family reports that it has gotten more painful and has been bleeding.  No modifying factors.  No associated symptoms.  No fevers, chills.  Family thinks it may be due to patient lying down more.  No other complaints or concerns.          Review of Systems   Skin:  Positive for wound.   All other systems reviewed and are negative.          Objective       ED Triage " Vitals   Temperature Pulse Blood Pressure Respirations SpO2 Patient Position - Orthostatic VS   12/23/24 0754 12/23/24 0754 12/23/24 0754 12/23/24 0754 12/23/24 0754 12/23/24 0754   97.6 °F (36.4 °C) 65 151/67 22 99 % Lying      Temp Source Heart Rate Source BP Location FiO2 (%) Pain Score    12/23/24 0754 12/23/24 0754 12/23/24 0754 -- 12/23/24 0938    Oral Monitor Left arm  8      Vitals      Date and Time Temp Pulse SpO2 Resp BP Pain Score FACES Pain Rating User   12/23/24 1430 -- 64 98 % -- 113/58 -- -- OE   12/23/24 1200 -- 72 100 % -- 134/60 -- -- OE   12/23/24 1130 -- 67 95 % -- 120/56 -- -- OE   12/23/24 1058 -- 63 93 % -- 122/58 -- -- OE   12/23/24 1043 -- 65 93 % -- -- -- -- OE   12/23/24 1028 -- 63 94 % -- 111/55 -- -- OE   12/23/24 1009 -- -- -- -- -- 4 -- OE   12/23/24 1000 -- 63 95 % -- 112/54 -- -- OE   12/23/24 0938 -- -- -- -- -- 8 -- [place]   12/23/24 0937 -- 69 98 % -- 189/74 -- -- OE   12/23/24 0907 -- 68 91 % -- 117/56 -- -- OE   12/23/24 0837 -- 68 96 % -- 131/61 -- -- OE   12/23/24 0754 97.6 °F (36.4 °C) 65 99 % 22 151/67 -- -- OE            Physical Exam  Vitals and nursing note reviewed.   Constitutional:       General: She is not in acute distress.     Appearance: She is well-developed. She is not ill-appearing, toxic-appearing or diaphoretic.   HENT:      Head: Normocephalic and atraumatic.      Right Ear: External ear normal.      Left Ear: External ear normal.      Nose: Nose normal.   Eyes:      General: Lids are normal. No scleral icterus.  Cardiovascular:      Rate and Rhythm: Normal rate and regular rhythm.      Heart sounds: Normal heart sounds.   Pulmonary:      Effort: Pulmonary effort is normal. No respiratory distress.   Abdominal:      Palpations: Abdomen is soft.      Tenderness: There is no abdominal tenderness. There is no guarding or rebound.   Musculoskeletal:         General: No deformity. Normal range of motion.      Cervical back: Normal range of motion and neck supple.    Skin:     General: Skin is warm and dry.      Findings: Abscess present.          Neurological:      General: No focal deficit present.      Mental Status: She is alert.   Psychiatric:         Mood and Affect: Mood normal.         Behavior: Behavior normal.               Results Reviewed       Procedure Component Value Units Date/Time    Wound culture and Gram stain [[id number]] Collected: 12/23/24 0824    Lab Status: In process Specimen: Wound from Arm, Left Updated: 12/23/24 1228    Lactic acid, plasma (w/reflex if result > 2.0) [[id number]]  (Normal) Collected: 12/23/24 0824    Lab Status: Final result Specimen: Blood from Arm, Left Updated: 12/23/24 0853     LACTIC ACID 1.2 mmol/L     Narrative:      Result may be elevated if tourniquet was used during collection.    Comprehensive metabolic panel [[id number]]  (Abnormal) Collected: 12/23/24 0824    Lab Status: Final result Specimen: Blood from Arm, Left Updated: 12/23/24 0853     Sodium 133 mmol/L      Potassium 3.8 mmol/L      Chloride 98 mmol/L      CO2 26 mmol/L      ANION GAP 9 mmol/L      BUN 15 mg/dL      Creatinine 0.56 mg/dL      Glucose 239 mg/dL      Calcium 9.1 mg/dL      Corrected Calcium 9.7 mg/dL      AST 16 U/L      ALT 27 U/L      Alkaline Phosphatase 68 U/L      Total Protein 7.5 g/dL      Albumin 3.2 g/dL      Total Bilirubin 0.38 mg/dL      eGFR 92 ml/min/1.73sq m     Narrative:      National Kidney Disease Foundation guidelines for Chronic Kidney Disease (CKD):     Stage 1 with normal or high GFR (GFR > 90 mL/min/1.73 square meters)    Stage 2 Mild CKD (GFR = 60-89 mL/min/1.73 square meters)    Stage 3A Moderate CKD (GFR = 45-59 mL/min/1.73 square meters)    Stage 3B Moderate CKD (GFR = 30-44 mL/min/1.73 square meters)    Stage 4 Severe CKD (GFR = 15-29 mL/min/1.73 square meters)    Stage 5 End Stage CKD (GFR <15 mL/min/1.73 square meters)  Note: GFR calculation is accurate only with a steady state creatinine    Blood culture #2 [[id number]]  Collected: 12/23/24 0824    Lab Status: In process Specimen: Blood from Arm, Left Updated: 12/23/24 0838    Blood culture #1 [[id number]] Collected: 12/23/24 0824    Lab Status: In process Specimen: Blood from Arm, Left Updated: 12/23/24 0838    CBC and differential [[id number]]  (Abnormal) Collected: 12/23/24 0824    Lab Status: Final result Specimen: Blood from Arm, Left Updated: 12/23/24 0836     WBC 8.46 Thousand/uL      RBC 4.24 Million/uL      Hemoglobin 11.9 g/dL      Hematocrit 36.5 %      MCV 86 fL      MCH 28.1 pg      MCHC 32.6 g/dL      RDW 15.8 %      MPV 9.1 fL      Platelets 321 Thousands/uL      nRBC 0 /100 WBCs      Segmented % 76 %      Immature Grans % 0 %      Lymphocytes % 12 %      Monocytes % 10 %      Eosinophils Relative 1 %      Basophils Relative 1 %      Absolute Neutrophils 6.44 Thousands/µL      Absolute Immature Grans 0.03 Thousand/uL      Absolute Lymphocytes 1.03 Thousands/µL      Absolute Monocytes 0.84 Thousand/µL      Eosinophils Absolute 0.08 Thousand/µL      Basophils Absolute 0.04 Thousands/µL             CT abdomen pelvis w contrast   Final Interpretation by Omar Marmolejo MD (12/23 1021)      1. Locally invasive lower rectal cancer invading the sphincters, left levator ani and gluteus shae muscle, similar to recent MRI, but progressed from 11/10/2024.      2. New rim-enhancing collection/abscess in the left ischioanal fat measuring 5.7 x 5.2 x 8.2 cm, with fistulization into the overlying gluteal skin. The collection extends into the tumor with development of multiple internal foci of air, likely related    to fistulization of the tumor or superinfection.      3. 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      The study was marked in EPIC for immediate notification.      Workstation performed: [id number]             Procedures    ED Medication and Procedure Management   Prior to Admission Medications   Prescriptions Last Dose  Informant Patient Reported? Taking?   Blood Glucose Monitoring Suppl (OneTouch Verio Reflect) w/Device KIT   No No   Sig: Check blood sugars once daily. Please substitute with appropriate alternative as covered by patient's insurance. Dx: E11.65   OneTouch Delica Lancets 33G MISC   No No   Sig: Check blood sugars once daily. Please substitute with appropriate alternative as covered by patient's insurance. Dx: E11.65   atorvastatin (LIPITOR) 40 mg tablet   No No   Sig: Take 1 tablet (40 mg total) by mouth daily   glucose blood (OneTouch Verio) test strip   No No   Sig: Check blood sugars once daily. Please substitute with appropriate alternative as covered by patient's insurance. Dx: E11.65   lidocaine-prilocaine (EMLA) cream   No No   Sig: Apply topically as needed for mild pain Apply to PAC site prior to access   metFORMIN (GLUCOPHAGE) 1000 MG tablet   No No   Sig: Take 1 tablet (1,000 mg total) by mouth 2 (two) times a day with meals   naloxone (NARCAN) 4 mg/0.1 mL nasal spray   No No   Sig: Administer 1 spray into a nostril. If no response after 2-3 minutes, give another dose in the other nostril using a new spray. For use in emergencies for opioid / pain medication reversal for accidental ingestion, respiratory depression, sedation or concerns for overdose.   ondansetron (ZOFRAN) 8 mg tablet   No No   Sig: Take 1 tablet (8 mg total) by mouth every 8 (eight) hours as needed for nausea or vomiting   oxyCODONE (Roxicodone) 5 immediate release tablet   No No   Sig: Take 1 tablet (5 mg total) by mouth every 4 (four) hours as needed for moderate pain Max Daily Amount: 30 mg   polyethylene glycol (MIRALAX) 17 g packet  Self No No   Sig: Take 17 g by mouth daily   psyllium (METAMUCIL) packet  Self No No   Sig: Take 1 packet by mouth daily   senna (SENOKOT) 8.6 mg   No No   Sig: Take 1 tablet (8.6 mg total) by mouth daily at bedtime as needed for constipation   Patient not taking: Reported on 12/19/2024       Facility-Administered Medications: None     Patient's Medications   Discharge Prescriptions    No medications on file     No discharge procedures on file.  ED SEPSIS DOCUMENTATION   Time reflects when diagnosis was documented in both MDM as applicable and the Disposition within this note       Time User Action Codes Description Comment    12/23/2024 11:13 AM Darrius Santana Add [L02.91] Abscess                  Darrius Santana, DO  12/23/24 1453

## 2024-12-23 NOTE — EMTALA/ACUTE CARE TRANSFER
[facility] EMERGENCY DEPARTMENT  [address] [place] [id number]  Dept: [phone]      EMTALA TRANSFER CONSENT    NAME Miriam Smith                                         1950                              MRN [id number]    I have been informed of my rights regarding examination, treatment, and transfer   by Dr. Darrius Santana DO    Benefits: Specialized equipment and/or services available at the receiving facility (Include comment)________________________ (Colorectal)    Risks: Potential for delay in receiving treatment, Potential deterioration of medical condition, Loss of IV, Possible worsening of condition or death during transfer, Increased discomfort during transfer      Consent for Transfer:  I acknowledge that my medical condition has been evaluated and explained to me by the emergency department physician or other qualified medical person and/or my attending physician, who has recommended that I be transferred to the service of  Accepting Physician: Dr Knox at Accepting Facility Name, City & State : [facility]. The above potential benefits of such transfer, the potential risks associated with such transfer, and the probable risks of not being transferred have been explained to me, and I fully understand them.  The doctor has explained that, in my case, the benefits of transfer outweigh the risks.  I agree to be transferred.    I authorize the performance of emergency medical procedures and treatments upon me in both transit and upon arrival at the receiving facility.  Additionally, I authorize the release of any and all medical records to the receiving facility and request they be transported with me, if possible.  I understand that the safest mode of transportation during a medical emergency is an ambulance and that the Hospital advocates the use of this mode of transport. Risks of traveling to the receiving facility by car, including absence of medical  control, life sustaining equipment, such as oxygen, and medical personnel has been explained to me and I fully understand them.    (VI CORRECT BOX BELOW)  [  ]  I consent to the stated transfer and to be transported by ambulance/helicopter.  [  ]  I consent to the stated transfer, but refuse transportation by ambulance and accept full responsibility for my transportation by car.  I understand the risks of non-ambulance transfers and I exonerate the Hospital and its staff from any deterioration in my condition that results from this refusal.    X___________________________________________    DATE  24  TIME________  Signature of patient or legally responsible individual signing on patient behalf           RELATIONSHIP TO PATIENT_________________________          Provider Certification    NAME Miriam Smith                                         1950                              MRN [id number]    A medical screening exam was performed on the above named patient.  Based on the examination:    Condition Necessitating Transfer The encounter diagnosis was Abscess.    Patient Condition: The patient has been stabilized such that within reasonable medical probability, no material deterioration of the patient condition or the condition of the unborn child(danilo) is likely to result from the transfer    Reason for Transfer: Level of Care needed not available at this facility (Colorectal)    Transfer Requirements: Facility [facility]   Space available and qualified personnel available for treatment as acknowledged by    Agreed to accept transfer and to provide appropriate medical treatment as acknowledged by       Dr Knox  Appropriate medical records of the examination and treatment of the patient are provided at the time of transfer   STAFF INITIAL WHEN COMPLETED _______  Transfer will be performed by qualified personnel from    and appropriate transfer equipment as required, including the use of  necessary and appropriate life support measures.    Provider Certification: I have examined the patient and explained the following risks and benefits of being transferred/refusing transfer to the patient/family:  General risk, such as traffic hazards, adverse weather conditions, rough terrain or turbulence, possible failure of equipment (including vehicle or aircraft), or consequences of actions of persons outside the control of the transport personnel, Unanticipated needs of medical equipment and personnel during transport, Risk of worsening condition, The possibility of a transport vehicle being unavailable      Based on these reasonable risks and benefits to the patient and/or the unborn child(danilo), and based upon the information available at the time of the patient’s examination, I certify that the medical benefits reasonably to be expected from the provision of appropriate medical treatments at another medical facility outweigh the increasing risks, if any, to the individual’s medical condition, and in the case of labor to the unborn child, from effecting the transfer.    X____________________________________________ DATE 12/23/24        TIME_______      ORIGINAL - SEND TO MEDICAL RECORDS   COPY - SEND WITH PATIENT DURING TRANSFER

## 2024-12-23 NOTE — H&P
H&P - Colorectal Surgery  Miriam Smith 74 y.o. female MRN: [id number]  Unit/Bed#: [facility] 830-01 Encounter: [id number]      Assessment:  74F with T4b N2 rectal cancer with communicating ischioanal abscess fistulizing to gluteal skin.    Plan:  - CLD, NPO@mn, isolyte@100  - cefepime/flagyl, f/u 12/23 bcx  - plan for 12/24 fecal diversion, abscess I&D  - ostomy marking  - DVT ppx    HPI:  Miriam Smith is a 74 y.o. female with PMHx of DM and T4b N2 rectal cancer first diagnosed in [place] in September 2024, who presents with bleeding from L gluteal fistula. Started 2d ago. Never occurred before. Has had 20lb wt loss in last 3 months. Sister had colon cancer at 71yo. Last colonoscopy 11/12/24 with single 15cm malignant-appearing, friable, fungating and ulcerated mass in the rectum and anal region, covering one half of the circumference.  Denies fever/chills, n/v. Last ate last night, had apple juice 3h ago, has been eating normally. Last BM at 4d ago, has had normal bowel function, just constipated in last 4d. Passing flatus.    Objective   Patient Vitals for the past 24 hrs:   BP Temp Pulse Resp SpO2   12/23/24 1706 130/62 97.9 °F (36.6 °C) 78 16 92 %       Physical Exam  Constitutional:       General: She is not in acute distress.  HENT:      Head: Normocephalic and atraumatic.   Eyes:      Extraocular Movements: Extraocular movements intact.      Conjunctiva/sclera: Conjunctivae normal.   Cardiovascular:      Rate and Rhythm: Normal rate.      Pulses: Normal pulses.   Pulmonary:      Effort: Pulmonary effort is normal. No respiratory distress.   Abdominal:      General: There is no distension.      Palpations: Abdomen is soft.      Tenderness: There is no abdominal tenderness.   Musculoskeletal:         General: Normal range of motion.      Cervical back: Normal range of motion.   Skin:     General: Skin is warm and dry.      Findings: Erythema and lesion (L butt abscess draining seropurulence)  present.   Neurological:      General: No focal deficit present.      Mental Status: She is alert and oriented to person, place, and time.   Psychiatric:         Mood and Affect: Mood normal.       Review of Systems   Constitutional:  Positive for unexpected weight change (20lbs in last 3 months). Negative for chills and fever.   HENT:  Negative for ear pain and sore throat.    Eyes:  Negative for pain and visual disturbance.   Respiratory:  Negative for cough and shortness of breath.    Cardiovascular:  Negative for chest pain and palpitations.   Gastrointestinal:  Positive for abdominal distention, blood in stool (4d ago first time) and constipation. Negative for abdominal pain, nausea and vomiting.   Genitourinary:  Negative for dysuria and hematuria.   Musculoskeletal:  Negative for arthralgias and back pain.   Skin:  Negative for color change and rash.   Neurological:  Negative for seizures and syncope.   All other systems reviewed and are negative.      Meds: SQH, cefepime/flagyl (12/23-), statin, SSI    Results:  12/6 MRI: T4b, N2, low rectal carcinoma thru posterior rectal wall, in posterior mesorectal space, L ischioanal fossa, L gluteus shae. Involves R posterolateral vaginal wall. + sphincter involvement  12/23 CTAP: New abscess in L ischioanal fat (5.7 x 5.2 x 8.2 cm) with fistulization into overlying gluteal skin. Extends into tumor with multiple internal foci of air. Metastatic mesorectal and superior rectal chain lymph nodes    Recent Labs     12/23/24  0824   WBC 8.46   HGB 11.9      SODIUM 133*   K 3.8   CL 98   CO2 26   BUN 15   CREATININE 0.56*   GLUC 239*   CALCIUM 9.1   AST 16   ALT 27   ALKPHOS 68   TBILI 0.38   EGFR 92   LACTICACID 1.2     Lab Results   Component Value Date    BLOODCX Received in Microbiology Lab. Culture in Progress. 12/23/2024    BLOODCX Received in Microbiology Lab. Culture in Progress. 12/23/2024    URINECX 20,000-29,000 cfu/ml Escherichia coli (A) 11/14/2024     LEUKOCYTESUR Large (A) 11/14/2024    NITRITE Negative 11/14/2024    GLUCOSEU 1000 (1%) (A) 11/14/2024    KETONESU Negative 11/14/2024    BLOODU Small (A) 11/14/2024       Lab Results: I have personally reviewed pertinent lab results.  Imaging: I have personally reviewed pertinent reports.  EKG, Pathology, and Other Studies: I have personally reviewed pertinent reports.  All current active meds have been reviewed  Counseling / Coordination of Care: Total floor / unit time spent today 30 minutes.  Greater than 50% of total time was spent with the patient and / or family counseling and / or coordination of care.

## 2024-12-24 ENCOUNTER — ANESTHESIA (INPATIENT)
Dept: PERIOP | Facility: HOSPITAL | Age: 74
DRG: 330 | End: 2024-12-24

## 2024-12-24 ENCOUNTER — RESULTS FOLLOW-UP (OUTPATIENT)
Dept: EMERGENCY DEPT | Facility: HOSPITAL | Age: 74
End: 2024-12-24

## 2024-12-24 LAB
ANION GAP SERPL CALCULATED.3IONS-SCNC: 8 MMOL/L (ref 4–13)
BASOPHILS # BLD AUTO: 0.03 THOUSANDS/ÂΜL (ref 0–0.1)
BASOPHILS NFR BLD AUTO: 1 % (ref 0–1)
BUN SERPL-MCNC: 10 MG/DL (ref 5–25)
CALCIUM SERPL-MCNC: 8.7 MG/DL (ref 8.4–10.2)
CHLORIDE SERPL-SCNC: 103 MMOL/L (ref 96–108)
CO2 SERPL-SCNC: 27 MMOL/L (ref 21–32)
CREAT SERPL-MCNC: 0.42 MG/DL (ref 0.6–1.3)
EOSINOPHIL # BLD AUTO: 0.12 THOUSAND/ÂΜL (ref 0–0.61)
EOSINOPHIL NFR BLD AUTO: 2 % (ref 0–6)
ERYTHROCYTE [DISTWIDTH] IN BLOOD BY AUTOMATED COUNT: 15.9 % (ref 11.6–15.1)
GFR SERPL CREATININE-BSD FRML MDRD: 101 ML/MIN/1.73SQ M
GLUCOSE SERPL-MCNC: 163 MG/DL (ref 65–140)
GLUCOSE SERPL-MCNC: 183 MG/DL (ref 65–140)
GLUCOSE SERPL-MCNC: 186 MG/DL (ref 65–140)
GLUCOSE SERPL-MCNC: 253 MG/DL (ref 65–140)
GLUCOSE SERPL-MCNC: 297 MG/DL (ref 65–140)
HCT VFR BLD AUTO: 30.3 % (ref 34.8–46.1)
HGB BLD-MCNC: 10 G/DL (ref 11.5–15.4)
IMM GRANULOCYTES # BLD AUTO: 0.02 THOUSAND/UL (ref 0–0.2)
IMM GRANULOCYTES NFR BLD AUTO: 0 % (ref 0–2)
LYMPHOCYTES # BLD AUTO: 1.2 THOUSANDS/ÂΜL (ref 0.6–4.47)
LYMPHOCYTES NFR BLD AUTO: 20 % (ref 14–44)
MAGNESIUM SERPL-MCNC: 1.9 MG/DL (ref 1.9–2.7)
MCH RBC QN AUTO: 28.1 PG (ref 26.8–34.3)
MCHC RBC AUTO-ENTMCNC: 33 G/DL (ref 31.4–37.4)
MCV RBC AUTO: 85 FL (ref 82–98)
MONOCYTES # BLD AUTO: 0.65 THOUSAND/ÂΜL (ref 0.17–1.22)
MONOCYTES NFR BLD AUTO: 11 % (ref 4–12)
NEUTROPHILS # BLD AUTO: 3.93 THOUSANDS/ÂΜL (ref 1.85–7.62)
NEUTS SEG NFR BLD AUTO: 66 % (ref 43–75)
NRBC BLD AUTO-RTO: 0 /100 WBCS
PHOSPHATE SERPL-MCNC: 3.3 MG/DL (ref 2.3–4.1)
PLATELET # BLD AUTO: 296 THOUSANDS/UL (ref 149–390)
PMV BLD AUTO: 9.1 FL (ref 8.9–12.7)
POTASSIUM SERPL-SCNC: 3.9 MMOL/L (ref 3.5–5.3)
RBC # BLD AUTO: 3.56 MILLION/UL (ref 3.81–5.12)
SODIUM SERPL-SCNC: 138 MMOL/L (ref 135–147)
WBC # BLD AUTO: 5.95 THOUSAND/UL (ref 4.31–10.16)

## 2024-12-24 PROCEDURE — 88305 TISSUE EXAM BY PATHOLOGIST: CPT | Performed by: PATHOLOGY

## 2024-12-24 PROCEDURE — 0DTQ0ZZ RESECTION OF ANUS, OPEN APPROACH: ICD-10-PCS | Performed by: COLON & RECTAL SURGERY

## 2024-12-24 PROCEDURE — 10061 I&D ABSCESS COMP/MULTIPLE: CPT | Performed by: PHYSICIAN ASSISTANT

## 2024-12-24 PROCEDURE — 10061 I&D ABSCESS COMP/MULTIPLE: CPT | Performed by: COLON & RECTAL SURGERY

## 2024-12-24 PROCEDURE — 44188 LAP COLOSTOMY: CPT | Performed by: PHYSICIAN ASSISTANT

## 2024-12-24 PROCEDURE — 84100 ASSAY OF PHOSPHORUS: CPT

## 2024-12-24 PROCEDURE — 0DTP0ZZ RESECTION OF RECTUM, OPEN APPROACH: ICD-10-PCS | Performed by: COLON & RECTAL SURGERY

## 2024-12-24 PROCEDURE — 0J990ZZ DRAINAGE OF BUTTOCK SUBCUTANEOUS TISSUE AND FASCIA, OPEN APPROACH: ICD-10-PCS | Performed by: COLON & RECTAL SURGERY

## 2024-12-24 PROCEDURE — 85025 COMPLETE CBC W/AUTO DIFF WBC: CPT

## 2024-12-24 PROCEDURE — 82948 REAGENT STRIP/BLOOD GLUCOSE: CPT

## 2024-12-24 PROCEDURE — 03HY32Z INSERTION OF MONITORING DEVICE INTO UPPER ARTERY, PERCUTANEOUS APPROACH: ICD-10-PCS | Performed by: GENERAL PRACTICE

## 2024-12-24 PROCEDURE — 0DBN0ZZ EXCISION OF SIGMOID COLON, OPEN APPROACH: ICD-10-PCS | Performed by: COLON & RECTAL SURGERY

## 2024-12-24 PROCEDURE — NC001 PR NO CHARGE: Performed by: COLON & RECTAL SURGERY

## 2024-12-24 PROCEDURE — 44188 LAP COLOSTOMY: CPT | Performed by: COLON & RECTAL SURGERY

## 2024-12-24 PROCEDURE — 83735 ASSAY OF MAGNESIUM: CPT

## 2024-12-24 PROCEDURE — 80048 BASIC METABOLIC PNL TOTAL CA: CPT

## 2024-12-24 PROCEDURE — 0D1N0Z4 BYPASS SIGMOID COLON TO CUTANEOUS, OPEN APPROACH: ICD-10-PCS | Performed by: COLON & RECTAL SURGERY

## 2024-12-24 RX ORDER — SODIUM CHLORIDE 9 MG/ML
INJECTION, SOLUTION INTRAVENOUS CONTINUOUS PRN
Status: DISCONTINUED | OUTPATIENT
Start: 2024-12-24 | End: 2024-12-24

## 2024-12-24 RX ORDER — CEFAZOLIN SODIUM 1 G/3ML
INJECTION, POWDER, FOR SOLUTION INTRAMUSCULAR; INTRAVENOUS AS NEEDED
Status: DISCONTINUED | OUTPATIENT
Start: 2024-12-24 | End: 2024-12-24

## 2024-12-24 RX ORDER — LABETALOL HYDROCHLORIDE 5 MG/ML
INJECTION, SOLUTION INTRAVENOUS AS NEEDED
Status: DISCONTINUED | OUTPATIENT
Start: 2024-12-24 | End: 2024-12-24

## 2024-12-24 RX ORDER — LIDOCAINE HYDROCHLORIDE 10 MG/ML
INJECTION, SOLUTION EPIDURAL; INFILTRATION; INTRACAUDAL; PERINEURAL
Status: COMPLETED | OUTPATIENT
Start: 2024-12-24 | End: 2024-12-24

## 2024-12-24 RX ORDER — HYDROMORPHONE HCL/PF 1 MG/ML
0.5 SYRINGE (ML) INJECTION
Status: DISCONTINUED | OUTPATIENT
Start: 2024-12-24 | End: 2024-12-24 | Stop reason: HOSPADM

## 2024-12-24 RX ORDER — FENTANYL CITRATE/PF 50 MCG/ML
50 SYRINGE (ML) INJECTION
Status: DISCONTINUED | OUTPATIENT
Start: 2024-12-24 | End: 2024-12-24 | Stop reason: HOSPADM

## 2024-12-24 RX ORDER — ONDANSETRON 2 MG/ML
INJECTION INTRAMUSCULAR; INTRAVENOUS AS NEEDED
Status: DISCONTINUED | OUTPATIENT
Start: 2024-12-24 | End: 2024-12-24

## 2024-12-24 RX ORDER — PROMETHAZINE HYDROCHLORIDE 25 MG/ML
12.5 INJECTION, SOLUTION INTRAMUSCULAR; INTRAVENOUS ONCE AS NEEDED
Status: DISCONTINUED | OUTPATIENT
Start: 2024-12-24 | End: 2024-12-24 | Stop reason: HOSPADM

## 2024-12-24 RX ORDER — PROPOFOL 10 MG/ML
INJECTION, EMULSION INTRAVENOUS AS NEEDED
Status: DISCONTINUED | OUTPATIENT
Start: 2024-12-24 | End: 2024-12-24

## 2024-12-24 RX ORDER — HYDROMORPHONE HCL/PF 1 MG/ML
SYRINGE (ML) INJECTION AS NEEDED
Status: DISCONTINUED | OUTPATIENT
Start: 2024-12-24 | End: 2024-12-24

## 2024-12-24 RX ORDER — METRONIDAZOLE 500 MG/100ML
INJECTION, SOLUTION INTRAVENOUS CONTINUOUS PRN
Status: DISCONTINUED | OUTPATIENT
Start: 2024-12-24 | End: 2024-12-24

## 2024-12-24 RX ORDER — FENTANYL CITRATE 50 UG/ML
INJECTION, SOLUTION INTRAMUSCULAR; INTRAVENOUS AS NEEDED
Status: DISCONTINUED | OUTPATIENT
Start: 2024-12-24 | End: 2024-12-24

## 2024-12-24 RX ORDER — ESMOLOL HYDROCHLORIDE 10 MG/ML
INJECTION INTRAVENOUS AS NEEDED
Status: DISCONTINUED | OUTPATIENT
Start: 2024-12-24 | End: 2024-12-24

## 2024-12-24 RX ORDER — LIDOCAINE HYDROCHLORIDE 10 MG/ML
INJECTION, SOLUTION EPIDURAL; INFILTRATION; INTRACAUDAL; PERINEURAL AS NEEDED
Status: DISCONTINUED | OUTPATIENT
Start: 2024-12-24 | End: 2024-12-24

## 2024-12-24 RX ORDER — ALBUMIN HUMAN 50 G/1000ML
SOLUTION INTRAVENOUS CONTINUOUS PRN
Status: DISCONTINUED | OUTPATIENT
Start: 2024-12-24 | End: 2024-12-24

## 2024-12-24 RX ORDER — ROCURONIUM BROMIDE 10 MG/ML
INJECTION, SOLUTION INTRAVENOUS AS NEEDED
Status: DISCONTINUED | OUTPATIENT
Start: 2024-12-24 | End: 2024-12-24

## 2024-12-24 RX ORDER — DEXAMETHASONE SODIUM PHOSPHATE 10 MG/ML
INJECTION, SOLUTION INTRAMUSCULAR; INTRAVENOUS AS NEEDED
Status: DISCONTINUED | OUTPATIENT
Start: 2024-12-24 | End: 2024-12-24

## 2024-12-24 RX ORDER — SODIUM CHLORIDE, SODIUM LACTATE, POTASSIUM CHLORIDE, CALCIUM CHLORIDE 600; 310; 30; 20 MG/100ML; MG/100ML; MG/100ML; MG/100ML
INJECTION, SOLUTION INTRAVENOUS CONTINUOUS PRN
Status: DISCONTINUED | OUTPATIENT
Start: 2024-12-24 | End: 2024-12-24

## 2024-12-24 RX ORDER — ONDANSETRON 2 MG/ML
4 INJECTION INTRAMUSCULAR; INTRAVENOUS ONCE AS NEEDED
Status: DISCONTINUED | OUTPATIENT
Start: 2024-12-24 | End: 2024-12-24 | Stop reason: HOSPADM

## 2024-12-24 RX ADMIN — SUGAMMADEX 200 MG: 100 INJECTION, SOLUTION INTRAVENOUS at 11:14

## 2024-12-24 RX ADMIN — FENTANYL CITRATE 50 MCG: 50 INJECTION INTRAMUSCULAR; INTRAVENOUS at 10:24

## 2024-12-24 RX ADMIN — OXYCODONE HYDROCHLORIDE 5 MG: 5 TABLET ORAL at 13:14

## 2024-12-24 RX ADMIN — METRONIDAZOLE 500 MG: 500 INJECTION, SOLUTION INTRAVENOUS at 21:47

## 2024-12-24 RX ADMIN — INSULIN LISPRO 1 UNITS: 100 INJECTION, SOLUTION INTRAVENOUS; SUBCUTANEOUS at 00:05

## 2024-12-24 RX ADMIN — FENTANYL CITRATE 50 MCG: 50 INJECTION INTRAMUSCULAR; INTRAVENOUS at 09:47

## 2024-12-24 RX ADMIN — LIDOCAINE HYDROCHLORIDE 0.3 ML: 10 INJECTION, SOLUTION EPIDURAL; INFILTRATION; INTRACAUDAL; PERINEURAL at 09:44

## 2024-12-24 RX ADMIN — ALBUMIN (HUMAN): 12.5 INJECTION, SOLUTION INTRAVENOUS at 10:44

## 2024-12-24 RX ADMIN — Medication 3 MG: at 21:47

## 2024-12-24 RX ADMIN — SODIUM CHLORIDE, SODIUM GLUCONATE, SODIUM ACETATE, POTASSIUM CHLORIDE, MAGNESIUM CHLORIDE, SODIUM PHOSPHATE, DIBASIC, AND POTASSIUM PHOSPHATE 100 ML/HR: .53; .5; .37; .037; .03; .012; .00082 INJECTION, SOLUTION INTRAVENOUS at 08:41

## 2024-12-24 RX ADMIN — LIDOCAINE HYDROCHLORIDE 1 ML: 10 INJECTION, SOLUTION EPIDURAL; INFILTRATION; INTRACAUDAL; PERINEURAL at 09:44

## 2024-12-24 RX ADMIN — ESMOLOL HYDROCHLORIDE 10 MG: 100 INJECTION, SOLUTION INTRAVENOUS at 10:04

## 2024-12-24 RX ADMIN — CEFAZOLIN 2000 MG: 1 INJECTION, POWDER, FOR SOLUTION INTRAMUSCULAR; INTRAVENOUS at 10:00

## 2024-12-24 RX ADMIN — FENTANYL CITRATE 50 MCG: 50 INJECTION INTRAMUSCULAR; INTRAVENOUS at 10:57

## 2024-12-24 RX ADMIN — ROCURONIUM BROMIDE 60 MG: 10 INJECTION, SOLUTION INTRAVENOUS at 09:45

## 2024-12-24 RX ADMIN — ONDANSETRON 4 MG: 2 INJECTION INTRAMUSCULAR; INTRAVENOUS at 10:29

## 2024-12-24 RX ADMIN — ONDANSETRON 4 MG: 2 INJECTION INTRAMUSCULAR; INTRAVENOUS at 14:17

## 2024-12-24 RX ADMIN — FENTANYL CITRATE 50 MCG: 50 INJECTION INTRAMUSCULAR; INTRAVENOUS at 10:59

## 2024-12-24 RX ADMIN — INSULIN LISPRO 1 UNITS: 100 INJECTION, SOLUTION INTRAVENOUS; SUBCUTANEOUS at 06:22

## 2024-12-24 RX ADMIN — HEPARIN SODIUM 5000 UNITS: 5000 INJECTION, SOLUTION INTRAVENOUS; SUBCUTANEOUS at 21:47

## 2024-12-24 RX ADMIN — ESMOLOL HYDROCHLORIDE 10 MG: 100 INJECTION, SOLUTION INTRAVENOUS at 10:05

## 2024-12-24 RX ADMIN — SODIUM CHLORIDE, SODIUM LACTATE, POTASSIUM CHLORIDE, AND CALCIUM CHLORIDE: .6; .31; .03; .02 INJECTION, SOLUTION INTRAVENOUS at 09:39

## 2024-12-24 RX ADMIN — ESMOLOL HYDROCHLORIDE 10 MG: 100 INJECTION, SOLUTION INTRAVENOUS at 10:55

## 2024-12-24 RX ADMIN — CEFEPIME 2000 MG: 2 INJECTION, POWDER, FOR SOLUTION INTRAVENOUS at 23:48

## 2024-12-24 RX ADMIN — INSULIN LISPRO 2 UNITS: 100 INJECTION, SOLUTION INTRAVENOUS; SUBCUTANEOUS at 18:28

## 2024-12-24 RX ADMIN — METRONIDAZOLE 500 MG: 500 INJECTION, SOLUTION INTRAVENOUS at 04:00

## 2024-12-24 RX ADMIN — HEPARIN SODIUM 5000 UNITS: 5000 INJECTION, SOLUTION INTRAVENOUS; SUBCUTANEOUS at 13:15

## 2024-12-24 RX ADMIN — INSULIN LISPRO 2 UNITS: 100 INJECTION, SOLUTION INTRAVENOUS; SUBCUTANEOUS at 12:53

## 2024-12-24 RX ADMIN — ROCURONIUM BROMIDE 10 MG: 10 INJECTION, SOLUTION INTRAVENOUS at 10:18

## 2024-12-24 RX ADMIN — ESMOLOL HYDROCHLORIDE 10 MG: 100 INJECTION, SOLUTION INTRAVENOUS at 10:51

## 2024-12-24 RX ADMIN — HYDROMORPHONE HYDROCHLORIDE 0.5 MG: 1 INJECTION, SOLUTION INTRAMUSCULAR; INTRAVENOUS; SUBCUTANEOUS at 10:37

## 2024-12-24 RX ADMIN — FENTANYL CITRATE 50 MCG: 50 INJECTION INTRAMUSCULAR; INTRAVENOUS at 10:28

## 2024-12-24 RX ADMIN — HEPARIN SODIUM 5000 UNITS: 5000 INJECTION, SOLUTION INTRAVENOUS; SUBCUTANEOUS at 05:55

## 2024-12-24 RX ADMIN — CEFEPIME 2000 MG: 2 INJECTION, POWDER, FOR SOLUTION INTRAVENOUS at 13:54

## 2024-12-24 RX ADMIN — PROPOFOL 20 MG: 10 INJECTION, EMULSION INTRAVENOUS at 10:01

## 2024-12-24 RX ADMIN — SODIUM CHLORIDE: 0.9 INJECTION, SOLUTION INTRAVENOUS at 09:51

## 2024-12-24 RX ADMIN — ESMOLOL HYDROCHLORIDE 10 MG: 100 INJECTION, SOLUTION INTRAVENOUS at 10:57

## 2024-12-24 RX ADMIN — CEFEPIME 2000 MG: 2 INJECTION, POWDER, FOR SOLUTION INTRAVENOUS at 00:02

## 2024-12-24 RX ADMIN — FENTANYL CITRATE 50 MCG: 50 INJECTION INTRAMUSCULAR; INTRAVENOUS at 11:52

## 2024-12-24 RX ADMIN — PHENYLEPHRINE HYDROCHLORIDE 20 MCG/MIN: 10 INJECTION INTRAVENOUS at 09:53

## 2024-12-24 RX ADMIN — ESMOLOL HYDROCHLORIDE 20 MG: 100 INJECTION, SOLUTION INTRAVENOUS at 11:16

## 2024-12-24 RX ADMIN — PROPOFOL 100 MG: 10 INJECTION, EMULSION INTRAVENOUS at 09:44

## 2024-12-24 RX ADMIN — FENTANYL CITRATE 50 MCG: 50 INJECTION INTRAMUSCULAR; INTRAVENOUS at 09:44

## 2024-12-24 RX ADMIN — DEXAMETHASONE SODIUM PHOSPHATE 10 MG: 10 INJECTION, SOLUTION INTRAMUSCULAR; INTRAVENOUS at 10:30

## 2024-12-24 RX ADMIN — LABETALOL HYDROCHLORIDE 5 MG: 5 INJECTION, SOLUTION INTRAVENOUS at 11:17

## 2024-12-24 RX ADMIN — METRONIDAZOLE 500 MG: 500 INJECTION, SOLUTION INTRAVENOUS at 12:52

## 2024-12-24 RX ADMIN — ESMOLOL HYDROCHLORIDE 20 MG: 100 INJECTION, SOLUTION INTRAVENOUS at 11:15

## 2024-12-24 RX ADMIN — METRONIDAZOLE: 500 SOLUTION INTRAVENOUS at 10:00

## 2024-12-24 NOTE — OP NOTE
OPERATIVE REPORT  PATIENT NAME: Miriam Smith    :  1950  MRN: [id number]  Pt Location: BE OR ROOM 06    SURGERY DATE: 2024    Surgeons and Role:     * SAI Bermudez MD - Primary     * Pema Helton PA-C - Assisting    Preop Diagnosis:  Rectal cancer (HCC) [C20]  Erosion of rectal wall by tumor  Buttock abscess secondary to the above    Post-Op Diagnosis Codes:     * Rectal cancer (HCC) [C20]  Erosion of rectal wall by tumor  Buttock abscess secondary to the above    Procedure(s):  INCISION AND DRAINAGE (I&D) BUTTOCK abscess, with packing  LAPAROSCOPIC DIVERTING sigmoid end-loop colostomy    Specimen(s):  ID Type Source Tests Collected by Time Destination   1 : Buttocks skin rule out tumor Tissue Buttock TISSUE EXAM SAI Bermudez MD 2024 10:03 AM        Estimated Blood Loss:   Minimal    Drains:  * No LDAs found *    Anesthesia Type:   General    Operative Indications:  Rectal cancer (HCC) [C20]  Erosion of rectal wall by tumor  Buttock abscess secondary to the above    Operative Findings:  Very large, soft, rectal mass with apparent central necrosis   Erosion of rectal wall by tumor leaving a large defect in the left lateral rectal wall and a very large cavity in the pelvic wall and buttock tissues, approximating 10 cm in diameter.  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  Buttock abscess secondary to the above    Complications:   None    Procedure and Technique:  The patient was placed in Jace boots on a pink pad.  Her arms were cushioned with pink pad material and tucked at her sides.  The chest was wrapped with a pink pad wrap.  A timeout was done.    The legs were elevated so we could approach the gluteus tissues.  There was some mild induration around a defect in the skin that was less than 1 cm in diameter.  An area of skin approximately 4 x 3 cm was removed with cautery around this opening.   This was to incorporate the indurated skin.  The remaining skin seemed viable.  There was a large defect in the gluteal tissues that could be followed up to the rectal wall which was communicating with the gluteal abscess.  The rectal wall opening was approximately 3 cm in diameter.  Palpation of the walls of the cavity allowed for removal of large chunks of soft tumor.  The area was packed with a Kerlix and was reinspected at the end of the colostomy operation.  The Kerlix was removed and a portion of a Kerlix roll was repacked into the area and cut.  The remainder of the Kerlix was discarded.  There was no significant bleeding at the end of the operation.    The legs were then dropped and I scrubbed to enter the abdominal portion of the operation.  The abdomen was prepped widely using ChloraPrep and allowed to dry completely, and the area of the abdomen was draped in a sterile fashion.  A timeout was done.    An 11 mm trocar was used to enter the abdomen.  A small wound was created using cautery in the infraumbilical midline.  Visiport technique was used to enter the abdominal cavity without any difficulty whatsoever.  The abdomen was insufflated.  A 12 mm trocar was placed in the right lower quadrant and a 5 mm trocar was placed in the right lateral abdomen.  Laparoscopic technique was used to lyse small adhesions from the anterior abdominal wall in the area of the sigmoid.  These were not associated with the rectal tumor.  They did not appear to be associated with any surrounding inflammation, either.    The sigmoid was quite redundant.  It was normal and supple.  A 5 mm trocar was placed in the previously marked colostomy site in the left infraumbilical position.  The sigmoid was grasped using a laparoscopic grasper and could easily be drawn to the abdominal wall.  The trocar opening was enlarged at the fascia using a horizontal incision on the anterior rectus fascia.  The rectus was then dilated for a  muscle-splitting incision that was 2 fingers and breath.  The sigmoid colon was delivered into this wound and held into position.  The abdomen was desufflated.    The sigmoid colon loop could be brought without tension into the wound.  A pathway was made through the avascular mesentery at the posterior wall of the colon.  The bowel was then stapled across with a single fire of a 75 mm CHANNING stapler.  No bleeding was encountered.  The distal side was dropped into the abdomen on the mesenteric side and the edge of the staple line was matured to the skin after it was opened approximately 4 or 5 mm by removing the corner of the staple line.  The proximal staple line was then removed and the colostomy proximal side was matured using 3-0 chromic sutures.  This was done in a Venus fashion to create a colostomy bud.  The inferior side of the colostomy was matured to the distal portion of the loop at the opening to maintain the opening for future decompression of the colon if necessary.  This should allow for avoidance of a closed loop if the tumor grows and obstructs the distal rectum.    Should a future abdominal perineal resection be performed, the mesentery could be divided out to the colostomy, leaving vasculature intact for the proximal bowel and taking the superior rectal artery with maintaining sleeve resection at the area of the colostomy itself.  This could then be  from internally, I suspect.  I felt it critical to create the decompression of the distal sigmoid and rectum rather than leave the staple line in the abdomen without decompression.    Gloves were changed and trocars were removed.  The 12 mm trocar site fascia was closed using an 0 Vicryl suture.  The wounds were irrigated and dried and the skin was closed using subcuticular 4-0 Monocryl suture.  Exofin was applied.  The colostomy area was then cleansed separately and a stoma appliance was positioned.    Sponge needle and instrument counts were  correct.    I was present for the entire procedure.    Patient Disposition:  PACU     This procedure was not performed to treat colon cancer through resection, although there is a cancer present in the patient.  This was not a treatment for removal of the tumor.           SIGNATURE: TENISHA Bermudez MD  DATE: December 24, 2024  TIME: 11:09 AM

## 2024-12-24 NOTE — UTILIZATION REVIEW
Initial Clinical Review    Admission: Date/Time/Statement:   Admission Orders (From admission, onward)       Ordered        12/23/24 1658  INPATIENT ADMISSION  Once                          Orders Placed This Encounter   Procedures    INPATIENT ADMISSION     Standing Status:   Standing     Number of Occurrences:   1     Level of Care:   Med Surg [16]     Estimated length of stay:   More than 2 Midnights     Certification:   I certify that inpatient services are medically necessary for this patient for a duration of greater than two midnights. See H&P and MD Progress Notes for additional information about the patient's course of treatment.     Initial Presentation: 74 y.o. female with PMHx includes DM and T4b N2 rectal cancer, who presented [facility] to [facility] then transferred to [facility], admitted Inpatient status dt Communicating ischioanal abscess fistulizing to gluteal skin.  Presented due to bleeding from left gluteal fistula for 2 days. Never occurred before. Has had 20lb wt loss in last 3 months. Last BM at 4d ago, has had normal bowel function, just constipated in last 4d. Passing flatus.     Plan:  Admit to med surg :  clear liquid diet, NPO at MN, continue IVF, IV cefepime and flagyl, fu on blood cxs, plan for 12/24 fecal diversion, abscess I&D, ostomy marking.     Date: 12/24   Day 2: Reports L buttock pain. No BM, passing flatus. Continue NPO for OR today, IVF, IV ABX, inc spirometry and OOB.    12/24 OP Note:  Procedure(s):  INCISION AND DRAINAGE (I&D) BUTTOCK abscess, with packing  LAPAROSCOPIC DIVERTING sigmoid end-loop colostomy   Anesthesia Type: General  Operative Findings:  Very large, soft, rectal mass with apparent central necrosis   Erosion of rectal wall by tumor leaving a large defect in the left lateral rectal wall and a very large cavity in the pelvic wall and buttock tissues, approximating 10 cm in diameter.  The tumor seemed friable and soft portions of tissue  suggesting mucinous cancer were easily removed with the exam.  Buttock abscess secondary to the above     Scheduled Medications:  atorvastatin, 40 mg, Oral, Daily  [Transfer Hold] cefazolin, 2,000 mg, Intravenous, On Call To OR  [Transfer Hold] cefepime, 2,000 mg, Intravenous, Q12H  [Transfer Hold] heparin (porcine), 5,000 Units, Subcutaneous, Q8H TEGAN  [Transfer Hold] insulin lispro, 1-5 Units, Subcutaneous, Q6H TEGAN  [Transfer Hold] melatonin, 3 mg, Oral, HS  [Transfer Hold] metroNIDAZOLE, 500 mg, Intravenous, On Call To OR  [Transfer Hold] metroNIDAZOLE, 500 mg, Intravenous, Q8H      Continuous IV Infusions:  multi-electrolyte, 100 mL/hr, Intravenous, Continuous      PRN Meds:  [Transfer Hold] HYDROmorphone, 0.2 mg, Intravenous, Q4H PRN  [Transfer Hold] ondansetron, 4 mg, Intravenous, Q4H PRN  [Transfer Hold] oxyCODONE, 5 mg, Oral, Q4H PRN  [Transfer Hold] oxyCODONE, 2.5 mg, Oral, Q4H PRN      ED Triage Vitals   Temperature Pulse Respirations Blood Pressure SpO2 Pain Score   12/23/24 1706 12/23/24 1706 12/23/24 1706 12/23/24 1706 12/23/24 1706 12/23/24 1715   97.9 °F (36.6 °C) 78 16 130/62 92 % No Pain     Weight (last 2 days)       None            Vital Signs (last 3 days)       Date/Time Temp Pulse Resp BP MAP (mmHg) SpO2 O2 Device Pain    12/24/24 0912 -- -- -- -- -- -- -- 5    12/24/24 08:21:41 98.2 °F (36.8 °C) -- 12 111/54 73 -- -- --    12/23/24 22:55:27 98.8 °F (37.1 °C) 72 16 108/57 74 95 % -- --    12/23/24 1915 -- -- -- -- -- -- None (Room air) No Pain    12/23/24 1715 -- -- -- -- -- -- -- No Pain    12/23/24 17:06:51 97.9 °F (36.6 °C) 78 16 130/62 85 92 % -- --              Pertinent Labs/Diagnostic Test Results:   Radiology:  No orders to display     Cardiology:  No orders to display     GI:  No orders to display           Results from last 7 days   Lab Units 12/24/24  0552 12/23/24  1940 12/23/24  0824   WBC Thousand/uL 5.95  --  8.46   HEMOGLOBIN g/dL 10.0*  --  11.9   HEMATOCRIT % 30.3*  --  36.5    PLATELETS Thousands/uL 296 309 321   TOTAL NEUT ABS Thousands/µL 3.93  --  6.44         Results from last 7 days   Lab Units 12/24/24  0552 12/23/24  0824   SODIUM mmol/L 138 133*   POTASSIUM mmol/L 3.9 3.8   CHLORIDE mmol/L 103 98   CO2 mmol/L 27 26   ANION GAP mmol/L 8 9   BUN mg/dL 10 15   CREATININE mg/dL 0.42* 0.56*   EGFR ml/min/1.73sq m 101 92   CALCIUM mg/dL 8.7 9.1   MAGNESIUM mg/dL 1.9  --    PHOSPHORUS mg/dL 3.3  --      Results from last 7 days   Lab Units 12/23/24  0824   AST U/L 16   ALT U/L 27   ALK PHOS U/L 68   TOTAL PROTEIN g/dL 7.5   ALBUMIN g/dL 3.2*   TOTAL BILIRUBIN mg/dL 0.38     Results from last 7 days   Lab Units 12/24/24  0820 12/24/24  0621 12/23/24  2258   POC GLUCOSE mg/dl 163* 183* 217*     Results from last 7 days   Lab Units 12/24/24  0552 12/23/24  0824   GLUCOSE RANDOM mg/dL 186* 239*     Results from last 7 days   Lab Units 12/23/24  0824   LACTIC ACID mmol/L 1.2     Results from last 7 days   Lab Units 12/23/24  0732   FERRITIN ng/mL 590*   IRON SATURATION % 13*   IRON ug/dL 35*   TIBC ug/dL 260.4     Results from last 7 days   Lab Units 12/23/24  0732   TRANSFERRIN mg/dL 186*         Results from last 7 days   Lab Units 12/23/24  0732   HEP C AB  Non-reactive     Results from last 7 days   Lab Units 12/23/24  0824   BLOOD CULTURE  Received in Microbiology Lab. Culture in Progress.  Received in Microbiology Lab. Culture in Progress.   GRAM STAIN RESULT  2+ Gram negative rods*  2+ Gram positive cocci in pairs*  No polys seen*     Past Medical History:   Diagnosis Date    Diabetes mellitus (HCC)      Present on Admission:   Rectal cancer (HCC)      Admitting Diagnosis: Abscess [L02.91]  Age/Sex: 74 y.o. female    Network Utilization Review Department  ATTENTION: Please call with any questions or concerns to [phone] and carefully listen to the prompts so that you are directed to the right person. All voicemails are confidential.   For Discharge needs, contact Care  Management DC Support Team at [phone] opt. 2  Send all requests for admission clinical reviews, approved or denied determinations and any other requests to dedicated fax number below belonging to the campus where the patient is receiving treatment. List of dedicated fax numbers for the Facilities:  FACILITY NAME UR FAX NUMBER   ADMISSION DENIALS (Administrative/Medical Necessity) [phone]   DISCHARGE SUPPORT TEAM (NETWORK) [phone]   PARENT CHILD HEALTH (Maternity/NICU/Pediatrics) [phone]   [facility] [phone]   [facility] [phone]   [facility] [phone]   [facility] [phone]   [facility] [phone]   [facility] [phone]   [facility] [phone]   [facility] [phone]   [facility] [phone]   [facility] [phone]   [facility] [phone]   [facility] [phone]

## 2024-12-24 NOTE — ANESTHESIA PREPROCEDURE EVALUATION
Procedure:  INCISION AND DRAINAGE (I&D) BUTTOCK (Buttocks)  LAPAROSCOPIC ILEOSTOMY, DIVERTING ENTROSTOMY (Abdomen)    Relevant Problems   ANESTHESIA (within normal limits)      CARDIO  CT Chest 11/12/2024:  Severe coronary artery calcification indicating atherosclerotic heart disease.   (-) Angina at rest (HCC)   (-) Angina of effort (HCC)   (-) Chest pain   (-) COLON (dyspnea on exertion)      ENDO   (+) Type 2 diabetes mellitus (HCC)      GI/HEPATIC   (+) Rectal bleeding   (+) Rectal cancer (HCC)      /RENAL   (-) Chronic kidney disease      HEMATOLOGY   (+) Iron deficiency anemia      NEURO/PSYCH   (-) CVA (cerebral vascular accident) (HCC)   (-) Seizures (HCC)      PULMONARY   (-) Asthma   (-) Chronic obstructive pulmonary disease (HCC)   (-) Sleep apnea        Physical Exam    Airway    Mallampati score: II  TM Distance: >3 FB  Neck ROM: full     Dental   No notable dental hx     Cardiovascular      Pulmonary      Other Findings  Bedside TTE performed: Normal biventricular systolic function, no significant valvular pathology appreciatedpost-pubertal.      Anesthesia Plan  ASA Score- 3     Anesthesia Type- general with ASA Monitors.         Additional Monitors: arterial line.    Airway Plan: ETT.    Comment: Patient with severe CAD of CT Chest, she denies any cardiac workup for this. She denies CP at rest or with exertion and denies COLON. Bedside TTE performed by me revealed normal biventricular systolic function with no significant valvular abnormalities. The nature of her presenting issue along with her rectal cancer make this an urgent surgical need, and given her normal cardiac function and absence of CP/COLON and ability to maintain >4 METS without symptoms, I do not believe delaying this urgent case for cardiac evaluation and consideration of interventions is feasible. We will place a pre-induction arterial line for tight blood pressure control as well as frequent intra-operative lab evaluation.       Plan  Factors-Exercise tolerance (METS): >4 METS.    Chart reviewed.  Imaging results reviewed. Existing labs reviewed. Patient summary reviewed.                  Induction- intravenous.    Postoperative Plan- Plan for postoperative opioid use. Planned trial extubation    Perioperative Resuscitation Plan - Level 1 - Full Code.       Informed Consent- Anesthetic plan and risks discussed with patient.  I personally reviewed this patient with the CRNA. Discussed and agreed on the Anesthesia Plan with the CRNA..

## 2024-12-24 NOTE — ASSESSMENT & PLAN NOTE
Refer to above, POD #0 status post I&D left buttock with Kerlix packing  -Nursing order in for packing change daily  -Monitor wound closely

## 2024-12-24 NOTE — ANESTHESIA POSTPROCEDURE EVALUATION
Post-Op Assessment Note    CV Status:  Stable  Pain Score: 0    Pain management: adequate       Mental Status:  Alert and awake   Hydration Status:  Euvolemic   PONV Controlled:  Controlled   Airway Patency:  Patent     Post Op Vitals Reviewed: Yes    No anethesia notable event occurred.    Staff: Anesthesiologist, CRNA           Last Filed PACU Vitals:  Vitals Value Taken Time   Temp 97.4    Pulse 75 12/24/24 1125   /72 12/24/24 1125   Resp 16 12/24/24 1125   SpO2 93 % nasal canula 12/24/24 1125   Vitals shown include unfiled device data.    Modified Ivette:  No data recorded

## 2024-12-24 NOTE — PROGRESS NOTES
Progress Note - Colorectal   Name: Miriam Smith 74 y.o. female I MRN: [id number]  Unit/Bed#: OR POOL I Date of Admission: 12/23/2024   Date of Service: 12/24/2024 I Hospital Day: 1     Assessment & Plan  Rectal cancer (HCC)  T4b N2 rectal cancer with communicating ischioanal abscess fistulizing to gluteal skin.    Plan:  - NPO, isolyte@100  - cefepime/flagyl, f/u 12/23 bcx  - OR today for 12/24 fecal diversion, abscess I&D  - ostomy marking this AM  - DVT ppx, IS/OOB  Perirectal abscess      The patient's family member, Rocky, was contacted to inform him that the operation is going early.  This was due to a schedule shift.  I just notified him and his wife Coco to make them aware.  He had no problem with this.

## 2024-12-24 NOTE — ASSESSMENT & PLAN NOTE
T4b N2 rectal cancer with communicating ischioanal abscess fistulizing to gluteal skin POD#0 s/p laparoscopic diverting sigmoid end-loop colostomy, I&D left buttock w/ Kerlix packing by Dr. Bermudez.    Plan:  -Diabetic diet  -Isolyte at 100  -Continue cefepime/flagyl, f/u 12/23 bcx  -Tejada in place draining clear yellow urine, 200 cc  -End-loop colostomy in place, wound care on board for teaching and education, appreciate assistance  -Strict I's and O's  -Multimodal pain control regimen  - DVT ppx  -PT/OT consult for early postoperative ambulation evaluation  -Encourage out of bed and ambulation starting 2/25/2024  -Encourage incentive spirometer use

## 2024-12-24 NOTE — WOUND OSTOMY CARE
Wound care consulted for stoma marking. Patient is scheduled to under go ostomy creation. Reviewed rationale of stoma marking with patient, and they are agreeable to proceed.    Education: Role of ostomy team and purpose of marking.    The patient was visualized in the sitting, standing, and laying positions to assess the contours of the abdomen. The rectus muscle was identified by have patient lean forward and engage muscles. The patient was ultimately marked in the LLQ. This area is flat, within the rectus muscle, and is in good view of the patient. It is away from bony prominences, scars and the umbilicus. The skin was marked using a skin marker.     Patient made aware that stoma marking is a guide, and the final location of the stoma will be determined by the surgeon.     Patient verbalized understanding.    Wound care team will follow along with patient during inpatient stay for ostomy teaching and education.       Yarelis KUN, RN, CWOCN

## 2024-12-24 NOTE — PROGRESS NOTES
Progress Note - Colorectal   Name: Miriam Smith 74 y.o. female I MRN: [id number]  [facility] I Date of Admission: 12/23/2024   Date of Service: 12/24/2024 I Hospital Day: 1     Assessment & Plan  Rectal cancer (HCC)  T4b N2 rectal cancer with communicating ischioanal abscess fistulizing to gluteal skin.    Plan:  - NPO, isolyte@100  - cefepime/flagyl, f/u 12/23 bcx  - OR today for 12/24 fecal diversion, abscess I&D  - ostomy marking this AM  - DVT ppx, IS/OOB  Perirectal abscess      Subjective/Objective   NAOE. Reports L buttock pain. No n/v. No BM, passing flatus. Voiding. Walking.    Physical Exam:  General: NAD  CV: nl rate  Lungs: nl wob. No resp distress.  ABD: Soft, ND, NT. L buttock abscess draining seropurulence, tender, erythematous  Extrem: No CCE  UOP 800cc    Patient Vitals for the past 24 hrs:   BP Temp Pulse Resp SpO2   12/23/24 2255 108/57 98.8 °F (37.1 °C) 72 16 95 %   12/23/24 1706 130/62 97.9 °F (36.6 °C) 78 16 92 %       I/O         12/22 0701  12/23 0700 12/23 0701  12/24 0700    Urine  600    Total Output  600    Net  -600                  Recent Labs     12/23/24  0824 12/23/24  1940   WBC 8.46  --    HGB 11.9  --     309   SODIUM 133*  --    K 3.8  --    CL 98  --    CO2 26  --    BUN 15  --    CREATININE 0.56*  --    GLUC 239*  --    CALCIUM 9.1  --    AST 16  --    ALT 27  --    ALKPHOS 68  --    TBILI 0.38  --    EGFR 92  --    LACTICACID 1.2  --      Lab Results   Component Value Date    BLOODCX Received in Microbiology Lab. Culture in Progress. 12/23/2024    BLOODCX Received in Microbiology Lab. Culture in Progress. 12/23/2024    URINECX 20,000-29,000 cfu/ml Escherichia coli (A) 11/14/2024    LEUKOCYTESUR Large (A) 11/14/2024

## 2024-12-24 NOTE — ASSESSMENT & PLAN NOTE
T4b N2 rectal cancer with communicating ischioanal abscess fistulizing to gluteal skin.    Plan:  - NPO, isolyte@100  - cefepime/flagyl, f/u 12/23 bcx  - OR today for 12/24 fecal diversion, abscess I&D  - ostomy marking this AM  - DVT ppx, IS/OOB

## 2024-12-24 NOTE — ANESTHESIA POSTPROCEDURE EVALUATION
Post-Op Assessment Note    CV Status:  Stable    Pain management: adequate       Hydration Status:  Stable   Airway Patency:  Patent     Post Op Vitals Reviewed: Yes    No anethesia notable event occurred.    Staff: Anesthesiologist           Last Filed PACU Vitals:  Vitals Value Taken Time   Temp 97.4 °F (36.3 °C) 12/24/24 1123   Pulse 59 12/24/24 1208   /55 12/24/24 1200   Resp 16 12/24/24 1207   SpO2 98 % 12/24/24 1208   Vitals shown include unfiled device data.    Modified Ivette:  Activity: 2 (12/24/2024 11:23 AM)  Respiration: 2 (12/24/2024 11:23 AM)  Circulation: 2 (12/24/2024 11:23 AM)  Consciousness: 2 (12/24/2024 11:23 AM)  Oxygen Saturation: 1 (12/24/2024 11:23 AM)  Modified Ivette Score: 9 (12/24/2024 11:23 AM)

## 2024-12-24 NOTE — CASE MANAGEMENT
Case Management Discharge Planning Note    Patient name Miriam Smith  Location [facility] 830/[facility] 830-01 MRN [id number]  : 1950 Date 2024       Current Admission Date: 2024  Current Admission Diagnosis:Rectal cancer (HCC)   Patient Active Problem List    Diagnosis Date Noted Date Diagnosed    Perirectal abscess 2024     Cancer associated pain 2024     Palliative care by specialist 2024     Rectal pain 2024     Rectal cancer (HCC) 2024     Rectal bleeding 2024     History of anal fissures 2024     Electrolyte abnormality 2024     Pain, rectal 2024     Iron deficiency anemia 2024     Leukocytosis 2024     Rectal mass 11/10/2024     Type 2 diabetes mellitus (HCC) 11/10/2024       LOS (days): 1  Geometric Mean LOS (GMLOS) (days):   Days to GMLOS:     OBJECTIVE:  Risk of Unplanned Readmission Score: 17.57         Current admission status: Inpatient   Preferred Pharmacy:   [id number] [place], [place] - [address] [facility]  [address] [facility] [address]  Phone: [phone] Fax: [phone]    [facility] [address] [place] [address] - [address]  [address]  [facility] [address]  Phone: [phone] Fax: [phone]    Primary Care Provider: Yumiko Thomas MD    Primary Insurance:   Secondary Insurance:     DISCHARGE DETAILS:    Discharge planning discussed with:: patient, grandson, Sha and DaughterMi at bedside    Additional Comments: CM spoke with daughterMi and grandSha hernandes at bedside.  They made this CM aware that a Alejandrina Case for [place] insurance was started at our [facility].  CM sent an email to our financial counselor department letting them know the same.  CM to follow.

## 2024-12-24 NOTE — ANESTHESIA PROCEDURE NOTES
"Arterial Line Insertion    Performed by: Alfreda Muir CRNA  Authorized by: Zoran Matson MD  Consent: Verbal consent obtained.  Consent given by: patient  Patient understanding: patient states understanding of the procedure being performed  Patient consent: the patient's understanding of the procedure matches consent given  Procedure consent: procedure consent matches procedure scheduled  Relevant documents: relevant documents present and verified  Test results: test results available and properly labeled  Site marked: the operative site was marked  Radiology Images: Radiology Images displayed and confirmed. If images not available, report reviewed  Required items: required blood products, implants, devices, and special equipment available  Patient identity confirmed: verbally with patient and arm band  Time out: Immediately prior to procedure a \"time out\" was called to verify the correct patient, procedure, equipment, support staff and site/side marked as required.  Preparation: Patient was prepped and draped in the usual sterile fashion.  Indications: hemodynamic monitoring  Orientation:  Left  Location: radial arterylidocaine (PF) (XYLOCAINE-MPF) 1 % - Infiltration, Left Arm   0.3 mL - 12/24/2024 9:44:00 AM  Procedure Details:  Needle gauge: 20  Seldinger technique: Seldinger technique used  Number of attempts: 1    Post-procedure:  Post-procedure: dressing applied  Patient tolerance: Patient tolerated the procedure well with no immediate complications  Comments: Ultrasound guided x 1 attempt        "

## 2024-12-24 NOTE — PROGRESS NOTES
Postop check- Colorectal   Name: Miriam Smith 74 y.o. female I MRN: [id number]  Unit/Bed#: [facility] 830-01 I Date of Admission: 12/23/2024   Date of Service: 12/24/2024 I Hospital Day: 1    Assessment & Plan  Rectal cancer (HCC)  T4b N2 rectal cancer with communicating ischioanal abscess fistulizing to gluteal skin POD#0 s/p laparoscopic diverting sigmoid end-loop colostomy, I&D left buttock w/ Kerlix packing by Dr. Bermudez.    Plan:  -Diabetic diet  -Isolyte at 100  -Continue cefepime/flagyl, f/u 12/23 bcx  -Tejada in place draining clear yellow urine, 200 cc  -End-loop colostomy in place, wound care on board for teaching and education, appreciate assistance  -Strict I's and O's  -Multimodal pain control regimen  - DVT ppx  -PT/OT consult for early postoperative ambulation evaluation  -Encourage out of bed and ambulation starting 2/25/2024  -Encourage incentive spirometer use  Perirectal abscess  Refer to above, POD #0 status post I&D left buttock with Kerlix packing  -Nursing order in for packing change daily  -Monitor wound closely    Colorectal service will follow.    Subjective   General: Patient offers no complaints at this time, she denies any fevers or chills, nausea or vomiting, chest pain or shortness of breath.  Pain: Currently controlled on as needed regimen and multimodal  N/V: Denies any  Tolerating Diet: As tolerated  -Flatus and -BM, blood-tinged bowel sweat in bag  OOB and ambulating starting 2/25/2024      Objective :  Temp:  [97.4 °F (36.3 °C)-98.8 °F (37.1 °C)] 97.4 °F (36.3 °C)  HR:  [60-78] 60  BP: (108-160)/(54-72) 110/55  Resp:  [12-16] 15  SpO2:  [92 %-99 %] 99 %  O2 Device: Nasal cannula  Nasal Cannula O2 Flow Rate (L/min):  [2 L/min] 2 L/min    I/O         12/22 0701 12/23 0700 12/23 0701 12/24 0700 12/24 0701 12/25 0700    I.V.   4018.3    IV Piggyback   350    Total Intake   4368.3    Urine  1150 1000    Blood   200    Total Output  1150 1200    Net  -1150 +3168.3                  Lines/Drains/Airways       Active Status       Name Placement date Placement time Site Days    Arterial Line 12/24/24 Left Radial 12/24/24  0944  Radial  less than 1    Colostomy LLQ 12/24/24  --  LLQ  less than 1    Urethral Catheter 12/24/24  --  --  less than 1                  Physical Exam   General: Pt is AAOx3, lying down in bed in NAD.   HEENT: normocephalic, moist mucous membranes   Neck: Supple, non-tender, ROM intact,    CV: RR   Resp: normal respiratory effort no wheezes, rhonchi, rhales   Abd: Soft, with mild appropriate postoperative tenderness of all 4 quadrants, non-distended, non-tympanitic.  Hypoactive bowelsounds all 4 quadrants. No rebound or guarding. No CVA tenderness.  Abdominal incisions clean, dry, intact, with glue in place.  And loop colostomy in place, with bowel sweat in bag.  Pink, budding, functioning without any difficulties.  Tejada in place draining clear yellow urine, 200 cc.   Ext: Warm with no cyanosis, no edema, no deformities. ROM intact   Skin: No rashes, bruises, ulcers.   Neuro: Sensation intact all 4 extremities. 5+ strength all 4 extremities.       Lab Results: I have reviewed the following results:  Recent Labs     12/23/24  0824 12/23/24  1940 12/24/24  0552   WBC 8.46  --  5.95   HGB 11.9  --  10.0*   HCT 36.5  --  30.3*      < > 296   SODIUM 133*  --  138   K 3.8  --  3.9   CL 98  --  103   CO2 26  --  27   BUN 15  --  10   CREATININE 0.56*  --  0.42*   GLUC 239*  --  186*   MG  --   --  1.9   PHOS  --   --  3.3   AST 16  --   --    ALT 27  --   --    ALB 3.2*  --   --    TBILI 0.38  --   --    ALKPHOS 68  --   --    LACTICACID 1.2  --   --     < > = values in this interval not displayed.       Imaging Results Review: No pertinent imaging studies reviewed.  Other Study Results Review: No additional pertinent studies reviewed.    VTE Pharmacologic Prophylaxis: Heparin  VTE Mechanical Prophylaxis: sequential compression device    Aida Guzman

## 2024-12-25 LAB
ANION GAP SERPL CALCULATED.3IONS-SCNC: 5 MMOL/L (ref 4–13)
BASOPHILS # BLD AUTO: 0.02 THOUSANDS/ÂΜL (ref 0–0.1)
BASOPHILS NFR BLD AUTO: 0 % (ref 0–1)
BUN SERPL-MCNC: 13 MG/DL (ref 5–25)
CALCIUM SERPL-MCNC: 8.1 MG/DL (ref 8.4–10.2)
CHLORIDE SERPL-SCNC: 107 MMOL/L (ref 96–108)
CO2 SERPL-SCNC: 26 MMOL/L (ref 21–32)
CREAT SERPL-MCNC: 0.44 MG/DL (ref 0.6–1.3)
EOSINOPHIL # BLD AUTO: 0.02 THOUSAND/ÂΜL (ref 0–0.61)
EOSINOPHIL NFR BLD AUTO: 0 % (ref 0–6)
ERYTHROCYTE [DISTWIDTH] IN BLOOD BY AUTOMATED COUNT: 15.9 % (ref 11.6–15.1)
GFR SERPL CREATININE-BSD FRML MDRD: 99 ML/MIN/1.73SQ M
GLUCOSE SERPL-MCNC: 166 MG/DL (ref 65–140)
GLUCOSE SERPL-MCNC: 166 MG/DL (ref 65–140)
GLUCOSE SERPL-MCNC: 169 MG/DL (ref 65–140)
GLUCOSE SERPL-MCNC: 243 MG/DL (ref 65–140)
GLUCOSE SERPL-MCNC: 251 MG/DL (ref 65–140)
GLUCOSE SERPL-MCNC: 270 MG/DL (ref 65–140)
HCT VFR BLD AUTO: 25.3 % (ref 34.8–46.1)
HGB BLD-MCNC: 8.3 G/DL (ref 11.5–15.4)
IMM GRANULOCYTES # BLD AUTO: 0.04 THOUSAND/UL (ref 0–0.2)
IMM GRANULOCYTES NFR BLD AUTO: 1 % (ref 0–2)
LYMPHOCYTES # BLD AUTO: 1.52 THOUSANDS/ÂΜL (ref 0.6–4.47)
LYMPHOCYTES NFR BLD AUTO: 18 % (ref 14–44)
MAGNESIUM SERPL-MCNC: 1.9 MG/DL (ref 1.9–2.7)
MCH RBC QN AUTO: 28 PG (ref 26.8–34.3)
MCHC RBC AUTO-ENTMCNC: 32.8 G/DL (ref 31.4–37.4)
MCV RBC AUTO: 86 FL (ref 82–98)
MONOCYTES # BLD AUTO: 0.73 THOUSAND/ÂΜL (ref 0.17–1.22)
MONOCYTES NFR BLD AUTO: 9 % (ref 4–12)
NEUTROPHILS # BLD AUTO: 6.3 THOUSANDS/ÂΜL (ref 1.85–7.62)
NEUTS SEG NFR BLD AUTO: 72 % (ref 43–75)
NRBC BLD AUTO-RTO: 0 /100 WBCS
PHOSPHATE SERPL-MCNC: 2.7 MG/DL (ref 2.3–4.1)
PLATELET # BLD AUTO: 279 THOUSANDS/UL (ref 149–390)
PMV BLD AUTO: 8.8 FL (ref 8.9–12.7)
POTASSIUM SERPL-SCNC: 3.7 MMOL/L (ref 3.5–5.3)
RBC # BLD AUTO: 2.96 MILLION/UL (ref 3.81–5.12)
SODIUM SERPL-SCNC: 138 MMOL/L (ref 135–147)
WBC # BLD AUTO: 8.63 THOUSAND/UL (ref 4.31–10.16)

## 2024-12-25 PROCEDURE — 82948 REAGENT STRIP/BLOOD GLUCOSE: CPT

## 2024-12-25 PROCEDURE — 80048 BASIC METABOLIC PNL TOTAL CA: CPT | Performed by: PHYSICIAN ASSISTANT

## 2024-12-25 PROCEDURE — 97167 OT EVAL HIGH COMPLEX 60 MIN: CPT

## 2024-12-25 PROCEDURE — 99024 POSTOP FOLLOW-UP VISIT: CPT | Performed by: COLON & RECTAL SURGERY

## 2024-12-25 PROCEDURE — 85025 COMPLETE CBC W/AUTO DIFF WBC: CPT | Performed by: PHYSICIAN ASSISTANT

## 2024-12-25 PROCEDURE — 83735 ASSAY OF MAGNESIUM: CPT | Performed by: PHYSICIAN ASSISTANT

## 2024-12-25 PROCEDURE — 84100 ASSAY OF PHOSPHORUS: CPT | Performed by: PHYSICIAN ASSISTANT

## 2024-12-25 RX ORDER — INSULIN LISPRO 100 [IU]/ML
1-5 INJECTION, SOLUTION INTRAVENOUS; SUBCUTANEOUS EVERY 6 HOURS SCHEDULED
Status: DISCONTINUED | OUTPATIENT
Start: 2024-12-25 | End: 2024-12-26

## 2024-12-25 RX ADMIN — OXYCODONE HYDROCHLORIDE 5 MG: 5 TABLET ORAL at 11:50

## 2024-12-25 RX ADMIN — HEPARIN SODIUM 5000 UNITS: 5000 INJECTION, SOLUTION INTRAVENOUS; SUBCUTANEOUS at 14:07

## 2024-12-25 RX ADMIN — ATORVASTATIN CALCIUM 40 MG: 40 TABLET, FILM COATED ORAL at 08:32

## 2024-12-25 RX ADMIN — METRONIDAZOLE 500 MG: 500 INJECTION, SOLUTION INTRAVENOUS at 03:30

## 2024-12-25 RX ADMIN — OXYCODONE HYDROCHLORIDE 5 MG: 5 TABLET ORAL at 18:37

## 2024-12-25 RX ADMIN — INSULIN LISPRO 2 UNITS: 100 INJECTION, SOLUTION INTRAVENOUS; SUBCUTANEOUS at 11:54

## 2024-12-25 RX ADMIN — HEPARIN SODIUM 5000 UNITS: 5000 INJECTION, SOLUTION INTRAVENOUS; SUBCUTANEOUS at 05:40

## 2024-12-25 RX ADMIN — HEPARIN SODIUM 5000 UNITS: 5000 INJECTION, SOLUTION INTRAVENOUS; SUBCUTANEOUS at 21:43

## 2024-12-25 RX ADMIN — INSULIN LISPRO 3 UNITS: 100 INJECTION, SOLUTION INTRAVENOUS; SUBCUTANEOUS at 18:39

## 2024-12-25 RX ADMIN — CEFEPIME 2000 MG: 2 INJECTION, POWDER, FOR SOLUTION INTRAVENOUS at 11:41

## 2024-12-25 RX ADMIN — INSULIN LISPRO 2 UNITS: 100 INJECTION, SOLUTION INTRAVENOUS; SUBCUTANEOUS at 00:24

## 2024-12-25 RX ADMIN — METRONIDAZOLE 500 MG: 500 INJECTION, SOLUTION INTRAVENOUS at 12:44

## 2024-12-25 RX ADMIN — METRONIDAZOLE 500 MG: 500 INJECTION, SOLUTION INTRAVENOUS at 20:16

## 2024-12-25 RX ADMIN — Medication 3 MG: at 21:43

## 2024-12-25 RX ADMIN — POTASSIUM PHOSPHATE, MONOBASIC POTASSIUM PHOSPHATE, DIBASIC 21 MMOL: 224; 236 INJECTION, SOLUTION, CONCENTRATE INTRAVENOUS at 14:10

## 2024-12-25 RX ADMIN — SODIUM CHLORIDE, SODIUM GLUCONATE, SODIUM ACETATE, POTASSIUM CHLORIDE, MAGNESIUM CHLORIDE, SODIUM PHOSPHATE, DIBASIC, AND POTASSIUM PHOSPHATE 100 ML/HR: .53; .5; .37; .037; .03; .012; .00082 INJECTION, SOLUTION INTRAVENOUS at 01:49

## 2024-12-25 RX ADMIN — INSULIN LISPRO 1 UNITS: 100 INJECTION, SOLUTION INTRAVENOUS; SUBCUTANEOUS at 06:35

## 2024-12-25 NOTE — OCCUPATIONAL THERAPY NOTE
Occupational Therapy Evaluation     Patient Name: Miriam Smith  Today's Date: 12/25/2024  Problem List  Active Problems:    Rectal cancer (HCC)    Perirectal abscess    Past Medical History  Past Medical History:   Diagnosis Date    Diabetes mellitus (HCC)      Past Surgical History  Past Surgical History:   Procedure Laterality Date    COLONOSCOPY          12/25/24 0826   OT Last Visit   OT Visit Date 12/25/24   Note Type   Note type Evaluation   Pain Assessment   Pain Assessment Tool 0-10   Pain Score 6   Pain Location/Orientation Location: Abdomen;Location: Buttocks   Effect of Pain on Daily Activities limits activity tolerance, mobility, and I w/ ADLs   Patient's Stated Pain Goal No pain   Hospital Pain Intervention(s) Repositioned;Ambulation/increased activity;Emotional support   Restrictions/Precautions   Weight Bearing Precautions Per Order No   Other Precautions Chair Alarm;Bed Alarm;Multiple lines;Fall Risk;Pain  (Pt is primarily Maldivian speaking; utilized [employer]  t/o session.)   Home Living   Type of Home House   Home Layout Two level;1/2 bath on main level  (3 GARDENIA; bedroom and full bathroom in finished basement)   Bathroom Shower/Tub Tub/shower unit   Bathroom Toilet Standard   Bathroom Equipment Grab bars in shower;Shower chair   Home Equipment   (pt denies)   Prior Function   Level of [place] Independent with ADLs;Independent with functional mobility;Needs assistance with IADLS   Lives With Daughter  (+ son-in-law, and grandchildren)   Receives Help From Family   IADLs Independent with medication management;Family/Friend/Other provides transportation;Family/Friend/Other provides meals   Falls in the last 6 months 0   Vocational Retired   Lifestyle   Autonomy Pt reports I w/ ADLs and fxnl mobility and required A w/ IADLs at baseline; - driving.   Reciprocal Relationships Pt lives w/ her daughter, son-in-law, and grandchildren. She reports that someone is almost always home  with her.   Service to Others Pt is retired.   Intrinsic Gratification Pt enjoys spending time w/ her grandchildren.   General   Family/Caregiver Present No   ADL   Eating Assistance 7  Independent   Grooming Assistance 5  Supervision/Setup   UB Bathing Assistance 5  Supervision/Setup   LB Bathing Assistance 4  Minimal Assistance   UB Dressing Assistance 5  Supervision/Setup   LB Dressing Assistance 3  Moderate Assistance   Toileting Assistance  4  Minimal Assistance   Toileting Deficit Setup;Verbal cueing;Increased time to complete;Clothing management up;Clothing management down;Perineal hygiene   Bed Mobility   Supine to Sit 4  Minimal assistance   Additional items Assist x 1;HOB elevated;Bedrails;Increased time required   Sit to Supine 4  Minimal assistance   Additional items Assist x 1;Increased time required;Verbal cues;LE management   Additional Comments Pt supine in bed at end of OT evaluation w/ alarm activated and all needs within reach; declined to sit OOB in chair due to pain.   Transfers   Sit to Stand 5  Supervision   Additional items Increased time required;Verbal cues   Stand to Sit 5  Supervision   Additional items Increased time required;Verbal cues   Toilet transfer 5  Supervision   Additional items Increased time required;Verbal cues;Standard toilet  (grab bar use)   Additional Comments w/ RW for support   Functional Mobility   Functional Mobility 4  Minimal assistance   Additional Comments Pt ambulated short household distance w/ min Ax1 using RW for support.   Additional items Rolling walker   Balance   Static Sitting Fair +   Dynamic Sitting Fair   Static Standing Fair -   Dynamic Standing Poor +   Ambulatory Poor +   Activity Tolerance   Activity Tolerance Patient limited by pain;Patient limited by fatigue   Nurse Made Aware RN clearance prior to session   RUE Assessment   RUE Assessment WFL   LUE Assessment   LUE Assessment WFL   Hand Function   Gross Motor Coordination Functional   Fine  "Motor Coordination Functional   Cognition   Overall Cognitive Status WFL   Arousal/Participation Alert;Responsive;Cooperative   Attention Within functional limits   Orientation Level Oriented X4   Memory Within functional limits   Following Commands Follows one step commands without difficulty   Comments Pt was identified by name and . Pt was pleasant, cooperative, and willing to participate in OT evaluation.   Assessment   Limitation Decreased ADL status;Decreased endurance;Decreased self-care trans;Decreased high-level ADLs   Assessment Pt is a 74 y.o. female seen for OT evaluation s/p admission to [facility] on 2024. Pt diagnosed with rectal cancer; s/p \"laparoscopic diverting sigmoid end-loop colostomy, I&D left buttock w/ Kerlix packing\" on . Pt has a significant PMH impacting occupational performance including: Diabetes mellitus (HCC). Pt with active OT evaluation and treatment orders and activity orders. PTA, pt living with her daughter, son-in-law, and grandchildren in a 2 SH w/ 3 [place]. Pt reports I w/ ADLs and fxnl mobility and requires A w/ IADLs at baseline; - driving. Pt agreeable and willing to participate in OT evaluation. During evaluation, pt was I for eating, S for grooming and UB ADLs, min-mod A for LB ADLs, and min A for toileting. Pt also required min Ax1 for bed mobility and fxnl mobility w/ RW and S for transfers. Performance deficits that affect the pt’s occupational performance during the initial evaluation include decreased ADL status, decreased activity tolerance, decreased endurance, decreased sitting tolerance, decreased sitting balance, decreased standing tolerance, decreased standing balance, decreased transfer skills, decreased fxnl mobility, and pain. Based on pt’s functional performance and deficits the following occupations will be addressed in OT treatments in order to maximize pt’s independence and overall occupational performance: grooming, " bathing/showering, toileting and toilet hygiene, dressing, and functional mobility. Goals are listed below.  From OT perspective, recommend Level III (Minimum Resource Intensity) upon d/c when pt medically stable to d/c from acute care. Will continue to follow.   Goals   Patient Goals to go to the bathroom   LT Time Frame 10-14   Plan   Treatment Interventions ADL retraining;Functional transfer training;Endurance training;Patient/family training;Equipment evaluation/education;Compensatory technique education;Continued evaluation;Energy conservation;Activityengagement   Goal Expiration Date 01/08/25   OT Treatment Day 0   OT Frequency 2-3x/wk   Discharge Recommendation   Rehab Resource Intensity Level, OT III (Minimum Resource Intensity)   -PAC Daily Activity Inpatient   Lower Body Dressing 2   Bathing 3   Toileting 3   Upper Body Dressing 3   Grooming 3   Eating 4   Daily Activity Raw Score 18   Daily Activity Standardized Score (Calc for Raw Score >=11) 38.66   -PAC Applied Cognition Inpatient   Following a Speech/Presentation 4   Understanding Ordinary Conversation 4   Taking Medications 4   Remembering Where Things Are Placed or Put Away 4   Remembering List of 4-5 Errands 4   Taking Care of Complicated Tasks 4   Applied Cognition Raw Score 24   Applied Cognition Standardized Score 62.21       The patient's raw score on the AM-PAC Daily Activity Inpatient Short Form is 18. A raw score of less than 19 suggests the patient may benefit from discharge to post-acute rehabilitation services. Please refer to the recommendation of the Occupational Therapist for safe discharge planning.    Goals:      - Pt will complete UB ADLs w/ mod I to maximize independence and return home.    - Pt will complete LB ADLs w/ mod I to maximize independence and return home.     - Pt will complete toileting routine (transfers, hygiene, and clothing management) w/ mod I to maximize independence and return to prior level of  function.    - Pt will complete bed mobility supine >< sit w/ mod I to maximize independence and return home.    - Pt will transfer to bed, chair, and toilet w/ mod I using AD / DME as needed to maximize independence and reduce burden of care.     - Pt will ambulate household distances w/ mod I using least restrictive device to maximize independence and return home.     - Pt will increase activity tolerance (and sitting tolerance) by eating all meals OOB in the chair.     - Pt will increase standing tolerance to 4-5 minutes to maximize independence w/ grooming tasks standing at the sink.     - Pt will tolerate therapeutic activities for greater than 30 minutes in order to increase tolerance for functional activities.     JERE Rose, OTR/L

## 2024-12-25 NOTE — PROGRESS NOTES
Progress Note - Colorectal   Name: Miriam Smith 74 y.o. female I MRN: [id number]  [facility] I Date of Admission: 12/23/2024   Date of Service: 12/25/2024 I Hospital Day: 2    Assessment & Plan  Rectal cancer (HCC)  T4b N2 rectal cancer with communicating ischioanal abscess fistulizing to gluteal skin POD#0 s/p laparoscopic diverting sigmoid end-loop colostomy, I&D left buttock w/ Kerlix packing by Dr. Bermudez.    Plan:  -Diabetic diet  - mIVF   -Continue cefepime/flagyl, f/u 12/23 bcx  - d/c godinez  -Strict I's and O's  - Multimodal pain control regimen  - DVT ppx  - PT/OT consult for early postoperative ambulation evaluation  - Encourage out of bed and ambulation   -Encourage incentive spirometer use  Perirectal abscess  status post I&D left buttock with Kerlix packing  -Nursing order in for packing change daily  -Monitor wound closely        Subjective   NAEON. Pain controlled. Tolerating diet without N/V. Ostomy with bowel sweat. Has not been OOB.     Objective :  Temp:  [97.4 °F (36.3 °C)-98.3 °F (36.8 °C)] 98.3 °F (36.8 °C)  HR:  [60-72] 70  BP: ()/(44-72) 118/49  Resp:  [12-16] 16  SpO2:  [94 %-99 %] 98 %  O2 Device: Nasal cannula  Nasal Cannula O2 Flow Rate (L/min):  [2 L/min] 2 L/min    I/O         12/23 0701  12/24 0700 12/24 0701  12/25 0700    I.V.  4018.3    IV Piggyback  350    Total Intake  4368.3    Urine 1150 3650    Blood  200    Total Output 1150 3850    Net -1150 +518.3                Lines/Drains/Airways       Active Status       Name Placement date Placement time Site Days    Colostomy LLQ 12/24/24  --  LLQ  1    Urethral Catheter Latex 16 Fr. 12/24/24  0945  Latex  less than 1                  Physical Exam  General: NAD  HENT: NCAT MMM  Neck: supple, no JVD  CV: nl rate  Lungs: nl wob. No resp distress  ABD: Soft, appropriately tender, nondistended. Incisions CDI. Ostomy with bowel sweat. Stoma pink and viable.   Extrem: No CCE  Neuro: AAOx3      Lab Results: I have  reviewed the following results:  Recent Labs     12/23/24  0824 12/23/24  1940 12/24/24  0552   WBC 8.46  --  5.95   HGB 11.9  --  10.0*   HCT 36.5  --  30.3*      < > 296   SODIUM 133*  --  138   K 3.8  --  3.9   CL 98  --  103   CO2 26  --  27   BUN 15  --  10   CREATININE 0.56*  --  0.42*   GLUC 239*  --  186*   MG  --   --  1.9   PHOS  --   --  3.3   AST 16  --   --    ALT 27  --   --    ALB 3.2*  --   --    TBILI 0.38  --   --    ALKPHOS 68  --   --    LACTICACID 1.2  --   --     < > = values in this interval not displayed.             VTE Pharmacologic Prophylaxis: VTE covered by:  heparin (porcine), Subcutaneous, 5,000 Units at 12/25/24 0540     VTE Mechanical Prophylaxis: sequential compression device

## 2024-12-25 NOTE — CASE MANAGEMENT
Case Management Assessment & Discharge Planning Note    Patient name Miriam Smith  Location [facility] 830/[facility] 830-01 MRN [id number]  : 1950 Date 2024       Current Admission Date: 2024  Current Admission Diagnosis:Rectal cancer (HCC)   Patient Active Problem List    Diagnosis Date Noted Date Diagnosed    Perirectal abscess 2024     Cancer associated pain 2024     Palliative care by specialist 2024     Rectal pain 2024     Rectal cancer (HCC) 2024     Rectal bleeding 2024     History of anal fissures 2024     Electrolyte abnormality 2024     Pain, rectal 2024     Iron deficiency anemia 2024     Leukocytosis 2024     Rectal mass 11/10/2024     Type 2 diabetes mellitus (HCC) 11/10/2024       LOS (days): 2  Geometric Mean LOS (GMLOS) (days):   Days to GMLOS:     OBJECTIVE:    Risk of Unplanned Readmission Score: 19.39      Current admission status: Inpatient    Preferred Pharmacy:   [id number] [place], [place] - [address]  [address] [facility] [address]  Phone: [phone] Fax: [phone]    [facility] [address] [place] [address] - [address]  [address]  [facility] [address]  Phone: [phone] Fax: [phone]    Primary Care Provider: Yumiko Thomas MD    Primary Insurance:   Secondary Insurance:     ASSESSMENT:  Active Health Care Proxies    There are no active Health Care Proxies on file.       Readmission Root Cause  30 Day Readmission: No    Patient Information  Admitted from:: Home  Mental Status: Alert  During Assessment patient was accompanied by: Daughter, Other-Comment (Daughter, NED, and two grandchildren)  Assessment information provided by:: Daughter  Primary Caregiver: Self  Support Systems: Family members, Self  County of Residence: [place]  What city do you live in?: [place]  Home entry access options. Select all that apply.: Stairs  Number of steps to  enter home.: 2  Do the steps have railings?: No  Type of Current Residence: 2 story home  Upon entering residence, is there a bedroom on the main floor (no further steps)?: No  A bedroom is located on the following floor levels of residence (select all that apply):: Basement  Upon entering residence, is there a bathroom on the main floor (no further steps)?: No  Indicate which floors of current residence have a bathroom (select all the apply):: Basement  Number of steps to basement from main floor: One Flight  Living Arrangements: Lives w/ Family members, Lives w/ Daughter  Is patient a ?: No    Activities of Daily Living Prior to Admission  Functional Status: Independent  Completes ADLs independently?: Yes  Ambulates independently?: Yes  Does patient use assisted devices?: Yes  Assisted Devices (DME) used: Walker, Hospital Bed  Does patient currently own DME?: Yes  What DME does the patient currently own?: Hospital Bed, Walker  Does patient have a history of Outpatient Therapy (PT/OT)?: No  Does the patient have a history of Short-Term Rehab?: No  Does patient have a history of HHC?: No  Does patient currently have HHC?: No    Patient Information Continued  Income Source: Pension/jail  Does patient receive dialysis treatments?: No  Does patient have a history of substance abuse?: No  Does patient have a history of Mental Health Diagnosis?: No    Means of Transportation  Means of Transport to Appts:: Drives Self    DISCHARGE DETAILS:    Discharge planning discussed with:: pt, pt's daughter NED Sepulveda, and grandchildren at bedside  Freedom of Choice: Yes     CM contacted family/caregiver?: Yes    Contacts  Patient Contacts: Coco Weir (daughter)  Relationship to Patient:: Family  Contact Method: In Person  Reason/Outcome: Emergency Contact, Discharge Planning    CM met w/ pt, daughter Coco, NED, and grandchildren to discuss role and complete assessment.  Coco provided all assessment  information.  Pt lives w/ above family in a 2SH in the basement with one flight to get to basement.  2 [place] from front.  IPTA, uses a walker PRN, drives, retired.  No hx of STR, OP PT, or HHC.  Per CM handoff, email has been sent to [employer] regarding Alejandrina Case that was [facility] at [facility].  CM following.    CM reviewed d/c planning process including the following: identifying help at home, patient preference for d/c planning needs, Discharge Lounge, [facility] Meds to Bed program, availability of treatment team to discuss questions or concerns patient and/or family may have regarding understanding medications and recognizing signs and symptoms once discharged.  CM also encouraged patient to follow up with all recommended appointments after discharge. Patient advised of importance for patient and family to participate in managing patient’s medical well being.

## 2024-12-25 NOTE — PLAN OF CARE
"  Problem: OCCUPATIONAL THERAPY ADULT  Goal: Performs self-care activities at highest level of function for planned discharge setting.  See evaluation for individualized goals.  Description: Treatment Interventions: ADL retraining, Functional transfer training, Endurance training, Patient/family training, Equipment evaluation/education, Compensatory technique education, Continued evaluation, Energy conservation, Activityengagement          See flowsheet documentation for full assessment, interventions and recommendations.   Note: Limitation: Decreased ADL status, Decreased endurance, Decreased self-care trans, Decreased high-level ADLs     Assessment: Pt is a 74 y.o. female seen for OT evaluation s/p admission to [facility] on 12/23/2024. Pt diagnosed with rectal cancer; s/p \"laparoscopic diverting sigmoid end-loop colostomy, I&D left buttock w/ Kerlix packing\" on 12/24. Pt has a significant PMH impacting occupational performance including: Diabetes mellitus (HCC). Pt with active OT evaluation and treatment orders and activity orders. PTA, pt living with her daughter, son-in-law, and grandchildren in a 2 SH w/ 3 [place]. Pt reports I w/ ADLs and fxnl mobility and requires A w/ IADLs at baseline; - driving. Pt agreeable and willing to participate in OT evaluation. During evaluation, pt was I for eating, S for grooming and UB ADLs, min-mod A for LB ADLs, and min A for toileting. Pt also required min Ax1 for bed mobility and fxnl mobility w/ RW and S for transfers. Performance deficits that affect the pt’s occupational performance during the initial evaluation include decreased ADL status, decreased activity tolerance, decreased endurance, decreased sitting tolerance, decreased sitting balance, decreased standing tolerance, decreased standing balance, decreased transfer skills, decreased fxnl mobility, and pain. Based on pt’s functional performance and deficits the following occupations will be addressed in OT " treatments in order to maximize pt’s independence and overall occupational performance: grooming, bathing/showering, toileting and toilet hygiene, dressing, and functional mobility. Goals are listed below.  From OT perspective, recommend Level III (Minimum Resource Intensity) upon d/c when pt medically stable to d/c from acute care. Will continue to follow.     Rehab Resource Intensity Level, OT: III (Minimum Resource Intensity)

## 2024-12-25 NOTE — ASSESSMENT & PLAN NOTE
T4b N2 rectal cancer with communicating ischioanal abscess fistulizing to gluteal skin POD#0 s/p laparoscopic diverting sigmoid end-loop colostomy, I&D left buttock w/ Kerlix packing by Dr. Bermudez.    Plan:  -Diabetic diet  - mIVF   -Continue cefepime/flagyl, f/u 12/23 bcx  - d/c godinez  -Strict I's and O's  - Multimodal pain control regimen  - DVT ppx  - PT/OT consult for early postoperative ambulation evaluation  - Encourage out of bed and ambulation   -Encourage incentive spirometer use

## 2024-12-25 NOTE — ASSESSMENT & PLAN NOTE
T4b N2 rectal cancer with communicating ischioanal abscess fistulizing to gluteal skin. Now s/p 12/24 lap diverting sigmoid end-loop colostomy, I&D left buttock w/ Kerlix packing.    Plan:  -continue CCD2  -continue cefepime/flagyl thru 12/27, f/u 12/23 bcx (NG@48h)  -daily L buttock packing changes per nursing  -DVT ppx, IS/OOB  -PT/OT

## 2024-12-25 NOTE — PROGRESS NOTES
Progress Note - Colorectal   Name: Miriam Smith 74 y.o. female I MRN: [id number]  [facility] Date of Admission: 12/23/2024   Date of Service: 12/26/2024 I Hospital Day: 3     Assessment & Plan  Rectal cancer (HCC)  T4b N2 rectal cancer with communicating ischioanal abscess fistulizing to gluteal skin. Now s/p 12/24 lap diverting sigmoid end-loop colostomy, I&D left buttock w/ Kerlix packing.    Plan:  -continue CCD2  -continue cefepime/flagyl thru 12/27, f/u 12/23 bcx (NG@48h)  -daily L buttock packing changes per nursing  -DVT ppx, IS/OOB  -PT/OT  Perirectal abscess      Subjective/Objective   NAOE. Just report mild L buttock pain. Tolerating diet, no n/v. Voiding. Walking.    Physical Exam:  General: NAD  CV: nl rate  Lungs: nl wob. No resp distress.  ABD: Soft, ND, NT, CDI. Ostomy with gas and scant bowel sweat. L buttock wound CDI, packing with ss saturation, replaced kerlix.  Extrem: No CCE  UOP 1850cc      Patient Vitals for the past 24 hrs:   BP Temp Pulse Resp SpO2   12/25/24 2302 (!) 105/46 98.6 °F (37 °C) 57 20 96 %   12/25/24 1532 (!) 103/46 98.6 °F (37 °C) 57 16 96 %   12/25/24 1150 -- -- -- -- 91 %   12/25/24 0707 131/58 98 °F (36.7 °C) 58 12 97 %       I/O         12/23 0701  12/24 0700 12/24 0701  12/25 0700    I.V.  4018.3    IV Piggyback  350    Total Intake  4368.3    Urine 1150 3650    Blood  200    Total Output 1150 3850    Net -1150 +518.3                  Recent Labs     12/23/24  0824 12/23/24  1940 12/24/24  0552 12/25/24  0730 12/26/24  0509   WBC 8.46  --  5.95 8.63 9.04   HGB 11.9  --  10.0* 8.3* 8.9*      < > 296 279 340   SODIUM 133*  --  138 138 137   K 3.8  --  3.9 3.7 3.8   CL 98  --  103 107 104   CO2 26  --  27 26 28   BUN 15  --  10 13 13   CREATININE 0.56*  --  0.42* 0.44* 0.46*   GLUC 239*  --  186* 169* 134   CALCIUM 9.1  --  8.7 8.1* 8.3*   MG  --   --  1.9 1.9  --    PHOS  --    < > 3.3 2.7 2.5   AST 16  --   --   --   --    ALT 27  --   --   --    --    ALKPHOS 68  --   --   --   --    TBILI 0.38  --   --   --   --    EGFR 92  --  101 99 98   LACTICACID 1.2  --   --   --   --     < > = values in this interval not displayed.     Lab Results   Component Value Date    BLOODCX No Growth at 48 hrs. 12/23/2024    BLOODCX No Growth at 48 hrs. 12/23/2024    WOUNDCULT 2+ Growth of Proteus vulgaris group (A) 12/23/2024    WOUNDCULT 1+ Growth of Escherichia coli (A) 12/23/2024    WOUNDCULT Few Colonies of Streptococcus gallolyticus (A) 12/23/2024    URINECX 20,000-29,000 cfu/ml Escherichia coli (A) 11/14/2024    LEUKOCYTESUR Large (A) 11/14/2024

## 2024-12-26 LAB
ANION GAP SERPL CALCULATED.3IONS-SCNC: 5 MMOL/L (ref 4–13)
BACTERIA WND AEROBE CULT: ABNORMAL
BASOPHILS # BLD AUTO: 0.04 THOUSANDS/ÂΜL (ref 0–0.1)
BASOPHILS NFR BLD AUTO: 0 % (ref 0–1)
BUN SERPL-MCNC: 13 MG/DL (ref 5–25)
CALCIUM SERPL-MCNC: 8.3 MG/DL (ref 8.4–10.2)
CHLORIDE SERPL-SCNC: 104 MMOL/L (ref 96–108)
CO2 SERPL-SCNC: 28 MMOL/L (ref 21–32)
CREAT SERPL-MCNC: 0.46 MG/DL (ref 0.6–1.3)
EOSINOPHIL # BLD AUTO: 0.18 THOUSAND/ÂΜL (ref 0–0.61)
EOSINOPHIL NFR BLD AUTO: 2 % (ref 0–6)
ERYTHROCYTE [DISTWIDTH] IN BLOOD BY AUTOMATED COUNT: 16.4 % (ref 11.6–15.1)
GFR SERPL CREATININE-BSD FRML MDRD: 98 ML/MIN/1.73SQ M
GLUCOSE SERPL-MCNC: 134 MG/DL (ref 65–140)
GLUCOSE SERPL-MCNC: 139 MG/DL (ref 65–140)
GLUCOSE SERPL-MCNC: 205 MG/DL (ref 65–140)
GLUCOSE SERPL-MCNC: 278 MG/DL (ref 65–140)
GRAM STN SPEC: ABNORMAL
HCT VFR BLD AUTO: 27.8 % (ref 34.8–46.1)
HGB BLD-MCNC: 8.9 G/DL (ref 11.5–15.4)
IMM GRANULOCYTES # BLD AUTO: 0.06 THOUSAND/UL (ref 0–0.2)
IMM GRANULOCYTES NFR BLD AUTO: 1 % (ref 0–2)
LYMPHOCYTES # BLD AUTO: 1.9 THOUSANDS/ÂΜL (ref 0.6–4.47)
LYMPHOCYTES NFR BLD AUTO: 21 % (ref 14–44)
MCH RBC QN AUTO: 28.3 PG (ref 26.8–34.3)
MCHC RBC AUTO-ENTMCNC: 32 G/DL (ref 31.4–37.4)
MCV RBC AUTO: 88 FL (ref 82–98)
MONOCYTES # BLD AUTO: 0.76 THOUSAND/ÂΜL (ref 0.17–1.22)
MONOCYTES NFR BLD AUTO: 8 % (ref 4–12)
NEUTROPHILS # BLD AUTO: 6.1 THOUSANDS/ÂΜL (ref 1.85–7.62)
NEUTS SEG NFR BLD AUTO: 68 % (ref 43–75)
NRBC BLD AUTO-RTO: 0 /100 WBCS
PHOSPHATE SERPL-MCNC: 2.5 MG/DL (ref 2.3–4.1)
PLATELET # BLD AUTO: 340 THOUSANDS/UL (ref 149–390)
PMV BLD AUTO: 9.1 FL (ref 8.9–12.7)
POTASSIUM SERPL-SCNC: 3.8 MMOL/L (ref 3.5–5.3)
RBC # BLD AUTO: 3.15 MILLION/UL (ref 3.81–5.12)
SODIUM SERPL-SCNC: 137 MMOL/L (ref 135–147)
WBC # BLD AUTO: 9.04 THOUSAND/UL (ref 4.31–10.16)

## 2024-12-26 PROCEDURE — 80048 BASIC METABOLIC PNL TOTAL CA: CPT

## 2024-12-26 PROCEDURE — 97163 PT EVAL HIGH COMPLEX 45 MIN: CPT

## 2024-12-26 PROCEDURE — 85025 COMPLETE CBC W/AUTO DIFF WBC: CPT

## 2024-12-26 PROCEDURE — 84100 ASSAY OF PHOSPHORUS: CPT

## 2024-12-26 PROCEDURE — 99024 POSTOP FOLLOW-UP VISIT: CPT | Performed by: COLON & RECTAL SURGERY

## 2024-12-26 PROCEDURE — 82948 REAGENT STRIP/BLOOD GLUCOSE: CPT

## 2024-12-26 RX ORDER — POTASSIUM CHLORIDE 1500 MG/1
20 TABLET, EXTENDED RELEASE ORAL ONCE
Status: COMPLETED | OUTPATIENT
Start: 2024-12-26 | End: 2024-12-26

## 2024-12-26 RX ORDER — INSULIN LISPRO 100 [IU]/ML
1-5 INJECTION, SOLUTION INTRAVENOUS; SUBCUTANEOUS
Status: DISCONTINUED | OUTPATIENT
Start: 2024-12-26 | End: 2024-12-27 | Stop reason: HOSPADM

## 2024-12-26 RX ORDER — ENOXAPARIN SODIUM 100 MG/ML
40 INJECTION SUBCUTANEOUS
Status: DISCONTINUED | OUTPATIENT
Start: 2024-12-26 | End: 2024-12-27 | Stop reason: HOSPADM

## 2024-12-26 RX ADMIN — CEFEPIME 2000 MG: 2 INJECTION, POWDER, FOR SOLUTION INTRAVENOUS at 00:20

## 2024-12-26 RX ADMIN — INSULIN LISPRO 1 UNITS: 100 INJECTION, SOLUTION INTRAVENOUS; SUBCUTANEOUS at 00:20

## 2024-12-26 RX ADMIN — HYDROMORPHONE HYDROCHLORIDE 0.2 MG: 0.2 INJECTION, SOLUTION INTRAMUSCULAR; INTRAVENOUS; SUBCUTANEOUS at 16:08

## 2024-12-26 RX ADMIN — METRONIDAZOLE 500 MG: 500 INJECTION, SOLUTION INTRAVENOUS at 20:09

## 2024-12-26 RX ADMIN — ATORVASTATIN CALCIUM 40 MG: 40 TABLET, FILM COATED ORAL at 09:19

## 2024-12-26 RX ADMIN — CEFEPIME 2000 MG: 2 INJECTION, POWDER, FOR SOLUTION INTRAVENOUS at 13:18

## 2024-12-26 RX ADMIN — POTASSIUM CHLORIDE 20 MEQ: 1500 TABLET, EXTENDED RELEASE ORAL at 09:19

## 2024-12-26 RX ADMIN — OXYCODONE HYDROCHLORIDE 5 MG: 5 TABLET ORAL at 04:58

## 2024-12-26 RX ADMIN — Medication 3 MG: at 21:53

## 2024-12-26 RX ADMIN — INSULIN LISPRO 1 UNITS: 100 INJECTION, SOLUTION INTRAVENOUS; SUBCUTANEOUS at 12:14

## 2024-12-26 RX ADMIN — METRONIDAZOLE 500 MG: 500 INJECTION, SOLUTION INTRAVENOUS at 12:14

## 2024-12-26 RX ADMIN — METRONIDAZOLE 500 MG: 500 INJECTION, SOLUTION INTRAVENOUS at 04:56

## 2024-12-26 RX ADMIN — HEPARIN SODIUM 5000 UNITS: 5000 INJECTION, SOLUTION INTRAVENOUS; SUBCUTANEOUS at 05:03

## 2024-12-26 RX ADMIN — ENOXAPARIN SODIUM 40 MG: 40 INJECTION SUBCUTANEOUS at 09:19

## 2024-12-26 RX ADMIN — OXYCODONE HYDROCHLORIDE 5 MG: 5 TABLET ORAL at 13:14

## 2024-12-26 NOTE — PLAN OF CARE
Problem: Prexisting or High Potential for Compromised Skin Integrity  Goal: Skin integrity is maintained or improved  Description: INTERVENTIONS:  - Identify patients at risk for skin breakdown  - Assess and monitor skin integrity  - Assess and monitor nutrition and hydration status  - Monitor labs   - Assess for incontinence   - Turn and reposition patient  - Assist with mobility/ambulation  - Relieve pressure over bony prominences  - Avoid friction and shearing  - Provide appropriate hygiene as needed including keeping skin clean and dry  - Evaluate need for skin moisturizer/barrier cream  - Collaborate with interdisciplinary team   - Patient/family teaching  - Consider wound care consult   Outcome: Progressing     Problem: PAIN - ADULT  Goal: Verbalizes/displays adequate comfort level or baseline comfort level  Description: Interventions:  - Encourage patient to monitor pain and request assistance  - Assess pain using appropriate pain scale  - Administer analgesics based on type and severity of pain and evaluate response  - Implement non-pharmacological measures as appropriate and evaluate response  - Consider cultural and social influences on pain and pain management  - Notify physician/advanced practitioner if interventions unsuccessful or patient reports new pain  Outcome: Progressing     Problem: INFECTION - ADULT  Goal: Absence or prevention of progression during hospitalization  Description: INTERVENTIONS:  - Assess and monitor for signs and symptoms of infection  - Monitor lab/diagnostic results  - Monitor all insertion sites, i.e. indwelling lines, tubes, and drains  - Monitor endotracheal if appropriate and nasal secretions for changes in amount and color  - [place] appropriate cooling/warming therapies per order  - Administer medications as ordered  - Instruct and encourage patient and family to use good hand hygiene technique  - Identify and instruct in appropriate isolation precautions for  identified infection/condition  Outcome: Progressing  Goal: Absence of fever/infection during neutropenic period  Description: INTERVENTIONS:  - Monitor WBC    Outcome: Progressing     Problem: SAFETY ADULT  Goal: Patient will remain free of falls  Description: INTERVENTIONS:  - Educate patient/family on patient safety including physical limitations  - Instruct patient to call for assistance with activity   - Consult OT/PT to assist with strengthening/mobility   - Keep Call bell within reach  - Keep bed low and locked with side rails adjusted as appropriate  - Keep care items and personal belongings within reach  - Initiate and maintain comfort rounds  - Make Fall Risk Sign visible to staff  - Offer Toileting every 2 Hours, in advance of need  - Initiate/Maintain bed/chair alarm  - Apply yellow socks and bracelet for high fall risk patients  - Consider moving patient to room near nurses station  Outcome: Progressing  Goal: Maintain or return to baseline ADL function  Description: INTERVENTIONS:  -  Assess patient's ability to carry out ADLs; assess patient's baseline for ADL function and identify physical deficits which impact ability to perform ADLs (bathing, care of mouth/teeth, toileting, grooming, dressing, etc.)  - Assess/evaluate cause of self-care deficits   - Assess range of motion  - Assess patient's mobility; develop plan if impaired  - Assess patient's need for assistive devices and provide as appropriate  - Encourage maximum independence but intervene and supervise when necessary  - Involve family in performance of ADLs  - Assess for home care needs following discharge   - Consider OT consult to assist with ADL evaluation and planning for discharge  - Provide patient education as appropriate  Outcome: Progressing  Goal: Maintains/Returns to pre admission functional level  Description: INTERVENTIONS:  - Perform AM-PAC 6 Click Basic Mobility/ Daily Activity assessment daily.  - Set and communicate daily  mobility goal to care team and patient/family/caregiver.   - Collaborate with rehabilitation services on mobility goals if consulted  - Perform Range of Motion 3 times a day.  - Reposition patient every 2 hours.  - Dangle patient 3 times a day  - Stand patient 3 times a day  - Ambulate patient 3 times a day  - Out of bed to chair 3 times a day   - Out of bed for meals 3 times a day  - Out of bed for toileting  - Record patient progress and toleration of activity level   Outcome: Progressing     Problem: DISCHARGE PLANNING  Goal: Discharge to home or other facility with appropriate resources  Description: INTERVENTIONS:  - Identify barriers to discharge w/patient and caregiver  - Arrange for needed discharge resources and transportation as appropriate  - Identify discharge learning needs (meds, wound care, etc.)  - Arrange for interpretive services to assist at discharge as needed  - Refer to Case Management Department for coordinating discharge planning if the patient needs post-hospital services based on physician/advanced practitioner order or complex needs related to functional status, cognitive ability, or social support system  Outcome: Progressing     Problem: Knowledge Deficit  Goal: Patient/family/caregiver demonstrates understanding of disease process, treatment plan, medications, and discharge instructions  Description: Complete learning assessment and assess knowledge base.  Interventions:  - Provide teaching at level of understanding  - Provide teaching via preferred learning methods  Outcome: Progressing

## 2024-12-26 NOTE — PLAN OF CARE
Problem: PHYSICAL THERAPY ADULT  Goal: Performs mobility at highest level of function for planned discharge setting.  See evaluation for individualized goals.  Description: Treatment/Interventions: Functional transfer training, Elevations, Therapeutic exercise, Bed mobility, Gait training, Spoke to nursing, Spoke to case management, OT  Equipment Recommended: Walker       See flowsheet documentation for full assessment, interventions and recommendations.  Note: Prognosis: Good  Problem List: Decreased endurance, Decreased mobility, Pain  Assessment: Pt is a 74 y.o. female seen for PT evaluation s/p admit to [facility] on 12/23/2024. Pt was admitted with a primary dx of: Rectal cancer, Bette rectal abscess now s/p lap diverting sigmoid end-loop colostomy 12/24.Pt has a past medical history of Diabetes mellitus (HCC).   PT now consulted for assessment of mobility and d/c needs. Pt with Ambulate orders. Pts current clinical presentation is Unstable/ Unpredictable (high complexity) due to Ongoing medical management for primary dx, Increased reliance on more restrictive AD compared to baseline, Decreased activity tolerance compared to baseline, Fall risk, Increased assistance needed from caregiver at current time, s/p surgical intervention. Prior to admission, pt was Ind for mobility and ADLs, lives with daughter and her family. Pt has bed/bath in basement with FFOS. Upon evaluation, pt currently is requiring Min AX 1 for bed mobility , completing transfers with supervision , ambulation with CG A .Pt utilizes RW for additional stability.  Pt approaching functional baseline, will continue to benefit from skilled acute PT interventions to maximize functional mobility; for ongoing pt training;stair assessment and DME needs. At conclusion of PT session chair alarm engaged, all needs in reach, RN notified of session findings/recommendations, and pt returned back in recliner chair with phone and call bell within  reach. Pt denies any further questions at this time. The patient's AM-PAC Basic Mobility Inpatient Short Form Raw Score is 17. A Raw score of greater than 16 suggests the patient may benefit from discharge to home. Please also refer to the recommendation of the Physical Therapist for safe discharge planning. Recommend Level III upon hospital D/C with continued family support. Pts daughter states she has family support at home, someone with pt most of the time. Anticipate patient to improve in mobility through hospital course.  Barriers to Discharge: Other (Comment) (Needs stair trial prior to dc)     Rehab Resource Intensity Level, PT: III (Minimum Resource Intensity)    See flowsheet documentation for full assessment.

## 2024-12-26 NOTE — QUICK NOTE
Gluteal wound packing done this morning.   Case mgt working on [facility] in [place] since will need [facility] care.

## 2024-12-26 NOTE — CASE MANAGEMENT
Case Management Discharge Planning Note    Patient name Miriam Smith  [facility]N [id number]  : 1950 Date 2024       Current Admission Date: 2024  Current Admission Diagnosis:Rectal cancer (HCC)   Patient Active Problem List    Diagnosis Date Noted Date Diagnosed    Perirectal abscess 2024     Cancer associated pain 2024     Palliative care by specialist 2024     Rectal pain 2024     Rectal cancer (HCC) 2024     Rectal bleeding 2024     History of anal fissures 2024     Electrolyte abnormality 2024     Pain, rectal 2024     Iron deficiency anemia 2024     Leukocytosis 2024     Rectal mass 11/10/2024     Type 2 diabetes mellitus (HCC) 11/10/2024       LOS (days): 3  Geometric Mean LOS (GMLOS) (days):   Days to GMLOS:     OBJECTIVE:  Risk of Unplanned Readmission Score: 19.82         Current admission status: Inpatient   Preferred Pharmacy:   [id number] [place], [place] - [address] [facility]  [address] [address]  Phone: [phone] Fax: [phone]    [facility] [address] [place] [address] - [address]  [address]  [facility] [address]  Phone: [phone] Fax: [phone]    Primary Care Provider: Yumiko Thomas MD    Primary Insurance:   Secondary Insurance:     DISCHARGE DETAILS:        Participated in care rounds- pt with colostomy and left buttock wound requiring HHC for SN.   Noted patient is active with [facility]- HHC referrals sent  CM to follow    Update 12:15pm: D/t lack of insurance and demographic location, no accepting HHC  Referral made to [address] [place] [address] for ostomy supplies, spoke with Dominique  Spoke with daughter at bedside re: above.  Provider/ nursing will need to work on bedside education with family  Pt will need to follow up with OP Wound Care clinic    12:48: Notified by wound care nurses, patient cannot be seen in Wound  Care clinic without insurance  Per [facility], despite previous notes, patient does not currently have an open case for shayna care.   CM reached out to Palliative OP CM, as well as [employer] via e-mail to discuss possible supports/ resources  Additionally, e-mail sent to [facility] requesting blank application for shayna care that CM can assist with patient/ family filling out at bedside

## 2024-12-26 NOTE — PLAN OF CARE
Problem: Prexisting or High Potential for Compromised Skin Integrity  Goal: Skin integrity is maintained or improved  Description: INTERVENTIONS:  - Identify patients at risk for skin breakdown  - Assess and monitor skin integrity  - Assess and monitor nutrition and hydration status  - Monitor labs   - Assess for incontinence   - Turn and reposition patient  - Assist with mobility/ambulation  - Relieve pressure over bony prominences  - Avoid friction and shearing  - Provide appropriate hygiene as needed including keeping skin clean and dry  - Evaluate need for skin moisturizer/barrier cream  - Collaborate with interdisciplinary team   - Patient/family teaching  - Consider wound care consult   Outcome: Progressing     Problem: PAIN - ADULT  Goal: Verbalizes/displays adequate comfort level or baseline comfort level  Description: Interventions:  - Encourage patient to monitor pain and request assistance  - Assess pain using appropriate pain scale  - Administer analgesics based on type and severity of pain and evaluate response  - Implement non-pharmacological measures as appropriate and evaluate response  - Consider cultural and social influences on pain and pain management  - Notify physician/advanced practitioner if interventions unsuccessful or patient reports new pain  Outcome: Progressing     Problem: INFECTION - ADULT  Goal: Absence or prevention of progression during hospitalization  Description: INTERVENTIONS:  - Assess and monitor for signs and symptoms of infection  - Monitor lab/diagnostic results  - Monitor all insertion sites, i.e. indwelling lines, tubes, and drains  - Monitor endotracheal if appropriate and nasal secretions for changes in amount and color  - [place] appropriate cooling/warming therapies per order  - Administer medications as ordered  - Instruct and encourage patient and family to use good hand hygiene technique  - Identify and instruct in appropriate isolation precautions for  identified infection/condition  Outcome: Progressing  Goal: Absence of fever/infection during neutropenic period  Description: INTERVENTIONS:  - Monitor WBC    Outcome: Progressing     Problem: SAFETY ADULT  Goal: Patient will remain free of falls  Description: INTERVENTIONS:  - Educate patient/family on patient safety including physical limitations  - Instruct patient to call for assistance with activity   - Consult OT/PT to assist with strengthening/mobility   - Keep Call bell within reach  - Keep bed low and locked with side rails adjusted as appropriate  - Keep care items and personal belongings within reach  - Initiate and maintain comfort rounds  - Make Fall Risk Sign visible to staff  - Offer Toileting every 2 Hours, in advance of need  - Initiate/Maintain bed/chair alarm  - Apply yellow socks and bracelet for high fall risk patients  - Consider moving patient to room near nurses station  Outcome: Progressing  Goal: Maintain or return to baseline ADL function  Description: INTERVENTIONS:  -  Assess patient's ability to carry out ADLs; assess patient's baseline for ADL function and identify physical deficits which impact ability to perform ADLs (bathing, care of mouth/teeth, toileting, grooming, dressing, etc.)  - Assess/evaluate cause of self-care deficits   - Assess range of motion  - Assess patient's mobility; develop plan if impaired  - Assess patient's need for assistive devices and provide as appropriate  - Encourage maximum independence but intervene and supervise when necessary  - Involve family in performance of ADLs  - Assess for home care needs following discharge   - Consider OT consult to assist with ADL evaluation and planning for discharge  - Provide patient education as appropriate  Outcome: Progressing  Goal: Maintains/Returns to pre admission functional level  Description: INTERVENTIONS:  - Perform AM-PAC 6 Click Basic Mobility/ Daily Activity assessment daily.  - Set and communicate daily  mobility goal to care team and patient/family/caregiver.   - Collaborate with rehabilitation services on mobility goals if consulted  - Perform Range of Motion 3 times a day.  - Reposition patient every 2 hours.  - Dangle patient 3 times a day  - Stand patient 3 times a day  - Ambulate patient 3 times a day  - Out of bed to chair 3 times a day   - Out of bed for meals 3 times a day  - Out of bed for toileting  - Record patient progress and toleration of activity level   Outcome: Progressing     Problem: DISCHARGE PLANNING  Goal: Discharge to home or other facility with appropriate resources  Description: INTERVENTIONS:  - Identify barriers to discharge w/patient and caregiver  - Arrange for needed discharge resources and transportation as appropriate  - Identify discharge learning needs (meds, wound care, etc.)  - Arrange for interpretive services to assist at discharge as needed  - Refer to Case Management Department for coordinating discharge planning if the patient needs post-hospital services based on physician/advanced practitioner order or complex needs related to functional status, cognitive ability, or social support system  Outcome: Progressing     Problem: Knowledge Deficit  Goal: Patient/family/caregiver demonstrates understanding of disease process, treatment plan, medications, and discharge instructions  Description: Complete learning assessment and assess knowledge base.  Interventions:  - Provide teaching at level of understanding  - Provide teaching via preferred learning methods  Outcome: Progressing

## 2024-12-26 NOTE — PROGRESS NOTES
Pastoral Care Progress Note     12/26/24 1000   Clinical Encounter Type   Visited With Patient   Routine Visit Introduction      met with pt while rounding and learned that pt spoke Sinhala and did not want to talk, but did welcome the check-in  remains available.

## 2024-12-26 NOTE — CASE MANAGEMENT
Case Management Discharge Planning Note    Patient name Miriam Smith  Location [facility] 830/[facility] 830-01 MRN [id number]  : 1950 Date 2024       Current Admission Date: 2024  Current Admission Diagnosis:Rectal cancer (HCC)   Patient Active Problem List    Diagnosis Date Noted Date Diagnosed    Perirectal abscess 2024     Cancer associated pain 2024     Palliative care by specialist 2024     Rectal pain 2024     Rectal cancer (HCC) 2024     Rectal bleeding 2024     History of anal fissures 2024     Electrolyte abnormality 2024     Pain, rectal 2024     Iron deficiency anemia 2024     Leukocytosis 2024     Rectal mass 11/10/2024     Type 2 diabetes mellitus (HCC) 11/10/2024       LOS (days): 3  Geometric Mean LOS (GMLOS) (days):   Days to GMLOS:     OBJECTIVE:  Risk of Unplanned Readmission Score: 19.91         Current admission status: Inpatient   Preferred Pharmacy:   [id number] [place], [place] - [address] [facility]  [address] [facility] [address]  Phone: [phone] Fax: [phone]    [facility] [address] [place] [address] - [address]  [address]  [facility] [address]  Phone: [phone] Fax: [phone]    Primary Care Provider: Yumiko Thomas MD    Primary Insurance:   Secondary Insurance:     DISCHARGE DETAILS:          Patient is APPROVED for [place] [facility] for all procedures/ visits/ etc for [place].   Requires [place] [facility] for procedures/ [place]/ etc in [place]  CM provided daughter with [place] [facility] application. Completed and faxed back to [facility] for review

## 2024-12-26 NOTE — PHYSICAL THERAPY NOTE
Physical Therapy Evaluation     Patient's Name: Miriam Smith    Admitting Diagnosis  Abscess [L02.91]    Problem List  Patient Active Problem List   Diagnosis    Rectal mass    Type 2 diabetes mellitus (HCC)    Rectal bleeding    History of anal fissures    Electrolyte abnormality    Pain, rectal    Iron deficiency anemia    Leukocytosis    Rectal cancer (HCC)    Cancer associated pain    Palliative care by specialist    Rectal pain    Perirectal abscess       Past Medical History  Past Medical History:   Diagnosis Date    Diabetes mellitus (HCC)        Past Surgical History  Past Surgical History:   Procedure Laterality Date    COLONOSCOPY      ILEOSTOMY N/A 12/24/2024    Procedure: LAPAROSCOPIC DIVERTING sigmoid end-loop colostomy;  Surgeon: SAI Bermudez MD;  Location: [facility];  Service: Colorectal    INCISION AND DRAINAGE OF WOUND N/A 12/24/2024    Procedure: INCISION AND DRAINAGE (I&D) BUTTOCK abscess;  Surgeon: SAI Bermudez MD;  Location: [facility];  Service: Colorectal          12/26/24 1240   PT Last Visit   PT Visit Date 12/26/24   Note Type   Note type Evaluation   Pain Assessment   Pain Assessment Tool 0-10   Pain Score 5   Pain Location/Orientation Location: Buttocks;Orientation: Left   Pain Onset/Description Onset: Ongoing   Effect of Pain on Daily Activities Increased activity tolerance   Patient's Stated Pain Goal No pain   Hospital Pain Intervention(s) Repositioned;Ambulation/increased activity   Restrictions/Precautions   Weight Bearing Precautions Per Order No   Other Precautions Chair Alarm;Bed Alarm;Multiple lines;Fall Risk;Pain  (Pt primarily [place] speaking, daughter present in room, declines  services, Dtr assists with translation)   Home Living   Type of Home House   Home Layout Two level;1/2 bath on main level;Stairs to enter with rails  (2STE, bed/bath in basement with 10 steps with HR)   Bathroom Shower/Tub Tub/shower unit   Bathroom Toilet Standard    Bathroom Equipment Grab bars in shower;Shower chair   Home Equipment Walker   Prior Function   Level of [place] Independent with ADLs;Independent with functional mobility;Needs assistance with IADLS   Lives With Daughter  (NED + Grandchildren)   Receives Help From Family   IADLs Family/Friend/Other provides transportation;Family/Friend/Other provides meals;Independent with medication management   Falls in the last 6 months 0   Vocational Retired   General   Family/Caregiver Present Yes   Cognition   Overall Cognitive Status WFL   Arousal/Participation Alert   Attention Within functional limits   Orientation Level Oriented X4   Following Commands Follows one step commands without difficulty   Comments Pt cooperative and pleasant during session   Subjective   Subjective Agreeable to mobilize   RLE Assessment   RLE Assessment WFL   LLE Assessment   LLE Assessment WFL   Bed Mobility   Supine to Sit 4  Minimal assistance   Additional items Assist x 1;HOB elevated;Increased time required;Verbal cues;LE management   Sit to Supine Unable to assess   Additional Comments Pt in bed upona rrival.   Transfers   Sit to Stand 5  Supervision   Additional items Increased time required;Verbal cues   Stand to Sit 5  Supervision   Additional items Increased time required;Verbal cues;Armrests   Toilet transfer 5  Supervision   Additional items Increased time required;Standard toilet  (GB)   Additional Comments w/RW   Ambulation/Elevation   Gait pattern Wide JOSÉ;Excessively slow   Gait Assistance   (CG A)   Additional items Verbal cues   Assistive Device Rolling walker   Distance 80 ft   Stair Management Assistance Not tested   Ambulation/Elevation Additional Comments Pt ambulates in hallway with RW , decreased gait speed 2* pain.   Balance   Static Sitting Fair +   Dynamic Sitting Fair   Static Standing Fair -   Dynamic Standing Poor +   Ambulatory Poor +   Endurance Deficit   Endurance Deficit Yes   Activity Tolerance   Activity  Tolerance Patient limited by fatigue;Patient limited by pain   Nurse Made Aware RN cleared pt for therapy   Assessment   Prognosis Good   Problem List Decreased endurance;Decreased mobility;Pain   Assessment Pt is a 74 y.o. female seen for PT evaluation s/p admit to [facility] on 12/23/[facility]24. Pt was admitted with a primary dx of: Rectal cancer, Bette rectal abscess now s/p lap diverting sigmoid end-loop colostomy 12/24.Pt has a past medical history of Diabetes mellitus (HCC).   PT now consulted for assessment of mobility and d/c needs. Pt with Ambulate orders. Pts current clinical presentation is Unstable/ Unpredictable (high complexity) due to Ongoing medical management for primary dx, Increased reliance on more restrictive AD compared to baseline, Decreased activity tolerance compared to baseline, Fall risk, Increased assistance needed from caregiver at current time, s/p surgical intervention. Prior to admission, pt was Ind for mobility and ADLs, lives with daughter and her family. Pt has bed/bath in basement with FFOS. Upon evaluation, pt currently is requiring Min AX 1 for bed mobility , completing transfers with supervision , ambulation with CG A .Pt utilizes RW for additional stability.  Pt approaching functional baseline, will continue to benefit from skilled acute PT interventions to maximize functional mobility; for ongoing pt training;stair assessment and DME needs. At conclusion of PT session chair alarm engaged, all needs in reach, RN notified of session findings/recommendations, and pt returned back in recliner chair with phone and call bell within reach. Pt denies any further questions at this time. The patient's AM-PAC Basic Mobility Inpatient Short Form Raw Score is 17. A Raw score of greater than 16 suggests the patient may benefit from discharge to home. Please also refer to the recommendation of the Physical Therapist for safe discharge planning. Recommend Level III upon hospital D/C with  continued family support. Pts daughter states she has family support at home, someone with pt most of the time. Anticipate patient to improve in mobility through hospital course.   Barriers to Discharge Other (Comment)  (Needs stair trial prior to dc)   Goals   Patient Goals to get up   STG Expiration Date 01/09/25   Short Term Goal #1 STG 1. Pt will be able to perform bed mobility tasks with Sup in order to improve overall functional mobility and assist in safe d/c. STG 2. Pt with sit EOB for at least 25 minutes at Ind level in order to strengthen abdominal musculature and assist in future transfers/ ambulation. STG 3. Pt will be able to perform functional transfer with Mod I in order to improve overall functional mobility and assist in safe d/c. STG 4. Pt will be able to ambulate at least 200 feet with least restrictive device with Sup A in order to improve overall functional mobility and assist in safe d/c. STG 5. Pt will improve sitting/standing static/dynamic balance 1/2 grade in order to improve functional mobility and assist in safe d/c. STG 6. Pt will be able to negotiate at least 10 stairs with least restrictive device with Sup A in order to improve overall functional mobility and assist in safe d/c.   PT Treatment Day 0   Plan   Treatment/Interventions Functional transfer training;Elevations;Therapeutic exercise;Bed mobility;Gait training;Spoke to nursing;Spoke to case management;OT   PT Frequency 2-3x/wk   Discharge Recommendation   Rehab Resource Intensity Level, PT III (Minimum Resource Intensity) with continued family support   Equipment Recommended Walker   AM-PAC Basic Mobility Inpatient   Turning in Flat Bed Without Bedrails 3   Lying on Back to Sitting on Edge of Flat Bed Without Bedrails 3   Moving Bed to Chair 3   Standing Up From Chair Using Arms 3   Walk in Room 3   Climb 3-5 Stairs With Railing 2   Basic Mobility Inpatient Raw Score 17   Basic Mobility Standardized Score 39.67   [facility]  Highest Level Of Mobility   JH-HLM Goal 5: Stand one or more mins   JH-HLM Achieved 7: Walk 25 feet or more   Modified [place] Scale   Modified [place] Scale 4   End of Consult   Patient Position at End of Consult All needs within reach;Bed/Chair alarm activated;Bedside chair         Callie Webb, PT DPT

## 2024-12-26 NOTE — WOUND OSTOMY CARE
Consult Note - Wound   Miriam Smith 74 y.o. female MRN: [id number]  Unit/Bed#: [facility] 830-01 Encounter: [id number]      History and Present Illness:  Admitted with rectal mass,rectal cancer, perirectal abscess. T4b N2 rectal cancer with communicating ischioanal abscess fistulizing to gluteal skin Sp laparoscopic diverting loop end colostomy   Assessment Findings:   Seen for initial ostomy teaching.Patient speaks Croatian. Language Line used. Pt verbalized understanding of removal of appliance , assesment and application of appliance   -Stoma is red oval shaped and above level of abdomen LLQ  Measures 3cm length and 4 cm width  -Midabdominal incision dry open to air  -Peristomal skin dry and intact   -Coloplast one piece Sensura MioFlat Drainable extended wear Pouch  #[id number] measured and cut to fit.    Ostomy Care:  While you were in the hospital you were using Coloplast Sensura Raul flat pouch #[id number].    Your stoma measured 3cm x 4cm oval on 12/26/2024-measure your stoma at least weekly, as it will continue to shrink for 6-8 weeks after surgery.  Empty your pouch when 1/3-1/2 full of gas or stool.    Change your pouch every 3-4 days or if leaking.  Ostomy Resources Online:  Www.ostomy.org   Www.shante.com  Www. Convatec.com  Www.coloplast.us  Bag Change Steps:  1. Peel back pouch using push-pull method, may use non-alcohol adhesive remover. Remove pouch from top to bottom.   2. Use warm water only to cleanse skin around the stoma (margot-stomal skin).   3. Make sure all adhesive residue is removed and skin is dry and not oily.  4. Measure stoma size using measuring guide and trace correct measurements onto back of pouch.  5. Then cut or mold the backing of pouch out to correct shape/size.  6. Place pouch over stoma and onto skin.  7. Use warmth of hand to apply gentle pressure to help backing of pouch to adhere well to skin.  **If the skin is open, moist or fragile, Crusting can be done prior to pouch  "application (before step 6) to create dry skin and assist with pouch adherence- steps are listed below.  Crusting as needed for moist, red, open skin around the stoma: (Done prior to pouch application)   Apply stoma powder to excoriation, move excess powder away with hand  Use no sting barrier to pat area to form \"crust\"  Repeat X2 before placing new ostomy pouch   Call or Secure Chat  with any questions  Wound Care will continue to follow    Pema LASSITER RN CWON                                   "

## 2024-12-27 ENCOUNTER — TRANSITIONAL CARE MANAGEMENT (OUTPATIENT)
Age: 74
End: 2024-12-27

## 2024-12-27 VITALS
HEART RATE: 64 BPM | RESPIRATION RATE: 18 BRPM | TEMPERATURE: 98.3 F | DIASTOLIC BLOOD PRESSURE: 64 MMHG | SYSTOLIC BLOOD PRESSURE: 136 MMHG | OXYGEN SATURATION: 95 %

## 2024-12-27 LAB
GLUCOSE SERPL-MCNC: 164 MG/DL (ref 65–140)
GLUCOSE SERPL-MCNC: 197 MG/DL (ref 65–140)

## 2024-12-27 PROCEDURE — 97116 GAIT TRAINING THERAPY: CPT

## 2024-12-27 PROCEDURE — 99024 POSTOP FOLLOW-UP VISIT: CPT | Performed by: COLON & RECTAL SURGERY

## 2024-12-27 PROCEDURE — 97530 THERAPEUTIC ACTIVITIES: CPT

## 2024-12-27 PROCEDURE — 82948 REAGENT STRIP/BLOOD GLUCOSE: CPT

## 2024-12-27 PROCEDURE — NC001 PR NO CHARGE: Performed by: COLON & RECTAL SURGERY

## 2024-12-27 RX ORDER — ENOXAPARIN SODIUM 100 MG/ML
40 INJECTION SUBCUTANEOUS
Qty: 10 ML | Refills: 0 | Status: SHIPPED | OUTPATIENT
Start: 2024-12-28 | End: 2025-01-22

## 2024-12-27 RX ADMIN — HYDROMORPHONE HYDROCHLORIDE 0.2 MG: 0.2 INJECTION, SOLUTION INTRAMUSCULAR; INTRAVENOUS; SUBCUTANEOUS at 13:21

## 2024-12-27 RX ADMIN — METFORMIN HYDROCHLORIDE 1000 MG: 500 TABLET ORAL at 08:10

## 2024-12-27 RX ADMIN — ATORVASTATIN CALCIUM 40 MG: 40 TABLET, FILM COATED ORAL at 08:10

## 2024-12-27 RX ADMIN — ENOXAPARIN SODIUM 40 MG: 40 INJECTION SUBCUTANEOUS at 08:10

## 2024-12-27 RX ADMIN — CEFTRIAXONE SODIUM 2000 MG: 10 INJECTION, POWDER, FOR SOLUTION INTRAVENOUS at 00:11

## 2024-12-27 RX ADMIN — INSULIN LISPRO 1 UNITS: 100 INJECTION, SOLUTION INTRAVENOUS; SUBCUTANEOUS at 08:10

## 2024-12-27 RX ADMIN — METRONIDAZOLE 500 MG: 500 INJECTION, SOLUTION INTRAVENOUS at 04:01

## 2024-12-27 RX ADMIN — INSULIN LISPRO 1 UNITS: 100 INJECTION, SOLUTION INTRAVENOUS; SUBCUTANEOUS at 11:59

## 2024-12-27 RX ADMIN — OXYCODONE HYDROCHLORIDE 5 MG: 5 TABLET ORAL at 12:26

## 2024-12-27 NOTE — PLAN OF CARE
Problem: Prexisting or High Potential for Compromised Skin Integrity  Goal: Skin integrity is maintained or improved  Description: INTERVENTIONS:  - Identify patients at risk for skin breakdown  - Assess and monitor skin integrity  - Assess and monitor nutrition and hydration status  - Monitor labs   - Assess for incontinence   - Turn and reposition patient  - Assist with mobility/ambulation  - Relieve pressure over bony prominences  - Avoid friction and shearing  - Provide appropriate hygiene as needed including keeping skin clean and dry  - Evaluate need for skin moisturizer/barrier cream  - Collaborate with interdisciplinary team   - Patient/family teaching  - Consider wound care consult   Outcome: Progressing     Problem: PAIN - ADULT  Goal: Verbalizes/displays adequate comfort level or baseline comfort level  Description: Interventions:  - Encourage patient to monitor pain and request assistance  - Assess pain using appropriate pain scale  - Administer analgesics based on type and severity of pain and evaluate response  - Implement non-pharmacological measures as appropriate and evaluate response  - Consider cultural and social influences on pain and pain management  - Notify physician/advanced practitioner if interventions unsuccessful or patient reports new pain  Outcome: Progressing     Problem: INFECTION - ADULT  Goal: Absence or prevention of progression during hospitalization  Description: INTERVENTIONS:  - Assess and monitor for signs and symptoms of infection  - Monitor lab/diagnostic results  - Monitor all insertion sites, i.e. indwelling lines, tubes, and drains  - Monitor endotracheal if appropriate and nasal secretions for changes in amount and color  - [place] appropriate cooling/warming therapies per order  - Administer medications as ordered  - Instruct and encourage patient and family to use good hand hygiene technique  - Identify and instruct in appropriate isolation precautions for  identified infection/condition  Outcome: Progressing  Goal: Absence of fever/infection during neutropenic period  Description: INTERVENTIONS:  - Monitor WBC    Outcome: Progressing     Problem: SAFETY ADULT  Goal: Patient will remain free of falls  Description: INTERVENTIONS:  - Educate patient/family on patient safety including physical limitations  - Instruct patient to call for assistance with activity   - Consult OT/PT to assist with strengthening/mobility   - Keep Call bell within reach  - Keep bed low and locked with side rails adjusted as appropriate  - Keep care items and personal belongings within reach  - Initiate and maintain comfort rounds  - Make Fall Risk Sign visible to staff  - Offer Toileting every 2 Hours, in advance of need  - Initiate/Maintain bed/chair alarm  - Apply yellow socks and bracelet for high fall risk patients  - Consider moving patient to room near nurses station  Outcome: Progressing  Goal: Maintain or return to baseline ADL function  Description: INTERVENTIONS:  -  Assess patient's ability to carry out ADLs; assess patient's baseline for ADL function and identify physical deficits which impact ability to perform ADLs (bathing, care of mouth/teeth, toileting, grooming, dressing, etc.)  - Assess/evaluate cause of self-care deficits   - Assess range of motion  - Assess patient's mobility; develop plan if impaired  - Assess patient's need for assistive devices and provide as appropriate  - Encourage maximum independence but intervene and supervise when necessary  - Involve family in performance of ADLs  - Assess for home care needs following discharge   - Consider OT consult to assist with ADL evaluation and planning for discharge  - Provide patient education as appropriate  Outcome: Progressing  Goal: Maintains/Returns to pre admission functional level  Description: INTERVENTIONS:  - Perform AM-PAC 6 Click Basic Mobility/ Daily Activity assessment daily.  - Set and communicate daily  mobility goal to care team and patient/family/caregiver.   - Collaborate with rehabilitation services on mobility goals if consulted  - Perform Range of Motion 3 times a day.  - Reposition patient every 2 hours.  - Dangle patient 3 times a day  - Stand patient 3 times a day  - Ambulate patient 3 times a day  - Out of bed to chair 3 times a day   - Out of bed for meals 3 times a day  - Out of bed for toileting  - Record patient progress and toleration of activity level   Outcome: Progressing     Problem: DISCHARGE PLANNING  Goal: Discharge to home or other facility with appropriate resources  Description: INTERVENTIONS:  - Identify barriers to discharge w/patient and caregiver  - Arrange for needed discharge resources and transportation as appropriate  - Identify discharge learning needs (meds, wound care, etc.)  - Arrange for interpretive services to assist at discharge as needed  - Refer to Case Management Department for coordinating discharge planning if the patient needs post-hospital services based on physician/advanced practitioner order or complex needs related to functional status, cognitive ability, or social support system  Outcome: Progressing     Problem: Knowledge Deficit  Goal: Patient/family/caregiver demonstrates understanding of disease process, treatment plan, medications, and discharge instructions  Description: Complete learning assessment and assess knowledge base.  Interventions:  - Provide teaching at level of understanding  - Provide teaching via preferred learning methods  Outcome: Progressing

## 2024-12-27 NOTE — PHYSICAL THERAPY NOTE
Physical Therapy Treatment Note    Patient's Name: Miriam Smith  : 24 1036   PT Last Visit   PT Visit Date 24   Note Type   Note Type Treatment   Pain Assessment   Pain Assessment Tool 0-10   Pain Score 3   Pain Location/Orientation Location: Abdomen   Hospital Pain Intervention(s) Ambulation/increased activity   Restrictions/Precautions   Weight Bearing Precautions Per Order No   Other Precautions Chair Alarm;Bed Alarm;Fall Risk;Pain  (language barrier - Qatari)   General   Chart Reviewed Yes   Response to Previous Treatment Patient with no complaints from previous session.   Family/Caregiver Present No   Subjective   Subjective Agreeable to mobilize.   Bed Mobility   Supine to Sit 4  Minimal assistance   Additional items Assist x 1;HOB elevated;Bedrails;Increased time required;Verbal cues   Additional Comments Pt greeted in supine.   Transfers   Sit to Stand 5  Supervision   Additional items Increased time required;Verbal cues   Stand to Sit 5  Supervision   Additional items Increased time required;Verbal cues   Additional Comments RW   Ambulation/Elevation   Gait pattern Excessively slow;Short stride;Narrow JOSÉ;Decreased foot clearance   Gait Assistance 5  Supervision   Additional items Verbal cues   Assistive Device None   Distance 50' + 300'   Stair Management Assistance 4  Minimal assist   Additional items Assist x 1;Verbal cues;Tactile cues   Stair Management Technique Two rails   Number of Stairs 7   Balance   Static Sitting Fair +   Dynamic Sitting Fair   Static Standing Fair -   Dynamic Standing Poor +   Ambulatory Poor +   Endurance Deficit   Endurance Deficit Yes   Endurance Deficit Description weakness, fatigue   Activity Tolerance   Activity Tolerance Patient limited by fatigue   Nurse Made Aware yes - cleared + updated   Assessment   Prognosis Good   Problem List Decreased endurance;Decreased mobility;Pain   Assessment Pt seen for PT treatment session w/  interventions consisting of bed mobility training, t/f training, gait training, + stair training. Pt demonstrated progress this session w/ increased distance covered during gait training and ability to participate in stair training. Pt continues to be limited from independent baseline. From a PT standpoint continue to recommend HHPT upon d/c.   Goals   Patient Goals go home   Plan   Treatment/Interventions Functional transfer training;LE strengthening/ROM;Elevations;Therapeutic exercise;Endurance training;Patient/family training;Equipment eval/education;Bed mobility;Gait training;Compensatory technique education;Spoke to nursing;OT   Progress Progressing toward goals   PT Frequency 2-3x/wk   Discharge Recommendation   Rehab Resource Intensity Level, PT III (Minimum Resource Intensity)   AM-PAC Basic Mobility Inpatient   Turning in Flat Bed Without Bedrails 3   Lying on Back to Sitting on Edge of Flat Bed Without Bedrails 3   Moving Bed to Chair 3   Standing Up From Chair Using Arms 3   Walk in Room 3   Climb 3-5 Stairs With Railing 3   Basic Mobility Inpatient Raw Score 18   Basic Mobility Standardized Score 41.05   [facility] Highest Level Of Mobility   JH-HLM Goal 6: Walk 10 steps or more   -HLM Achieved 7: Walk 25 feet or more   Education   Education Provided Mobility training   Patient Demonstrates acceptance/verbal understanding;Reinforcement needed   End of Consult   Patient Position at End of Consult Bedside chair;Bed/Chair alarm activated;All needs within reach  (on waffle cushion)       Pura Ureña, PT, DPT

## 2024-12-27 NOTE — ASSESSMENT & PLAN NOTE
status post I&D left buttock with [place] packing  -Nursing order in for packing change daily  -Monitor wound closely

## 2024-12-27 NOTE — WOUND OSTOMY CARE
Progress Note- Ostomy  Miriam Smith 74 y.o. female  [id number]  [facility] 830-[facility] 830-01    Assessment:  Patient seen today for ostomy teaching. Patient seen lying on regular mattress. She was able to state pouch changing frequency and able to remove pouch and cut pouch wafer with minimal assistance from WOC RN. She watched and listened to the rest of the ostomy teaching and stated no additional questions or concerns.     Topics reviewed with patient: normal & abdormal stoma appearance, how and when to empty the pouch (1/3- 1/2 full) to prevent pulling/lifting of the barrier, importance & how to clean the end of the pouch to prevent odor after pouch emptying, gas/odor producing foods, frequency of changing of the pouch (every 3-4 days on a schedule), burping, bathing, margot-stomal skin care, how to measure the stoma/ cut the wafer to the correct size, how to close the pouch, and how to obtain supplies.     Step-by-step pouch change done using a coloplast one piece pouch. Reviewed with patient how and when to measure the stoma (weekly). Demonstrated how to trace & cut one piece barrier, and how to apply it to the skin using gentle pressure / warmth of the hands. Good seal obtained.    Patient verbalized understanding of education and teaching. Patient is active learner. All questions and concerns answered. Encouraged patient to continue to read the educational materials provided- Finnish ostomy educational materials left at bedside yesterday for patient.     Patient gave verbal permission to order sample kits from [employer], and [employer]. Additional discharge supplies and pouches provided to patient- left at beside.     Patient was discharged prior to Finnish ostomy instructions were able to be placed in AVS.     Stoma appearance:     Stoma: Red, oval, budded stoma with proximal and distal os, peristomal skin is intact. Stoma attached to skin junction, no separation noted. Small amount of brown soft  "stool noted in pouch. STOMA MEASUREMENT: 1.5 inches by 1.75 inches      Ostomy Care:  -Stoma Measurement: 1.5 inches by 1.75 inches (Measure stoma weekly for next 6-8 weeks- stoma will decrease in size due to decrease in swelling)     -Appliance Used During Bag Change: Coloplast Sensura Schriever One Piece Flat Pouch Cut to Fit Pouch     *Can shower with pouch on or off. Make sure to dry off pouch after shower.     Bag Change Steps:  1. Peel back pouch using push-pull method, may use non-alcohol adhesive remover. Remove pouch from top to bottom.   2. Use warm water only to cleanse skin around the stoma (margot-stomal skin).   3. Make sure all adhesive residue is removed and skin is dry and not oily.  4. Measure stoma size using measuring guide and trace correct measurements onto back of pouch.  5. Then cut or mold the backing of pouch out to correct shape/size.  6. Place pouch over stoma and onto skin.  7. Use warmth of hand to apply gentle pressure to help backing of pouch to adhere well to skin.    (Coloplast Pouches do not require use of Skin Prep Prior to Application. If using other brands of pouches, refer to product guide to determine the need for use of skin prep prior to the application of their pouches.)       **If the skin is open, moist or fragile, Crusting can be done prior to pouch application (before step 6) to create dry skin and assist with pouch adherence- steps are listed below.      TIPS:  Empty pouch when 1/3 -1/2 full.  Change pouch every 3-4 days or if signs of leaking.    Crusting as needed for moist, red, open skin around the stoma: (Done prior to pouch application)   Apply stoma powder to excoriation, move excess powder away with hand  Use no sting barrier to pat area to form \"crust\"  Repeat X2 before placing new ostomy pouch      Cuidado de la ostomía  Medición del estoma: 1.5 by 1.75 pulgadas (Mida el estoma semanalmente rickie las próximas 6-8 semanas; el estoma disminuirá de tamaño debido a la " "disminución de la hinchazón)  -Aparato utilizado rickie el cambio de bolsa: ** (Pouch)  -Accesorios utilizados: Skin Prep (hace que la piel sea pegajosa y protege la piel de las propiedades adhesivas de la bolsa)     * Puede ducharse con la bolsa puesta o apagada. Asegúrese de secar la bolsa después de la ducha.    Pasos para el cambio de bolsa:  1. Despegue la bolsa posterior usando el método push-pull, puede usar removedor de adhesivo sin alcohol. Retire la bolsa de arriba a abajo.   2. Use agua tibia solo para limpiar la piel alrededor del estoma (piel periestomal).   3. Asegúrese de que se eliminen todos los residuos de adhesivo y que la piel esté seca y no grasa.  4. Mida el tamaño del estoma usando la guía de medición y trace las mediciones correctas en la parte posterior de la bolsa.  5. Luego lisette o moldee el respaldo de la bolsa para corregir la forma / tamaño.  6. Coloque la bolsa sobre el estoma y sobre la piel.  7. Use el calor de la mano para aplicar josephine presión suave para ayudar a que el respaldo de la bolsa se adhiera fady a la piel.     CONSEJOS:  Vacíe la bolsa cuando esté 1/3 -1/2 llena.  Cambie la bolsa cada 3-4 días o si hay signos de fuga.    Costras según sea necesario para la piel húmeda, naida y abierta alrededor del estoma: (Hecho antes de la aplicación de la bolsa)   Aplique polvo de estoma a la excoriación, retire el exceso de polvo con la mano  Use la potts de la piel para acariciar el área para formar \"costra\"  Repita X2 antes de colocar josephine nueva bolsa de ostomía     Si tiene algún problema con la bolsa de apple estoma, llame al [phone]    Orders listed above and wound care will continue to follow, call or Secure Chat Message with questions. Bedside nurse updated of findings and orders. Flowsheets updated with exam details and measurements.      Lesley Marrero RN, BSN, CWOCN   "

## 2024-12-27 NOTE — ASSESSMENT & PLAN NOTE
T4b N2 rectal cancer with communicating ischioanal abscess fistulizing to gluteal skin. Now s/p 12/24 lap diverting sigmoid end-loop colostomy, I&D left buttock w/ Kerlix packing.    Plan:  -continue CCD2  -continue ceftriaxone/flagyl thru 12/27  -12/23 bcx (NG@72h)  -daily L buttock packing changes per nursing  -DVT ppx, IS/OOB  -PT/OT  - dispo planning

## 2024-12-27 NOTE — PROGRESS NOTES
Progress Note - Colorectal   Name: Miriam Smith 74 y.o. female I MRN: [id number]  [facility] 8[facility]-01 I Date of Admission: 12/23/2024   Date of Service: 12/27/2024 I Hospital Day: 4    Assessment & Plan  Rectal cancer (HCC)  T4b N2 rectal cancer with communicating ischioanal abscess fistulizing to gluteal skin. Now s/p 12/24 lap diverting sigmoid end-loop colostomy, I&D left buttock w/ Kerlix packing.    Plan:  -continue CCD2  -continue ceftriaxone/flagyl thru 12/27  -12/23 bcx (NG@72h)  -daily L buttock packing changes per nursing  -DVT ppx, IS/OOB  -PT/OT  - dispo planning  Perirectal abscess  status post I&D left buttock with Kerlix packing  -Nursing order in for packing change daily  -Monitor wound closely        Subjective   No acute events overnight.  Pain controlled and improved.  Tolerating diet without nausea or vomiting.  Has been out of bed and ambulating.    Objective :  Temp:  [97.5 °F (36.4 °C)-98.6 °F (37 °C)] 98.6 °F (37 °C)  HR:  [56-71] 56  BP: ()/(51-66) 120/66  Resp:  [16-20] 20  SpO2:  [95 %-96 %] 95 %  O2 Device: None (Room air)    I/O         12/25 0701  12/26 0700 12/26 0701  12/27 0700    P.O. 120 540    IV Piggyback 660 100    Total Intake 780 640    Urine 1850 2450    Stool 0 50    Total Output 1850 2500    Net -1070 -1860                Lines/Drains/Airways       Active Status       Name Placement date Placement time Site Days    Colostomy LLQ 12/24/24  --  LLQ  3                  Physical Exam  General: NAD  HENT: NCAT MMM  Neck: supple, no JVD  CV: nl rate  Lungs: nl wob. No resp distress  ABD: Soft, appropriately tender, nondistended.  Incisions CDI.  Ostomy with bowel sweat, stoma pink and viable.  Extrem: No CCE  Neuro: AAOx3  UOP: 2450 cc    Lab Results: I have reviewed the following results:  Recent Labs     12/25/24  0730 12/26/24  0509   WBC 8.63 9.04   HGB 8.3* 8.9*   HCT 25.3* 27.8*    340   SODIUM 138 137   K 3.7 3.8    104   CO2 26 28   BUN 13  13   CREATININE 0.44* 0.46*   GLUC 169* 134   MG 1.9  --    PHOS 2.7 2.5             VTE Pharmacologic Prophylaxis: VTE covered by:  enoxaparin, Subcutaneous, 40 mg at 12/26/24 0919     VTE Mechanical Prophylaxis: sequential compression device

## 2024-12-27 NOTE — DISCHARGE INSTR - AVS FIRST PAGE
Gluteal wound care:   Wash wound daily with soap and water, rinse, loosely pack with small amount of kerlix in one piece. Wet to dry dressing (not saturated). Change daily and prn  Patient can remove the dressing, shower and then repack    Routine colostomy care    To follow in the [place] wound care clinic    Lovenox 40 mg SQ daily for 25 more days, to be given around the same time daily, to prevent blood clots in the post operative period

## 2024-12-27 NOTE — ASSESSMENT & PLAN NOTE
T4b N2 rectal cancer with communicating ischioanal abscess fistulizing to gluteal skin. Now s/p 12/24 lap diverting sigmoid end-loop colostomy, I&D left buttock w/ Kerlix packing.

## 2024-12-27 NOTE — DISCHARGE SUMMARY
Discharge Summary - Surgery-General   Name: Miriam Smith 74 y.o. female I MRN: [id number]  Unit/Bed#: [facility] 830-01 I Date of Admission: 12/23/2024   Date of Service: 12/27/2024 I Hospital Day: 4    Assessment & Plan  Rectal cancer (HCC)  T4b N2 rectal cancer with communicating ischioanal abscess fistulizing to gluteal skin. Now s/p 12/24 lap diverting sigmoid end-loop colostomy, I&D left buttock w/ Kerlix packing.    Perirectal abscess  status post I&D left buttock with Kerlix packing  -Nursing order in for packing change daily  -Monitor wound closely    Admission Date: 12/23/2024 1656    Discharge Date: 12/27/24    Admitting Diagnosis: Abscess [L02.91]    Discharge Diagnosis: Stage T4b N2 rectal cancer with communicating ischial anal abscess fistulizing to the gluteal skin    Medical Problems       Resolved Problems  Date Reviewed: 12/18/2024   None         HPI: Arline is a 74-year-old Thai-speaking woman who presented to [facility] emergency room for an abscess on her left buttock.  Patient was then transferred to [facility] for colorectal evaluation.  Patient has a history of T4b N2 rectal cancer.  A CAT scan of her abdomen and pelvis reveals a very large rectal mass that penetrates through the rectal wall and creating an abscess on her left buttock.  Patient was diagnosed with this rectal cancer in September 2024 in [place].     Procedures Performed: 12/24/2024 laparoscopic diverting sigmoid loop colostomy, incision and drainage of left buttock -Dr. Bermudez      Summary of Hospital Course: Patient was transferred to our hospital from [facility] in [place].  Patient is [facility] in [place].  Patient was taken to the operating room to have a laparoscopic diverting sigmoid and loop colostomy performed along with an I&D of her left buttock.  Patient was started on cefepime and Flagyl and was treated for 4 days.  Patient has done well postoperatively.  Her colostomy has started functioning.  Her  left gluteal wound has been cleansed and the packing changed daily.  Patient is tolerating a diabetic diet.  Patient and patient's family has had colostomy teaching.  Patient has pain medication of oxycodone 5 mg that was just filled prior to her admission.  Patient has been on Lovenox for DVT prophylaxis throughout her admission.  Patient's family that I spoke with today as well as patient is happy to be discharged home.  She will be followed with [facility] at a wound care clinic in [place].  She has an appointment to see Dr. Twan Perea the oncologist on 1/21/2024 at 240 in the afternoon at the [place] office.  She also has an appointment to follow-up with Dr. Knox on 1/13/2024 at 320 in the afternoon at the [facility] office.  Patient is being sent home with multiple supplies for her colostomy as well as gluteal wound.  Patient's daughter said she has the wound care clinic with [facility] set up already for her mother.  A prescription for Lovenox 40 mg subcu daily for the next 25 days was sent to [facility] in [place], [place].  Being patient has the oxycodone pills none were prescribed.    Pathology: Pending    Complications: None    Discharge Day Visit / Exam:   /64   Pulse 64   Temp 98.3 °F (36.8 °C)   Resp 18   SpO2 95%     Discussion with Family: Speaking with the daughter, they have everything set up for their mom in [place] as far as the wound care center for ostomy as well as gluteal wound.    Condition at Discharge: Good    Discharge instructions/Information to patient and family:   See After Visit Summary (AVS) for information provided to patient and family.      Provisions for Follow-Up Care:  See after visit summary for information related to follow-up care and any pertinent home health orders.      PCP: Yumiko Thomas MD    Disposition: Home    Planned Readmission: No    Discharge Medications:  See after visit summary for reconciled discharge  medications provided to patient and family.      Discharge Statement:  I have spent a total time of 40 minutes in caring for this patient on the day of the visit/encounter.

## 2024-12-27 NOTE — PROGRESS NOTES
Pt discharged.  All d/c instructions reviewed and understood.  D/selvin saline locks with cath intact

## 2024-12-27 NOTE — CASE MANAGEMENT
"   Case Management Discharge Planning Note    Patient name Miriam Smith  Location [facility] 830/[facility] 830-01 MRN [id number]  : 1950 Date 2024       Current Admission Date: 2024  Current Admission Diagnosis:Rectal cancer (HCC)   Patient Active Problem List    Diagnosis Date Noted Date Diagnosed    Perirectal abscess 2024     Cancer associated pain 2024     Palliative care by specialist 2024     Rectal pain 2024     Rectal cancer (HCC) 2024     Rectal bleeding 2024     History of anal fissures 2024     Electrolyte abnormality 2024     Pain, rectal 2024     Iron deficiency anemia 2024     Leukocytosis 2024     Rectal mass 11/10/2024     Type 2 diabetes mellitus (HCC) 11/10/2024       LOS (days): 4  Geometric Mean LOS (GMLOS) (days):   Days to GMLOS:     OBJECTIVE:  Risk of Unplanned Readmission Score: 20.1         Current admission status: Inpatient   Preferred Pharmacy:   [id number] [place], [place] - [address]  [address] [facility] [address]  Phone: [phone] Fax: [phone]    [facility] [address] [place] [address] - [address]  [address]  [facility] [address]  Phone: [phone] Fax: [phone]    Primary Care Provider: Yumiko Thomas MD    Primary Insurance:   Secondary Insurance:     DISCHARGE DETAILS:              Pt connected with [address] [place] [address] for wound care/ ostomy supplies. Daughter also has the contact information and patient was provided with extra supplies/ education by wound care at bedside  Follow up scheduled with Heme/Onc in [place]- will be covered by Pending sale to [facility]  Patient requires follow up with Dr. BRAY at [facility] location- aware will be considered \" self pay\" until [facility] application is finalized  Will provide sign out to OP CM for ongoing assistance      "

## 2024-12-27 NOTE — ASSESSMENT & PLAN NOTE
T4b N2 rectal cancer with communicating ischioanal abscess fistulizing to gluteal skin. Now s/p 12/24 lap diverting sigmoid end-loop colostomy, I&D left buttock w/ Kerlix packing.    Plan:  -continue CCD2  -f/u 12/23 bcx (NG@4d)  -DVT ppx, IS/OOB  -PT/OT: III, dispo planning

## 2024-12-27 NOTE — PLAN OF CARE
Problem: PHYSICAL THERAPY ADULT  Goal: Performs mobility at highest level of function for planned discharge setting.  See evaluation for individualized goals.  Description: Treatment/Interventions: Functional transfer training, Elevations, Therapeutic exercise, Bed mobility, Gait training, Spoke to nursing, Spoke to case management, OT  Equipment Recommended: Walker       See flowsheet documentation for full assessment, interventions and recommendations.  Outcome: Progressing  Note: Prognosis: Good  Problem List: Decreased endurance, Decreased mobility, Pain  Assessment: Pt seen for PT treatment session w/ interventions consisting of bed mobility training, t/f training, gait training, + stair training. Pt demonstrated progress this session w/ increased distance covered during gait training and ability to participate in stair training. Pt continues to be limited from independent baseline. From a PT standpoint continue to recommend HHPT upon d/c.  Barriers to Discharge: Other (Comment) (Needs stair trial prior to dc)     Rehab Resource Intensity Level, PT: III (Minimum Resource Intensity)    See flowsheet documentation for full assessment.

## 2024-12-28 LAB
BACTERIA BLD CULT: NORMAL
BACTERIA BLD CULT: NORMAL
MISCELLANEOUS LAB TEST RESULT: NORMAL

## 2024-12-30 PROCEDURE — 88305 TISSUE EXAM BY PATHOLOGIST: CPT | Performed by: PATHOLOGY

## 2024-12-31 ENCOUNTER — TELEPHONE (OUTPATIENT)
Age: 74
End: 2024-12-31

## 2024-12-31 DIAGNOSIS — Z93.3 COLOSTOMY STATUS (HCC): ICD-10-CM

## 2024-12-31 DIAGNOSIS — C20 RECTAL CANCER (HCC): Primary | ICD-10-CM

## 2024-12-31 NOTE — TELEPHONE ENCOUNTER
Please advise     Pts son Rocky calling in, reports pt has new ostomy as of 12/24/24 and is only producing loose liquid yellow in color. He is unsure if it is stool/ if it is normal. Pt is emptying ostomy bag once a day. No fevers, bloating, blood or pain at ostomy site.     I explained she would be having stool from ostomy bag and it would not be per rectum. Stool may be looser via ostomy but will let provider know. Pt does have appt on 1/13    Pt has wound on buttock and it is causing increase in pain today.   They are cleaning it and using ointments as recommended. Pts son is concerned if this is normal for increase in pain.

## 2025-01-02 ENCOUNTER — TELEPHONE (OUTPATIENT)
Age: 75
End: 2025-01-02

## 2025-01-02 DIAGNOSIS — K62.89 RECTAL MASS: ICD-10-CM

## 2025-01-02 DIAGNOSIS — K62.89 RECTAL PAIN: ICD-10-CM

## 2025-01-02 DIAGNOSIS — G89.3 CANCER ASSOCIATED PAIN: ICD-10-CM

## 2025-01-02 DIAGNOSIS — Z51.5 PALLIATIVE CARE BY SPECIALIST: ICD-10-CM

## 2025-01-02 RX ORDER — OXYCODONE HYDROCHLORIDE 5 MG/1
5 TABLET ORAL EVERY 4 HOURS PRN
Qty: 30 TABLET | Refills: 0 | Status: SHIPPED | OUTPATIENT
Start: 2025-01-02

## 2025-01-02 NOTE — TELEPHONE ENCOUNTER
Spoke with patient's son in law. The patient is having a small amount of liquid output. Recommended that she can use miralax. Advised to present to the ED if she is experiences increased pain, fever, abdominal distention, and stops having output. Advised to contact the office with any questions/ concerns.    patient

## 2025-01-02 NOTE — TELEPHONE ENCOUNTER
PT SON FLORIN, CALLING TO F/U PREVIOUS CALL. PT IS PRODUCING YELLOW/BROWN LIQUID IN OSTOMY BAG, WANTS TO MAKE SURE THIS IS NORMAL. DENIES FEVER, CHILLS, N/V, ABDOMINAL PAIN, BLEEDING. PT IS EATING AND DRINKING.

## 2025-01-02 NOTE — TELEPHONE ENCOUNTER
Reason for call:   [x] Refill   [] Prior Auth  [] Other:     Office:   [] PCP/Provider -   [x] Specialty/Provider - PALLIATIVE [facility]ABRICIO     Medication: oxyCODONE (Roxicodone) 5 immediate release tablet     Dose/Frequency: Take 1 tablet (5 mg total) by mouth every 4 (four) hours as needed for moderate pain     Quantity: 30    Pharmacy: [id number]  [place] [address]      Does the patient have enough for 3 days?   [] Yes   [x] No - Send as HP to POD

## 2025-01-02 NOTE — TELEPHONE ENCOUNTER
Patient's son calling in to report that she had a colostomy placed on 12/24 and she has not been having bowel movements in the bag since it was placed. He reports that there has been minimal output and she is now feeling some bloating and some distention/firmness to abdomen. Denies fever or other symptoms.    He reports she is eating and drinking adequately and the stoma appears normal. He states she has been taking metamucil but has not tried any other medications and is wondering what she may take to help with constipation.    Son would appreciate call back at [phone] to advise.    Advised to call back with new or worsening symptoms develop prior to our return call.

## 2025-01-02 NOTE — TELEPHONE ENCOUNTER
Spoke to son today, discuessed given colostomy status it is normal to not have formed stool per stoma since not the stool is being bypassed by part of the colon which is responsible for water absorption. Patient is eating well. Changing the bag daily, does not have visiting nurse given she does not have insurance. Offered to place ref for wound clinic for evaluation and advised they will be able to purchase supplies off [employer] vs. Supplier in absence of insurance. He is agreeable to consult and thanked me for my time.

## 2025-01-02 NOTE — TELEPHONE ENCOUNTER
Patients son in law call patient now out of medication please refill and call Rocky at [phone]

## 2025-01-02 NOTE — TELEPHONE ENCOUNTER
Spoke with  Rocky and instructed him to contact Dr Pacheco as he is the colorectal surgeon who performed the colostomy

## 2025-01-02 NOTE — TELEPHONE ENCOUNTER
Last appointment: 12/18/24    Next scheduled appointment: left     Pharmacy: [facility]      PDMP review:

## 2025-01-02 NOTE — TELEPHONE ENCOUNTER
Patient's NED Navdeep called patient has not had much out put per the colostomy only a small amount of liquid.  Patient is status post  INCISION AND DRAINAGE (I&D) BUTTOCK abscess (Buttocks) LAPAROSCOPIC DIVERTING sigmoid end-loop colostomy (Abdomen) 12/24 with Dr. Bermudez.  Patient is feeling bloated and has not been passing any gas per the stoma.  She has had small amounts of liquid stool per the stoma.  Patient is hydrating and eating.  No fever, nausea or vomiting.   Patient had taken metamucil for the past 2 days.  She does have some abdominal discomfort.  Patient has a post op appointment with Dr. Knox on 1/13.  At this time they will try a stool softener to see if the helps with bowel movement.  I have also advised that they hold metamucil at this time to see if patient has bowel movement within the next 24 hours.     Please advise if you feel patient should take miralax or go to ED.

## 2025-01-03 ENCOUNTER — TELEPHONE (OUTPATIENT)
Dept: RADIOLOGY | Facility: HOSPITAL | Age: 75
End: 2025-01-03

## 2025-01-03 RX ORDER — SODIUM CHLORIDE 9 MG/ML
30 INJECTION, SOLUTION INTRAVENOUS CONTINUOUS
Status: CANCELLED | OUTPATIENT
Start: 2025-01-04

## 2025-01-03 NOTE — NURSING NOTE
Spoke to patient's son Rocky, reviewed preprocedure instructions for port placement on Monday 1/6.  0800 arrival to [facility].

## 2025-01-03 NOTE — NURSING NOTE
Pre-procedure Instructions for Interventional Radiology  [address]  [address]  INTERVENTIONAL RADIOLOGY [phone]    You are scheduled for a/an port insertion.  On 1/6.    Your arrival time is 0800.        To prepare for your procedure:  Please arrange for someone to drive you home after the procedure and stay with you until the next morning if you are instructed to do so.  This is typically for patients receiving some type of sedative or anesthetic for the procedure.  DO NOT EAT OR DRINK ANYTHING after midnight on the evening before your procedure including candy & gum.  ONLY SIPS OF WATER with your medications are allowed on the morning of your procedure.  TAKE ALL OF YOUR REGULAR MEDICATIONS THE MORNING OF YOUR PROCEDURE with sips of water!  We may call you to stop some of your blood sugar, blood pressure and blood thinning medications depending on the procedure.  Please take all of these medications unless we instruct you to stop them.  If you have an allergy to x-ray dye, please contact Interventional Radiology for an x-ray dye preparation which usually consists of an oral steroid and Benadryl.  The day of your procedure:  Bring a list of the medications you take at home.  Bring medications you take for breathing problems (such as inhalers), medications for chest pain, or both.  Bring a case for your glasses or contacts.  Bring your insurance card and a form of photo ID.  Please leave all valuables such as credit cards and jewelry at home.  Report to the registration desk in the main lobby at the [facility].  Ask to be directed to the Short Procedure Unit on the 2nd floor.  While your procedure is being performed, your family may wait in the Waiting Room on the 2nd floor. If they need to leave, they may provide a number to be called following the procedure.   Be prepared to stay overnight just in case. Sometimes procedures will indicate the need for further  observation or treatment.   If you are scheduled for a follow-up visit with the Interventional Radiologist after your procedure, you will be called with a date and time.  Special Instructions (Medications to stop taking before your procedure etc.):   Left message for cecilio Hidalgo with call back number     home

## 2025-01-04 ENCOUNTER — NURSE TRIAGE (OUTPATIENT)
Dept: OTHER | Facility: OTHER | Age: 75
End: 2025-01-04

## 2025-01-04 NOTE — TELEPHONE ENCOUNTER
"Answer Assessment - Initial Assessment Questions  1. STOOL PATTERN OR FREQUENCY: \"How often do you have a bowel movement (BM)?\"  (Normal range: 3 times a day to every 3 days)  \"When was your last BM?\"        Last BM was before surgery on 12/24    2. STRAINING: \"Do you have to strain to have a BM?\"       Unsure' pt has colostomy bag    3. ONSET: \"When did the constipation begin?\"      12/24    5. RECTAL PAIN: \"Does your rectum hurt when the stool comes out?\" If Yes, ask: \"Do you have hemorrhoids? How bad is the pain?\"  (Scale 1-10; or mild, moderate, severe)      No    6. BM COMPOSITION: \"Are the stools hard?\"       N/A    7. BLOOD ON STOOLS: \"Has there been any blood on the toilet tissue or on the surface of the BM?\" If Yes, ask: \"When was the last time?\"      No    8. CHRONIC CONSTIPATION: \"Is this a new problem for you?\"  If No, ask: \"How long have you had this problem?\" (days, weeks, months)       Ongoing since 12/24    8. CHANGES IN DIET OR HYDRATION: \"Have there been any recent changes in your diet?\" \"How much fluids are you drinking on a daily basis?\"  \"How much have you had to drink today?\"      No    9. MEDICINES: \"Have you been taking any new medicines?\" \"Are you taking any narcotic pain medicines?\" (e.g., Dilaudid, morphine, Percocet, Vicodin)      Miralax with no relief    10. LAXATIVES: \"Have you been using any stool softeners, laxatives, or enemas?\"  If Yes, ask \"What, how often, and when was the last time?\"        Miralax with no relief    12. CAUSE: \"What do you think is causing the constipation?\"         Unsure; pt had recent surgery on 12/24    13. MEDICAL HISTORY: \"Do you have a history of hemorrhoids, rectal fissures, rectal surgery, or rectal abscess?\"          Hx of colon ca    14. OTHER SYMPTOMS: \"Do you have any other symptoms?\" (e.g., abdomen pain, bloating, fever, vomiting)        No    Protocols used: Constipation-Adult-AH    "

## 2025-01-04 NOTE — TELEPHONE ENCOUNTER
Reason for Disposition  • Last bowel movement (BM) > 4 days ago    Protocols used: Constipation-Adult-AH

## 2025-01-04 NOTE — TELEPHONE ENCOUNTER
"Regarding: No bowel movement  ----- Message from Yakelin TAYLOR sent at 1/4/2025  9:34 AM EST -----  Pt's son stated, \" My mother had surgery on 12/24 she has not used the bathroom since the surgery.  A nurse told us to use miralax but she still has not used the bathroom. What is the next step.\"    "

## 2025-01-06 ENCOUNTER — HOSPITAL ENCOUNTER (OUTPATIENT)
Dept: NON INVASIVE DIAGNOSTICS | Facility: HOSPITAL | Age: 75
Discharge: HOME | End: 2025-01-06
Attending: INTERNAL MEDICINE
Payer: SUBSIDIZED

## 2025-01-06 VITALS
TEMPERATURE: 97.1 F | BODY MASS INDEX: 20.8 KG/M2 | WEIGHT: 106.48 LBS | DIASTOLIC BLOOD PRESSURE: 60 MMHG | SYSTOLIC BLOOD PRESSURE: 128 MMHG | HEART RATE: 72 BPM | OXYGEN SATURATION: 98 % | RESPIRATION RATE: 20 BRPM

## 2025-01-06 DIAGNOSIS — C20 RECTAL CANCER (HCC): ICD-10-CM

## 2025-01-06 LAB — GLUCOSE SERPL-MCNC: 125 MG/DL (ref 65–140)

## 2025-01-06 PROCEDURE — 77001 FLUOROGUIDE FOR VEIN DEVICE: CPT

## 2025-01-06 PROCEDURE — 76937 US GUIDE VASCULAR ACCESS: CPT

## 2025-01-06 PROCEDURE — 99153 MOD SED SAME PHYS/QHP EA: CPT

## 2025-01-06 PROCEDURE — 82948 REAGENT STRIP/BLOOD GLUCOSE: CPT

## 2025-01-06 PROCEDURE — C1788 PORT, INDWELLING, IMP: HCPCS | Performed by: RADIOLOGY

## 2025-01-06 PROCEDURE — 99152 MOD SED SAME PHYS/QHP 5/>YRS: CPT

## 2025-01-06 PROCEDURE — 36561 INSERT TUNNELED CV CATH: CPT

## 2025-01-06 RX ORDER — FENTANYL CITRATE 50 UG/ML
INJECTION, SOLUTION INTRAMUSCULAR; INTRAVENOUS AS NEEDED
Status: COMPLETED | OUTPATIENT
Start: 2025-01-06 | End: 2025-01-06

## 2025-01-06 RX ORDER — MIDAZOLAM HYDROCHLORIDE 2 MG/2ML
INJECTION, SOLUTION INTRAMUSCULAR; INTRAVENOUS AS NEEDED
Status: COMPLETED | OUTPATIENT
Start: 2025-01-06 | End: 2025-01-06

## 2025-01-06 RX ORDER — SODIUM CHLORIDE 9 MG/ML
30 INJECTION, SOLUTION INTRAVENOUS CONTINUOUS
Status: DISCONTINUED | OUTPATIENT
Start: 2025-01-06 | End: 2025-01-07 | Stop reason: HOSPADM

## 2025-01-06 RX ORDER — LIDOCAINE HYDROCHLORIDE AND EPINEPHRINE 10; 10 MG/ML; UG/ML
INJECTION, SOLUTION INFILTRATION; PERINEURAL AS NEEDED
Status: COMPLETED | OUTPATIENT
Start: 2025-01-06 | End: 2025-01-06

## 2025-01-06 RX ORDER — VANCOMYCIN HYDROCHLORIDE 1 G/200ML
1000 INJECTION, SOLUTION INTRAVENOUS ONCE
Status: COMPLETED | OUTPATIENT
Start: 2025-01-06 | End: 2025-01-06

## 2025-01-06 RX ORDER — VANCOMYCIN HYDROCHLORIDE 1 G/200ML
1000 INJECTION, SOLUTION INTRAVENOUS ONCE
Status: CANCELLED | OUTPATIENT
Start: 2025-01-06 | End: 2025-01-06

## 2025-01-06 RX ADMIN — LIDOCAINE HYDROCHLORIDE AND EPINEPHRINE 20 ML: 10; 10 INJECTION, SOLUTION INFILTRATION; PERINEURAL at 09:30

## 2025-01-06 RX ADMIN — FENTANYL CITRATE 50 MCG: 50 INJECTION, SOLUTION INTRAMUSCULAR; INTRAVENOUS at 09:30

## 2025-01-06 RX ADMIN — MIDAZOLAM 1 MG: 1 INJECTION INTRAMUSCULAR; INTRAVENOUS at 09:21

## 2025-01-06 RX ADMIN — VANCOMYCIN HYDROCHLORIDE 1000 MG: 1 INJECTION, SOLUTION INTRAVENOUS at 08:16

## 2025-01-06 RX ADMIN — SODIUM CHLORIDE 30 ML/HR: 0.9 INJECTION, SOLUTION INTRAVENOUS at 08:17

## 2025-01-06 RX ADMIN — FENTANYL CITRATE 50 MCG: 50 INJECTION, SOLUTION INTRAMUSCULAR; INTRAVENOUS at 09:21

## 2025-01-06 RX ADMIN — MIDAZOLAM 1 MG: 1 INJECTION INTRAMUSCULAR; INTRAVENOUS at 09:30

## 2025-01-06 NOTE — DISCHARGE INSTRUCTIONS
Implanted Venous Access Port     WHAT YOU NEED TO KNOW:   An implanted venous access port is a device used to give treatments and take blood. It may also be called a central venous access device (CVAD). The port is a small container that is placed under your skin, usually in your upper chest. The port is attached to a catheter that enters a large vein.   DISCHARGE INSTRUCTIONS:   Resume your normal diet. Small sips of flat soda will help with mild nausea.  Prevent an infection:   Wash your hands often.  Use soap and water. Clean your hands before and after you care for your port. Remind everyone who cares for your port to wash their hands.   Check your skin for infection every day.  Look for redness, swelling, or fluid oozing from the port site.  Care for your port:   1. You may shower beginning 48 hours after procedure.     2.  Leave glue in place.    3. It is normal for some bruising to occur.    4. Use Tylenol for pain.    5. Limit use of arm on the side that your port was placed. Lift nothing heavier than 5 pounds for 1 week, and then gradually increase activity as tolerated.    6. DO NOT apply ointment, lotion or cream to port site until incision is healed. Allow glue to fall off. DO NOT attempt to peel glue from skin even it it begins to flake.     7. After the port incision is healed you may swim, bathe.  Notify the Interventional Radiologist if you have any of the followin. Fever above 101 F    2. Increased redness or swelling after 1st day.     3. Increased pain after 1st day.    4. Any sign of infection (drainage from port site, skin separation, hot to touch).    5. Persistent nausea or vomiting.    Contact Interventional Radiology at [phone] ([facility] PATIENTS: Contact Interventional Radiology at [phone]) ([facility] PATIENTS: Contact Interventional Radiology at [phone]).Procedural Sedation   WHAT YOU NEED TO KNOW:   Procedural sedation is medicine used during procedures to help you feel  relaxed and calm. You will remember little to none of the procedure. After sedation you may feel tired, weak, or unsteady on your feet. You may also have trouble concentrating or short-term memory loss. These symptoms should go away in 24 hours or less.   DISCHARGE INSTRUCTIONS:   Call 911 or have someone else call for any of the following:   You have sudden trouble breathing.     You cannot be woken.     Contact Interventional Radiology at [phone]   [facility] PATIENTS: Contact Interventional Radiology at [phone] [facility] PATIENTS: Contact Interventional Radiology at [phone]) if any of the following occur:      You have a severe headache or dizziness.     Your heart is beating faster than usual.    You have a fever or chills.     Your skin is itchy, swollen, or you have a rash.     You have nausea or are vomiting for more than 8 hours after the procedure.      You have questions or concerns about your condition or care.  Self-care:   Have someone stay with you for 24 hours. This person can drive you to errands and help you do things around the house. This person can also watch for problems.      Rest and do quiet activities for 24 hours. Do not exercise, ride a bike, or play sports. Stand up slowly to prevent dizziness and falls. Take short walks around the house with another person. Slowly return to your usual activities the next day.      Do not drive or use dangerous machines or tools for 24 hours. You may injure yourself or others. Examples include a lawnmower, saw, or drill. Do not return to work for 24 hours if you use dangerous machines or tools for work.      Do not make important decisions for 24 hours. For example, do not sign important papers or invest money.      Drink liquids as directed. Liquids help flush the sedation medicine out of your body. Ask how much liquid to drink each day and which liquids are best for you.      Eat small, frequent meals to prevent nausea and vomiting. Start with  clear liquids such as juice or broth. If you do not vomit after clear liquids, you can eat your usual foods.      Do not drink alcohol or take medicines that make you drowsy. This includes medicines that help you sleep and anxiety medicines. Ask your healthcare provider if it is safe for you to take pain medicine.  Follow up with your healthcare provider as directed: Write down your questions so you remember to ask them during your visits. z

## 2025-01-06 NOTE — TELEPHONE ENCOUNTER
Pt. Son-in-law calling  back with update on pt. , pt. Has been using Miralax once daily since Thursday, no output in colostomy bag, denies n/v/fever, pt. c/o abdominal discomfort generalized, bloated, distended abdomen, pt. Has been eating a lot of fruits and vegetables and water, pt. Using oxycodone every 5-6 hours for Colon cancer, advised son to increase Miralax to BID and start with Dulcolax otc, pt. Son unable to find Dulcolax brand bisacodyl 5mg tablets otc, advised he can use generic brand if Dulcolax brand is not available at pharmacy, pt. Last BM or output was 12/22/24, reviewed alarming symptoms and when to go to ER, please advise with further recommendation

## 2025-01-06 NOTE — PERIOPERATIVE NURSING NOTE
Patient denies pain or discomfort. AVS summary reviewed with patient and son. Both verbalize understanding of all discharge instructions. Ambulated to BR to void without difficulty.

## 2025-01-06 NOTE — PERIOPERATIVE NURSING NOTE
Received patient from PACU via stretcher awake and alert. VSS. Right chest portacath sites D+I with oxofin skin glue intact. Patient denies c/o pain or discomfort. Tolerating oral fluids well. Son at bedside.

## 2025-01-06 NOTE — INTERVAL H&P NOTE
Update: (This section must be completed if the H&P was completed greater than 24 hrs to procedure or admission)    H&P reviewed. After examining the patient, I find no changed to the H&P since it had been written.    Visit Vitals  /58   Pulse 63   Temp (!) 97.1 °F (36.2 °C) (Temporal)   Resp 15   Wt 48.3 kg (106 lb 7.7 oz)   SpO2 100%   BMI 20.80 kg/m²   OB Status Postmenopausal   Smoking Status Never   BSA 1.43 m²         Patient re-evaluated. Accept as history and physical.    Fermín Camejo MD/January 6, 2025/10:06 AM

## 2025-01-06 NOTE — TELEPHONE ENCOUNTER
Spoke to son today- per son patient has not had any colostomy output since surgery other than a little brown liquid. Son unsure if she had bowel movements daily prior to surgery. She has used oxycodone for pain management however has been eating fruits and vegetables. She does not complain of any alarm symptoms. Discussed she can take a dose of miralax every 1-2 hrs over the next 24 hrs in attempt to move her bowels. If unsuccessful will go to ER. We discussed alarm symptoms as well. Son was agreeable to plan.

## 2025-01-06 NOTE — SEDATION DOCUMENTATION
Procedure ended, pt tolerated without incident, vss, right chest port incision with skin glue intact.  Pt to PACU for recovery in stable condition.

## 2025-01-07 ENCOUNTER — PATIENT OUTREACH (OUTPATIENT)
Dept: HEMATOLOGY ONCOLOGY | Facility: CLINIC | Age: 75
End: 2025-01-07

## 2025-01-07 ENCOUNTER — TELEPHONE (OUTPATIENT)
Age: 75
End: 2025-01-07

## 2025-01-07 NOTE — PROGRESS NOTES
I reached out to Coco pts daughter  to complete the Distress Thermometer, review for any barriers to care and to offer any supportive services that may be needed. I left VM with the reason for my call including my direct phone number   2nd attempt for out reach .

## 2025-01-08 ENCOUNTER — HOSPITAL ENCOUNTER (INPATIENT)
Facility: HOSPITAL | Age: 75
LOS: 3 days | Discharge: HOME | DRG: 254 | End: 2025-01-11
Attending: EMERGENCY MEDICINE | Admitting: INTERNAL MEDICINE
Payer: COMMERCIAL

## 2025-01-08 ENCOUNTER — APPOINTMENT (EMERGENCY)
Dept: CT IMAGING | Facility: HOSPITAL | Age: 75
DRG: 254 | End: 2025-01-08
Payer: COMMERCIAL

## 2025-01-08 ENCOUNTER — DOCUMENTATION (OUTPATIENT)
Dept: WOUND CARE | Facility: HOSPITAL | Age: 75
End: 2025-01-08

## 2025-01-08 ENCOUNTER — OFFICE VISIT (OUTPATIENT)
Dept: PALLIATIVE MEDICINE | Facility: CLINIC | Age: 75
End: 2025-01-08

## 2025-01-08 VITALS
SYSTOLIC BLOOD PRESSURE: 110 MMHG | DIASTOLIC BLOOD PRESSURE: 52 MMHG | TEMPERATURE: 97.3 F | HEART RATE: 87 BPM | OXYGEN SATURATION: 97 %

## 2025-01-08 DIAGNOSIS — G89.3 CANCER ASSOCIATED PAIN: ICD-10-CM

## 2025-01-08 DIAGNOSIS — Z51.5 PALLIATIVE CARE BY SPECIALIST: ICD-10-CM

## 2025-01-08 DIAGNOSIS — C20 RECTAL CANCER (HCC): ICD-10-CM

## 2025-01-08 DIAGNOSIS — R10.9 ABDOMINAL PAIN: Primary | ICD-10-CM

## 2025-01-08 DIAGNOSIS — K62.89 RECTAL MASS: Primary | ICD-10-CM

## 2025-01-08 DIAGNOSIS — R10.84 INTRACTABLE GENERALIZED ABDOMINAL PAIN: ICD-10-CM

## 2025-01-08 DIAGNOSIS — K59.00 CONSTIPATION: ICD-10-CM

## 2025-01-08 DIAGNOSIS — Z93.3 S/P COLOSTOMY (HCC): ICD-10-CM

## 2025-01-08 DIAGNOSIS — R19.15 HYPOACTIVE BOWEL SOUNDS: ICD-10-CM

## 2025-01-08 LAB
ALBUMIN SERPL BCG-MCNC: 3.9 G/DL (ref 3.5–5)
ALP SERPL-CCNC: 51 U/L (ref 34–104)
ALT SERPL W P-5'-P-CCNC: 13 U/L (ref 7–52)
ANION GAP SERPL CALCULATED.3IONS-SCNC: 15 MMOL/L (ref 4–13)
AST SERPL W P-5'-P-CCNC: 14 U/L (ref 13–39)
BACTERIA UR QL AUTO: ABNORMAL /HPF
BASOPHILS # BLD AUTO: 0.03 THOUSANDS/ΜL (ref 0–0.1)
BASOPHILS NFR BLD AUTO: 0 % (ref 0–1)
BILIRUB SERPL-MCNC: 0.53 MG/DL (ref 0.2–1)
BILIRUB UR QL STRIP: NEGATIVE
BUN SERPL-MCNC: 22 MG/DL (ref 5–25)
CALCIUM SERPL-MCNC: 9.3 MG/DL (ref 8.4–10.2)
CHLORIDE SERPL-SCNC: 98 MMOL/L (ref 96–108)
CLARITY UR: CLEAR
CO2 SERPL-SCNC: 21 MMOL/L (ref 21–32)
COLOR UR: ABNORMAL
CREAT SERPL-MCNC: 0.59 MG/DL (ref 0.6–1.3)
EOSINOPHIL # BLD AUTO: 0.01 THOUSAND/ΜL (ref 0–0.61)
EOSINOPHIL NFR BLD AUTO: 0 % (ref 0–6)
ERYTHROCYTE [DISTWIDTH] IN BLOOD BY AUTOMATED COUNT: 19.3 % (ref 11.6–15.1)
GFR SERPL CREATININE-BSD FRML MDRD: 90 ML/MIN/1.73SQ M
GLUCOSE SERPL-MCNC: 142 MG/DL (ref 65–140)
GLUCOSE SERPL-MCNC: 154 MG/DL (ref 65–140)
GLUCOSE UR STRIP-MCNC: NEGATIVE MG/DL
HCT VFR BLD AUTO: 40.1 % (ref 34.8–46.1)
HGB BLD-MCNC: 13.5 G/DL (ref 11.5–15.4)
HGB UR QL STRIP.AUTO: ABNORMAL
IMM GRANULOCYTES # BLD AUTO: 0.03 THOUSAND/UL (ref 0–0.2)
IMM GRANULOCYTES NFR BLD AUTO: 0 % (ref 0–2)
KETONES UR STRIP-MCNC: ABNORMAL MG/DL
LACTATE SERPL-SCNC: 1.5 MMOL/L (ref 0.5–2)
LEUKOCYTE ESTERASE UR QL STRIP: ABNORMAL
LIPASE SERPL-CCNC: 22 U/L (ref 11–82)
LYMPHOCYTES # BLD AUTO: 0.9 THOUSANDS/ΜL (ref 0.6–4.47)
LYMPHOCYTES NFR BLD AUTO: 11 % (ref 14–44)
MCH RBC QN AUTO: 30.2 PG (ref 26.8–34.3)
MCHC RBC AUTO-ENTMCNC: 33.7 G/DL (ref 31.4–37.4)
MCV RBC AUTO: 90 FL (ref 82–98)
MONOCYTES # BLD AUTO: 0.63 THOUSAND/ΜL (ref 0.17–1.22)
MONOCYTES NFR BLD AUTO: 8 % (ref 4–12)
NEUTROPHILS # BLD AUTO: 6.49 THOUSANDS/ΜL (ref 1.85–7.62)
NEUTS SEG NFR BLD AUTO: 81 % (ref 43–75)
NITRITE UR QL STRIP: NEGATIVE
NON-SQ EPI CELLS URNS QL MICRO: ABNORMAL /HPF
NRBC BLD AUTO-RTO: 0 /100 WBCS
PH UR STRIP.AUTO: 6 [PH]
PLATELET # BLD AUTO: 337 THOUSANDS/UL (ref 149–390)
PMV BLD AUTO: 9.4 FL (ref 8.9–12.7)
POTASSIUM SERPL-SCNC: 3.8 MMOL/L (ref 3.5–5.3)
PROT SERPL-MCNC: 7.2 G/DL (ref 6.4–8.4)
PROT UR STRIP-MCNC: ABNORMAL MG/DL
RBC # BLD AUTO: 4.47 MILLION/UL (ref 3.81–5.12)
RBC #/AREA URNS AUTO: ABNORMAL /HPF
SODIUM SERPL-SCNC: 134 MMOL/L (ref 135–147)
SP GR UR STRIP.AUTO: >=1.05 (ref 1–1.03)
TSH SERPL DL<=0.05 MIU/L-ACNC: 2.33 UIU/ML (ref 0.45–4.5)
UROBILINOGEN UR STRIP-ACNC: <2 MG/DL
WBC # BLD AUTO: 8.09 THOUSAND/UL (ref 4.31–10.16)
WBC #/AREA URNS AUTO: ABNORMAL /HPF

## 2025-01-08 PROCEDURE — 96374 THER/PROPH/DIAG INJ IV PUSH: CPT

## 2025-01-08 PROCEDURE — 83605 ASSAY OF LACTIC ACID: CPT | Performed by: EMERGENCY MEDICINE

## 2025-01-08 PROCEDURE — 74177 CT ABD & PELVIS W/CONTRAST: CPT

## 2025-01-08 PROCEDURE — 36415 COLL VENOUS BLD VENIPUNCTURE: CPT

## 2025-01-08 PROCEDURE — 80053 COMPREHEN METABOLIC PANEL: CPT | Performed by: EMERGENCY MEDICINE

## 2025-01-08 PROCEDURE — 85025 COMPLETE CBC W/AUTO DIFF WBC: CPT | Performed by: EMERGENCY MEDICINE

## 2025-01-08 PROCEDURE — 81001 URINALYSIS AUTO W/SCOPE: CPT | Performed by: EMERGENCY MEDICINE

## 2025-01-08 PROCEDURE — 83690 ASSAY OF LIPASE: CPT | Performed by: EMERGENCY MEDICINE

## 2025-01-08 PROCEDURE — 87086 URINE CULTURE/COLONY COUNT: CPT | Performed by: EMERGENCY MEDICINE

## 2025-01-08 PROCEDURE — 96361 HYDRATE IV INFUSION ADD-ON: CPT

## 2025-01-08 PROCEDURE — 82948 REAGENT STRIP/BLOOD GLUCOSE: CPT

## 2025-01-08 PROCEDURE — 99285 EMERGENCY DEPT VISIT HI MDM: CPT | Performed by: EMERGENCY MEDICINE

## 2025-01-08 PROCEDURE — 99284 EMERGENCY DEPT VISIT MOD MDM: CPT

## 2025-01-08 PROCEDURE — 99024 POSTOP FOLLOW-UP VISIT: CPT | Performed by: COLON & RECTAL SURGERY

## 2025-01-08 PROCEDURE — 84443 ASSAY THYROID STIM HORMONE: CPT | Performed by: EMERGENCY MEDICINE

## 2025-01-08 PROCEDURE — 99222 1ST HOSP IP/OBS MODERATE 55: CPT | Performed by: INTERNAL MEDICINE

## 2025-01-08 RX ORDER — DOCUSATE SODIUM 100 MG/1
100 CAPSULE, LIQUID FILLED ORAL 2 TIMES DAILY
Status: DISCONTINUED | OUTPATIENT
Start: 2025-01-08 | End: 2025-01-09

## 2025-01-08 RX ORDER — OXYCODONE HYDROCHLORIDE 5 MG/1
5 TABLET ORAL ONCE
Refills: 0 | Status: COMPLETED | OUTPATIENT
Start: 2025-01-08 | End: 2025-01-08

## 2025-01-08 RX ORDER — SODIUM CHLORIDE, SODIUM LACTATE, POTASSIUM CHLORIDE, CALCIUM CHLORIDE 600; 310; 30; 20 MG/100ML; MG/100ML; MG/100ML; MG/100ML
100 INJECTION, SOLUTION INTRAVENOUS CONTINUOUS
Status: DISCONTINUED | OUTPATIENT
Start: 2025-01-08 | End: 2025-01-11 | Stop reason: HOSPADM

## 2025-01-08 RX ORDER — ENOXAPARIN SODIUM 100 MG/ML
40 INJECTION SUBCUTANEOUS DAILY
Status: DISCONTINUED | OUTPATIENT
Start: 2025-01-09 | End: 2025-01-11 | Stop reason: HOSPADM

## 2025-01-08 RX ORDER — POLYETHYLENE GLYCOL 3350 17 G/17G
17 POWDER, FOR SOLUTION ORAL 2 TIMES DAILY
Status: COMPLETED | OUTPATIENT
Start: 2025-01-08 | End: 2025-01-11

## 2025-01-08 RX ORDER — ONDANSETRON 2 MG/ML
4 INJECTION INTRAMUSCULAR; INTRAVENOUS EVERY 6 HOURS PRN
Status: DISCONTINUED | OUTPATIENT
Start: 2025-01-08 | End: 2025-01-11 | Stop reason: HOSPADM

## 2025-01-08 RX ORDER — INSULIN LISPRO 100 [IU]/ML
1-5 INJECTION, SOLUTION INTRAVENOUS; SUBCUTANEOUS
Status: DISCONTINUED | OUTPATIENT
Start: 2025-01-09 | End: 2025-01-11 | Stop reason: HOSPADM

## 2025-01-08 RX ORDER — HYDROMORPHONE HCL/PF 1 MG/ML
0.5 SYRINGE (ML) INJECTION ONCE
Refills: 0 | Status: COMPLETED | OUTPATIENT
Start: 2025-01-08 | End: 2025-01-08

## 2025-01-08 RX ADMIN — POLYETHYLENE GLYCOL 3350 17 G: 17 POWDER, FOR SOLUTION ORAL at 22:34

## 2025-01-08 RX ADMIN — SODIUM CHLORIDE 500 ML: 0.9 INJECTION, SOLUTION INTRAVENOUS at 16:38

## 2025-01-08 RX ADMIN — DOCUSATE SODIUM 100 MG: 100 CAPSULE, LIQUID FILLED ORAL at 22:34

## 2025-01-08 RX ADMIN — IOHEXOL 85 ML: 350 INJECTION, SOLUTION INTRAVENOUS at 17:07

## 2025-01-08 RX ADMIN — HYDROMORPHONE HYDROCHLORIDE 0.5 MG: 1 INJECTION, SOLUTION INTRAMUSCULAR; INTRAVENOUS; SUBCUTANEOUS at 16:37

## 2025-01-08 RX ADMIN — SODIUM CHLORIDE, SODIUM LACTATE, POTASSIUM CHLORIDE, AND CALCIUM CHLORIDE 100 ML/HR: .6; .31; .03; .02 INJECTION, SOLUTION INTRAVENOUS at 22:34

## 2025-01-08 RX ADMIN — OXYCODONE HYDROCHLORIDE 5 MG: 5 TABLET ORAL at 19:58

## 2025-01-08 NOTE — PATIENT INSTRUCTIONS
It was a pleasure speaking with you today.    As discussed:  -Continue your medications as prescribed.  -Continue with a bowel regimen to avoid constipation.    Follow-up in: pending evaluation at ER. Once discharged from ER/hospital, will follow-up for ongoing symptom management.    Please do not hesitate to call me sooner should you have any questions/concerns or needs.    Medication safety issues - Do not drive under the influence of narcotics (including opioids), watch for adverse effects including confusion / altered mental status / respiratory depression (slowed breathing), keep medications stored in a safe/locked environment, do not use alcohol while opioids or other narcotics are in your system. Do not travel with more than the minimum number of tablets or capsules required for the trip.    ER Precautions  Watch for red flag symptoms including, but not limited to fevers, chills, chest pain, shortness of breath, intractable nausea/vomiting/diarrhea, or acute intractable pain (especially if pain is new or has changed).

## 2025-01-08 NOTE — PROGRESS NOTES
Name: Miriam Smith      : 1950      MRN: [id number]  Encounter Provider: Derek Alba DO  Encounter Date: 2025   Encounter department: [facility]  :  Assessment & Plan  Cancer associated pain  75 yo female with history of rectal cancer with communicating issue anal abscess.  Patient is status post laparoscopic diverting sigmoid and loop colostomy from 2024.  Patient is here for follow-up, however, still with intractable abdominal pain and worsened when trying to take laxatives such as MiraLAX as instructed for her postsurgical bowel regimen.  Pain was not responding to oxycodone, last dose was at 8 AM this morning which could be contributing to her pain.  Patient has had some output to her colostomy bag, however, on auscultation of the abdomen today, minimal bowel sounds in all 4 quadrants.  Particularly tender to touch to the left side of the abdomen with some bloating noted.  Patient with acute discomfort due to her abdominal pain.  Patient/family agreeable for evaluation in the ER due to worsening intractable abdominal pain, particularly worsening pain with laxative, as well as hypoactive bowel sounds on examination.  Family will bring patient to the ER for evaluation.     On examination - patient with bloating, tenderness to touch of abdomen (left upper and lower abdominal quadrants more sensitive), notable hypoactive bowel sounds on abdominal auscultation with notable discomfort. Patient does have some soft brown stool in the colostomy bag, stoma intact.  Patient and daughter endorses worsening pain with each day and not responding to Oxycodone since her surgery. States Miralax has lead to more pain with each dose, though did see some output of stool from the stoma since starting it 2 days ago.     Patient agreeable to be evaluated today to rule out any potential acute cause for her intractable pain, hypoactive bowel sounds and poor appetite.    [facility]raCom   declined - daughter and son-in-law interpreted for patient.    Orders:    Transfer to other facility    Rectal mass    Orders:    Transfer to other facility    Rectal cancer (HCC)    Orders:    Transfer to other facility    Intractable generalized abdominal pain    Orders:    Transfer to other facility    Hypoactive bowel sounds    Orders:    Transfer to other facility        PDMP Review: I have reviewed the patient's controlled substance dispensing history in the Prescription Drug Monitoring Program in compliance with the [facility] regulations before prescribing any controlled substances.    History of Present Illness   Miriam Smith is a 74 y.o. female who presents for follow-up.    Palliative Diagnosis - Rectal cancer     Medical History Reviewed by provider this encounter:  Tobacco  Allergies  Meds  Problems  Med Hx  Surg Hx  Fam Hx     .  Past Medical History   Past Medical History:   Diagnosis Date    Diabetes mellitus (HCC)      Past Surgical History:   Procedure Laterality Date    COLONOSCOPY      ILEOSTOMY N/A 12/24/2024    Procedure: LAPAROSCOPIC DIVERTING sigmoid end-loop colostomy;  Surgeon: SAI Bermudez MD;  Location: [facility];  Service: Colorectal    INCISION AND DRAINAGE OF WOUND N/A 12/24/2024    Procedure: INCISION AND DRAINAGE (I&D) BUTTOCK abscess;  Surgeon: SAI Bermudez MD;  Location: [facility];  Service: Colorectal    IR PORT PLACEMENT  1/6/2025     Family History   Problem Relation Age of Onset    Colon cancer Sister       reports that she has never smoked. She has never used smokeless tobacco. She reports that she does not currently use alcohol. She reports that she does not currently use drugs.  No current facility-administered medications on file prior to visit.     Current Outpatient Medications on File Prior to Visit   Medication Sig Dispense Refill    Blood Glucose Monitoring Suppl (OneTouch Verio Reflect) w/Device KIT Check blood sugars once daily.  Please substitute with appropriate alternative as covered by patient's insurance. Dx: E11.65 1 kit 0    enoxaparin (LOVENOX) 40 mg/0.4 mL Inject 0.4 mL (40 mg total) under the skin every 24 hours for 25 days 10 mL 0    glucose blood (OneTouch Verio) test strip Check blood sugars once daily. Please substitute with appropriate alternative as covered by patient's insurance. Dx: E11.65 100 each 3    metFORMIN (GLUCOPHAGE) 1000 MG tablet Take 1 tablet (1,000 mg total) by mouth 2 (two) times a day with meals 60 tablet 0    OneTouch Delica Lancets 33G MISC Check blood sugars once daily. Please substitute with appropriate alternative as covered by patient's insurance. Dx: E11.65 100 each 3    oxyCODONE (Roxicodone) 5 immediate release tablet Take 1 tablet (5 mg total) by mouth every 4 (four) hours as needed for moderate pain Max Daily Amount: 30 mg 30 tablet 0    atorvastatin (LIPITOR) 40 mg tablet Take 1 tablet (40 mg total) by mouth daily (Patient not taking: Reported on 1/8/2025) 30 tablet 3    lidocaine-prilocaine (EMLA) cream Apply topically as needed for mild pain Apply to PAC site prior to access (Patient not taking: Reported on 1/8/2025) 30 g 4    naloxone (NARCAN) 4 mg/0.1 mL nasal spray Administer 1 spray into a nostril. If no response after 2-3 minutes, give another dose in the other nostril using a new spray. For use in emergencies for opioid / pain medication reversal for accidental ingestion, respiratory depression, sedation or concerns for overdose. (Patient not taking: Reported on 1/8/2025) 1 each 1    ondansetron (ZOFRAN) 8 mg tablet Take 1 tablet (8 mg total) by mouth every 8 (eight) hours as needed for nausea or vomiting (Patient not taking: Reported on 1/8/2025) 20 tablet 4     Allergies   Allergen Reactions    Motrin [Ibuprofen] Itching    Tylenol [Acetaminophen] Itching    Penicillins Rash     Rash and bruise    Sulfa Antibiotics Rash     Price velia syndrome      No current facility-administered  medications on file prior to visit.     Current Outpatient Medications on File Prior to Visit   Medication Sig Dispense Refill    Blood Glucose Monitoring Suppl (OneTouch Verio Reflect) w/Device KIT Check blood sugars once daily. Please substitute with appropriate alternative as covered by patient's insurance. Dx: E11.65 1 kit 0    enoxaparin (LOVENOX) 40 mg/0.4 mL Inject 0.4 mL (40 mg total) under the skin every 24 hours for 25 days 10 mL 0    glucose blood (OneTouch Verio) test strip Check blood sugars once daily. Please substitute with appropriate alternative as covered by patient's insurance. Dx: E11.65 100 each 3    metFORMIN (GLUCOPHAGE) 1000 MG tablet Take 1 tablet (1,000 mg total) by mouth 2 (two) times a day with meals 60 tablet 0    OneTouch Delica Lancets 33G MISC Check blood sugars once daily. Please substitute with appropriate alternative as covered by patient's insurance. Dx: E11.65 100 each 3    oxyCODONE (Roxicodone) 5 immediate release tablet Take 1 tablet (5 mg total) by mouth every 4 (four) hours as needed for moderate pain Max Daily Amount: 30 mg 30 tablet 0    atorvastatin (LIPITOR) 40 mg tablet Take 1 tablet (40 mg total) by mouth daily (Patient not taking: Reported on 1/8/2025) 30 tablet 3    lidocaine-prilocaine (EMLA) cream Apply topically as needed for mild pain Apply to PAC site prior to access (Patient not taking: Reported on 1/8/2025) 30 g 4    naloxone (NARCAN) 4 mg/0.1 mL nasal spray Administer 1 spray into a nostril. If no response after 2-3 minutes, give another dose in the other nostril using a new spray. For use in emergencies for opioid / pain medication reversal for accidental ingestion, respiratory depression, sedation or concerns for overdose. (Patient not taking: Reported on 1/8/2025) 1 each 1    ondansetron (ZOFRAN) 8 mg tablet Take 1 tablet (8 mg total) by mouth every 8 (eight) hours as needed for nausea or vomiting (Patient not taking: Reported on 1/8/2025) 20 tablet 4       Social History     Tobacco Use    Smoking status: Never    Smokeless tobacco: Never   Vaping Use    Vaping status: Never Used   Substance and Sexual Activity    Alcohol use: Not Currently    Drug use: Not Currently    Sexual activity: Not on file        Objective   /52 (BP Location: Left arm, Patient Position: Sitting, Cuff Size: Standard)   Pulse 87   Temp (!) 97.3 °F (36.3 °C) (Temporal)   SpO2 97%     Physical Exam  Vitals reviewed.   Constitutional:       General: She is in acute distress.      Appearance: She is ill-appearing. She is not toxic-appearing or diaphoretic.   HENT:      Head: Normocephalic and atraumatic.      Nose: Nose normal.      Mouth/Throat:      Mouth: Mucous membranes are dry.   Eyes:      General:         Right eye: No discharge.         Left eye: No discharge.   Cardiovascular:      Rate and Rhythm: Normal rate.   Pulmonary:      Effort: Pulmonary effort is normal. No respiratory distress.      Breath sounds: No wheezing.   Abdominal:      General: There is distension.      Tenderness: There is abdominal tenderness. There is guarding.      Comments: Round abdomen, ostomy bag intact with stoma visualized. Abdomen is tender to palpation in all 4 quadrants. Hypoactive bowel sounds in all 4 quadrantsl   Musculoskeletal:         General: No swelling.   Skin:     General: Skin is warm and dry.      Coloration: Skin is pale. Skin is not jaundiced.   Neurological:      General: No focal deficit present.      Mental Status: She is alert. Mental status is at baseline.   Psychiatric:         Mood and Affect: Mood normal.         Behavior: Behavior normal.         Thought Content: Thought content normal.         Judgment: Judgment normal.         Recent labs:  Lab Results   Component Value Date/Time    SODIUM 137 12/26/2024 05:09 AM    K 3.8 12/26/2024 05:09 AM    BUN 13 12/26/2024 05:09 AM    CREATININE 0.46 (L) 12/26/2024 05:09 AM    GLUC 134 12/26/2024 05:09 AM    CALCIUM 8.3 (L)  "12/26/2024 05:09 AM    AST 16 12/23/2024 08:24 AM    ALT 27 12/23/2024 08:24 AM    ALB 3.2 (L) 12/23/2024 08:24 AM    TP 7.5 12/23/2024 08:24 AM    EGFR 98 12/26/2024 05:09 AM     Lab Results   Component Value Date/Time    HGB 8.9 (L) 12/26/2024 05:09 AM    WBC 9.04 12/26/2024 05:09 AM     12/26/2024 05:09 AM    INR 1.17 11/12/2024 04:36 AM     No results found for: \"[id number]\"    Recent Imaging:  Procedure: IR port placement  Result Date: 1/6/2025  Narrative: IR PORT PLACEMENT PROCEDURE: Implantable infusion port placement CLINICAL INDICATION: Rectal cancer COMPARISON: CT 12/23/2024 and 11/12/2024 PERFORMING PHYSICIAN: Fermín Camejo MD ASSISTANT PHYSICIAN: None MEDICATIONS: 1% lidocaine with epinephrine. 2 mg Versed. 100 mcg fentanyl. 1 gm Vancomycin. SEDATION TIME: Continuous cardiopulmonary monitoring by the interventional radiology physician and/or nurse for  45 MIN CONTRAST: None FLUOROSCOPY TIME: 0.7 MIN RADIATION DOSE: 3 mGy   (air Kerma). NUMBER OF IMAGES: 2 TECHNIQUE: Informed written consent was obtained from the patient after a thorough discussion with the patient and her son of risks, benefits, and alternatives to the procedure . All questions were answered. The patient was brought to the procedure room and a time-out was performed utilizing universal protocol. The patient was identified verbally and via wrist band. The right neck and chest wall was prepped and draped in the usual sterile fashion. All elements of maximal sterile barrier technique were followed (cap, mask, sterile gown, sterile gloves, large sterile sheet, hand hygiene, and 2% chlorhexidine for cutaneous antisepsis). Sterile ultrasound technique wtih sterile gel and sterile probe cover was also utilized. The skin of the neck infiltrated with 1% lidocaine/epinephrine solution.  1 cm incision made with a #11 blade.  Using real-time ultrasound guidance with permanent image documentation, access to the internal jugular vein in " "the supraclavicular region was achieved with a micropuncture system.  An 0.035\" stiff wire was used to secure the access with advancement to the inferior vena cava. 1% lidocaine/epinephrine used to infiltrate the infraclavicular chest wall in the 2nd anterior interspace and a 2 cm long incision was made with a #15 blade.  Using blunt dissection a small pocket was fashioned distal to the incision. 8-Frisian port catheter tubing was pulled through a subcutaneous tunnel from the port pocket site to the neck venotomy site.  8-Frisian peel-away sheath placed over the guidewire.  Catheter was placed through the peel-away sheath and the peel-away sheath was removed.  Catheter length was adjusted so the catheter tip was at the cavoatrial junction and the catheter was cut to the appropriate length and attached to the hub of the port reservoir.  The system was easily aspirated and flushed with saline. The pocket was closed in 2 layers with 3-0 deep interrupted Vicryl sutures and 4-0 running subcuticular Vicryl suture with the venotomy and port pocket skin sites then closed with cyanoacrylate adhesive.  The sites were dressed and bandaged. The patient tolerated the procedure well without difficulty or complication encountered and left the section in satisfactory stable condition. FINDINGS: As above.     Impression: Tecnically successful port placement as described.  The port is available for immediate use. Workstation performed: [id number]     Procedure: CT abdomen pelvis w contrast  Result Date: 12/23/2024  Narrative: CT ABDOMEN AND PELVIS WITH IV CONTRAST INDICATION: abscess on buttock? known hx of rectal CA. now with large, painful, bleeding wound to buttock. . COMPARISON: MRI pelvis dated 12/6/2024, CT abdomen and pelvis dated 11/10/2024 TECHNIQUE: CT examination of the abdomen and pelvis was performed. Multiplanar 2D reformatted images were created from the source data. This examination, like all CT scans performed in the " [facility], was performed utilizing techniques to minimize radiation dose exposure, including the use of iterative reconstruction and automated exposure control. Radiation dose length product (DLP) for this visit: 493.45 mGy-cm IV Contrast: 100 mL of iohexol (OMNIPAQUE) Enteric Contrast: Not administered. FINDINGS: ABDOMEN LOWER CHEST: Linear right lower lobe atelectasis. Coronary artery calcifications. LIVER/BILIARY TREE: Unremarkable. GALLBLADDER: Cholelithiasis without findings of acute cholecystitis. SPLEEN: Unremarkable. PANCREAS: Unremarkable. ADRENAL GLANDS: Unremarkable. KIDNEYS/URETERS: Stable right upper pole cortical scarring. No hydronephrosis or urinary tract calculi. Subcentimeter hypoattenuating renal lesion(s), too small to characterize but statistically likely benign, which do not warrant follow-up (Radiology June 2019). STOMACH AND BOWEL: Again seen is a locally invasive low rectal tumor measuring 9.0 x 6.6 x 7.8 cm, similar to recent MRI, but increased in size from 11/10/2024. The mass is again noted to invade the left side internal and external sphincters, levator ani  and gluteus shae muscle with extension into the left ischio anal fat. There has been interval development of rim-enhancing collection/abscess in the left ischioanal fat measuring 5.7 x 5.2 x 8.2 cm (2/175, 61/93) with fistulization into the overlying  gluteal skin. The collection extends into the tumor with development of multiple internal foci of air. APPENDIX: Normal. ABDOMINOPELVIC CAVITY: Enlarged mesorectal and superior rectal chain lymph nodes, similar to recent MRI. No ascites or free air. VESSELS: Atherosclerosis without abdominal aortic aneurysm. PELVIS REPRODUCTIVE ORGANS: Tumor invasion of the posterior wall of the vagina, better seen on prior MRI. URINARY BLADDER: Unremarkable. ABDOMINAL WALL/INGUINAL REGIONS: Locally invasive tumor with development of rim-enhancing collection/abscess in the left  ischio anal fat as described. BONES: No acute fracture or suspicious osseous lesion. Transitional lumbosacral junction.     Impression: 1. Locally invasive lower rectal cancer invading the sphincters, left levator ani and gluteus shae muscle, similar to recent MRI, but progressed from 11/10/2024. 2. New rim-enhancing collection/abscess in the left ischioanal fat measuring 5.7 x 5.2 x 8.2 cm, with fistulization into the overlying gluteal skin. The collection extends into the tumor with development of multiple internal foci of air, likely related to fistulization of the tumor or superinfection. 3. 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 The study was marked in EPIC for immediate notification. Workstation performed: [id number]     Procedure: MRI pelvis rectal cancer staging wo contrast  Result Date: 12/6/2024  Narrative: MRI PELVIS - WITHOUT CONTRAST (RECTAL CANCER STAGING) INDICATION: K62.89: Other specified diseases of anus and rectum K92.1: Melena K62.89: Other specified diseases of anus and rectum. Based on colonoscopy report from 11/12/2024, the tumor is a single ulcerated mass measuring 15 cm in the rectum and anal region covering 1/2 the circumference. Pathology demonstrated fragments of tubovillous adenoma with features suggestive of traditional serrated adenoma. Samples were negative for high-grade dysplasia or carcinoma. Patient has not had preoperative chemoradiation treatment. COMPARISON: None TECHNIQUE: Multiplanar/multisequence MRI of the pelvis without contrast was performed using rectal cancer imaging protocol. An additional small field of view, axial oblique T2-weighted sequence was performed through the tumor, perpendicular to the long axis of the rectum at that level. IV contrast was not given. FINDINGS: TUMOR LOCATION AND CHARACTERISTICS -  Tumor location: Low rectal cancer (0 to 5 cm), 2.2 cm from the anal verge (series 2 image 19.) -  Distance of  the lowest extent of tumor from the top of the anal sphincter: 0 cm. -  Relationship to the sigmoid takeoff (STO): Completely below sigmoid takeoff. -  Relationship to anterior peritoneal reflection: Below. -  Morphology: Polypoid. -  Circumference (percent, relative to wall): The base of the polypoid mass is difficult to determine. The polypoid mass fills the entire lumen of the rectum. -  Tumor craniocaudal length: 7.5 cm. -  Mucinous: Mostly mucin. T-STAGING: T4b* (tumor invades or adherent to adjacent organs or structures). This is based on invasion of the levator ani described below. Also, right anterolaterally, tumor penetrates through the mesorectal fascia to involve the right posterolateral vaginal wall (series 3 image 21 and series 2 image 27.) ANAL SPHINCTER INVOLVEMENT (FOR LOW RECTAL CA): On the left tumor invades the top of the IS and extends into intersphincteric space (ISS) (series 9 images 19-22.) Also separately, the tumor broadly penetrates through the posterior rectal wall (series 3 images 11-18) fills the posterior mesorectal space, penetrates through the left posterolateral levator ani (series 3 images 21-26,) and extends into the left ischioanal fossa and invades the left gluteus shae (series 3 images 26-31.) EXTRAMURAL VASCULAR INVASION: EMVI is not present. MESORECTAL FASCIA (MRF) STATUS: - Not applicable for T4 tumors. TUMOR DEPOSITS: No. LYMPH NODES: There are 4 or greater suspicious locoregional nodes (N2). Suspicious mesorectal/superior rectal lymph nodes described on series 7 and 3: Image 13 series 7, Superior rectal, 9 mm. Image 1 series 3, Superior rectal, 6 mm. This node is mucinous evidenced by T2 hyperintensity. Image 2 series 3, Superior rectal, 6 mm. This node is mucinous Image 9 series 3, right mesorectal, 3 mm. This node is mucinous Suspicious extra-mesorectal locoregional nodes: None. Suspicious non-locoregional nodes: None REST OF THE PELVIS: REPRODUCTIVE STRUCTURES: Small  uterine fibroids. See above regarding vaginal involvement. BLADDER: Normal. OTHER BOWEL LOOPS: Unremarkable MRI appearance. PELVIC CAVITY: Unremarkable. VASCULAR STRUCTURES: Limited evaluation of the visualized vasculature on this non-contrast MRI is unremarkable. PELVIC WALL: Unremarkable. OSSEOUS STRUCTURES: No osseous destruction.     Impression: Unenhanced MRI of the Pelvis (Rectal Protocol) Please note that although the current biopsy results does not demonstrate definite cancer, the imaging characteristics of this tumor is clearly invasive and will be described as a rectal cancer. Large polypoid, predominately mucinous low rectal cancer, 2.2 cm from the anal verge, 7.5 cm long (series 2 image 19, series 3 images 9-32.) On the left tumor invades the top of the internal sphincter and extends into intersphincteric space (ISS) (series 9 images 19-22.) Also separately, the tumor broadly penetrates through the posterior rectal wall (series 3 images 11-18) fills the posterior mesorectal space, penetrates through the left posterolateral levator ani (series 3 images 21-26,) and extends into the left ischioanal fossa and invades the left gluteus shae (series 3 images 26-31.) Right anterolaterally, tumor penetrates through the mesorectal fascia to involve the right posterolateral vaginal wall (series 3 image 21 and series 2 image 27.) Overall MRI stage: T4b, N2, Low rectal cancer MRF: Not applicable for T4 tumor. Sphincter involvement: Yes. Suspicious extra mesorectal lymph nodes: No. EMVI: No. For the purpose of radiation therapy planning, series 3 would be most useful. Workstation performed: [id number]     Procedure: CT chest wo contrast  Result Date: 11/13/2024  Narrative: CT CHEST WITHOUT IV CONTRAST INDICATION:   Cancer, staging, abnorm ct abd. per my review of the medical record, patient presented with left gluteal pain with rectal mass. COMPARISON: Abdomen CT 11/10/2024. TECHNIQUE: Chest CT without intravenous  contrast.  Axial, sagittal, coronal 2D reformats and coronal MIPS from source data. Radiation dose length product (DLP):  155.25 mGy-cm . Radiation dose exposure minimized using iterative reconstruction and automated exposure control. FINDINGS: LUNGS: No metastases. No acute disease. Minimal benign bilateral lower lobe subsegmental atelectasis. Benign calcified granulomas. AIRWAYS: No significant filling defects. PLEURA:  Unremarkable. HEART/GREAT VESSELS: Mild cardiomegaly. Severe coronary artery calcification indicating atherosclerotic heart disease. MEDIASTINUM AND YOVANY:  Unremarkable. CHEST WALL AND LOWER NECK: Unremarkable. UPPER ABDOMEN: Cholelithiasis. OSSEOUS STRUCTURES: Mild degenerative disease in the spine. Benign bone islands in the right humeral head.     Impression: No lung metastases. Severe coronary artery calcification indicating atherosclerotic heart disease. Cholelithiasis. Workstation performed: [id number]     Procedure: Colonoscopy  Result Date: 11/12/2024  Narrative: Table formatting from the original result was not included. [facility] Operating Room [address] [place] [id number] [phone] DATE OF SERVICE: 11/12/24 PHYSICIAN(S): Attending: Morris Tom MD Fellow: No Staff Documented INDICATION: Rectal mass POST-OP DIAGNOSIS: See the impression below. HISTORY: Prior colonoscopy: No prior colonoscopy. BOWEL PREPARATION: Miralax/Magnesium Citrate; Golytely/Colyte/Trilyte PREPROCEDURE: Informed consent was obtained for the procedure, including sedation. Risks including but not limited to bleeding, infection, perforation, adverse drug reaction and aspiration were explained in detail. Also explained about less than 100% sensitivity with the exam and other alternatives. The patient was placed in the left lateral decubitus position. Procedure: Colonoscopy DETAILS OF PROCEDURE: Patient was taken to the procedure room where a time out was performed to confirm correct  patient and correct procedure. The patient underwent monitored anesthesia care, which was administered by an anesthesia professional. The patient's blood pressure, ECG, ETCO2, heart rate, level of consciousness, oxygen and respirations were monitored throughout the procedure. A digital rectal exam was performed. The scope was introduced through the anus and advanced to the cecum. Retroflexion was performed in the rectum. The quality of bowel preparation was evaluated using the [place] Bowel Preparation Scale with scores of: right colon = 1, transverse colon = 1, left colon = 1. The total BBPS score was 3. Bowel prep was not adequate. The patient experienced no blood loss. The procedure was not difficult. The patient tolerated the procedure well. There were no apparent adverse events. ANESTHESIA INFORMATION: ASA: II Anesthesia Type: IV Sedation with Anesthesia MEDICATIONS: No administrations occurring from 1406 to 1450 on 11/12/24 FINDINGS: Single malignant-appearing, friable, fungating and ulcerated mass (traversable) measuring 15 cm in the rectum and anal region, covering one half of the circumference; performed cold forceps biopsy with partial removal. The mass was extending up to the dentate line. EVENTS: Procedure Events Event Event Time ENDO CECUM REACHED 11/12/2024  2:28 PM ENDO SCOPE OUT TIME 11/12/2024  2:45 PM SPECIMENS: ID Type Source Tests Collected by Time Destination 1 : rectal mass r/o malignancy , cancer Tissue Rectum TISSUE EXAM Morris Tom MD 11/12/2024  2:39 PM  EQUIPMENT: Colonoscope -     Impression: Single malignant-appearing, friable, fungating and ulcerated mass measuring 15 cm in the rectum and anal region, covering one half of the circumference; performed cold forceps biopsy with partial removal RECOMMENDATION: Await pathology results Repeat colonoscopy in 1 year, due: 11/12/2025 Inadequate bowel preparation  Check CEA levels. Patient will need colorectal surgery evaluation. Resume diet  as tolerated. Communicated exam results with patient's daughter who was at bedside.  Morris Tom MD     Procedure: CT abdomen pelvis with contrast  Result Date: 11/10/2024  Narrative: CT ABDOMEN AND PELVIS WITH IV CONTRAST INDICATION: L gluteal pain. . COMPARISON: None. TECHNIQUE: CT examination of the abdomen and pelvis was performed. Multiplanar 2D reformatted images were created from the source data. This examination, like all CT scans performed in the [facility], was performed utilizing techniques to minimize radiation dose exposure, including the use of iterative reconstruction and automated exposure control. Radiation dose length product (DLP) for this visit: 534 mGy-cm IV Contrast: 100 mL of iohexol (OMNIPAQUE) Enteric Contrast: Not administered. FINDINGS: ABDOMEN LOWER CHEST: No clinically significant abnormality in the visualized lower chest. LIVER/BILIARY TREE: Unremarkable. GALLBLADDER: Cholelithiasis without findings of acute cholecystitis. SPLEEN: Unremarkable. PANCREAS: Unremarkable. ADRENAL GLANDS: Unremarkable. KIDNEYS/URETERS: No hydronephrosis or urinary tract calculi. Subcentimeter hypoattenuating renal lesion(s), too small to characterize but statistically likely benign, which do not warrant follow-up (Radiology June 2019). STOMACH AND BOWEL: There is a heterogeneous peripherally enhancing lesion involving the rectum measuring approximately 7.7 x 7.5 x 8.6 cm with central fluid density which may reflect mass/malignancy with necrosis (series 2, image 160). There is soft tissue infiltration of the perirectal and left ischio rectal and gluteal space where there is a lobulated soft tissue (series 2, image 165). Fluid-filled distal small bowel loops which may be due to superimposed enteritis. APPENDIX: No findings to suggest appendicitis. ABDOMINOPELVIC CAVITY: No ascites. No pneumoperitoneum. No significant lymphadenopathy. A few perirectal lymph nodes measure 7 mm in short  axis.. VESSELS: Unremarkable for patient's age. PELVIS REPRODUCTIVE ORGANS: Subcentimeter calcifications in the bladder may be due to fibroids. URINARY BLADDER: Unremarkable. ABDOMINAL WALL/INGUINAL REGIONS: Unremarkable. BONES: No acute fracture or suspicious osseous lesion.     Impression: Heterogeneous peripherally enhancing mass/malignancy involving the rectum and infiltrating the left ischiorectal and gluteal fat measuring approximately 7.7 x 7.5 x 8.6 cm with areas of fluid density compatible with internal necrosis. I personally discussed this study with ALMA ROSA ROMAN on 11/10/2024 9:49 PM. Workstation performed: [id number]       Administrative Statements   I have spent a total time of 32 minutes in caring for this patient on the day of the visit/encounter including Risks and benefits of tx options, Instructions for management, Patient and family education, Importance of tx compliance, Risk factor reductions, Impressions, Counseling / Coordination of care, Documenting in the medical record, Reviewing / ordering tests, medicine, procedures  , Obtaining or reviewing history  , and Communicating with other healthcare professionals . Topics discussed with the patient / family include symptom assessment and management, medication review, medication adjustment, psychosocial support, supportive listening, and anticipatory guidance..

## 2025-01-08 NOTE — PROGRESS NOTES
Received message from Central Scheduling that referral received for eval & treat for colostomy. Patient does not have insurance and needs direction in obtaining supplies. This RN spoke with family member who stated patient was at [facility] ED and was going to be admitted for problems related to abdominal pain. Contact phone number given to family member if patient needs assistance in obtaining supplies once she is discharged.

## 2025-01-08 NOTE — ASSESSMENT & PLAN NOTE
73 yo female with history of rectal cancer with communicating issue anal abscess.  Patient is status post laparoscopic diverting sigmoid and loop colostomy from 12/24/2024.  Patient is here for follow-up, however, still with intractable abdominal pain and worsened when trying to take laxatives such as MiraLAX as instructed for her postsurgical bowel regimen.  Pain was not responding to oxycodone, last dose was at 8 AM this morning which could be contributing to her pain.  Patient has had some output to her colostomy bag, however, on auscultation of the abdomen today, minimal bowel sounds in all 4 quadrants.  Particularly tender to touch to the left side of the abdomen with some bloating noted.  Patient with acute discomfort due to her abdominal pain.  Patient/family agreeable for evaluation in the ER due to worsening intractable abdominal pain, particularly worsening pain with laxative, as well as hypoactive bowel sounds on examination.  Family will bring patient to the ER for evaluation.     On examination - patient with bloating, tenderness to touch of abdomen (left upper and lower abdominal quadrants more sensitive), notable hypoactive bowel sounds on abdominal auscultation with notable discomfort. Patient does have some soft brown stool in the colostomy bag, stoma intact.  Patient and daughter endorses worsening pain with each day and not responding to Oxycodone since her surgery. States Miralax has lead to more pain with each dose, though did see some output of stool from the stoma since starting it 2 days ago.     Patient agreeable to be evaluated today to rule out any potential acute cause for her intractable pain, hypoactive bowel sounds and poor appetite.    [employer]  declined - daughter and son-in-law interpreted for patient.    Orders:    Transfer to other facility

## 2025-01-08 NOTE — CONSULTS
Consultation - Colorectal   Name: Miriam Smith 74 y.o. female I MRN: [id number]  Unit/Bed#: -01 I Date of Admission: 1/8/2025   Date of Service: 1/8/2025 I Hospital Day: 0     Inpatient consult to Colorectal Surgery  Consult performed by: Regina Cohen MD  Consult ordered by: Anisha Tabares MD      Physician Requesting Evaluation: Dre Navas MD   Reason for Evaluation / Principal Problem: Abdominal pain/constipation     Assessment & Plan  Constipation  74 yof with constipation. Hx rectal cancer with communicating ischioanal abscess. S/p 12/24/24 laparoscopic diverting sigmoid loop colostomy with abscess I&D by Dr. Bermudez.     Afebrile, normal vitals  WBC 8, LA 1.5    1/8/25 CT showing extensive stool burden with new small volume ascites.     Plan:  - No acute surgical intervention  - Diet as tolerated   - Recommend mineral oil enemas BID via proximal ostomy  - Continue bowel regimen   - Monitor ostomy output   - Wound care to gluteal I&D site, nursing orders placed   - Pain/nausea control as needed  - Maximize non-opioid analgesia   - Lovenox for DVT ppx  - Remainder of care per primary  Rectal cancer (HCC)  As above  S/P colostomy (HCC)  As above    History of Present Illness   Miriam Smith is a 74 yof with hx of T4b N2 rectal cancer with communicating ischioanal abscess. S/p 12/24/24 laparoscopic diverting sigmoid loop colostomy with abscess I&D by Dr. Bermudez. Presenting with three days of worsening abdominal pain in the setting of decreased ostomy output. Trialed miralax/dulcolax with increased output afterwards. Pain persisted.     Endorsing decreased appetite since yesterday. Patient denies nausea, vomiting, fever, chills, shortness of breath, chest pain, dysuria, hematuria.    Patient on home narcotic regimen for cancer related pain, per Palliative. Wound care for gluteal wound provided by family member, reportedly daily.     Afebrile, normal  vitals  WBC 8, LA 1.5    1/8/25 CT showing extensive stool burden with new small volume ascites.     Review of Systems negative unless stated above    I have reviewed the patient's PMH, PSH, Social History, Family History, Meds, and Allergies    Historical Information   Past Medical History:   Diagnosis Date    Diabetes mellitus (HCC)      Past Surgical History:   Procedure Laterality Date    COLONOSCOPY      ILEOSTOMY N/A 12/24/2024    Procedure: LAPAROSCOPIC DIVERTING sigmoid end-loop colostomy;  Surgeon: SAI Bermudez MD;  Location: [facility];  Service: Colorectal    INCISION AND DRAINAGE OF WOUND N/A 12/24/2024    Procedure: INCISION AND DRAINAGE (I&D) BUTTOCK abscess;  Surgeon: SAI Bermudez MD;  Location: [facility];  Service: Colorectal    IR PORT PLACEMENT  1/6/2025     Social History     Tobacco Use    Smoking status: Never    Smokeless tobacco: Never   Vaping Use    Vaping status: Never Used   Substance and Sexual Activity    Alcohol use: Not Currently    Drug use: Not Currently    Sexual activity: Not on file     E-Cigarette/Vaping    E-Cigarette Use Never User      E-Cigarette/Vaping Substances    Nicotine No     THC No     CBD No     Flavoring No     Other No     Unknown No      Family History   Problem Relation Age of Onset    Colon cancer Sister        Objective :  Temp:  [97.3 °F (36.3 °C)-98 °F (36.7 °C)] 98 °F (36.7 °C)  HR:  [72-91] 72  BP: (104-163)/(52-74) 104/54  Resp:  [17-18] 18  SpO2:  [95 %-99 %] 96 %  O2 Device: None (Room air)      Physical Exam  Vitals reviewed.   Constitutional:       General: She is not in acute distress.  HENT:      Head: Normocephalic and atraumatic.   Eyes:      Extraocular Movements: Extraocular movements intact.   Cardiovascular:      Rate and Rhythm: Normal rate.   Pulmonary:      Effort: Pulmonary effort is normal. No respiratory distress.   Abdominal:      General: There is distension (mild).      Palpations: Abdomen is soft.      Tenderness: There is  abdominal tenderness (generalized, most near ostomy). There is no guarding or rebound.      Comments: Ostomy in place, productive. Mix of firm stool balls and liquid brown stool. Patient last changed appliance prior to arrival to ED.   Skin:     General: Skin is warm and dry.      Comments: Left gluteal wound with granulation tissue. Seropurulent appearing drainage   Neurological:      Mental Status: She is alert.         Lab Results: I have reviewed the following results:  Recent Labs     01/08/25  1449 01/08/25  1641   WBC 8.09  --    HGB 13.5  --    HCT 40.1  --      --    SODIUM 134*  --    K 3.8  --    CL 98  --    CO2 21  --    BUN 22  --    CREATININE 0.59*  --    GLUC 154*  --    AST 14  --    ALT 13  --    ALB 3.9  --    TBILI 0.53  --    ALKPHOS 51  --    LACTICACID  --  1.5     CT abdomen pelvis with contrast   Final Result by Gabriela Munroe MD (01/08 1827)      Status post diverting sigmoid loop colostomy with a large amount of stool throughout the entire colon to the level of the ostomy.      Large locally invasive lower rectal cancer again shown with decrease in the size of fluid collection in the left ischioanal fat.      Small volume ascites in the abdomen and pelvis, new from the prior study.      The study was marked in EPIC for immediate notification.         Workstation performed: [id number]           VTE Pharmacologic Prophylaxis: VTE covered by:  [START ON 1/9/2025] enoxaparin, Subcutaneous      VTE Mechanical Prophylaxis: sequential compression device    ---  Regina Cohen MD  General Surgery PGY-II

## 2025-01-08 NOTE — ED PROVIDER NOTES
Time reflects when diagnosis was documented in both MDM as applicable and the Disposition within this note       Time User Action Codes Description Comment    1/8/2025  5:34 PM Anisha Tabares Add [R10.9] Abdominal pain     1/8/2025  5:34 PM Anisha Tabares Add [K59.00] Constipation     1/8/2025  5:34 PM Anisha Tabares Add [Z93.3] S/P colostomy (HCC)     1/8/2025  5:34 PM Anisha Tabares Add [C20] Rectal cancer (HCC)           ED Disposition       ED Disposition   Admit    Condition   Stable    Date/Time   Wed Jan 8, 2025  7:30 PM    Comment   Case was discussed with Dr. Navas and the patient's admission status was agreed to be Admission Status: inpatient status to the service of Dr. Navas .               Assessment & Plan       Medical Decision Making  Amount and/or Complexity of Data Reviewed  Labs: ordered.  Radiology: ordered.    Risk  Prescription drug management.  Decision regarding hospitalization.        ED Course as of 01/09/25 0311   Wed Jan 08, 2025   1644 Patient with generalized abdominal discomfort since surgical procedure though fortunately no fever or vomiting.  Stool passage started only a few days ago despite her having had a reasonable appetite and oral intake.  Bowel sounds decreased in office today although normal at time of my assessment.  She was in the process of passing some formed stool which I suspect may have contributed to change in exam.    Do suspect that she has some further degree of constipation.  Must consider additionally possibility of ileus, partial bowel obstruction versus less likely colitis, diverticulitis, intra-abdominal infection.  Decreased stool passage likely secondary to combination of postsurgical ileus, decreased activity given recent surgery as well as opioid use.   1647 Labs performed prior to patient rooming.  CBC is unremarkable with normal white blood cell count and absence of left shift.  Lipase is not  elevated to suggest presence of pancreatitis.  Renal function/electrolytes within normal limits.   1717 Patient smiling and indicating that she feels much better at this time.    Radiology interpretation pending from imaging.  I do appreciate very large amount of stool throughout the intestines.    With regard to bowel regimen on January 5 she began taking MiraLAX and had this every 2 hours x 3 doses with 3 daily doses since.  She additionally took Dulcolax once orally on the fifth.    Reaching out to surgery team regarding patient presence in the ED, recent history and studies.  If all imaging findings are related to stool retention she will require adjustment of bowel regimen.   1741 Dr. Cohen will be down to see pt.   1800 Radiologist currently reviewing images.     1913 Dr. Cohen has evaluated patient.  She does require increased bowel regimen/stool passage.  Use of enemas and colostomy advised to assist with this.  Due to degree of discomfort recommendation is for in-hospital care.  Without acute need for surgery Dr. Cohen shares that Dr. Lopez desires medicine admission.   1936 Left-sided abdominal discomfort is starting to return.  Will give oxycodone.  She has been admitted to [facility].  Case reviewed with Dr. Navas.       Medications   insulin lispro (HumALOG/ADMELOG) 100 units/mL subcutaneous injection 1-5 Units (has no administration in time range)   HYDROmorphone (DILAUDID) injection 0.5 mg (0.5 mg Intravenous Given 1/8/25 1637)   sodium chloride 0.9 % bolus 500 mL (0 mL Intravenous Stopped 1/8/25 1738)   iohexol (OMNIPAQUE) 350 MG/ML injection (MULTI-DOSE) 85 mL (85 mL Intravenous Given 1/8/25 1707)   oxyCODONE (ROXICODONE) IR tablet 5 mg (5 mg Oral Given 1/8/25 1958)       ED Risk Strat Scores                                              History of Present Illness       Chief Complaint   Patient presents with    Abdominal Pain     Pt had colon surgery 2 weeks ago w/ ostomy placement. From  12/24-1/5 family reports no stool output in the ostomy. Pt was prescribed Miralax. Started having stool output yesterday but pt is having a lot of cramping and abd pain.       Past Medical History:   Diagnosis Date    Diabetes mellitus (HCC)       Past Surgical History:   Procedure Laterality Date    COLONOSCOPY      ILEOSTOMY N/A 12/24/2024    Procedure: LAPAROSCOPIC DIVERTING sigmoid end-loop colostomy;  Surgeon: SAI Bermudez MD;  Location: [facility];  Service: Colorectal    INCISION AND DRAINAGE OF WOUND N/A 12/24/2024    Procedure: INCISION AND DRAINAGE (I&D) BUTTOCK abscess;  Surgeon: SAI Bermudez MD;  Location: [facility];  Service: Colorectal    IR PORT PLACEMENT  1/6/2025      Family History   Problem Relation Age of Onset    Colon cancer Sister       Social History     Tobacco Use    Smoking status: Never    Smokeless tobacco: Never   Vaping Use    Vaping status: Never Used   Substance Use Topics    Alcohol use: Not Currently    Drug use: Not Currently      E-Cigarette/Vaping    E-Cigarette Use Never User       E-Cigarette/Vaping Substances    Nicotine No     THC No     CBD No     Flavoring No     Other No     Unknown No       I have reviewed and agree with the history as documented.     Patient is a 74-year-old female presents to the emergency department having been referred in by Dr. Alba at her palliative care appointment today.  She has had abdominal discomfort and found on exam to have decreased bowel sounds as well as diffuse tenderness-maximally on the left side.  History significant for undergoing surgery on 12/24 performed by Dr. Ramey.  She had a laparoscopic diverting sigmoidectomy and loop colostomy at that time with history of rectal CA and communicating abscess.  Family accompanies her to appointment today and shared that she has been constipated 13 days.  3 days ago she began taking MiraLAX and experiencing some passage of stool into her ostomy.  The very first passage was  described as solid and subsequent ones liquidy.  She has continued to have generalized abdominal pain and over the last few days decreased appetite along with this.  She has not had any fevers, nausea, vomiting or any changes or concerns with urination.  She feels generally malaised/weak.  She describes pain as typically being a 10 out of 10.  This does wax and wane some.  History of Price-Kendall's with sulfa medication.  Family notes that she is limited with what analgesic she may take.  She has been on oxycodone up to every 4 hours with minimal relief in discomfort.  (Medication list reviewed.  She has had pruritus with acetaminophen and ibuprofen.)    Today's palliative care note from  was reviewed.  His message regarding concern/reason for referral into ED was additionally reviewed.        Review of Systems   All other systems reviewed and are negative.          Objective       ED Triage Vitals   Temperature Pulse Blood Pressure Respirations SpO2 Patient Position - Orthostatic VS   01/08/25 1446 01/08/25 1446 01/08/25 1446 01/08/25 1446 01/08/25 1446 01/08/25 1446   97.9 °F (36.6 °C) 91 123/59 17 98 % Sitting      Temp Source Heart Rate Source BP Location FiO2 (%) Pain Score    01/08/25 1446 01/08/25 1446 01/08/25 1446 -- 01/08/25 1637    Oral Monitor Right arm  10 - Worst Possible Pain      Vitals      Date and Time Temp Pulse SpO2 Resp BP Pain Score FACES Pain Rating User   01/08/25 2100 98 °F (36.7 °C) 72 96 % 18 104/54 -- -- EW   01/08/25 2056 -- 78 95 % -- 121/58 -- -- HR   01/08/25 1958 -- -- -- -- -- 7 -- SM   01/08/25 1637 -- -- -- -- -- 10 - Worst Possible Pain -- SM   01/08/25 1630 -- 87 99 % -- 163/74 -- -- HR   01/08/25 1446 97.9 °F (36.6 °C) 91 98 % 17 123/59 -- -- KK            Physical Exam  Vitals and nursing note reviewed.   Constitutional:       General: She is in acute distress.      Appearance: She is well-developed. She is ill-appearing.   HENT:      Head: Normocephalic.   Eyes:       General: No scleral icterus.     Pupils: Pupils are equal, round, and reactive to light.   Cardiovascular:      Rate and Rhythm: Normal rate and regular rhythm.   Pulmonary:      Effort: Pulmonary effort is normal.      Breath sounds: Normal breath sounds.   Abdominal:      General: Bowel sounds are normal.      Palpations: Abdomen is soft.      Tenderness: There is generalized abdominal tenderness. There is no right CVA tenderness, left CVA tenderness or guarding.      Comments: Thin but mildly protruberant.  Ostomy lower abd.  Liquid brown stool dependent in bag.  Medium amount of formed stool passing from ostomy, gently facilitated by myself through bag   Skin:     General: Skin is warm and dry.      Findings: No rash.   Neurological:      Mental Status: She is alert.         Results Reviewed       Procedure Component Value Units Date/Time    Urine Microscopic [[id number]]  (Abnormal) Collected: 01/08/25 1958    Lab Status: Final result Specimen: Urine, Clean Catch Updated: 01/08/25 2044     RBC, UA 4-10 /hpf      WBC, UA 20-30 /hpf      Epithelial Cells None Seen /hpf      Bacteria, UA None Seen /hpf     Urine culture [[id number]] Collected: 01/08/25 1958    Lab Status: In process Specimen: Urine, Clean Catch Updated: 01/08/25 2044    UA w Reflex to Microscopic w Reflex to Culture [[id number]]  (Abnormal) Collected: 01/08/25 1958    Lab Status: Final result Specimen: Urine, Clean Catch Updated: 01/08/25 2041     Color, UA Light Yellow     Clarity, UA Clear     Specific Gravity, UA >=1.050     pH, UA 6.0     Leukocytes, UA Trace     Nitrite, UA Negative     Protein, UA 30 (1+) mg/dl      Glucose, UA Negative mg/dl      Ketones,  (4+) mg/dl      Urobilinogen, UA <2.0 mg/dl      Bilirubin, UA Negative     Occult Blood, UA Moderate    TSH, 3rd generation with Free T4 reflex [[id number]]  (Normal) Collected: 01/08/25 1449    Lab Status: Final result Specimen: Blood from Arm, Left Updated: 01/08/25 1711     TSH  3RD GENERATON 2.328 uIU/mL     Lactic acid, plasma (w/reflex if result > 2.0) [[id number]]  (Normal) Collected: 01/08/25 1641    Lab Status: Final result Specimen: Blood from Arm, Left Updated: 01/08/25 1709     LACTIC ACID 1.5 mmol/L     Narrative:      Result may be elevated if tourniquet was used during collection.    Comprehensive metabolic panel [[id number]]  (Abnormal) Collected: 01/08/25 1449    Lab Status: Final result Specimen: Blood from Arm, Left Updated: 01/08/25 1521     Sodium 134 mmol/L      Potassium 3.8 mmol/L      Chloride 98 mmol/L      CO2 21 mmol/L      ANION GAP 15 mmol/L      BUN 22 mg/dL      Creatinine 0.59 mg/dL      Glucose 154 mg/dL      Calcium 9.3 mg/dL      AST 14 U/L      ALT 13 U/L      Alkaline Phosphatase 51 U/L      Total Protein 7.2 g/dL      Albumin 3.9 g/dL      Total Bilirubin 0.53 mg/dL      eGFR 90 ml/min/1.73sq m     Narrative:      [employer] guidelines for Chronic Kidney Disease (CKD):     Stage 1 with normal or high GFR (GFR > 90 mL/min/1.73 square meters)    Stage 2 Mild CKD (GFR = 60-89 mL/min/1.73 square meters)    Stage 3A Moderate CKD (GFR = 45-59 mL/min/1.73 square meters)    Stage 3B Moderate CKD (GFR = 30-44 mL/min/1.73 square meters)    Stage 4 Severe CKD (GFR = 15-29 mL/min/1.73 square meters)    Stage 5 End Stage CKD (GFR <15 mL/min/1.73 square meters)  Note: GFR calculation is accurate only with a steady state creatinine    Lipase [[id number]]  (Normal) Collected: 01/08/25 1449    Lab Status: Final result Specimen: Blood from Arm, Left Updated: 01/08/25 1521     Lipase 22 u/L     CBC and differential [[id number]]  (Abnormal) Collected: 01/08/25 1449    Lab Status: Final result Specimen: Blood from Arm, Left Updated: 01/08/25 1500     WBC 8.09 Thousand/uL      RBC 4.47 Million/uL      Hemoglobin 13.5 g/dL      Hematocrit 40.1 %      MCV 90 fL      MCH 30.2 pg      MCHC 33.7 g/dL      RDW 19.3 %      MPV 9.4 fL      Platelets 337  Thousands/uL      nRBC 0 /100 WBCs      Segmented % 81 %      Immature Grans % 0 %      Lymphocytes % 11 %      Monocytes % 8 %      Eosinophils Relative 0 %      Basophils Relative 0 %      Absolute Neutrophils 6.49 Thousands/µL      Absolute Immature Grans 0.03 Thousand/uL      Absolute Lymphocytes 0.90 Thousands/µL      Absolute Monocytes 0.63 Thousand/µL      Eosinophils Absolute 0.01 Thousand/µL      Basophils Absolute 0.03 Thousands/µL             CT abdomen pelvis with contrast   Final Interpretation by Gabriela Munroe MD (01/08 1827)      Status post diverting sigmoid loop colostomy with a large amount of stool throughout the entire colon to the level of the ostomy.      Large locally invasive lower rectal cancer again shown with decrease in the size of fluid collection in the left ischioanal fat.      Small volume ascites in the abdomen and pelvis, new from the prior study.      The study was marked in EPIC for immediate notification.         Workstation performed: [id number]             Procedures    ED Medication and Procedure Management   Prior to Admission Medications   Prescriptions Last Dose Informant Patient Reported? Taking?   Blood Glucose Monitoring Suppl (OneTouch Verio Reflect) w/Device KIT   No No   Sig: Check blood sugars once daily. Please substitute with appropriate alternative as covered by patient's insurance. Dx: E11.65   OneTouch Delica Lancets 33G MISC   No No   Sig: Check blood sugars once daily. Please substitute with appropriate alternative as covered by patient's insurance. Dx: E11.65   atorvastatin (LIPITOR) 40 mg tablet   No No   Sig: Take 1 tablet (40 mg total) by mouth daily   Patient not taking: Reported on 1/8/2025   enoxaparin (LOVENOX) 40 mg/0.4 mL   No No   Sig: Inject 0.4 mL (40 mg total) under the skin every 24 hours for 25 days   glucose blood (OneTouch Verio) test strip   No No   Sig: Check blood sugars once daily. Please substitute with appropriate alternative as  covered by patient's insurance. Dx: E11.65   lidocaine-prilocaine (EMLA) cream   No No   Sig: Apply topically as needed for mild pain Apply to PAC site prior to access   Patient not taking: Reported on 1/8/2025   metFORMIN (GLUCOPHAGE) 1000 MG tablet   No No   Sig: Take 1 tablet (1,000 mg total) by mouth 2 (two) times a day with meals   naloxone (NARCAN) 4 mg/0.1 mL nasal spray   No No   Sig: Administer 1 spray into a nostril. If no response after 2-3 minutes, give another dose in the other nostril using a new spray. For use in emergencies for opioid / pain medication reversal for accidental ingestion, respiratory depression, sedation or concerns for overdose.   Patient not taking: Reported on 1/8/2025   ondansetron (ZOFRAN) 8 mg tablet   No No   Sig: Take 1 tablet (8 mg total) by mouth every 8 (eight) hours as needed for nausea or vomiting   Patient not taking: Reported on 1/8/2025   oxyCODONE (Roxicodone) 5 immediate release tablet   No No   Sig: Take 1 tablet (5 mg total) by mouth every 4 (four) hours as needed for moderate pain Max Daily Amount: 30 mg      Facility-Administered Medications: None     Current Discharge Medication List        CONTINUE these medications which have NOT CHANGED    Details   atorvastatin (LIPITOR) 40 mg tablet Take 1 tablet (40 mg total) by mouth daily  Qty: 30 tablet, Refills: 3    Associated Diagnoses: Type 2 diabetes mellitus without complication, unspecified whether long term insulin use (HCC)      Blood Glucose Monitoring Suppl (OneTouch Verio Reflect) w/Device KIT Check blood sugars once daily. Please substitute with appropriate alternative as covered by patient's insurance. Dx: E11.65  Qty: 1 kit, Refills: 0    Comments: Please substitute with appropriate alternative as covered by patient's insurance  Associated Diagnoses: Type 2 diabetes mellitus without complication, unspecified whether long term insulin use (HCC)      enoxaparin (LOVENOX) 40 mg/0.4 mL Inject 0.4 mL (40 mg  total) under the skin every 24 hours for 25 days  Qty: 10 mL, Refills: 0    Associated Diagnoses: Rectal cancer (HCC)      glucose blood (OneTouch Verio) test strip Check blood sugars once daily. Please substitute with appropriate alternative as covered by patient's insurance. Dx: E11.65  Qty: 100 each, Refills: 3    Comments: Please substitute with appropriate alternative as covered by patient's insurance  Associated Diagnoses: Type 2 diabetes mellitus without complication, unspecified whether long term insulin use ([facility])      lidocaine-prilocaine (EMLA) cream Apply topically as needed for mild pain Apply to PAC site prior to access  Qty: 30 g, Refills: 4    Associated Diagnoses: Rectal cancer (HCC)      metFORMIN (GLUCOPHAGE) 1000 MG tablet Take 1 tablet (1,000 mg total) by mouth 2 (two) times a day with meals  Qty: 60 tablet, Refills: 0    Associated Diagnoses: Type 2 diabetes mellitus without complication, without long-term current use of insulin ([facility])      naloxone (NARCAN) 4 mg/0.1 mL nasal spray Administer 1 spray into a nostril. If no response after 2-3 minutes, give another dose in the other nostril using a new spray. For use in emergencies for opioid / pain medication reversal for accidental ingestion, respiratory depression, sedation or concerns for overdose.  Qty: 1 each, Refills: 1    Associated Diagnoses: Rectal mass; Cancer associated pain      ondansetron (ZOFRAN) 8 mg tablet Take 1 tablet (8 mg total) by mouth every 8 (eight) hours as needed for nausea or vomiting  Qty: 20 tablet, Refills: 4    Associated Diagnoses: Rectal cancer (HCC)      OneTouch Delica Lancets 33G MISC Check blood sugars once daily. Please substitute with appropriate alternative as covered by patient's insurance. Dx: E11.65  Qty: 100 each, Refills: 3    Comments: Please substitute with appropriate alternative as covered by patient's insurance  Associated Diagnoses: Type 2 diabetes mellitus without complication, unspecified  whether long term insulin use (HCC)      oxyCODONE (Roxicodone) 5 immediate release tablet Take 1 tablet (5 mg total) by mouth every 4 (four) hours as needed for moderate pain Max Daily Amount: 30 mg  Qty: 30 tablet, Refills: 0    Associated Diagnoses: Rectal mass; Cancer associated pain; Palliative care by specialist; Rectal pain           No discharge procedures on file.  ED SEPSIS DOCUMENTATION   Time reflects when diagnosis was documented in both MDM as applicable and the Disposition within this note       Time User Action Codes Description Comment    1/8/2025  5:34 PM Anisha Tabares [R10.9] Abdominal pain     1/8/2025  5:34 PM Anisha Tabares [K59.00] Constipation     1/8/2025  5:34 PM Anisha Tabares [Z93.3] S/P colostomy ([facility])     1/8/2025  5:34 PM Anisha Tabares [C20] Rectal cancer ([facility])                  Anisha Tabares MD  01/09/25 0311

## 2025-01-09 PROBLEM — Z51.5 PALLIATIVE CARE ENCOUNTER: Status: ACTIVE | Noted: 2025-01-09

## 2025-01-09 PROBLEM — G89.3 CANCER RELATED PAIN: Status: ACTIVE | Noted: 2025-01-09

## 2025-01-09 LAB
GLUCOSE SERPL-MCNC: 100 MG/DL (ref 65–140)
GLUCOSE SERPL-MCNC: 114 MG/DL (ref 65–140)
GLUCOSE SERPL-MCNC: 156 MG/DL (ref 65–140)
GLUCOSE SERPL-MCNC: 181 MG/DL (ref 65–140)
GLUCOSE SERPL-MCNC: 202 MG/DL (ref 65–140)

## 2025-01-09 PROCEDURE — 99255 IP/OBS CONSLTJ NEW/EST HI 80: CPT

## 2025-01-09 PROCEDURE — 82948 REAGENT STRIP/BLOOD GLUCOSE: CPT

## 2025-01-09 PROCEDURE — 99232 SBSQ HOSP IP/OBS MODERATE 35: CPT | Performed by: INTERNAL MEDICINE

## 2025-01-09 RX ORDER — HYDROMORPHONE HCL/PF 1 MG/ML
0.3 SYRINGE (ML) INJECTION
Status: DISCONTINUED | OUTPATIENT
Start: 2025-01-09 | End: 2025-01-09

## 2025-01-09 RX ORDER — OXYCODONE HYDROCHLORIDE 5 MG/1
5 TABLET ORAL EVERY 6 HOURS PRN
Refills: 0 | Status: DISCONTINUED | OUTPATIENT
Start: 2025-01-09 | End: 2025-01-09

## 2025-01-09 RX ORDER — AMOXICILLIN 250 MG
1 CAPSULE ORAL 2 TIMES DAILY
Status: DISCONTINUED | OUTPATIENT
Start: 2025-01-09 | End: 2025-01-11 | Stop reason: HOSPADM

## 2025-01-09 RX ORDER — OXYCODONE HYDROCHLORIDE 5 MG/1
5 TABLET ORAL EVERY 4 HOURS PRN
Refills: 0 | Status: DISCONTINUED | OUTPATIENT
Start: 2025-01-09 | End: 2025-01-11 | Stop reason: HOSPADM

## 2025-01-09 RX ORDER — HYDROMORPHONE HCL/PF 1 MG/ML
0.5 SYRINGE (ML) INJECTION EVERY 4 HOURS PRN
Status: DISCONTINUED | OUTPATIENT
Start: 2025-01-09 | End: 2025-01-11 | Stop reason: HOSPADM

## 2025-01-09 RX ADMIN — Medication 2.5 MG: at 21:47

## 2025-01-09 RX ADMIN — OXYCODONE HYDROCHLORIDE 5 MG: 5 TABLET ORAL at 13:48

## 2025-01-09 RX ADMIN — POLYETHYLENE GLYCOL 3350 17 G: 17 POWDER, FOR SOLUTION ORAL at 08:40

## 2025-01-09 RX ADMIN — SENNOSIDES AND DOCUSATE SODIUM 1 TABLET: 50; 8.6 TABLET ORAL at 08:40

## 2025-01-09 RX ADMIN — INSULIN LISPRO 1 UNITS: 100 INJECTION, SOLUTION INTRAVENOUS; SUBCUTANEOUS at 13:44

## 2025-01-09 RX ADMIN — Medication 2.5 MG: at 17:33

## 2025-01-09 RX ADMIN — MINERAL OIL 1 ENEMA: 118 ENEMA RECTAL at 00:03

## 2025-01-09 RX ADMIN — MINERAL OIL 1 ENEMA: 118 ENEMA RECTAL at 08:40

## 2025-01-09 RX ADMIN — MINERAL OIL 1 ENEMA: 118 ENEMA RECTAL at 22:34

## 2025-01-09 RX ADMIN — ENOXAPARIN SODIUM 40 MG: 40 INJECTION SUBCUTANEOUS at 08:40

## 2025-01-09 RX ADMIN — POLYETHYLENE GLYCOL 3350 17 G: 17 POWDER, FOR SOLUTION ORAL at 23:05

## 2025-01-09 RX ADMIN — SENNOSIDES AND DOCUSATE SODIUM 1 TABLET: 50; 8.6 TABLET ORAL at 17:09

## 2025-01-09 NOTE — ASSESSMENT & PLAN NOTE
s/p lap diverting sigmoid end-loop colostomy last month  Routine ostomy care  Colorectal surgery following

## 2025-01-09 NOTE — CASE MANAGEMENT
Case Management Assessment & Discharge Planning Note    Patient name Miriam Smith  Location /-01 MRN [id number]  : 1950 Date 2025       Current Admission Date: 2025  Current Admission Diagnosis:Constipation   Patient Active Problem List    Diagnosis Date Noted Date Diagnosed    Palliative care encounter 2025     Cancer related pain 2025     Constipation 2025     S/P colostomy (HCC) 2025     Perirectal abscess 2024     Cancer associated pain 2024     Palliative care by specialist 2024     Rectal pain 2024     Rectal cancer (HCC) 2024     Rectal bleeding 2024     History of anal fissures 2024     Electrolyte abnormality 2024     Pain, rectal 2024     Iron deficiency anemia 2024     Leukocytosis 2024     Rectal mass 11/10/2024     Type 2 diabetes mellitus (HCC) 11/10/2024       LOS (days): 1  Geometric Mean LOS (GMLOS) (days):   Days to GMLOS:     OBJECTIVE:  PATIENT READMITTED TO HOSPITAL  Risk of Unplanned Readmission Score: 16.44         Current admission status: Inpatient       Preferred Pharmacy:   [id number] [place], [place] - [address] [place] [address]  2 [facility] [address]  Phone: [phone] Fax: [phone]    [facility] [address] [place] [address] - [address]  [address]  [facility] [address]  Phone: [phone] Fax: [phone]    Primary Care Provider: Yumiko Thomas MD    Primary Insurance: [place] PEND[place]  Secondary Insurance:     ASSESSMENT:  Active Health Care Proxies    There are no active Health Care Proxies on file.                 Readmission Root Cause  30 Day Readmission: Yes  During your hospital stay, did someone (provider, nurse, ) explain your care to you in a way you could understand?: Yes  Did you feel medically stable to leave the hospital?: Yes  Were you able to pay for your medication at the  pharmacy?: Yes  Did you have reliable transportation to take you to your appointments?: Yes  During previous admission, was a post-acute recommendation made?: No  Patient was readmitted due to: Constipation  Action Plan: Palliative care    Patient Information  Admitted from:: Home  Mental Status: Alert  During Assessment patient was accompanied by: Not accompanied during assessment  Assessment information provided by:: Other - please comment (Son-in-law)  Primary Caregiver: Self  Support Systems: Self, Daughter, Family members  County of Residence: [place]  What city do you live in?: [place]  Home entry access options. Select all that apply.: Stairs  Number of steps to enter home.: 2  Type of Current Residence: 2 story home  Upon entering residence, is there a bedroom on the main floor (no further steps)?: No  A bedroom is located on the following floor levels of residence (select all that apply):: Basement  Upon entering residence, is there a bathroom on the main floor (no further steps)?: No  Indicate which floors of current residence have a bathroom (select all the apply):: Basement  Number of steps to basement from main floor: One Flight  Living Arrangements: Lives w/ Extended Family, Lives w/ Daughter    Activities of Daily Living Prior to Admission  Functional Status: Independent  Completes ADLs independently?: Yes  Ambulates independently?: Yes  Does patient use assisted devices?: Yes  Assisted Devices (DME) used: Walker, Hospital Bed  Does patient have a history of Outpatient Therapy (PT/OT)?: No  Does the patient have a history of Short-Term Rehab?: No  Does patient have a history of HHC?: No  Does patient currently have HHC?: No    Patient Information Continued  Income Source: Unknown  Does patient have prescription coverage?: No  Does patient receive dialysis treatments?: No  Does patient have a history of substance abuse?: No  Does patient have a history of Mental Health Diagnosis?: No    Means of  Transportation  Means of Transport to Appts:: Family transport    DISCHARGE DETAILS:    Discharge planning discussed with:: Son-in-law Rocky  Freedom of Choice: Yes  Comments - Freedom of Choice: Preference is for pt to return home  CM contacted family/caregiver?: Yes (NED via phone)  Were Treatment Team discharge recommendations reviewed with patient/caregiver?: Yes  Did patient/caregiver verbalize understanding of patient care needs?: Yes  Were patient/caregiver advised of the risks associated with not following Treatment Team discharge recommendations?: Yes    Contacts  Patient Contacts: Rocky, son-in-law  Relationship to Patient:: Family  Contact Method: Phone  Phone Number: [phone]  Reason/Outcome: Continuity of Care, Emergency Contact, Discharge Planning    Other Referral/Resources/Interventions Provided:  Interventions: Other (Specify)  Referral Comments: CM received return call to pt NED Hidalgo. CM introduced self and role with dcp. NED reported pt lives with him, her daughter and grandchildren in a 2 story home with 2 [address]. NED reported pt bedroom and bathroom are in the basement. NED reported pt is independent with ADLs and functional mobility at baseline. NED reported pt assists with care as needed. NED reported pt has a RW and hospital bed. NED reported preferred pharmacy is [facility] and PCP is Dr. Thomas. Family assists with transportation needs. CM will continue to follow.    Would you like to participate in our [facility] service program?  : No - Declined    Treatment Team Recommendation: Home  Discharge Destination Plan:: Home

## 2025-01-09 NOTE — PROGRESS NOTES
"Progress Note - Hospitalist   Name: Miriam Smith 74 y.o. female I MRN: [id number]  Unit/Bed#: -01 I Date of Admission: 1/8/2025   Date of Service: 1/9/2025 I Hospital Day: 1    Assessment & Plan  Constipation  Presented with abdominal pain and decreased subjective stool frequency  CT of abdomen/pelvis noting: \"Status post diverting sigmoid loop colostomy with a large amount of stool throughout the entire colon to the level of the ostomy.\"  Appreciate colorectal surgery input with recommendation of optimization of constipation prophylaxis due to chronic opiate dependence -> currently on a stool softener, laxative, and enema regimen consisting of MiraLAX, Senokot, and mineral oil BID   Currently on a clear liquid diet  Will appreciate palliative care input for augmentation of cancer related pain medications in the setting of constipation  Rectal cancer (HCC)  Initially diagnosed 9/2024, stage T4b N2  Also noted to have 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, s/p I&D  Planned for chemotherapy soon s/p port placement in the right IJ on 1/6  Outpatient oncology follow-up  PRN pain control  Supportive care otherwise  Await palliative care input  Cancer related pain  Currently on a tiered Oxycodone regimen with PRN IV for breakthrough events  Optimize bowel prophylaxis due to presenting issue  Palliative care input to be appreciated  S/P colostomy (HCC)  s/p lap diverting sigmoid end-loop colostomy last month  Routine ostomy care  Colorectal surgery following      VTE Pharmacologic Prophylaxis: VTE Score: 4 Moderate Risk (Score 3-4) - Pharmacological DVT Prophylaxis Ordered: Enoxaparin (Lovenox).    AM-PAC Basic Mobility:  Basic Mobility Inpatient Raw Score: 15  -HLM Goal: 4: Move to chair/commode  JH-HLM Achieved: 2: Bed activities/Dependent transfer  JH-HLM Goal NOT achieved. Continue with multidisciplinary rounding and encourage appropriate mobility to improve upon JH-HLM " goals.    Patient Centered Rounds:  I have performed bedside rounds and discussed plan of care with nursing today.  Discussions with Specialists or Other Care Team Provider:  see above assessments if applicable    Education and Discussions with Family / Patient:  At bedside    Time Spent for Care:  35 minutes. More than 50% of total time spent on counseling and coordination of care, on one or more of the following: performing physical exam; counseling and coordination of care, obtaining or reviewing history, documenting in the medical record, reviewing/ordering tests/medications/procedures, and communicating with other healthcare professionals.    Current Length of Stay: 1 day(s)  Current Patient Status: Inpatient   Certification Statement:  Patient will continue to require additional hospital stay due to assessments as noted above.    Code Status: Level 1 - Full Code      Subjective     Encountered earlier in the morning.  Complains of generalized lower abdominal pain on both sides.      Objective     Vitals:   Temp (24hrs), Av.9 °F (36.6 °C), Min:97.8 °F (36.6 °C), Max:98 °F (36.7 °C)    Temp:  [97.8 °F (36.6 °C)-98 °F (36.7 °C)] 97.8 °F (36.6 °C)  HR:  [72-91] 84  Resp:  [17-18] 18  BP: (104-163)/(54-74) 134/60  SpO2:  [95 %-99 %] 96 %  Body mass index is 20.7 kg/m².     Input and Output Summary (last 24 hours):       Intake/Output Summary (Last 24 hours) at 2025 1402  Last data filed at 2025 1001  Gross per 24 hour   Intake 610 ml   Output 380 ml   Net 230 ml       Physical Exam:     GENERAL Weak/fatigued - waxing/waning distress from pain   HEAD   Normocephalic - atraumatic   EYES Nonicteric   MOUTH   Mucosa moist   NECK   Supple - full range of motion   CARDIAC Rate controlled   PULMONARY Fairly clear to auscultation   ABDOMEN Generalized tenderness more prominent in the lower quadrants - ostomy in place   MUSCULOSKELETAL   Motor strength/range of motion intact   NEUROLOGIC   Alert/oriented at  baseline   PSYCHIATRIC   Mood/affect mildly anxious         Labs & Recent Cultures:  Results from last 7 days   Lab Units 01/08/25  1449   WBC Thousand/uL 8.09   HEMOGLOBIN g/dL 13.5   HEMATOCRIT % 40.1   PLATELETS Thousands/uL 337   SEGS PCT % 81*   LYMPHO PCT % 11*   MONO PCT % 8   EOS PCT % 0     Results from last 7 days   Lab Units 01/08/25  1449   POTASSIUM mmol/L 3.8   CHLORIDE mmol/L 98   CO2 mmol/L 21   BUN mg/dL 22   CREATININE mg/dL 0.59*   CALCIUM mg/dL 9.3   ALK PHOS U/L 51   ALT U/L 13   AST U/L 14         Results from last 7 days   Lab Units 01/09/25  1040 01/09/25  0806 01/08/25  2124 01/06/25  0818   POC GLUCOSE mg/dl 181* 100 142* 125         Results from last 7 days   Lab Units 01/08/25  1641   LACTIC ACID mmol/L 1.5                 Lines/Drains/Telemetry:  Invasive Devices       Peripheral Intravenous Line  Duration             Peripheral IV 01/06/25 Distal;Right;Upper;Ventral (anterior) Antecubital 3 days    Peripheral IV 01/08/25 Left Antecubital <1 day              Drain  Duration             Colostomy LLQ 16 days                    Last 24 Hours Medication List:   Current Facility-Administered Medications   Medication Dose Route Frequency Provider Last Rate    enoxaparin  40 mg Subcutaneous Daily Meli Wright MD      HYDROmorphone  0.3 mg Intravenous Q3H PRN Duc Torres MD      insulin lispro  1-5 Units Subcutaneous TID AC Meli Wright MD      lactated ringers  100 mL/hr Intravenous Continuous Meli Wright  mL/hr (01/08/25 2234)    mineral oil  1 enema Ostomy BID Meil Wright MD      ondansetron  4 mg Intravenous Q6H PRN Meli Wright MD      oxyCODONE  5 mg Oral Q6H PRN Duc Torres MD      oxyCODONE  2.5 mg Oral Q6H PRN Duc Torres MD      polyethylene glycol  17 g Oral BID Meli Wright MD      senna-docusate sodium  1 tablet Oral BID MD DUC Moreno MD   Hospitalist - [facility]        **  Please Note:  Documentation is constructed using a voice recognition dictation system.  An occasional wrong word/phrase or “sound-a-like” substitution may have been picked up by dictation device due to the inherent limitations of voice recognition software.  Read the chart carefully and recognize, using reasonable context, where substitutions may have occurred.**

## 2025-01-09 NOTE — CASE MANAGEMENT
Case Management Progress Note    Patient name Miriam Smith  Location /-01 MRN [id number]  : 1950 Date 2025       LOS (days): 1  Geometric Mean LOS (GMLOS) (days):   Days to GMLOS:        OBJECTIVE:        Current admission status: Inpatient  Preferred Pharmacy:   [id number] [place], [place] - [address] [facility]  2 [facility] [address]  Phone: [phone] Fax: [phone]    [facility] [address] [place] [address] - [address]  [address]  [facility] [address]  Phone: [phone] Fax: [phone]    Primary Care Provider: Yumiko Thomas MD    Primary Insurance: [facility] PENDING  Secondary Insurance:     PROGRESS NOTE:    CM placed call to pt daughter Coco ([phone]) to touch base re: dcp and to complete open assessment. No answer,  left requesting return call back. CM will continue to outreach to complete open assessment.

## 2025-01-09 NOTE — ASSESSMENT & PLAN NOTE
Patient reports abdominal pain, decreased stool frequency  CT abd/pelvis shows large stool burden  Seen by Surgery team, recommending admission to medicine service, bowel regimen   Admit for further management  Start miralax BID  Docusate BID  Clear liquid diet for now  Follow up Surgery input - currently requesting mineral oil enema via proximal ostomy BID  Pain control with dilaudid as needed

## 2025-01-09 NOTE — ASSESSMENT & PLAN NOTE
Dx 9/2024  Stage T4b N2  Noted to have rectal wall invasion w/ communicating ischioanal abscess  S/p I&D  Following w/ oncology, planned for chemotherapy

## 2025-01-09 NOTE — ASSESSMENT & PLAN NOTE
"Presented to the ED for decreased bowel movements and worsening abdominal pain  CT A/P revealed \"large stool burden\"  Surgery team following  Recommend bowel regimen   "

## 2025-01-09 NOTE — ASSESSMENT & PLAN NOTE
Initially diagnosed 9/2024, stage T4b N2  Also noted to have 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, s/p I&D  Patient is planned for chemotherapy, following with Onol[facility] and is s/p port placement in right IJ 1/6  Outpatient follow up with oncology on discharge  Pain control as above

## 2025-01-09 NOTE — H&P
H&P - Hospitalist   Name: Miriam Smith 74 y.o. female I MRN: [id number]  Unit/Bed#: -01 I Date of Admission: 1/8/2025   Date of Service: 1/8/2025 I Hospital Day: 0     Assessment & Plan  Constipation  Patient reports abdominal pain, decreased stool frequency  CT abd/pelvis shows large stool burden  Seen by Surgery team, recommending admission to medicine service, bowel regimen   Admit for further management  Start miralax BID  Docusate BID  Clear liquid diet for now  Follow up Surgery input - currently requesting mineral oil enema via proximal ostomy BID  Pain control with dilaudid as needed  Rectal cancer (HCC)  Initially diagnosed 9/2024, stage T4b N2  Also noted to have 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, s/p I&D  Patient is planned for chemotherapy, following with [facility] and is s/p port placement in right IJ 1/6  Outpatient follow up with oncology on discharge  Pain control as above  S/P colostomy (HCC)  s/p lap diverting sigmoid end-loop colostomy 12/24/24 12/24  Ostomy care   Surgery team following      VTE Pharmacologic Prophylaxis:   Moderate Risk (Score 3-4) - Pharmacological DVT Prophylaxis Ordered: enoxaparin (Lovenox).  Code Status: Prior   Discussion with family: Patient declined call to .     Anticipated Length of Stay: Patient will be admitted on an inpatient basis with an anticipated length of stay of greater than 2 midnights secondary to above.    History of Present Illness   Chief Complaint: Abdominal pain    Miriam Smith is a 74 y.o. female with a PMH of recently diagnosed rectal   Patient states she has been having problems with bowel movements since surgery 12/24. She was only having to change her colostomy bag once a day with only scant amount of brown liquid stool noted.She started to take miralax as prescribed but noted 2 days ago, she noted increased abdominal pain to the left side of the colostomy. She is prescribed  oxycodone but this did not improve the pain. She has not had any nausea, vomiting, blood in colostomy bag, fevers, chill, lightheadedness. She was seen by the palliative care specialist earlier today and was recommended to come to ER for further evaluation.     In ER, VSS  Labs notable for Na 134, AG 15  CT abd/pelvis: Status post diverting sigmoid loop colostomy with a large amount of stool throughout the entire colon to the level of the ostomy.  Large locally invasive lower rectal cancer again shown with decrease in the size of fluid collection in the left ischioanal fat.  Small volume ascites in the abdomen and pelvis, new from the prior study.    Patient received Dilaudid, oxycodone, IVF in ER    Patient was evaluated by surgical team and recommended for admission to medicine for further management.      #[id number]    Review of Systems   Constitutional:  Negative for chills and fever.   HENT:  Negative for ear pain and sore throat.    Eyes:  Negative for pain and visual disturbance.   Respiratory:  Negative for cough and shortness of breath.    Cardiovascular:  Negative for chest pain and palpitations.   Gastrointestinal:  Positive for abdominal distention, abdominal pain, constipation and rectal pain. Negative for anal bleeding, blood in stool, diarrhea, nausea and vomiting.   Endocrine: Negative for polyuria.   Genitourinary:  Negative for dysuria and hematuria.   Musculoskeletal:  Negative for arthralgias and back pain.   Skin:  Negative for color change and rash.   Neurological:  Negative for dizziness, seizures, syncope and light-headedness.   All other systems reviewed and are negative.      Historical Information   Past Medical History:   Diagnosis Date    Diabetes mellitus (HCC)      Past Surgical History:   Procedure Laterality Date    COLONOSCOPY      ILEOSTOMY N/A 12/24/2024    Procedure: LAPAROSCOPIC DIVERTING sigmoid end-loop colostomy;  Surgeon: SAI Bermudez MD;  [facility];  Service: Colorectal    INCISION AND DRAINAGE OF WOUND N/A 12/24/2024    Procedure: INCISION AND DRAINAGE (I&D) BUTTOCK abscess;  Surgeon: SAI Bermudez MD;  Location: [facility];  Service: Colorectal    IR PORT PLACEMENT  1/6/2025     Social History     Tobacco Use    Smoking status: Never    Smokeless tobacco: Never   Vaping Use    Vaping status: Never Used   Substance and Sexual Activity    Alcohol use: Not Currently    Drug use: Not Currently    Sexual activity: Not on file     E-Cigarette/Vaping    E-Cigarette Use Never User      E-Cigarette/Vaping Substances    Nicotine No     THC No     CBD No     Flavoring No     Other No     Unknown No      Family History   Problem Relation Age of Onset    Colon cancer Sister      Social History:  Marital Status: /Civil Union       Meds/Allergies   I have reviewed home medications using recent Epic encounter.  Prior to Admission medications    Medication Sig Start Date End Date Taking? Authorizing Provider   atorvastatin (LIPITOR) 40 mg tablet Take 1 tablet (40 mg total) by mouth daily  Patient not taking: Reported on 1/8/2025 12/19/24   Yumiko Thomas MD   Blood Glucose Monitoring Suppl (OneTouch Verio Reflect) w/Device KIT Check blood sugars once daily. Please substitute with appropriate alternative as covered by patient's insurance. Dx: E11.65 12/19/24   Yumiko Thomas MD   enoxaparin (LOVENOX) 40 mg/0.4 mL Inject 0.4 mL (40 mg total) under the skin every 24 hours for 25 days 12/28/24 1/22/25  Liz Sutton PA-C   glucose blood (OneTouch Verio) test strip Check blood sugars once daily. Please substitute with appropriate alternative as covered by patient's insurance. Dx: E11.65 12/19/24   Yumiko Thomas MD   lidocaine-prilocaine (EMLA) cream Apply topically as needed for mild pain Apply to PAC site prior to access  Patient not taking: Reported on 1/8/2025 12/22/24   Twan Perea MD   metFORMIN (GLUCOPHAGE) 1000 MG  tablet Take 1 tablet (1,000 mg total) by mouth 2 (two) times a day with meals 12/19/24   Yumiko Thomas MD   naloxone (NARCAN) 4 mg/0.1 mL nasal spray Administer 1 spray into a nostril. If no response after 2-3 minutes, give another dose in the other nostril using a new spray. For use in emergencies for opioid / pain medication reversal for accidental ingestion, respiratory depression, sedation or concerns for overdose.  Patient not taking: Reported on 1/8/2025 12/18/24 12/18/25  Derek Alba DO   ondansetron (ZOFRAN) 8 mg tablet Take 1 tablet (8 mg total) by mouth every 8 (eight) hours as needed for nausea or vomiting  Patient not taking: Reported on 1/8/2025 12/22/24   Twan Perea MD   OneTouch Delica Lancets 33G MISC Check blood sugars once daily. Please substitute with appropriate alternative as covered by patient's insurance. Dx: E11.65 12/19/24   Yumiko Thomas MD   oxyCODONE (Roxicodone) 5 immediate release tablet Take 1 tablet (5 mg total) by mouth every 4 (four) hours as needed for moderate pain Max Daily Amount: 30 mg 1/2/25   Derek Alba DO     Allergies   Allergen Reactions    Motrin [Ibuprofen] Itching    Tylenol [Acetaminophen] Itching    Penicillins Rash     Rash and bruise    Sulfa Antibiotics Rash     Price velia syndrome       Objective :  Temp:  [97.3 °F (36.3 °C)-97.9 °F (36.6 °C)] 97.9 °F (36.6 °C)  HR:  [78-91] 78  BP: (110-163)/(52-74) 121/58  Resp:  [17] 17  SpO2:  [95 %-99 %] 95 %  O2 Device: None (Room air)    Physical Exam  Vitals and nursing note reviewed.   Constitutional:       General: She is not in acute distress.     Appearance: She is well-developed. She is ill-appearing. She is not toxic-appearing.   HENT:      Head: Normocephalic and atraumatic.   Eyes:      Conjunctiva/sclera: Conjunctivae normal.   Cardiovascular:      Rate and Rhythm: Normal rate and regular rhythm.      Heart sounds: No murmur heard.  Pulmonary:      Effort: Pulmonary effort is normal.  No respiratory distress.      Breath sounds: Normal breath sounds.   Abdominal:      General: The ostomy site is clean. Bowel sounds are decreased. There is distension.      Palpations: Abdomen is soft.      Tenderness: There is abdominal tenderness in the periumbilical area. There is no guarding.      Hernia: No hernia is present.   Musculoskeletal:         General: No swelling.      Cervical back: Neck supple.      Right lower leg: No edema.      Left lower leg: No edema.   Skin:     General: Skin is warm and dry.      Capillary Refill: Capillary refill takes less than 2 seconds.      Coloration: Skin is pale.      Findings: Wound present. No rash.   Neurological:      General: No focal deficit present.      Mental Status: She is alert and oriented to person, place, and time.      Motor: No weakness.   Psychiatric:         Mood and Affect: Mood normal.          Lines/Drains:            Lab Results: I have reviewed the following results:  Results from last 7 days   Lab Units 01/08/25  1449   WBC Thousand/uL 8.09   HEMOGLOBIN g/dL 13.5   HEMATOCRIT % 40.1   PLATELETS Thousands/uL 337   SEGS PCT % 81*   LYMPHO PCT % 11*   MONO PCT % 8   EOS PCT % 0     Results from last 7 days   Lab Units 01/08/25  1449   SODIUM mmol/L 134*   POTASSIUM mmol/L 3.8   CHLORIDE mmol/L 98   CO2 mmol/L 21   BUN mg/dL 22   CREATININE mg/dL 0.59*   ANION GAP mmol/L 15*   CALCIUM mg/dL 9.3   ALBUMIN g/dL 3.9   TOTAL BILIRUBIN mg/dL 0.53   ALK PHOS U/L 51   ALT U/L 13   AST U/L 14   GLUCOSE RANDOM mg/dL 154*         Results from last 7 days   Lab Units 01/06/25  0818   POC GLUCOSE mg/dl 125     Lab Results   Component Value Date    HGBA1C 8.3 (H) 11/10/2024     Results from last 7 days   Lab Units 01/08/25  1641   LACTIC ACID mmol/L 1.5       Imaging Results Review: I reviewed radiology reports from this admission including: CT abdomen/pelvis.  Other Study Results Review: No additional pertinent studies reviewed.    Administrative Statements    I have spent a total time of 65 minutes in caring for this patient on the day of the visit/encounter including Instructions for management.    ** Please Note: This note has been constructed using a voice recognition system. **

## 2025-01-09 NOTE — ASSESSMENT & PLAN NOTE
Palliative diagnoses: Rectal cancer     Goals of care  Level 1 code status  Disease focused care w/ no limits in place   Concerns introduced include:  Introduced palliative care and discussed symptom management  Will continue discussions regarding GOC as patient's clinical presentation evolves.  Encouraged follow up with Palliative Medicine on an outpatient basis after discharge for continued symptom management. Our office will contact patient to schedule a hospital follow up.  Patient follows w/ Dr. Alba outpatient. Last saw Dr. Alba on 1/9/24    Social support:  Supportive listening provided  Normalized experience of patient/family  Provided anxiety containment  Provided anticipatory guidance  Encouraged self care  Discussed spiritual needs    Follow up  Palliative Care will continue to follow and goals of care discussions will be ongoing.    Please reach out via [employer] Secure Chat if questions or concerns arise.    Care Coordination  Reviewed case with n/a    PDMP Review: I have reviewed the patient's controlled substance dispensing history in the Prescription Drug Monitoring Program in compliance with the [facility] regulations before prescribing any controlled substances.    Decisional apparatus: Patient does have capacity on exam today. If capacity is lost, patient's substitute decision maker would default to adult children by [place] Act 169.    Advance Directive/Living Will, POLST and POA Forms: None on file     ER contacts:  Name Relation Home Work Mobile   jeff becker Daughter   [phone]   Rocky Becker Son In-Law   [phone]     We appreciate the invitation to be involved in this patient's care.  We will continue to follow throughout this hospitalization.  Please do not hesitate to reach our on call provider through our clinic answering service at [phone] should you have acute symptom control concerns.

## 2025-01-09 NOTE — UTILIZATION REVIEW
Initial Clinical Review    Admission: Date/Time/Statement:   Admission Orders (From admission, onward)       Ordered        01/08/25 1931  INPATIENT ADMISSION  Once                          Orders Placed This Encounter   Procedures    INPATIENT ADMISSION     Standing Status:   Standing     Number of Occurrences:   1     Level of Care:   Med Surg [16]     Estimated length of stay:   More than 2 Midnights     Certification:   I certify that inpatient services are medically necessary for this patient for a duration of greater than two midnights. See H&P and MD Progress Notes for additional information about the patient's course of treatment.     ED Arrival Information       Expected   1/8/2025     Arrival   1/8/2025 14:30    Acuity   Urgent              Means of arrival   Walk-In    Escorted by   Family Member    Service   Hospitalist    Admission type   Emergency              Arrival complaint   Severe abd pain&bloating             Chief Complaint   Patient presents with    Abdominal Pain     Pt had colon surgery 2 weeks ago w/ ostomy placement. From 12/24-1/5 family reports no stool output in the ostomy. Pt was prescribed Miralax. Started having stool output yesterday but pt is having a lot of cramping and abd pain.       Initial Presentation: 74 y.o. female  with a PMH of recently [id number] N2 rectal cancer with communicating ischioanal abscess fistulizing to gluteal skin. Now s/p 12/24  (dc home 12/27)   lap diverting sigmoid end-loop colostomy, I&D left buttock w/ Kerlix packing.  [facility]  Following w/ oncology, Planned for chemotherapy soon s/p port placement in the right IJ on 1/6   Presents to  [place]   ER 1/08/2025   stating  she has been having problems with bowel movements since surgery 12/24/2024    She was only having to change her colostomy bag once a day with only scant amount of brown liquid stool noted.She started to take miralax as prescribed but noted 2 days ago increasing abdominal pain  "to the left side of the colostomy  (not controlled w/oxy)    was seen by the palliative care specialist earlier today and was recommended to come to ER for further evaluation.    IN ER    VSS  Labs notable for Na 134, AG 15   CTAP  large stool burden,  Large locally invasive lower rectal cancer again shown with decrease in the size of fluid collection in the left ischioanal fat.  Small volume ascites in the abdomen and pelvis, new from the prior study.   exam:  oriented x4.  pale.  abd distended, decreased bowel sounds.   Abd tender in periumbilical area - no guarding.   Wound present.         Anticipated Length of Stay/Certification Statement: Patient will be admitted on an inpatient basis,  MS  Level of care with an anticipated length of stay of greater than 2 midnights   for treatment  of  constipation and rectal/abd pain.   Initiate bowel regimen.   Consult colorectal, palliative care .   Provide pain/nausea management.  I/O q shift.  PT/OT eval and treat.  Wound care daily   Accucks qid w/SSI.  OOB as tolerated. Clear liquid diet   IVF LR @  100 cc/hr .       Date: 1/09   Day 2: Pain primarily in the abdomen, around colostomy.   Feels \"intense\"     Unsure if pain medication has been helping     1/9  Palliative Care:   goals of care -  disease focused care w/ no limits in place .  Pain regimen initiated -  Currently on a tiered Oxycodone regimen with PRN IV for breakthrough events       1/9  INTERNAL MED:   Appreciate colorectal surgery input with recommendation of optimization of constipation prophylaxis due to chronic opiate dependence -> currently on a stool softener, laxative, and enema regimen consisting of MiraLAX, Senokot, and mineral oil BID .  Currently on a clear liquid diet        ED Treatment-Medication Administration from 01/08/2025 1430 to 01/08/2025 2115         Date/Time Order Dose Route Action     01/08/2025 1637 HYDROmorphone (DILAUDID) injection 0.5 mg 0.5 mg Intravenous Given     01/08/2025 " 1638 sodium chloride 0.9 % bolus 500 mL 500 mL Intravenous New Bag     01/08/2025 1707 iohexol (OMNIPAQUE) 350 MG/ML injection (MULTI-DOSE) 85 mL 85 mL Intravenous Given     01/08/2025 1958 oxyCODONE (ROXICODONE) IR tablet 5 mg 5 mg Oral Given            Scheduled Medications:  enoxaparin, 40 mg, Subcutaneous, Daily  insulin lispro, 1-5 Units, Subcutaneous, TID AC  mineral oil, 1 enema, Ostomy, BID  polyethylene glycol, 17 g, Oral, BID  senna-docusate sodium, 1 tablet, Oral, BID      Continuous IV Infusions:  lactated ringers, 100 mL/hr, Intravenous, Continuous      PRN Meds:  HYDROmorphone, 0.5 mg, Intravenous, Q4H PRN  naloxone, 0.04 mg, Intravenous, Q1MIN PRN  ondansetron, 4 mg, Intravenous, Q6H PRN  oxyCODONE, 5 mg, Oral, Q4H PRN  oxyCODONE, 2.5 mg, Oral, Q4H PRN      ED Triage Vitals   Temperature Pulse Respirations Blood Pressure SpO2 Pain Score   01/08/25 1446 01/08/25 1446 01/08/25 1446 01/08/25 1446 01/08/25 1446 01/08/25 1637   97.9 °F (36.6 °C) 91 17 123/59 98 % 10 - Worst Possible Pain     Weight (last 2 days)       Date/Time Weight    01/08/25 1446 48.1 (106)            Vital Signs (last 3 days)       Date/Time Temp Pulse Resp BP MAP (mmHg) SpO2 O2 Device Patient Position - Orthostatic VS Pain    01/09/25 1348 -- -- -- -- -- -- -- -- 10 - Worst Possible Pain    01/09/25 1128 -- -- -- -- -- -- None (Room air) -- --    01/09/25 0806 97.8 °F (36.6 °C) 84 18 134/60 87 96 % None (Room air) Lying --    01/09/25 0745 -- -- -- -- -- -- -- -- No Pain    01/08/25 2200 -- -- -- -- -- -- -- -- No Pain    01/08/25 2100 98 °F (36.7 °C) 72 18 104/54 72 96 % None (Room air) Lying --    01/08/25 2056 -- 78 -- 121/58 83 95 % None (Room air) Lying --    01/08/25 1958 -- -- -- -- -- -- -- -- 7    01/08/25 1637 -- -- -- -- -- -- -- -- 10 - Worst Possible Pain    01/08/25 1630 -- 87 -- 163/74 107 99 % -- -- --    01/08/25 1446 97.9 °F (36.6 °C) 91 17 123/59 85 98 % None (Room air) Sitting --              Pertinent  "Labs/Diagnostic Test Results:   Radiology:  CT abdomen pelvis with contrast   Final Interpretation by Gabriela Munroe MD (01/08 1827)      Status post diverting sigmoid loop colostomy with a large amount of stool throughout the entire colon to the level of the ostomy.      Large locally invasive lower rectal cancer again shown with decrease in the size of fluid collection in the left ischioanal fat.      Small volume ascites in the abdomen and pelvis, new from the prior study.      The study was marked in EPIC for immediate notification.         Workstation performed: [id number]           Cardiology:  No orders to display     GI:  No orders to display           Results from last 7 days   Lab Units 01/08/25  1449   WBC Thousand/uL 8.09   HEMOGLOBIN g/dL 13.5   HEMATOCRIT % 40.1   PLATELETS Thousands/uL 337   TOTAL NEUT ABS Thousands/µL 6.49         Results from last 7 days   Lab Units 01/08/25  1449   SODIUM mmol/L 134*   POTASSIUM mmol/L 3.8   CHLORIDE mmol/L 98   CO2 mmol/L 21   ANION GAP mmol/L 15*   BUN mg/dL 22   CREATININE mg/dL 0.59*   EGFR ml/min/1.73sq m 90   CALCIUM mg/dL 9.3     Results from last 7 days   Lab Units 01/08/25  1449   AST U/L 14   ALT U/L 13   ALK PHOS U/L 51   TOTAL PROTEIN g/dL 7.2   ALBUMIN g/dL 3.9   TOTAL BILIRUBIN mg/dL 0.53     Results from last 7 days   Lab Units 01/09/25  1040 01/09/25  0806 01/08/25  2124 01/06/25  0818   POC GLUCOSE mg/dl 181* 100 142* 125     Results from last 7 days   Lab Units 01/08/25  1449   GLUCOSE RANDOM mg/dL 154*             No results found for: \"BETA-HYDROXYBUTYRATE\"                               Results from last 7 days   Lab Units 01/08/25  1449   TSH 3RD GENERATON uIU/mL 2.328         Results from last 7 days   Lab Units 01/08/25  1641   LACTIC ACID mmol/L 1.5                                 Results from last 7 days   Lab Units 01/08/25  1449   LIPASE u/L 22                 Results from last 7 days   Lab Units 01/08/25  1958   CLARITY UA  Clear "   COLOR UA  Light Yellow   SPEC GRAV UA  >=1.050*   PH UA  6.0   GLUCOSE UA mg/dl Negative   KETONES UA mg/dl 150 (4+)*   BLOOD UA  Moderate*   PROTEIN UA mg/dl 30 (1+)*   NITRITE UA  Negative   BILIRUBIN UA  Negative   UROBILINOGEN UA (BE) mg/dl <2.0   LEUKOCYTES UA  Trace*   WBC UA /hpf 20-30*   RBC UA /hpf 4-10*   BACTERIA UA /hpf None Seen   EPITHELIAL CELLS WET PREP /hpf None Seen                                                   Past Medical History:   Diagnosis Date    Diabetes mellitus (HCC)      Present on Admission:   Constipation   Rectal cancer (HCC)   Perirectal abscess      Admitting Diagnosis: Rectal cancer (HCC) [C20]  Abdominal pain [R10.9]  Constipation [K59.00]  S/P colostomy (HCC) [Z93.3]  Age/Sex: 74 y.o. female    Network Utilization Review Department  ATTENTION: Please call with any questions or concerns to [phone] and carefully listen to the prompts so that you are directed to the right person. All voicemails are confidential.   For Discharge needs, contact Care Management DC Support Team at [phone] opt. 2  Send all requests for admission clinical reviews, approved or denied determinations and any other requests to dedicated fax number below belonging to the campus where the patient is receiving treatment. List of dedicated fax numbers for the Facilities:  FACILITY NAME UR FAX NUMBER   ADMISSION DENIALS (Administrative/Medical Necessity) [phone]   DISCHARGE SUPPORT TEAM (NETWORK) [phone]   PARENT CHILD HEALTH (Maternity/NICU/Pediatrics) [phone]   [facility] [phone]   [facility] [phone]   Critical access hospital [phone]   [facility] [phone]   [facility] [phone]   [facility] [phone]   [facility] [phone]   [facility] [phone]    [facility] [phone]   [facility] [phone]   [facility] [phone]   [facility] [phone]          No

## 2025-01-09 NOTE — PLAN OF CARE
Problem: PAIN - ADULT  Goal: Verbalizes/displays adequate comfort level or baseline comfort level  Description: Interventions:  - Encourage patient to monitor pain and request assistance  - Assess pain using appropriate pain scale  - Administer analgesics based on type and severity of pain and evaluate response  - Implement non-pharmacological measures as appropriate and evaluate response  - Consider cultural and social influences on pain and pain management  - Notify physician/advanced practitioner if interventions unsuccessful or patient reports new pain  Outcome: Progressing     Problem: INFECTION - ADULT  Goal: Absence or prevention of progression during hospitalization  Description: INTERVENTIONS:  - Assess and monitor for signs and symptoms of infection  - Monitor lab/diagnostic results  - Monitor all insertion sites, i.e. indwelling lines, tubes, and drains  - Monitor endotracheal if appropriate and nasal secretions for changes in amount and color  - [place] appropriate cooling/warming therapies per order  - Administer medications as ordered  - Instruct and encourage patient and family to use good hand hygiene technique  - Identify and instruct in appropriate isolation precautions for identified infection/condition  Outcome: Progressing  Goal: Absence of fever/infection during neutropenic period  Description: INTERVENTIONS:  - Monitor WBC    Outcome: Progressing     Problem: SAFETY ADULT  Goal: Patient will remain free of falls  Description: INTERVENTIONS:  - Educate patient/family on patient safety including physical limitations  - Instruct patient to call for assistance with activity   - Consult OT/PT to assist with strengthening/mobility   - Keep Call bell within reach  - Keep bed low and locked with side rails adjusted as appropriate  - Keep care items and personal belongings within reach  - Initiate and maintain comfort rounds  - Make Fall Risk Sign visible to staff  - Offer Toileting every 2 Hours,  in advance of need  - Initiate/Maintain bed alarm  - Obtain necessary fall risk management equipment  - Apply yellow socks and bracelet for high fall risk patients  - Consider moving patient to room near nurses station  Outcome: Progressing  Goal: Maintain or return to baseline ADL function  Description: INTERVENTIONS:  -  Assess patient's ability to carry out ADLs; assess patient's baseline for ADL function and identify physical deficits which impact ability to perform ADLs (bathing, care of mouth/teeth, toileting, grooming, dressing, etc.)  - Assess/evaluate cause of self-care deficits   - Assess range of motion  - Assess patient's mobility; develop plan if impaired  - Assess patient's need for assistive devices and provide as appropriate  - Encourage maximum independence but intervene and supervise when necessary  - Involve family in performance of ADLs  - Assess for home care needs following discharge   - Consider OT consult to assist with ADL evaluation and planning for discharge  - Provide patient education as appropriate  Outcome: Progressing  Goal: Maintains/Returns to pre admission functional level  Description: INTERVENTIONS:  - Perform AM-PAC 6 Click Basic Mobility/ Daily Activity assessment daily.  - Set and communicate daily mobility goal to care team and patient/family/caregiver.   - Collaborate with rehabilitation services on mobility goals if consulted  - Perform Range of Motion 4 times a day.  - Reposition patient every 2 hours.  - Dangle patient 3 times a day  - Stand patient 3 times a day  - Ambulate patient 3 times a day  - Out of bed to chair 3 times a day   - Out of bed for meals 3 times a day  - Out of bed for toileting  - Record patient progress and toleration of activity level   Outcome: Progressing     Problem: DISCHARGE PLANNING  Goal: Discharge to home or other facility with appropriate resources  Description: INTERVENTIONS:  - Identify barriers to discharge w/patient and caregiver  -  Arrange for needed discharge resources and transportation as appropriate  - Identify discharge learning needs (meds, wound care, etc.)  - Arrange for interpretive services to assist at discharge as needed  - Refer to Case Management Department for coordinating discharge planning if the patient needs post-hospital services based on physician/advanced practitioner order or complex needs related to functional status, cognitive ability, or social support system  Outcome: Progressing     Problem: Knowledge Deficit  Goal: Patient/family/caregiver demonstrates understanding of disease process, treatment plan, medications, and discharge instructions  Description: Complete learning assessment and assess knowledge base.  Interventions:  - Provide teaching at level of understanding  - Provide teaching via preferred learning methods  Outcome: Progressing     Problem: Prexisting or High Potential for Compromised Skin Integrity  Goal: Skin integrity is maintained or improved  Description: INTERVENTIONS:  - Identify patients at risk for skin breakdown  - Assess and monitor skin integrity  - Assess and monitor nutrition and hydration status  - Monitor labs   - Assess for incontinence   - Turn and reposition patient  - Assist with mobility/ambulation  - Relieve pressure over bony prominences  - Avoid friction and shearing  - Provide appropriate hygiene as needed including keeping skin clean and dry  - Evaluate need for skin moisturizer/barrier cream  - Collaborate with interdisciplinary team   - Patient/family teaching  - Consider wound care consult   Outcome: Progressing

## 2025-01-09 NOTE — ASSESSMENT & PLAN NOTE
Currently on a tiered Oxycodone regimen with PRN IV for breakthrough events  Optimize bowel prophylaxis due to presenting issue  Palliative care input to be appreciated

## 2025-01-09 NOTE — PLAN OF CARE
Problem: PAIN - ADULT  Goal: Verbalizes/displays adequate comfort level or baseline comfort level  Description: Interventions:  - Encourage patient to monitor pain and request assistance  - Assess pain using appropriate pain scale  - Administer analgesics based on type and severity of pain and evaluate response  - Implement non-pharmacological measures as appropriate and evaluate response  - Consider cultural and social influences on pain and pain management  - Notify physician/advanced practitioner if interventions unsuccessful or patient reports new pain  Outcome: Progressing     Problem: INFECTION - ADULT  Goal: Absence or prevention of progression during hospitalization  Description: INTERVENTIONS:  - Assess and monitor for signs and symptoms of infection  - Monitor lab/diagnostic results  - Monitor all insertion sites, i.e. indwelling lines, tubes, and drains  - Monitor endotracheal if appropriate and nasal secretions for changes in amount and color  - [place] appropriate cooling/warming therapies per order  - Administer medications as ordered  - Instruct and encourage patient and family to use good hand hygiene technique  - Identify and instruct in appropriate isolation precautions for identified infection/condition  Outcome: Progressing  Goal: Absence of fever/infection during neutropenic period  Description: INTERVENTIONS:  - Monitor WBC    Outcome: Progressing     Problem: SAFETY ADULT  Goal: Patient will remain free of falls  Description: INTERVENTIONS:  - Educate patient/family on patient safety including physical limitations  - Instruct patient to call for assistance with activity   - Consult OT/PT to assist with strengthening/mobility   - Keep Call bell within reach  - Keep bed low and locked with side rails adjusted as appropriate  - Keep care items and personal belongings within reach  - Initiate and maintain comfort rounds  - Make Fall Risk Sign visible to staff  - Apply yellow socks and bracelet  for high fall risk patients  - Consider moving patient to room near nurses station  Outcome: Progressing  Goal: Maintain or return to baseline ADL function  Description: INTERVENTIONS:  -  Assess patient's ability to carry out ADLs; assess patient's baseline for ADL function and identify physical deficits which impact ability to perform ADLs (bathing, care of mouth/teeth, toileting, grooming, dressing, etc.)  - Assess/evaluate cause of self-care deficits   - Assess range of motion  - Assess patient's mobility; develop plan if impaired  - Assess patient's need for assistive devices and provide as appropriate  - Encourage maximum independence but intervene and supervise when necessary  - Involve family in performance of ADLs  - Assess for home care needs following discharge   - Consider OT consult to assist with ADL evaluation and planning for discharge  - Provide patient education as appropriate  Outcome: Progressing  Goal: Maintains/Returns to pre admission functional level  Description: INTERVENTIONS:  - Perform AM-PAC 6 Click Basic Mobility/ Daily Activity assessment daily.  - Set and communicate daily mobility goal to care team and patient/family/caregiver.   - Collaborate with rehabilitation services on mobility goals if consulted  - Out of bed for toileting  - Record patient progress and toleration of activity level   Outcome: Progressing     Problem: DISCHARGE PLANNING  Goal: Discharge to home or other facility with appropriate resources  Description: INTERVENTIONS:  - Identify barriers to discharge w/patient and caregiver  - Arrange for needed discharge resources and transportation as appropriate  - Identify discharge learning needs (meds, wound care, etc.)  - Arrange for interpretive services to assist at discharge as needed  - Refer to Case Management Department for coordinating discharge planning if the patient needs post-hospital services based on physician/advanced practitioner order or complex needs  related to functional status, cognitive ability, or social support system  Outcome: Progressing     Problem: Knowledge Deficit  Goal: Patient/family/caregiver demonstrates understanding of disease process, treatment plan, medications, and discharge instructions  Description: Complete learning assessment and assess knowledge base.  Interventions:  - Provide teaching at level of understanding  - Provide teaching via preferred learning methods  Outcome: Progressing

## 2025-01-09 NOTE — ASSESSMENT & PLAN NOTE
74 yof with constipation. Hx rectal cancer with communicating ischioanal abscess. S/p 12/24/24 laparoscopic diverting sigmoid loop colostomy with abscess I&D by Dr. Bermudez.     Afebrile, normal vitals  WBC 8, LA 1.5    1/8/25 CT showing extensive stool burden with new small volume ascites.     Plan:  - No acute surgical intervention  - Diet as tolerated   - Recommend mineral oil enemas BID via proximal ostomy  - Continue bowel regimen   - Monitor ostomy output   - Wound care to gluteal I&D site, nursing orders placed   - Pain/nausea control as needed  - Maximize non-opioid analgesia   - Lovenox for DVT ppx  - Remainder of care per primary

## 2025-01-09 NOTE — ASSESSMENT & PLAN NOTE
"Presented with abdominal pain and decreased subjective stool frequency  CT of abdomen/pelvis noting: \"Status post diverting sigmoid loop colostomy with a large amount of stool throughout the entire colon to the level of the ostomy.\"  Appreciate colorectal surgery input with recommendation of optimization of constipation prophylaxis due to chronic opiate dependence -> currently on a stool softener, laxative, and enema regimen consisting of MiraLAX, Senokot, and mineral oil BID   Currently on a clear liquid diet  Will appreciate palliative care input for augmentation of cancer related pain medications in the setting of constipation  "

## 2025-01-09 NOTE — CONSULTS
"Consultation - Palliative Care   Name: Miriam Smith 74 y.o. female I MRN: [id number]  Unit/Bed#: -01 I Date of Admission: 1/8/2025   Date of Service: 1/9/2025 I Hospital Day: 1   Inpatient consult to Palliative Care  Consult performed by: CHIP Junior  Consult ordered by: Rosalba Torres MD        Physician Requesting Evaluation: Rosalba Torres MD   Reason for Evaluation / Principal Problem: Cancer related pain    Assessment & Plan  Constipation  Presented to the ED for decreased bowel movements and worsening abdominal pain  CT A/P revealed \"large stool burden\"  Surgery team following  Recommend bowel regimen   Rectal cancer (HCC)  Dx 9/2024  Stage T4b N2  Noted to have rectal wall invasion w/ communicating ischioanal abscess  S/p I&D  Following w/ oncology, planned for chemotherapy   S/P colostomy (HCC)  S/p lap diverting sigmoid end-loop colostomy 12/24/24  Palliative care encounter  Palliative diagnoses: Rectal cancer     Goals of care  Level 1 code status  Disease focused care w/ no limits in place   Concerns introduced include:  Introduced palliative care and discussed symptom management  Will continue discussions regarding GOC as patient's clinical presentation evolves.  Encouraged follow up with Palliative Medicine on an outpatient basis after discharge for continued symptom management. Our office will contact patient to schedule a hospital follow up.  Patient follows w/ Dr. Alba outpatient. Last saw Dr. Alba on 1/9/24    Social support:  Supportive listening provided  Normalized experience of patient/family  Provided anxiety containment  Provided anticipatory guidance  Encouraged self care  Discussed spiritual needs    Follow up  Palliative Care will continue to follow and goals of care discussions will be ongoing.    Please reach out via [email or web address] Secure Chat if questions or concerns arise.    Care Coordination  Reviewed case with n/a    PDMP Review: I have reviewed the patient's " "controlled substance dispensing history in the Prescription Drug Monitoring Program in compliance with the [facility] regulations before prescribing any controlled substances.    Decisional apparatus: Patient does have capacity on exam today. If capacity is lost, patient's substitute decision maker would default to adult children by [place] Act 169.    Advance Directive/Living Will, POLST and POA Forms: None on file     ER contacts:  Name Relation Home Work Mobile   jeff becker Daughter   [phone]   Rocky Becker Son In-Law   [phone]     We appreciate the invitation to be involved in this patient's care.  We will continue to follow throughout this hospitalization.  Please do not hesitate to reach our on call provider through our clinic answering service at [phone] should you have acute symptom control concerns.    Cancer related pain  Current Pain Regimen:  OxyIR 2.5-5 mg Q4hrs PRN for moderate/severe pain  IV Dilaudid 0.5 mg Q4hrs PRN for BTP or unable to take PO  Narcan for safety   Other Symptom Management:  Zofran 4 mg Q6hrs PRN for n/v     Pain primarily in the abdomen, around colostomy.   Feels \"intense\"  Unsure if pain medication has been helping     History of Present Illness   HPI: Miriam Smith is a 74 y.o. year old female w/ a PMHx of recently dx rectal cancer who presented to the [facility] ED on 1/8/25 after being recommended to go to the hospital during her outpatient palliative office visit to have her worsening abdominal pain evaluated. Per chart review, patient started having bowel issues after her surgery on 12/24. She was having less bowel movements and increasing abdominal pain. She was taking Oxycodone at home which was not helping the pain. Patient is known to the palliative clinic, follows w/ Dr. Alba outpatient. Palliative was consulted for pain management.     Chart reviewed prior to visit, ERICKEO  Patient lying in bed, appears comfortable, NAD  Daughter at bedside, declined " "[facility]   Daughter providing translation during conversation  Patient stated that her pain is primarily around her colostomy, pain is severe  Unsure if pain medication is helping or not, encouraged to use PRN pain medication which she is in agreement with  Provided education on pain medication   Feels that pain is \"intense\"   Answered all questions/concerns     Review of Systems   Gastrointestinal:  Positive for abdominal pain and constipation.   All other systems reviewed and are negative.    Objective :  Temp:  [97.3 °F (36.3 °C)-98 °F (36.7 °C)] 97.8 °F (36.6 °C)  HR:  [72-91] 84  BP: (104-163)/(52-74) 134/60  Resp:  [17-18] 18  SpO2:  [95 %-99 %] 96 %  O2 Device: None (Room air)    Physical Exam  Constitutional:       General: She is awake. She is not in acute distress.     Appearance: She is not ill-appearing.   HENT:      Head: Normocephalic and atraumatic.      Mouth/Throat:      Mouth: Mucous membranes are moist.   Eyes:      Pupils: Pupils are equal, round, and reactive to light.   Cardiovascular:      Rate and Rhythm: Normal rate.   Pulmonary:      Effort: Pulmonary effort is normal. No respiratory distress.   Abdominal:      General: There is distension.      Tenderness: There is abdominal tenderness.   Skin:     General: Skin is warm and dry.   Neurological:      General: No focal deficit present.      Mental Status: She is alert. Mental status is at baseline.          Lab Results: I have reviewed the following results:  Lab Results   Component Value Date/Time    SODIUM 134 (L) 01/08/2025 02:49 PM    K 3.8 01/08/2025 02:49 PM    BUN 22 01/08/2025 02:49 PM    CREATININE 0.59 (L) 01/08/2025 02:49 PM    GLUC 154 (H) 01/08/2025 02:49 PM    CALCIUM 9.3 01/08/2025 02:49 PM    AST 14 01/08/2025 02:49 PM    ALT 13 01/08/2025 02:49 PM    ALB 3.9 01/08/2025 02:49 PM    TP 7.2 01/08/2025 02:49 PM    EGFR 90 01/08/2025 02:49 PM     Lab Results   Component Value Date/Time    HGB 13.5 01/08/2025 02:49 PM    " WBC 8.09 01/08/2025 02:49 PM     01/08/2025 02:49 PM    INR 1.17 11/12/2024 04:36 AM     Lab Results   Component Value Date/Time    PJR1NWPZTACC 2.328 01/08/2025 02:49 PM     Administrative Statements   I have spent a total time of 30 minutes in caring for this patient on the day of the visit/encounter including Risks and benefits of tx options, Instructions for management, Patient and family education, Risk factor reductions, Counseling / Coordination of care, Documenting in the medical record, Reviewing / ordering tests, medicine, procedures  , and Obtaining or reviewing history  . Topics discussed with the patient / family include symptom assessment and management, medication review, medication adjustment, psychosocial support, supportive listening, and anticipatory guidance.    hCeri Cortes

## 2025-01-09 NOTE — ASSESSMENT & PLAN NOTE
"Current Pain Regimen:  OxyIR 2.5-5 mg Q4hrs PRN for moderate/severe pain  IV Dilaudid 0.5 mg Q4hrs PRN for BTP or unable to take PO  Narcan for safety   Other Symptom Management:  Zofran 4 mg Q6hrs PRN for n/v     Pain primarily in the abdomen, around colostomy.   Feels \"intense\"  Unsure if pain medication has been helping   "

## 2025-01-09 NOTE — ASSESSMENT & PLAN NOTE
Initially diagnosed 9/2024, stage T4b N2  Also noted to have 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, s/p I&D  Planned for chemotherapy soon s/p port placement in the right IJ on 1/6  Outpatient oncology follow-up  PRN pain control  Supportive care otherwise  Await palliative care input

## 2025-01-09 NOTE — ASSESSMENT & PLAN NOTE
74 yof with constipation. Hx rectal cancer with communicating ischioanal abscess. S/p 12/24/24 laparoscopic diverting sigmoid loop colostomy with abscess I&D by Dr. Bermudez.     1/8/25 CT showing extensive stool burden with new small volume ascites.     Plan:  - No acute surgical intervention  - Diet as tolerated   - Continue mineral oil enemas/bowel regimen   - Monitor ostomy output   - Wound care to gluteal I&D site  - Pain/nausea control as needed  - Maximize non-opioid analgesia   - Lovenox for DVT ppx  - Remainder of care per primary

## 2025-01-10 LAB
BACTERIA UR CULT: NORMAL
GLUCOSE SERPL-MCNC: 101 MG/DL (ref 65–140)
GLUCOSE SERPL-MCNC: 118 MG/DL (ref 65–140)
GLUCOSE SERPL-MCNC: 121 MG/DL (ref 65–140)

## 2025-01-10 PROCEDURE — 97163 PT EVAL HIGH COMPLEX 45 MIN: CPT

## 2025-01-10 PROCEDURE — 82948 REAGENT STRIP/BLOOD GLUCOSE: CPT

## 2025-01-10 PROCEDURE — 97535 SELF CARE MNGMENT TRAINING: CPT

## 2025-01-10 PROCEDURE — 99233 SBSQ HOSP IP/OBS HIGH 50: CPT

## 2025-01-10 PROCEDURE — 97167 OT EVAL HIGH COMPLEX 60 MIN: CPT

## 2025-01-10 PROCEDURE — 99232 SBSQ HOSP IP/OBS MODERATE 35: CPT | Performed by: INTERNAL MEDICINE

## 2025-01-10 RX ADMIN — SODIUM CHLORIDE, SODIUM LACTATE, POTASSIUM CHLORIDE, AND CALCIUM CHLORIDE 100 ML/HR: .6; .31; .03; .02 INJECTION, SOLUTION INTRAVENOUS at 23:05

## 2025-01-10 RX ADMIN — POLYETHYLENE GLYCOL 3350 17 G: 17 POWDER, FOR SOLUTION ORAL at 22:40

## 2025-01-10 RX ADMIN — SENNOSIDES AND DOCUSATE SODIUM 1 TABLET: 50; 8.6 TABLET ORAL at 17:22

## 2025-01-10 RX ADMIN — MINERAL OIL 1 ENEMA: 118 ENEMA RECTAL at 22:40

## 2025-01-10 RX ADMIN — SENNOSIDES AND DOCUSATE SODIUM 1 TABLET: 50; 8.6 TABLET ORAL at 11:10

## 2025-01-10 RX ADMIN — Medication 2.5 MG: at 14:17

## 2025-01-10 RX ADMIN — POLYETHYLENE GLYCOL 3350 17 G: 17 POWDER, FOR SOLUTION ORAL at 11:10

## 2025-01-10 RX ADMIN — ENOXAPARIN SODIUM 40 MG: 40 INJECTION SUBCUTANEOUS at 11:11

## 2025-01-10 RX ADMIN — MINERAL OIL 1 ENEMA: 118 ENEMA RECTAL at 11:10

## 2025-01-10 RX ADMIN — SODIUM CHLORIDE, SODIUM LACTATE, POTASSIUM CHLORIDE, AND CALCIUM CHLORIDE 100 ML/HR: .6; .31; .03; .02 INJECTION, SOLUTION INTRAVENOUS at 12:15

## 2025-01-10 NOTE — PROGRESS NOTES
"Progress Note - Palliative Care   Name: Miriam Smith 74 y.o. female I MRN: [id number]  Unit/Bed#: S -01 I Date of Admission: 1/8/2025   Date of Service: 1/10/2025 I Hospital Day: 2    Assessment & Plan  Constipation  Presented to the ED for decreased bowel movements and worsening abdominal pain  CT A/P revealed \"large stool burden\"  Surgery team following  Recommend bowel regimen   Rectal cancer (HCC)  Dx 9/2024  Stage T4b N2  Noted to have rectal wall invasion w/ communicating ischioanal abscess  S/p I&D  Following w/ oncology, planned for chemotherapy   S/P colostomy (HCC)  S/p lap diverting sigmoid end-loop colostomy 12/24/24  Palliative care encounter  Palliative diagnoses: Rectal cancer     Goals of care  Level 1 code status  Disease focused care w/ no limits in place   Concerns introduced include:  Introduced palliative care and discussed symptom management  Will continue discussions regarding GOC as patient's clinical presentation evolves.  Encouraged follow up with Palliative Medicine on an outpatient basis after discharge for continued symptom management. Our office will contact patient to schedule a hospital follow up.  Patient follows w/ Dr. Alba outpatient. Last saw Dr. Alba on 1/9/24    Social support:  Supportive listening provided  Normalized experience of patient/family  Provided anxiety containment  Provided anticipatory guidance  Encouraged self care  Discussed spiritual needs    Follow up  Palliative Care will continue to follow and goals of care discussions will be ongoing.    Please reach out via [employer] Secure Chat if questions or concerns arise.    Care Coordination  Reviewed case with n/a    PDMP Review: I have reviewed the patient's controlled substance dispensing history in the Prescription Drug Monitoring Program in compliance with the [place] regulations before prescribing any controlled substances.    Decisional apparatus: Patient does have capacity on exam today. If capacity " "is lost, patient's substitute decision maker would default to adult children by [place] Act 169.    Advance Directive/Living Will, POLST and POA Forms: None on file     ER contacts:  Name Relation Home Work Mobile   jeff becker Daughter   [phone]   Rocky Becker Son In-Law   [phone]     We appreciate the invitation to be involved in this patient's care.  We will continue to follow throughout this hospitalization.  Please do not hesitate to reach our on call provider through our clinic answering service at [phone] should you have acute symptom control concerns.    Cancer related pain  Current Pain Regimen:  OxyIR 2.5-5 mg Q4hrs PRN for moderate/severe pain  IV Dilaudid 0.5 mg Q4hrs PRN for BTP or unable to take PO  Narcan for safety   Other Symptom Management:  Zofran 4 mg Q6hrs PRN for n/v     OME's in the past 24 hours: 15 mg    Pain primarily in the abdomen, around colostomy.   Feels \"intense\"  Unsure if pain medication has been helping     Subjective   Chart reviewed prior to visit, JG  Patient OOB to chair, appears comfortable, NAD  DiscountIF  used: Monica #[id number]  Patient has been well controlled  Pain improved overnight, rating pain as 2-3/10  Continues to have ostomy output   Pain medication working well   No needs at this time  Answered all questions/concerns    Objective :  Temp:  [97.6 °F (36.4 °C)-99 °F (37.2 °C)] 99 °F (37.2 °C)  HR:  [65-81] 81  BP: (116-124)/(53-64) 124/53  Resp:  [16-18] 18  SpO2:  [93 %-95 %] 93 %  O2 Device: Nasal cannula    Physical Exam  Constitutional:       General: She is awake. She is not in acute distress.  HENT:      Head: Normocephalic and atraumatic.      Mouth/Throat:      Mouth: Mucous membranes are moist.   Eyes:      Pupils: Pupils are equal, round, and reactive to light.   Cardiovascular:      Rate and Rhythm: Normal rate.   Pulmonary:      Effort: Pulmonary effort is normal. No respiratory distress.   Skin:     General: Skin is " warm and dry.   Neurological:      General: No focal deficit present.      Mental Status: She is alert and oriented to person, place, and time.          Lab Results: I have reviewed the following results:  Lab Results   Component Value Date/Time    SODIUM 134 (L) 01/08/2025 02:49 PM    K 3.8 01/08/2025 02:49 PM    BUN 22 01/08/2025 02:49 PM    CREATININE 0.59 (L) 01/08/2025 02:49 PM    GLUC 154 (H) 01/08/2025 02:49 PM    CALCIUM 9.3 01/08/2025 02:49 PM    AST 14 01/08/2025 02:49 PM    ALT 13 01/08/2025 02:49 PM    ALB 3.9 01/08/2025 02:49 PM    TP 7.2 01/08/2025 02:49 PM    EGFR 90 01/08/2025 02:49 PM     Lab Results   Component Value Date/Time    HGB 13.5 01/08/2025 02:49 PM    WBC 8.09 01/08/2025 02:49 PM     01/08/2025 02:49 PM    INR 1.17 11/12/2024 04:36 AM     Lab Results   Component Value Date/Time    YLW2ETRAVXRM 2.328 01/08/2025 02:49 PM     Administrative Statements   I have spent a total time of 15 minutes in caring for this patient on the day of the visit/encounter including Patient and family education, Counseling / Coordination of care, Documenting in the medical record, Reviewing / ordering tests, medicine, procedures  , and Obtaining or reviewing history  . Topics discussed with the patient / family include symptom assessment and management, medication review, psychosocial support, supportive listening, and anticipatory guidance.    Cheri Cortes

## 2025-01-10 NOTE — PROGRESS NOTES
"Progress Note - Hospitalist   Name: Miriam Smith 74 y.o. female I MRN: [id number]  Unit/Bed#: S -01 I Date of Admission: 1/8/2025   Date of Service: 1/10/2025 I Hospital Day: 2    Assessment & Plan  Constipation  Presented with abdominal pain and decreased subjective stool frequency  CT of abdomen/pelvis noting: \"Status post diverting sigmoid loop colostomy with a large amount of stool throughout the entire colon to the level of the ostomy.\"  Appreciate colorectal surgery input with recommendation of optimization of constipation prophylaxis due to chronic opiate dependence -> bowel movements improved currently on a stool softener, laxative, and enema regimen consisting of MiraLAX, Senokot, and mineral oil BID   Diet advanced to surgical soft -> if continues to have optimal bowel movements on advanced diet with current constipation regimen, anticipate discharge tomorrow  Appreciate palliative care input for augmentation of cancer related pain medications in the setting of constipation  Rectal cancer (HCC)  Initially diagnosed 9/2024, stage T4b N2  Also noted to have 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, s/p I&D  Planned for chemotherapy soon s/p port placement in the right IJ on 1/6  Outpatient oncology follow-up  PRN pain control  Supportive care otherwise  Palliative care following  Cancer related pain  Currently on a tiered Oxycodone regimen with PRN IV for breakthrough events  Optimize bowel prophylaxis due to presenting issue  Palliative care follow-up  S/P colostomy (HCC)  S/p laparoscopic diverting sigmoid end-loop colostomy last month  Routine ostomy care  Colorectal surgery input appreciated      VTE Pharmacologic Prophylaxis: VTE Score: 4 Moderate Risk (Score 3-4) - Pharmacological DVT Prophylaxis Ordered: Enoxaparin (Lovenox).    AM-PAC Basic Mobility:  Basic Mobility Inpatient Raw Score: 15  -[facility] Goal: 4: Move to chair/commode  -[facility] Achieved: 7: Walk 25 feet or " more  [facility]-HLM Goal NOT achieved. Continue with multidisciplinary rounding and encourage appropriate mobility to improve upon [facility]-HLM goals.    Patient Centered Rounds:  I have performed bedside rounds and discussed plan of care with nursing today.  Discussions with Specialists or Other Care Team Provider:  see above assessments if applicable    Education and Discussions with Family / Patient:  Patient at bedside, along with son-in-law, Rcoky, over the phone    Time Spent for Care:  35 minutes. More than 50% of total time spent on counseling and coordination of care, on one or more of the following: performing physical exam; counseling and coordination of care, obtaining or reviewing history, documenting in the medical record, reviewing/ordering tests/medications/procedures, and communicating with other healthcare professionals.    Current Length of Stay: 2 day(s)  Current Patient Status: Inpatient   Certification Statement:  Patient will continue to require additional hospital stay due to assessments as noted above.    Code Status: Level 1 - Full Code      Subjective     Seen and examined earlier in the day.  Sitting upright in a chair.  States pain has generally improved and she is having more soft/formed bowel movements through ostomy tube now.      Objective     Vitals:   Temp (24hrs), Av.2 °F (36.8 °C), Min:97.7 °F (36.5 °C), Max:99 °F (37.2 °C)    Temp:  [97.7 °F (36.5 °C)-99 °F (37.2 °C)] 98.3 °F (36.8 °C)  HR:  [65-81] 74  Resp:  [16-18] 18  BP: (116-124)/(53-64) 124/57  SpO2:  [93 %-96 %] 96 %  Body mass index is 20.7 kg/m².     Input and Output Summary (last 24 hours):       Intake/Output Summary (Last 24 hours) at 1/10/2025 1509  Last data filed at 1/10/2025 1436  Gross per 24 hour   Intake 657 ml   Output 1450 ml   Net -793 ml       Physical Exam:     GENERAL Weak/fatigued - improved distress from pain today   HEAD   Normocephalic - atraumatic   EYES Nonicteric   MOUTH   Mucosa moist   NECK   Supple  - full range of motion   CARDIAC Rate controlled   PULMONARY Clear bilateral breath sounds   ABDOMEN Improved bilateral lower quadrant tenderness to palpation - ostomy in place with soft/formed stools   MUSCULOSKELETAL   Motor strength/range of motion mildly deconditioned   NEUROLOGIC   Alert/oriented at baseline   PSYCHIATRIC   Mood/affect pleasant today         Labs & Recent Cultures:  Results from last 7 days   Lab Units 01/08/25  1449   WBC Thousand/uL 8.09   HEMOGLOBIN g/dL 13.5   HEMATOCRIT % 40.1   PLATELETS Thousands/uL 337   SEGS PCT % 81*   LYMPHO PCT % 11*   MONO PCT % 8   EOS PCT % 0     Results from last 7 days   Lab Units 01/08/25  1449   POTASSIUM mmol/L 3.8   CHLORIDE mmol/L 98   CO2 mmol/L 21   BUN mg/dL 22   CREATININE mg/dL 0.59*   CALCIUM mg/dL 9.3   ALK PHOS U/L 51   ALT U/L 13   AST U/L 14         Results from last 7 days   Lab Units 01/10/25  1047 01/10/25  0709 01/09/25  2113 01/09/25  1809 01/09/25  1635 01/09/25  1040 01/09/25  0806 01/08/25  2124 01/06/25  0818   POC GLUCOSE mg/dl 121 101 202* 114 156* 181* 100 142* 125         Results from last 7 days   Lab Units 01/08/25  1641   LACTIC ACID mmol/L 1.5         Results from last 7 days   Lab Units 01/08/25  1958   URINE CULTURE  30,000-39,000 cfu/ml         Lines/Drains/Telemetry:  Invasive Devices       Peripheral Intravenous Line  Duration             Peripheral IV 01/10/25 Proximal;Right;Ventral (anterior) Forearm <1 day              Drain  Duration             Colostomy LLQ 17 days                    Last 24 Hours Medication List:   Current Facility-Administered Medications   Medication Dose Route Frequency Provider Last Rate    enoxaparin  40 mg Subcutaneous Daily Meli Wright MD      HYDROmorphone  0.5 mg Intravenous Q4H PRN CHIP Junior      insulin lispro  1-5 Units Subcutaneous TID AC Meli Wright MD      lactated ringers  100 mL/hr Intravenous Continuous Meli Wright  mL/hr (01/10/25 1215)     mineral oil  1 enema Ostomy BID Meli Wright MD      naloxone  0.04 mg Intravenous Q1MIN PRN CHIP Junior      ondansetron  4 mg Intravenous Q6H PRN Meli Wright MD      oxyCODONE  5 mg Oral Q4H PRN CHIP Junior      oxyCODONE  2.5 mg Oral Q4H PRN CHIP Junior      polyethylene glycol  17 g Oral BID Meli Wright MD      senna-docusate sodium  1 tablet Oral BID MD DUC Moreno MD   Hospitalist - [facility]        ** Please Note:  Documentation is constructed using a voice recognition dictation system.  An occasional wrong word/phrase or “sound-a-like” substitution may have been picked up by dictation device due to the inherent limitations of voice recognition software.  Read the chart carefully and recognize, using reasonable context, where substitutions may have occurred.**

## 2025-01-10 NOTE — ASSESSMENT & PLAN NOTE
Palliative diagnoses: Rectal cancer     Goals of care  Level 1 code status  Disease focused care w/ no limits in place   Concerns introduced include:  Introduced palliative care and discussed symptom management  Will continue discussions regarding GOC as patient's clinical presentation evolves.  Encouraged follow up with Palliative Medicine on an outpatient basis after discharge for continued symptom management. Our office will contact patient to schedule a hospital follow up.  Patient follows w/ Dr. Alba outpatient. Last saw Dr. Alba on 1/9/24    Social support:  Supportive listening provided  Normalized experience of patient/family  Provided anxiety containment  Provided anticipatory guidance  Encouraged self care  Discussed spiritual needs    Follow up  Palliative Care will continue to follow and goals of care discussions will be ongoing.    Please reach out via [employer] Secure Chat if questions or concerns arise.    Care Coordination  Reviewed case with n/a    PDMP Review: I have reviewed the patient's controlled substance dispensing history in the Prescription Drug Monitoring Program in compliance with the [facility] regulations before prescribing any controlled substances.    Decisional apparatus: Patient does have capacity on exam today. If capacity is lost, patient's substitute decision maker would default to adult children by [place] 169.    Advance Directive/Living Will, POLST and POA Forms: None on file     ER contacts:  Name Relation Home Work Mobile   jeff becker Daughter   [phone]   Rocky Becker Son In-Law   [phone]     We appreciate the invitation to be involved in this patient's care.  We will continue to follow throughout this hospitalization.  Please do not hesitate to reach our on call provider through our clinic answering service at [phone] should you have acute symptom control concerns.

## 2025-01-10 NOTE — ASSESSMENT & PLAN NOTE
"Current Pain Regimen:  OxyIR 2.5-5 mg Q4hrs PRN for moderate/severe pain  IV Dilaudid 0.5 mg Q4hrs PRN for BTP or unable to take PO  Narcan for safety   Other Symptom Management:  Zofran 4 mg Q6hrs PRN for n/v     OME's in the past 24 hours: 15 mg    Pain primarily in the abdomen, around colostomy.   Feels \"intense\"  Unsure if pain medication has been helping   "

## 2025-01-10 NOTE — PLAN OF CARE
Problem: PAIN - ADULT  Goal: Verbalizes/displays adequate comfort level or baseline comfort level  Description: Interventions:  - Encourage patient to monitor pain and request assistance  - Assess pain using appropriate pain scale  - Administer analgesics based on type and severity of pain and evaluate response  - Implement non-pharmacological measures as appropriate and evaluate response  - Consider cultural and social influences on pain and pain management  - Notify physician/advanced practitioner if interventions unsuccessful or patient reports new pain  Outcome: Progressing     Problem: INFECTION - ADULT  Goal: Absence or prevention of progression during hospitalization  Description: INTERVENTIONS:  - Assess and monitor for signs and symptoms of infection  - Monitor lab/diagnostic results  - Monitor all insertion sites, i.e. indwelling lines, tubes, and drains  - Monitor endotracheal if appropriate and nasal secretions for changes in amount and color  - [place] appropriate cooling/warming therapies per order  - Administer medications as ordered  - Instruct and encourage patient and family to use good hand hygiene technique  - Identify and instruct in appropriate isolation precautions for identified infection/condition  Outcome: Progressing  Goal: Absence of fever/infection during neutropenic period  Description: INTERVENTIONS:  - Monitor WBC    Outcome: Progressing     Problem: SAFETY ADULT  Goal: Patient will remain free of falls  Description: INTERVENTIONS:  - Educate patient/family on patient safety including physical limitations  - Instruct patient to call for assistance with activity   - Consult OT/PT to assist with strengthening/mobility   - Keep Call bell within reach  - Keep bed low and locked with side rails adjusted as appropriate  - Keep care items and personal belongings within reach  - Initiate and maintain comfort rounds  - Make Fall Risk Sign visible to staff  - Offer Toileting every 2 Hours,  in advance of need  - Initiate/Maintain bed/chair alarm  - Obtain necessary fall risk management equipment  - Apply yellow socks and bracelet for high fall risk patients  - Consider moving patient to room near nurses station  Outcome: Progressing  Goal: Maintain or return to baseline ADL function  Description: INTERVENTIONS:  -  Assess patient's ability to carry out ADLs; assess patient's baseline for ADL function and identify physical deficits which impact ability to perform ADLs (bathing, care of mouth/teeth, toileting, grooming, dressing, etc.)  - Assess/evaluate cause of self-care deficits   - Assess range of motion  - Assess patient's mobility; develop plan if impaired  - Assess patient's need for assistive devices and provide as appropriate  - Encourage maximum independence but intervene and supervise when necessary  - Involve family in performance of ADLs  - Assess for home care needs following discharge   - Consider OT consult to assist with ADL evaluation and planning for discharge  - Provide patient education as appropriate  Outcome: Progressing  Goal: Maintains/Returns to pre admission functional level  Description: INTERVENTIONS:  - Perform AM-PAC 6 Click Basic Mobility/ Daily Activity assessment daily.  - Set and communicate daily mobility goal to care team and patient/family/caregiver.   - Collaborate with rehabilitation services on mobility goals if consulted  - Perform Range of Motion 3 times a day.  - Reposition patient every 2 hours.  - Dangle patient 3 times a day  - Stand patient 3 times a day  - Ambulate patient 3 times a day  - Out of bed to chair 3 times a day   - Out of bed for meals 3 times a day  - Out of bed for toileting  - Record patient progress and toleration of activity level   Outcome: Progressing     Problem: DISCHARGE PLANNING  Goal: Discharge to home or other facility with appropriate resources  Description: INTERVENTIONS:  - Identify barriers to discharge w/patient and  caregiver  - Arrange for needed discharge resources and transportation as appropriate  - Identify discharge learning needs (meds, wound care, etc.)  - Arrange for interpretive services to assist at discharge as needed  - Refer to Case Management Department for coordinating discharge planning if the patient needs post-hospital services based on physician/advanced practitioner order or complex needs related to functional status, cognitive ability, or social support system  Outcome: Progressing     Problem: Knowledge Deficit  Goal: Patient/family/caregiver demonstrates understanding of disease process, treatment plan, medications, and discharge instructions  Description: Complete learning assessment and assess knowledge base.  Interventions:  - Provide teaching at level of understanding  - Provide teaching via preferred learning methods  Outcome: Progressing     Problem: Prexisting or High Potential for Compromised Skin Integrity  Goal: Skin integrity is maintained or improved  Description: INTERVENTIONS:  - Identify patients at risk for skin breakdown  - Assess and monitor skin integrity  - Assess and monitor nutrition and hydration status  - Monitor labs   - Assess for incontinence   - Turn and reposition patient  - Assist with mobility/ambulation  - Relieve pressure over bony prominences  - Avoid friction and shearing  - Provide appropriate hygiene as needed including keeping skin clean and dry  - Evaluate need for skin moisturizer/barrier cream  - Collaborate with interdisciplinary team   - Patient/family teaching  - Consider wound care consult   Outcome: Progressing

## 2025-01-10 NOTE — PLAN OF CARE
Problem: OCCUPATIONAL THERAPY ADULT  Goal: Performs self-care activities at highest level of function for planned discharge setting.  See evaluation for individualized goals.  Description: Treatment Interventions: ADL retraining, Functional transfer training, Endurance training, Equipment evaluation/education, Compensatory technique education, Energy conservation, Activityengagement          See flowsheet documentation for full assessment, interventions and recommendations.   Note: Limitation: Decreased ADL status, Decreased endurance, Decreased self-care trans, Decreased high-level ADLs (activity tolerance, pain)  Prognosis: Good  Assessment: Patient is a 74 y.o. female seen for OT evaluation and treatment  following admission on 1/8/2025  s/p Constipation. Please see above for comprehensive list of comorbidities and significant PMHx impacting functional performance. Upon initial evaluation, pt appears to be performing below baseline functional status. Pt requires ARLIN for UB ADLs, supervision for LB ADLs, ARLIN for bed mobility, supervision for transfers and supervision for functional mobility household distance with RW. The AM-PAC & Barthel Index outcome tools were used to assist in determining pt safety w/self care /mobility and appropriate d/c recommendations, see above for score. Occupational performance is affected by the following deficits: decreased dynamic balance impacting functional reach, decreased activity tolerance , and (+) pain . Personal/Environmental factors impacting D/C include: steps to enter/navigate the home, Assistance needed for ADLs and functional mobility, and High fall risk . Supporting factors include: able to maintain FFSU, support system available, 24/7 supervision available , and attitude towards recovery . Patient would benefit from OT services within the acute care setting to maximize level of functional independence in the following areas fall prevention , self-care transfers,  bed mobility , functional mobility, and ADLs.  From OT standpoint, recommendation at time of D/C would be Level III (Minimum Resource Intensity) vs no needs pending progression towards therapy goals.     Rehab Resource Intensity Level, OT: III (Minimum Resource Intensity) (vs no needs pending progression towards therapy goals)     Madina Lowe MS OTR/L   [place] Licensure# [id number]

## 2025-01-10 NOTE — ASSESSMENT & PLAN NOTE
"Presented with abdominal pain and decreased subjective stool frequency  CT of abdomen/pelvis noting: \"Status post diverting sigmoid loop colostomy with a large amount of stool throughout the entire colon to the level of the ostomy.\"  Appreciate colorectal surgery input with recommendation of optimization of constipation prophylaxis due to chronic opiate dependence -> bowel movements improved currently on a stool softener, laxative, and enema regimen consisting of MiraLAX, Senokot, and mineral oil BID   Diet advanced to surgical soft -> if continues to have optimal bowel movements on advanced diet with current constipation regimen, anticipate discharge tomorrow  Appreciate palliative care input for augmentation of cancer related pain medications in the setting of constipation  "

## 2025-01-10 NOTE — ASSESSMENT & PLAN NOTE
Currently on a tiered Oxycodone regimen with PRN IV for breakthrough events  Optimize bowel prophylaxis due to presenting issue  Palliative care follow-up

## 2025-01-10 NOTE — ASSESSMENT & PLAN NOTE
Initially diagnosed 9/2024, stage T4b N2  Also noted to have 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, s/p I&D  Planned for chemotherapy soon s/p port placement in the right IJ on 1/6  Outpatient oncology follow-up  PRN pain control  Supportive care otherwise  Palliative care following

## 2025-01-10 NOTE — UTILIZATION REVIEW
Continued Stay Review    Date: 1/10/25                          Current Patient Class: Inpatient    Current Level of Care: med surg    HPI:74 y.o. female w/ communicating ischioanal abscess and diverting sigmoid loop colostomy placed 12/24/24 initially admitted on 1/8/25 due to abdominal pain & constipation in setting of invasive rectal cancer.     Assessment/Plan:   1/8: started /hr  colorectal consult: Dx: constipation; extensive stool burden w/new ascites. Cited 3 days of worsening abdominal pain with decreasing ostomy output. Tried miralax/dulcolax with partial results, but has persistent pain. Decreased appetite x 1 day. Uses daily narcotics for cancer related pain. Exam w/distended abd & generalized abd tenderness. Ostomy productive, mix of firm stool balls with liquid brown stool. Last changed appliance prior to ED arrival. L gluteal wound w/granulation tissue and seropurulent drainage.   No surgical intervention indicated, needs bid mineral oil enema via proximal ostomy, continue bowel regimen, diet, has gluteal I&D site wound, provide analgesics as needed, monitor ostomy output.     1/9 palliative care consult: c/o intense margot colostomy pain, unrelieved by pain meds. Abdomen distended and tender. decision on goals of care is needed. Has been having decreased bowel output and increased abdominal pain despite home oxycodone since 12/24 surgery. Pain regimen oxyIR 2.5-5mg q4hprn mod/severe pain; IV dilaudid q4h prn for breakthrough pain or if unable to tolerate PO, narcan prn. Prn zofran for nausea.     1/9 attending: c/o generalized bilateral lower abdominal pain and tenderness. continue miralax, senokot, mineral oil enemas, clear liquids, follow palliative care analgesic recommendations. Remains on /hr.     1/10: deconditioned, persistent pain 2-3/10 after using 4 doses PO oxycodone since yesterday afternoon. Abdomen remains with BLE tenderness to palp. Bid mineral oil enema continue via ostomy.  Miralax/senna in progress. Cites increased soft/formed bowel movements through ostomy. Urine culture came back as mixed contaminants. Palliative care continues to follow. LR continues at 100/hr.   PT evaluation in progress, patient is not at baseline mobility. Has impaired balance, decreased mobility, poor ambulation. Cane/walker recommended.     Medications:   Scheduled Medications:  enoxaparin, 40 mg, Subcutaneous, Daily  insulin lispro, 1-5 Units, Subcutaneous, TID AC  mineral oil, 1 enema, Ostomy, BID  polyethylene glycol, 17 g, Oral, BID  senna-docusate sodium, 1 tablet, Oral, BID      Continuous IV Infusions:  lactated ringers, 100 mL/hr, Intravenous, Continuous      PRN Meds:  HYDROmorphone, 0.5 mg, Intravenous, Q4H PRN  naloxone, 0.04 mg, Intravenous, Q1MIN PRN  ondansetron, 4 mg, Intravenous, Q6H PRN  oxyCODONE, 5 mg, Oral, Q4H PRN x 1 1/9  oxyCODONE, 2.5 mg, Oral, Q4H PRN x 2 1/9, x 1 1/10      Discharge Plan: TBD    Vital Signs (last 3 days)      Date/Time Temp Pulse Resp BP MAP (mmHg) SpO2 O2 Device Patient Position - Orthostatic VS   01/10/25 14:37:01 98.3 °F (36.8 °C) 74 18 124/57 79 96 % -- --      Date/Time Temp Pulse Resp BP MAP (mmHg) SpO2 O2 Device Patient Position - Orthostatic VS Pain    01/10/25 0816 -- -- -- -- -- -- -- -- 2    01/10/25 07:37:46 99 °F (37.2 °C) 81 18 124/53 77 93 % -- -- --    01/09/25 22:01:43 97.9 °F (36.6 °C) 68 18 122/57 79 93 % -- -- --    01/09/25 2147 -- -- -- -- -- -- -- -- 6    01/09/25 2100 -- -- -- -- -- -- -- -- 6    01/09/25 1742 -- -- -- -- -- -- Nasal cannula -- --    01/09/25 1733 -- -- -- -- -- -- -- -- 3    01/09/25 17:25:05 97.7 °F (36.5 °C) 65 16 116/64 81 95 % -- -- --    01/09/25 1500 97.6 °F (36.4 °C) 71 18 118/57 82 94 % None (Room air) Lying --    01/09/25 1348 -- -- -- -- -- -- -- -- 10 - Worst Possible Pain    01/09/25 1128 -- -- -- -- -- -- None (Room air) -- --    01/09/25 0806 97.8 °F (36.6 °C) 84 18 134/60 87 96 % None (Room air) Lying --     01/09/25 0745 -- -- -- -- -- -- -- -- No Pain    01/08/25 2200 -- -- -- -- -- -- -- -- No Pain    01/08/25 2100 98 °F (36.7 °C) 72 18 104/54 72 96 % None (Room air) Lying --    01/08/25 2056 -- 78 -- 121/58 83 95 % None (Room air) Lying --    01/08/25 1958 -- -- -- -- -- -- -- -- 7    01/08/25 1637 -- -- -- -- -- -- -- -- 10 - Worst Possible Pain    01/08/25 1630 -- 87 -- 163/74 107 99 % -- -- --    01/08/25 1446 97.9 °F (36.6 °C) 91 17 123/59 85 98 % None (Room air) Sitting --          Weight (last 2 days)       Date/Time Weight    01/09/25 1742 48.1 (106)    01/08/25 1446 48.1 (106)            Pertinent Labs/Diagnostic Results:   Radiology:  CT abdomen pelvis with contrast   Final Interpretation by Gabriela Munroe MD (01/08 1827)      Status post diverting sigmoid loop colostomy with a large amount of stool throughout the entire colon to the level of the ostomy.      Large locally invasive lower rectal cancer again shown with decrease in the size of fluid collection in the left ischioanal fat.      Small volume ascites in the abdomen and pelvis, new from the prior study.      The study was marked in EPIC for immediate notification.         Workstation performed: [id number]               Results from last 7 days   Lab Units 01/08/25  1449   WBC Thousand/uL 8.09   HEMOGLOBIN g/dL 13.5   HEMATOCRIT % 40.1   PLATELETS Thousands/uL 337   TOTAL NEUT ABS Thousands/µL 6.49         Results from last 7 days   Lab Units 01/08/25  1449   SODIUM mmol/L 134*   POTASSIUM mmol/L 3.8   CHLORIDE mmol/L 98   CO2 mmol/L 21   ANION GAP mmol/L 15*   BUN mg/dL 22   CREATININE mg/dL 0.59*   EGFR ml/min/1.73sq m 90   CALCIUM mg/dL 9.3     Results from last 7 days   Lab Units 01/08/25  1449   AST U/L 14   ALT U/L 13   ALK PHOS U/L 51   TOTAL PROTEIN g/dL 7.2   ALBUMIN g/dL 3.9   TOTAL BILIRUBIN mg/dL 0.53     Results from last 7 days   Lab Units 01/10/25  1047 01/10/25  0709 01/09/25  2113 01/09/25  1809 01/09/25  1635 01/09/25  1040  01/09/25  0806 01/08/25  2124 01/06/25  0818   POC GLUCOSE mg/dl 121 101 202* 114 156* 181* 100 142* 125     Results from last 7 days   Lab Units 01/08/25  1449   GLUCOSE RANDOM mg/dL 154*           Results from last 7 days   Lab Units 01/08/25  1449   TSH 3RD GENERATON uIU/mL 2.328         Results from last 7 days   Lab Units 01/08/25  1641   LACTIC ACID mmol/L 1.5     Results from last 7 days   Lab Units 01/08/25  1449   LIPASE u/L 22       Results from last 7 days   Lab Units 01/08/25  1958   CLARITY UA  Clear   COLOR UA  Light Yellow   SPEC GRAV UA  >=1.050*   PH UA  6.0   GLUCOSE UA mg/dl Negative   KETONES UA mg/dl 150 (4+)*   BLOOD UA  Moderate*   PROTEIN UA mg/dl 30 (1+)*   NITRITE UA  Negative   BILIRUBIN UA  Negative   UROBILINOGEN UA (BE) mg/dl <2.0   LEUKOCYTES UA  Trace*   WBC UA /hpf 20-30*   RBC UA /hpf 4-10*   BACTERIA UA /hpf None Seen   EPITHELIAL CELLS WET PREP /hpf None Seen       Results from last 7 days   Lab Units 01/08/25 1958   URINE CULTURE  30,000-39,000 cfu/ml       Network Utilization Review Department  ATTENTION: Please call with any questions or concerns to [phone] and carefully listen to the prompts so that you are directed to the right person. All voicemails are confidential.   For Discharge needs, contact Care Management DC Support Team at [phone] opt. 2  Send all requests for admission clinical reviews, approved or denied determinations and any other requests to dedicated fax number below belonging to the campus where the patient is receiving treatment. List of dedicated fax numbers for the Facilities:  FACILITY NAME UR FAX NUMBER   ADMISSION DENIALS (Administrative/Medical Necessity) [phone]   DISCHARGE SUPPORT TEAM (NETWORK) [phone]   PARENT CHILD HEALTH (Maternity/NICU/Pediatrics) [phone]   [facility] [phone]   [facility] [phone]   Critical access hospital [phone]   [facility]  [facility] [phone]   [facility] [phone]   [facility] [phone]   [facility] [phone]   [facility] [phone]   [facility] [phone]   [facility] [phone]   [facility] [phone]   [facility] [phone]

## 2025-01-10 NOTE — PHYSICAL THERAPY NOTE
PHYSICAL THERAPY EVALUATION  NAME:  Miriam Smith  DATE: 01/10/25    AGE:   74 y.o.  Mrn:   [id number]  Principal problem: Principal Problem:    Constipation  Active Problems:    Rectal cancer (HCC)    Perirectal abscess    S/P colostomy (HCC)    Palliative care encounter    Cancer related pain      Vitals:    01/09/25 2201 01/10/25 0737 01/10/25 0816 01/10/25 1437   BP: 122/57 124/53  124/57   BP Location:       Pulse: 68 81  74   Resp: 18 18  18   Temp: 97.9 °F (36.6 °C) 99 °F (37.2 °C)  98.3 °F (36.8 °C)   TempSrc:       SpO2: 93% 93% 94% 96%   Weight:       Height:           Length Of Stay: 2  Performed at least 2 patient identifiers during session: Name and Epic photo  PHYSICAL THERAPY EVALUATION :    01/10/25 1352   PT Last Visit   PT Visit Date 01/10/25  ([employer] [place] interpretter [id number])   Note Type   Note type Evaluation   Pain Assessment   Pain Assessment Tool 0-10   Pain Score 2   Pain Location/Orientation Location: Abdomen   Effect of Pain on Daily Activities limits speed and indep of mobility, requested pain medication   Patient's Stated Pain Goal No pain   Hospital Pain Intervention(s) Repositioned;Ambulation/increased activity;Emotional support  (messaged RN, but pt wished to continue w/ her mobility)   Multiple Pain Sites No   Restrictions/Precautions   Weight Bearing Precautions Per Order No   Other Precautions Chair Alarm;Bed Alarm;Multiple lines;Fall Risk;Pain   Home Living   Type of Home House   Home Layout Two level  (2 steps)   Home Equipment Walker;Grab bars   Additional Comments At baseline pt is independent with fnxl mobility, most recently has been ambulating w/ RW   Prior Function   Level of [place] Independent with ADLs;Independent with functional mobility   Lives With Family   Receives Help From Family   IADLs   (daughter has been assisting with ADLs since sx)   Falls in the last 6 months 0   Comments At true baseline pt is fully independent, has needed increased help  since sx on 12/24 - family is very supportive.   General   Family/Caregiver Present No   Cognition   Overall Cognitive Status WFL   Arousal/Participation Alert   Attention Within functional limits   Following Commands Follows one step commands with increased time or repetition  (likely limited by [place] interpretter)   Subjective   Subjective Pt pleasant and cooperative.  Agreeable to participate despite having 2 out of 10 pain.  Denies lightheadedness.  Requested to mobilize without walker   Strength RLE   R Hip Flexion 4-/5  (Submaximal overpressure due to anticipated pain)   R Knee Extension 4/5   R Ankle Dorsiflexion 4/5   Strength LLE   L Hip Flexion 4-/5  (Submaximal overpressure due to anticipated pain)   L Knee Extension 4/5   L Ankle Dorsiflexion 4/5   Light Touch   RLE Light Touch Grossly intact  (As per patient)   LLE Light Touch Grossly intact  (As per patient)   Bed Mobility   Supine to Sit   (NT as pt in recliner upon entering room)   Transfers   Sit to Stand 5  Supervision   Additional items Increased time required;Armrests  (S for line management)   Stand to Sit 5  Supervision   Additional items Increased time required;Verbal cues;Armrests  (S for line management)   Ambulation/Elevation   Gait pattern Step through pattern  (guarded initially without limited reciprocal arm swing, but improvements in speed with increased distance)   Gait Assistance 5  Supervision   Assistive Device None   Distance 200'   Stair Management Assistance Not tested  (Pt reports that she has family A for stairs all of the time)   Ambulation/Elevation Additional Comments Pt was able to perform head turning up/down and R /L w/o uncorrected losses of balance.   Balance   Static Sitting Good   Static Standing Fair -   Ambulatory Poor +   Endurance Deficit   Endurance Deficit Yes  (limited mobility compared to pt's baseline)   Activity Tolerance   Activity Tolerance Patient limited by fatigue;Patient limited by pain   Medical  Staff Made Aware spoke to Madina from OT, case management   Nurse Made Aware spoke to RN     Assessment:   Pt is a 74 y.o. female who arrived at [facility] on 1/8/2025 w/ Constipation. However pt recently had laparoscopic diverting sigmoid and loop colostomy from 12/24/2024 along with history of rectal cancer with communicating issue anal abscess.   Order placed for PT.      Prior to admission: Pt lived w/family in 2 story home, and typically does not use DME--most recently pt has needed a walker for amb and A on stairs.  Upon evaluation: Pt needed only S for transfers and amb w/o device including amb in halls.     Pt's clinical presentation is currently unstable/unpredictable given the functional mobility deficits above, especially (but not limited to) gait deviations and pain, and combined with medical complications including readmission to hospital and abnormal sodium values.  Pt IS NOT at her TRUE mobility baseline.        During this admission, pt MAY benefit from continued skilled inpatient PT in the acute care setting in order to address deficits as defined above to maximize function and mobility.      Recommendations:    From a PT standpoint, recommend next several sessions focus on continued mobility trials to promote consistency of mobility javon with ambulation and for stairs.     Prognosis Fair   Problem List Impaired balance;Decreased mobility;Pain  (gait deviations)   Barriers to Discharge   (steps)   Goals   Patient Goals less pain, go home and be able to cook again, do my normal routine   STG Expiration Date 01/20/25   Short Term Goal #1 Goals: Pt will: Perform rolling  and supine<>sit bed mobility tasks with modified I to prepare for transfers and reposition in bed. Perform transfers with modified I to promote proper hand placement and approach. Perform ambulation with out device for up to 50' with Supervision to increase Indep in home environment.Perform ambulation with LRAD for at least 150'  with Supervision to increase Indep in community. Perform at least 2 stairs w/ railing +/- DME and w/Supervision to return to home with [place]. Perform full 4 point DGI to >9/12 to demonstrate less risk for falls.   PT Treatment Day 1   Plan   Treatment/Interventions Functional transfer training;LE strengthening/ROM;Therapeutic exercise;Endurance training;Cognitive reorientation;Patient/family training;Equipment eval/education;Bed mobility;Gait training;Elevations;Spoke to nursing;Spoke to case management   PT Frequency 3-5x/wk   Discharge Recommendation   Rehab Resource Intensity Level, PT III (Minimum Resource Intensity)   Equipment Recommended Cane;Walker   Walker Package Recommended   (has both at home, may need PRN)   -[facility] Basic Mobility Inpatient   Turning in Flat Bed Without Bedrails 3   Lying on Back to Sitting on Edge of Flat Bed Without Bedrails 3   Moving Bed to Chair 3   Standing Up From Chair Using Arms 3   Walk in Room 3   Climb 3-5 Stairs With Railing 2   Basic Mobility Inpatient Raw Score 17   Basic Mobility Standardized Score 39.67   [facility] Level Of Mobility   -HL Goal 5: Stand one or more mins   -HL Achieved 7: Walk 25 feet or more   End of Consult   Patient Position at End of Consult All needs within reach;Bed/Chair alarm activated;Bedside chair   (Please find full objective findings from PT assessment regarding body systems outlined above).     The patient's [facility] Basic Mobility Inpatient Short Form Raw Score is 17. A Raw score of greater than 16 suggests the patient may benefit from discharge to home, which DOES coincide with CURRENT above PT recommendations javon as pt has family support upon DC     However please refer to therapist recommendation for discharge planning given other factors that may influence destination.     Adapted from Phuc TAYLOR, Francesca MURRAY, Jo MURRAY, Reza MURRAY. Association of -[facility] “6-Clicks” Basic Mobility and Daily Activity Scores With Discharge  "Destination. Physical Therapy, 2021;101:1-9. DOI: 10.1093/[email or web address]    Portions of the record may have been created with voice recognition software.  Occasional wrong word or \"sound a like\" substitutions may have occurred due to the inherent limitations of voice recognition software.  Read the chart carefully and recognize, using context, where substitutions have occurred    "

## 2025-01-10 NOTE — PLAN OF CARE
Problem: PHYSICAL THERAPY ADULT  Goal: Performs mobility at highest level of function for planned discharge setting.  See evaluation for individualized goals.  Description: Treatment/Interventions: Functional transfer training, LE strengthening/ROM, Therapeutic exercise, Endurance training, Cognitive reorientation, Patient/family training, Equipment eval/education, Bed mobility, Gait training, Elevations, Spoke to nursing, Spoke to case management  Equipment Recommended: Cane, Walker       See flowsheet documentation for full assessment, interventions and recommendations.  Note: Prognosis: Fair  Problem List: Impaired balance, Decreased mobility, Pain (gait deviations)  Assessment: Pt is a 74 y.o. female who arrived at [facility] on 1/8/[facility]25 w/ Constipation. However pt recently had laparoscopic diverting sigmoid and loop colostomy from 12/24/2024 along with history of rectal cancer with communicating issue anal abscess.   Order placed for PT.      Prior to admission: Pt lived w/family in 2 story home, and typically does not use DME--most recently pt has needed a walker for amb and A on stairs.  Upon evaluation: Pt needed only S for transfers and amb w/o device including amb in halls.     Pt's clinical presentation is currently unstable/unpredictable given the functional mobility deficits above, especially (but not limited to) gait deviations and pain, and combined with medical complications including readmission to hospital and abnormal sodium values.  Pt IS NOT at her TRUE mobility baseline.        During this admission, pt MAY benefit from continued skilled inpatient PT in the acute care setting in order to address deficits as defined above to maximize function and mobility.      Recommendations:     From a PT standpoint, recommend next several sessions focus on continued mobility trials to promote consistency of mobility javon with ambulation and for stairs.  Barriers to Discharge:  (steps)     Rehab  Resource Intensity Level, PT: III (Minimum Resource Intensity)    See flowsheet documentation for full assessment.

## 2025-01-10 NOTE — ASSESSMENT & PLAN NOTE
S/p laparoscopic diverting sigmoid end-loop colostomy last month  Routine ostomy care  Colorectal surgery input appreciated

## 2025-01-10 NOTE — OCCUPATIONAL THERAPY NOTE
Occupational Therapy Evaluation + Treatment     Patient Name: Miriam Smith  Today's Date: 1/10/2025  Problem List  Principal Problem:    Constipation  Active Problems:    Rectal cancer (HCC)    Perirectal abscess    S/P colostomy (HCC)    Palliative care encounter    Cancer related pain    Past Medical History  Past Medical History:   Diagnosis Date    Diabetes mellitus (HCC)      Past Surgical History  Past Surgical History:   Procedure Laterality Date    COLONOSCOPY      ILEOSTOMY N/A 12/24/2024    Procedure: LAPAROSCOPIC DIVERTING sigmoid end-loop colostomy;  Surgeon: SAI Bermudez MD;  Location: [facility];  Service: Colorectal    INCISION AND DRAINAGE OF WOUND N/A 12/24/2024    Procedure: INCISION AND DRAINAGE (I&D) BUTTOCK abscess;  Surgeon: SAI Bermudez MD;  Location: [facility];  Service: Colorectal    IR PORT PLACEMENT  1/6/2025           01/10/25 0816   OT Last Visit   OT Visit Date 01/10/25   Note Type   Note type Evaluation  (+ treatment)   Pain Assessment   Pain Assessment Tool 0-10   Pain Score 2   Pain Location/Orientation Orientation: Left;Location: Abdomen   Pain Radiating Towards non radiating   Pain Onset/Description Onset: Ongoing;Frequency: Constant/Continuous   Effect of Pain on Daily Activities comfort, activity tolerance, positioning   Patient's Stated Pain Goal No pain   Hospital Pain Intervention(s) Repositioned;Ambulation/increased activity;Emotional support   Multiple Pain Sites No   Restrictions/Precautions   Weight Bearing Precautions Per Order No   Other Precautions Chair Alarm;Bed Alarm;Multiple lines;Fall Risk;Pain  (Turkish Speaking use of  : S/p lap diverting sigmoid end-loop colostomy 12/24/24, sacral wound, colostomy)   Home Living   Type of Home House   Home Layout Two level   Bathroom Shower/Tub Tub/shower unit   Bathroom Toilet Standard   Bathroom Equipment Grab bars in shower;Shower chair   Bathroom Accessibility Accessible   Home Equipment  "Walker;Grab bars   Additional Comments At baseline pt is independent with fnxl mobility, most recently has been ambulating w/ RW   Prior Function   Level of [place] Independent with ADLs;Independent with functional mobility  (daughter has been assisting with ADLs since sx)   Lives With Family  (NED, daughter and grandchildren)   Receives Help From Family   IADLs Independent with medication management;Family/Friend/Other provides transportation  (independent at baseline, family has been assisting since sx)   Falls in the last 6 months 0   Vocational Retired   Comments At true baseline pt is fully independent, has needed increased help since sx on 12/24 - family is very supportive.   Lifestyle   Autonomy Pt lives w/ family, maintains a FFSU. Independent with ADLs, assist with IADLs and ARLIN for fnxl mobility w/ use of AD. (-)  and (-) falls   Reciprocal Relationships Supportive family, has 24 hour support / assistance if needed   Service to Others retired   General   Additional Pertinent History Pt presented to the ED 1/8/2025  after being recommended to go to the hospital during her outpatient palliative office visit to have her worsening abdominal pain evaluated    PMHx of recently dx rectal cancer, S/p lap diverting sigmoid end-loop colostomy 12/24/24   Family/Caregiver Present No   Subjective   Subjective \"I am okay\"   ADL   Eating Assistance 7  Independent   Grooming Assistance 7  Independent   UB Bathing Assistance 6  Modified Independent   LB Bathing Assistance 5  Supervision/Setup   UB Dressing Assistance 6  Modified independent   LB Dressing Assistance 5  Supervision/Setup   LB Dressing Deficit Don/doff R sock;Don/doff L sock  (donned in a supine position)   Toileting Assistance  5  Supervision/Setup   Additional Comments The above levels of assistance are anticipated based on functional performance deficits with use of clinical judgement.   Bed Mobility   Supine to Sit 6  Modified independent "   Additional items HOB elevated   Additional Comments Denies lightheadedness/dizziness w/ postural changes. Good static sitting balance displayed at the EOB.   Transfers   Sit to Stand 5  Supervision   Additional items Increased time required;Verbal cues   Stand to Sit 5  Supervision   Additional items Increased time required;Verbal cues   Additional Comments Use of RW. No overt LOB or signs of instability   Functional Mobility   Functional Mobility 5  Supervision   Additional Comments for functional household distance w/ use of RW. Tolerated well.   Additional items Rolling walker   Balance   Static Sitting Fair +   Dynamic Sitting Fair +   Static Standing Fair -  (w/ RW)   Dynamic Standing Fair -   Activity Tolerance   Activity Tolerance Patient limited by pain   Medical Staff Made Aware Spoke with CM, PT   Nurse Made Aware Spoke with RN pre/post   RUE Assessment   RUE Assessment WFL   LUE Assessment   LUE Assessment WFL   Hand Function   Gross Motor Coordination Functional   Fine Motor Coordination Functional   Cognition   Overall Cognitive Status WFL   Arousal/Participation Alert;Responsive;Cooperative   Attention Within functional limits   Orientation Level Oriented X4   Memory Within functional limits   Following Commands Follows all commands and directions without difficulty   Comments Pt ID via wristband, name and . Pt is very pleasent and cooperative. Use of .   Assessment   Limitation Decreased ADL status;Decreased endurance;Decreased self-care trans;Decreased high-level ADLs  (activity tolerance, pain)   Prognosis Good   Assessment Patient is a 74 y.o. female seen for OT evaluation and treatment  following admission on 2025  s/p Constipation. Please see above for comprehensive list of comorbidities and significant PMHx impacting functional performance. Upon initial evaluation, pt appears to be performing below baseline functional status. Pt requires ARLIN for UB ADLs, supervision  for LB ADLs, ARLIN for bed mobility, supervision for transfers and supervision for functional mobility household distance with RW. The AM-PAC & Barthel Index outcome tools were used to assist in determining pt safety w/self care /mobility and appropriate d/c recommendations, see above for score. Occupational performance is affected by the following deficits: decreased dynamic balance impacting functional reach, decreased activity tolerance , and (+) pain . Personal/Environmental factors impacting D/C include: steps to enter/navigate the home, Assistance needed for ADLs and functional mobility, and High fall risk . Supporting factors include: able to maintain FFSU, support system available, 24/7 supervision available , and attitude towards recovery . Patient would benefit from OT services within the acute care setting to maximize level of functional independence in the following areas fall prevention , self-care transfers, bed mobility , functional mobility, and ADLs.  From OT standpoint, recommendation at time of D/C would be Level III (Minimum Resource Intensity) vs no needs pending progression towards therapy goals.   Goals   Patient Goals to have less pain and go home   LTG Time Frame 10-14   Long Term Goal See below   Plan   Treatment Interventions ADL retraining;Functional transfer training;Endurance training;Equipment evaluation/education;Compensatory technique education;Energy conservation;Activityengagement   Goal Expiration Date 01/20/25   OT Treatment Day 1   OT Frequency 1-2x/wk   Discharge Recommendation   Rehab Resource Intensity Level, OT III (Minimum Resource Intensity)  (vs no needs pending progression towards therapy goals)   AM-PAC Daily Activity Inpatient   Lower Body Dressing 3   Bathing 3   Toileting 3   Upper Body Dressing 4   Grooming 4   Eating 4   Daily Activity Raw Score 21   Daily Activity Standardized Score (Calc for Raw Score >=11) 44.27   AM-PAC Applied Cognition Inpatient   Following a  Speech/Presentation 4   Understanding Ordinary Conversation 4   Taking Medications 4   Remembering Where Things Are Placed or Put Away 4   Remembering List of 4-5 Errands 4   Taking Care of Complicated Tasks 4   Applied Cognition Raw Score 24   Applied Cognition Standardized Score 62.21   Barthel Index   Feeding 10   Bathing 0   Grooming Score 5   Dressing Score 10   Bladder Score 10   Bowels Score 10   Toilet Use Score 10   Transfers (Bed/Chair) Score 10   Mobility (Level Surface) Score 10   Stairs Score 5   Barthel Index Score 80   Additional Treatment Session   Start Time 0827   End Time 0837   Treatment Assessment Pt participated in tx session following IE for ADL training and activity tolerance. Patient completed ambulating transfer to<>from standard toilet w/ supervision. ARLIN for pericare completed in sitting. ~2 mins of static standing at the sink with fair - balance for completion of hand washing task. Functional community distance ambulated w/ use of RW + supervision. At this time, patient continues to be limited by pain, decreased activity tolerance and decreased standing tolerance. Pt remained sitting in the recliner chair at the end of the session with alarm donned and all needs in reach. At this time, pt will benefit from continued skilled OT to address functional performance deficits and optimize independence in mobility and ADL tasks. Will continue to assess 1-2x/week.   End of Consult   Education Provided Yes   Patient Position at End of Consult Bedside chair;Bed/Chair alarm activated;All needs within reach   Nurse Communication Nurse aware of consult     GOALS:      -Patient will perform grooming tasks standing at sink with overall Mod I in order to increase overall independence     -Patient will be Mod I with LB dressing with use of AE and AD as needed in order to increase (I) with ADLs     -Patient will be Mod I with LB bathing with use of AE and AD as needed in order to increase (I) with  ADLs    -Patient will complete toileting w/ Mod I w/ G hygiene/thoroughness in order to reduce caregiver burden     -Patient will perform functional transfers with Mod I to/from all surfaces using DME as needed in order to increase (I) with functional tasks     -Patient will be Mod I with functional mobility to/from bathroom for increased independence with toileting tasks     -Patient will tolerate therapeutic activities for greater than 30 min, in order to increase tolerance for functional activities.      -Patient will demonstrate standing for 6-8 mins in order to increase active participation in functional activities    The patient's raw score on the AM-PAC Daily Activity Inpatient Short Form is 21. A raw score of greater than or equal to 19 suggests the patient may benefit from discharge to home. Please refer to the recommendation of the Occupational Therapist for safe discharge planning.    Madina Lowe MS OTR/L   [place] Licensure# [id number]

## 2025-01-10 NOTE — PLAN OF CARE
Problem: PAIN - ADULT  Goal: Verbalizes/displays adequate comfort level or baseline comfort level  Description: Interventions:  - Encourage patient to monitor pain and request assistance  - Assess pain using appropriate pain scale  - Administer analgesics based on type and severity of pain and evaluate response  - Implement non-pharmacological measures as appropriate and evaluate response  - Consider cultural and social influences on pain and pain management  - Notify physician/advanced practitioner if interventions unsuccessful or patient reports new pain  Outcome: Progressing     Problem: INFECTION - ADULT  Goal: Absence or prevention of progression during hospitalization  Description: INTERVENTIONS:  - Assess and monitor for signs and symptoms of infection  - Monitor lab/diagnostic results  - Monitor all insertion sites, i.e. indwelling lines, tubes, and drains  - Monitor endotracheal if appropriate and nasal secretions for changes in amount and color  - [place] appropriate cooling/warming therapies per order  - Administer medications as ordered  - Instruct and encourage patient and family to use good hand hygiene technique  - Identify and instruct in appropriate isolation precautions for identified infection/condition  Outcome: Progressing  Goal: Absence of fever/infection during neutropenic period  Description: INTERVENTIONS:  - Monitor WBC    Outcome: Progressing     Problem: SAFETY ADULT  Goal: Patient will remain free of falls  Description: INTERVENTIONS:  - Educate patient/family on patient safety including physical limitations  - Instruct patient to call for assistance with activity   - Consult OT/PT to assist with strengthening/mobility   - Keep Call bell within reach  - Keep bed low and locked with side rails adjusted as appropriate  - Keep care items and personal belongings within reach  - Initiate and maintain comfort rounds  - Make Fall Risk Sign visible to staff  - Offer Toileting every  Hours,  in advance of need  - Initiate/Maintain alarm  - Obtain necessary fall risk management equipment:   - Apply yellow socks and bracelet for high fall risk patients  - Consider moving patient to room near nurses station  Outcome: Progressing  Goal: Maintain or return to baseline ADL function  Description: INTERVENTIONS:  -  Assess patient's ability to carry out ADLs; assess patient's baseline for ADL function and identify physical deficits which impact ability to perform ADLs (bathing, care of mouth/teeth, toileting, grooming, dressing, etc.)  - Assess/evaluate cause of self-care deficits   - Assess range of motion  - Assess patient's mobility; develop plan if impaired  - Assess patient's need for assistive devices and provide as appropriate  - Encourage maximum independence but intervene and supervise when necessary  - Involve family in performance of ADLs  - Assess for home care needs following discharge   - Consider OT consult to assist with ADL evaluation and planning for discharge  - Provide patient education as appropriate  Outcome: Progressing  Goal: Maintains/Returns to pre admission functional level  Description: INTERVENTIONS:  - Perform AM-PAC 6 Click Basic Mobility/ Daily Activity assessment daily.  - Set and communicate daily mobility goal to care team and patient/family/caregiver.   - Collaborate with rehabilitation services on mobility goals if consulted  - Perform Range of Motion  times a day.  - Reposition patient every  hours.  - Dangle patient  times a day  - Stand patient  times a day  - Ambulate patient  times a day  - Out of bed to chair  times a day   - Out of bed for meals  times a day  - Out of bed for toileting  - Record patient progress and toleration of activity level   Outcome: Progressing     Problem: DISCHARGE PLANNING  Goal: Discharge to home or other facility with appropriate resources  Description: INTERVENTIONS:  - Identify barriers to discharge w/patient and caregiver  - Arrange for  needed discharge resources and transportation as appropriate  - Identify discharge learning needs (meds, wound care, etc.)  - Arrange for interpretive services to assist at discharge as needed  - Refer to Case Management Department for coordinating discharge planning if the patient needs post-hospital services based on physician/advanced practitioner order or complex needs related to functional status, cognitive ability, or social support system  Outcome: Progressing     Problem: Knowledge Deficit  Goal: Patient/family/caregiver demonstrates understanding of disease process, treatment plan, medications, and discharge instructions  Description: Complete learning assessment and assess knowledge base.  Interventions:  - Provide teaching at level of understanding  - Provide teaching via preferred learning methods  Outcome: Progressing     Problem: Prexisting or High Potential for Compromised Skin Integrity  Goal: Skin integrity is maintained or improved  Description: INTERVENTIONS:  - Identify patients at risk for skin breakdown  - Assess and monitor skin integrity  - Assess and monitor nutrition and hydration status  - Monitor labs   - Assess for incontinence   - Turn and reposition patient  - Assist with mobility/ambulation  - Relieve pressure over bony prominences  - Avoid friction and shearing  - Provide appropriate hygiene as needed including keeping skin clean and dry  - Evaluate need for skin moisturizer/barrier cream  - Collaborate with interdisciplinary team   - Patient/family teaching  - Consider wound care consult   Outcome: Progressing

## 2025-01-11 VITALS
OXYGEN SATURATION: 96 % | HEART RATE: 74 BPM | BODY MASS INDEX: 20.81 KG/M2 | TEMPERATURE: 98.4 F | WEIGHT: 106 LBS | SYSTOLIC BLOOD PRESSURE: 117 MMHG | HEIGHT: 60 IN | DIASTOLIC BLOOD PRESSURE: 56 MMHG | RESPIRATION RATE: 20 BRPM

## 2025-01-11 LAB
GLUCOSE SERPL-MCNC: 102 MG/DL (ref 65–140)
GLUCOSE SERPL-MCNC: 397 MG/DL (ref 65–140)

## 2025-01-11 PROCEDURE — 99239 HOSP IP/OBS DSCHRG MGMT >30: CPT | Performed by: INTERNAL MEDICINE

## 2025-01-11 PROCEDURE — 82948 REAGENT STRIP/BLOOD GLUCOSE: CPT

## 2025-01-11 RX ORDER — POLYETHYLENE GLYCOL 3350 17 G/17G
17 POWDER, FOR SOLUTION ORAL DAILY
Qty: 510 G | Refills: 0 | Status: SHIPPED | OUTPATIENT
Start: 2025-01-11

## 2025-01-11 RX ORDER — AMOXICILLIN 250 MG
1 CAPSULE ORAL 2 TIMES DAILY
Qty: 60 TABLET | Refills: 0 | Status: SHIPPED | OUTPATIENT
Start: 2025-01-11

## 2025-01-11 RX ADMIN — INSULIN LISPRO 4 UNITS: 100 INJECTION, SOLUTION INTRAVENOUS; SUBCUTANEOUS at 11:15

## 2025-01-11 RX ADMIN — SODIUM CHLORIDE, SODIUM LACTATE, POTASSIUM CHLORIDE, AND CALCIUM CHLORIDE 100 ML/HR: .6; .31; .03; .02 INJECTION, SOLUTION INTRAVENOUS at 07:11

## 2025-01-11 RX ADMIN — OXYCODONE HYDROCHLORIDE 5 MG: 5 TABLET ORAL at 05:19

## 2025-01-11 RX ADMIN — SENNOSIDES AND DOCUSATE SODIUM 1 TABLET: 50; 8.6 TABLET ORAL at 10:24

## 2025-01-11 RX ADMIN — MINERAL OIL 1 ENEMA: 118 ENEMA RECTAL at 10:24

## 2025-01-11 RX ADMIN — ENOXAPARIN SODIUM 40 MG: 40 INJECTION SUBCUTANEOUS at 10:24

## 2025-01-11 RX ADMIN — POLYETHYLENE GLYCOL 3350 17 G: 17 POWDER, FOR SOLUTION ORAL at 10:24

## 2025-01-11 NOTE — DISCHARGE SUMMARY
"Discharge Summary - Hospitalist   Name: Miriam Smith 74 y.o. female I MRN: [id number]  Unit/Bed#: S -01 I Date of Admission: 1/8/2025   Date of Service: 1/11/2025 I Hospital Day: 3     Assessment & Plan  Constipation  Presented with abdominal pain and decreased subjective stool frequency  CT of abdomen/pelvis noting: \"Status post diverting sigmoid loop colostomy with a large amount of stool throughout the entire colon to the level of the ostomy.\"  Appreciate colorectal surgery input with recommendation of optimization of constipation prophylaxis due to chronic opiate dependence -> bowel movements improved currently on a stool softener, laxative, and enema regimen consisting of MiraLAX, Senokot, and mineral oil BID   Diet advanced to surgical soft -> as patient tolerating diet with optimized bowel movements, plan for discharge home today  Appreciate palliative care input for augmentation of cancer related pain medications in the setting of constipation  Rectal cancer (HCC)  Initially diagnosed 9/2024, stage T4b N2  Also noted to have 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, s/p I&D  Planned for chemotherapy soon s/p port placement in the right IJ on 1/6  Outpatient oncology follow-up  PRN pain control  Supportive care otherwise  Palliative care input appreciated  Cancer related pain  Currently on a tiered Oxycodone regimen with PRN IV for breakthrough events  Optimize bowel prophylaxis due to presenting issue  Palliative care input appreciated  S/P colostomy (HCC)  S/p laparoscopic diverting sigmoid end-loop colostomy last month  Routine ostomy care  Colorectal surgery input appreciated           Discharging Physician / Practitioner: Rosalba Torres MD  PCP: Yumiko Thomas MD    Admission Date:   Admission Orders (From admission, onward)       Ordered        01/08/25 1931  INPATIENT ADMISSION  Once                          Discharge Date: 01/11/25      Reason for " Admission:     Abdominal pain/discomfort      Discharge Diagnoses:     Principal Problem:    Constipation    Active Problems:    Rectal cancer (HCC)    S/P colostomy (HCC)    Cancer related pain      Consultations During Hospital Stay:   Palliative care  Colorectal surgery      Condition at Discharge: fair       Discharge Day Visit / Exam:     Vitals: Blood Pressure: 117/56 (01/11/25 0756)  Pulse: 74 (01/11/25 0756)  Temperature: 98.4 °F (36.9 °C) (01/11/25 0756)  Temp Source: Oral (01/09/25 1500)  Respirations: 20 (01/11/25 0756)  Height: 5' (152.4 cm) (01/09/25 1742)  Weight - Scale: 48.1 kg (106 lb) (01/09/25 1742)  SpO2: 96 % (01/11/25 1241)      Physical exam - I had a face-to-face encounter with the patient on day of discharge.      Discussion with Patient and/or Family:  The patient has been advised to return to the ER immediately if any symptoms recur or worsen.       Discharge Instructions/Information to Patient and/or Family:   See after visit summary for information provided to patient and/or family.        Provisions for Follow-up Care:   See after visit summary for information related to follow-up care and any pertinent home health orders.        Disposition:   Home      Discharge Medications:   See after visit summary for reconciled discharge medications provided to patient and/or family.        Discharge Statement:   I spent 38 minutes discharging the patient. This time was spent on the day of discharge. I had direct contact with the patient on the day of discharge. Greater than 50% of the total time was spent examining patient, answering all patient questions, arranging and discussing plan of care with patient as well as directly providing post-discharge instructions.  Additional time then spent on discharge activities.           DUC PIMENTEL MD    Hospitalist - [facility]        ** Please Note:  Documentation is constructed using a voice recognition dictation system.  An  occasional wrong word/phrase or “sound-a-like” substitution may have been picked up by dictation device due to the inherent limitations of voice recognition software.  Read the chart carefully and recognize, using reasonable context, where substitutions may have occurred.**

## 2025-01-11 NOTE — PLAN OF CARE
Problem: PAIN - ADULT  Goal: Verbalizes/displays adequate comfort level or baseline comfort level  Description: Interventions:  - Encourage patient to monitor pain and request assistance  - Assess pain using appropriate pain scale  - Administer analgesics based on type and severity of pain and evaluate response  - Implement non-pharmacological measures as appropriate and evaluate response  - Consider cultural and social influences on pain and pain management  - Notify physician/advanced practitioner if interventions unsuccessful or patient reports new pain  Outcome: Progressing     Problem: INFECTION - ADULT  Goal: Absence or prevention of progression during hospitalization  Description: INTERVENTIONS:  - Assess and monitor for signs and symptoms of infection  - Monitor lab/diagnostic results  - Monitor all insertion sites, i.e. indwelling lines, tubes, and drains  - Monitor endotracheal if appropriate and nasal secretions for changes in amount and color  - [place] appropriate cooling/warming therapies per order  - Administer medications as ordered  - Instruct and encourage patient and family to use good hand hygiene technique  - Identify and instruct in appropriate isolation precautions for identified infection/condition  Outcome: Progressing  Goal: Absence of fever/infection during neutropenic period  Description: INTERVENTIONS:  - Monitor WBC    Outcome: Progressing     Problem: SAFETY ADULT  Goal: Patient will remain free of falls  Description: INTERVENTIONS:  - Educate patient/family on patient safety including physical limitations  - Instruct patient to call for assistance with activity   - Consult OT/PT to assist with strengthening/mobility   - Keep Call bell within reach  - Keep bed low and locked with side rails adjusted as appropriate  - Keep care items and personal belongings within reach  - Initiate and maintain comfort rounds  - Make Fall Risk Sign visible to staff  - Offer Toileting every 2 Hours,  in advance of need  - Initiate/Maintain bed/chair alarm  - Obtain necessary fall risk management equipment  - Apply yellow socks and bracelet for high fall risk patients  - Consider moving patient to room near nurses station  Outcome: Progressing  Goal: Maintain or return to baseline ADL function  Description: INTERVENTIONS:  -  Assess patient's ability to carry out ADLs; assess patient's baseline for ADL function and identify physical deficits which impact ability to perform ADLs (bathing, care of mouth/teeth, toileting, grooming, dressing, etc.)  - Assess/evaluate cause of self-care deficits   - Assess range of motion  - Assess patient's mobility; develop plan if impaired  - Assess patient's need for assistive devices and provide as appropriate  - Encourage maximum independence but intervene and supervise when necessary  - Involve family in performance of ADLs  - Assess for home care needs following discharge   - Consider OT consult to assist with ADL evaluation and planning for discharge  - Provide patient education as appropriate  Outcome: Progressing  Goal: Maintains/Returns to pre admission functional level  Description: INTERVENTIONS:  - Perform AM-PAC 6 Click Basic Mobility/ Daily Activity assessment daily.  - Set and communicate daily mobility goal to care team and patient/family/caregiver.   - Collaborate with rehabilitation services on mobility goals if consulted  - Perform Range of Motion 3 times a day.  - Reposition patient every 2 hours.  - Dangle patient 3 times a day  - Stand patient 3 times a day  - Ambulate patient 3 times a day  - Out of bed to chair 3 times a day   - Out of bed for meals 3 times a day  - Out of bed for toileting  - Record patient progress and toleration of activity level   Outcome: Progressing     Problem: DISCHARGE PLANNING  Goal: Discharge to home or other facility with appropriate resources  Description: INTERVENTIONS:  - Identify barriers to discharge w/patient and  caregiver  - Arrange for needed discharge resources and transportation as appropriate  - Identify discharge learning needs (meds, wound care, etc.)  - Arrange for interpretive services to assist at discharge as needed  - Refer to Case Management Department for coordinating discharge planning if the patient needs post-hospital services based on physician/advanced practitioner order or complex needs related to functional status, cognitive ability, or social support system  Outcome: Progressing     Problem: Knowledge Deficit  Goal: Patient/family/caregiver demonstrates understanding of disease process, treatment plan, medications, and discharge instructions  Description: Complete learning assessment and assess knowledge base.  Interventions:  - Provide teaching at level of understanding  - Provide teaching via preferred learning methods  Outcome: Progressing     Problem: Prexisting or High Potential for Compromised Skin Integrity  Goal: Skin integrity is maintained or improved  Description: INTERVENTIONS:  - Identify patients at risk for skin breakdown  - Assess and monitor skin integrity  - Assess and monitor nutrition and hydration status  - Monitor labs   - Assess for incontinence   - Turn and reposition patient  - Assist with mobility/ambulation  - Relieve pressure over bony prominences  - Avoid friction and shearing  - Provide appropriate hygiene as needed including keeping skin clean and dry  - Evaluate need for skin moisturizer/barrier cream  - Collaborate with interdisciplinary team   - Patient/family teaching  - Consider wound care consult   Outcome: Progressing     Problem: Nutrition/Hydration-ADULT  Goal: Nutrient/Hydration intake appropriate for improving, restoring or maintaining nutritional needs  Description: Monitor and assess patient's nutrition/hydration status for malnutrition. Collaborate with interdisciplinary team and initiate plan and interventions as ordered.  Monitor patient's weight and  dietary intake as ordered or per policy. Utilize nutrition screening tool and intervene as necessary. Determine patient's food preferences and provide high-protein, high-caloric foods as appropriate.     INTERVENTIONS:  - Monitor oral intake, urinary output, labs, and treatment plans  - Assess nutrition and hydration status and recommend course of action  - Evaluate amount of meals eaten  - Assist patient with eating if necessary   - Allow adequate time for meals  - Recommend/ encourage appropriate diets, oral nutritional supplements, and vitamin/mineral supplements  - Order, calculate, and assess calorie counts as needed  - Recommend, monitor, and adjust tube feedings and TPN/PPN based on assessed needs  - Assess need for intravenous fluids  - Provide specific nutrition/hydration education as appropriate  - Include patient/family/caregiver in decisions related to nutrition  Outcome: Progressing

## 2025-01-11 NOTE — ASSESSMENT & PLAN NOTE
Currently on a tiered Oxycodone regimen with PRN IV for breakthrough events  Optimize bowel prophylaxis due to presenting issue  Palliative care input appreciated

## 2025-01-11 NOTE — PLAN OF CARE
Problem: PAIN - ADULT  Goal: Verbalizes/displays adequate comfort level or baseline comfort level  Description: Interventions:  - Encourage patient to monitor pain and request assistance  - Assess pain using appropriate pain scale  - Administer analgesics based on type and severity of pain and evaluate response  - Implement non-pharmacological measures as appropriate and evaluate response  - Consider cultural and social influences on pain and pain management  - Notify physician/advanced practitioner if interventions unsuccessful or patient reports new pain  Outcome: Progressing     Problem: INFECTION - ADULT  Goal: Absence or prevention of progression during hospitalization  Description: INTERVENTIONS:  - Assess and monitor for signs and symptoms of infection  - Monitor lab/diagnostic results  - Monitor all insertion sites, i.e. indwelling lines, tubes, and drains  - Monitor endotracheal if appropriate and nasal secretions for changes in amount and color  - [place] appropriate cooling/warming therapies per order  - Administer medications as ordered  - Instruct and encourage patient and family to use good hand hygiene technique  - Identify and instruct in appropriate isolation precautions for identified infection/condition  Outcome: Progressing  Goal: Absence of fever/infection during neutropenic period  Description: INTERVENTIONS:  - Monitor WBC    Outcome: Progressing     Problem: SAFETY ADULT  Goal: Patient will remain free of falls  Description: INTERVENTIONS:  - Educate patient/family on patient safety including physical limitations  - Instruct patient to call for assistance with activity   - Consult OT/PT to assist with strengthening/mobility   - Keep Call bell within reach  - Keep bed low and locked with side rails adjusted as appropriate  - Keep care items and personal belongings within reach  - Initiate and maintain comfort rounds  - Make Fall Risk Sign visible to staff  - Offer Toileting every  Hours,  in advance of need  - Initiate/Maintain alarm  - Obtain necessary fall risk management equipment:   - Apply yellow socks and bracelet for high fall risk patients  - Consider moving patient to room near nurses station  Outcome: Progressing  Goal: Maintain or return to baseline ADL function  Description: INTERVENTIONS:  -  Assess patient's ability to carry out ADLs; assess patient's baseline for ADL function and identify physical deficits which impact ability to perform ADLs (bathing, care of mouth/teeth, toileting, grooming, dressing, etc.)  - Assess/evaluate cause of self-care deficits   - Assess range of motion  - Assess patient's mobility; develop plan if impaired  - Assess patient's need for assistive devices and provide as appropriate  - Encourage maximum independence but intervene and supervise when necessary  - Involve family in performance of ADLs  - Assess for home care needs following discharge   - Consider OT consult to assist with ADL evaluation and planning for discharge  - Provide patient education as appropriate  Outcome: Progressing  Goal: Maintains/Returns to pre admission functional level  Description: INTERVENTIONS:  - Perform AM-PAC 6 Click Basic Mobility/ Daily Activity assessment daily.  - Set and communicate daily mobility goal to care team and patient/family/caregiver.   - Collaborate with rehabilitation services on mobility goals if consulted  - Perform Range of Motion  times a day.  - Reposition patient every  hours.  - Dangle patient  times a day  - Stand patient  times a day  - Ambulate patient  times a day  - Out of bed to chair  times a day   - Out of bed for meals times a day  - Out of bed for toileting  - Record patient progress and toleration of activity level   Outcome: Progressing     Problem: DISCHARGE PLANNING  Goal: Discharge to home or other facility with appropriate resources  Description: INTERVENTIONS:  - Identify barriers to discharge w/patient and caregiver  - Arrange for  needed discharge resources and transportation as appropriate  - Identify discharge learning needs (meds, wound care, etc.)  - Arrange for interpretive services to assist at discharge as needed  - Refer to Case Management Department for coordinating discharge planning if the patient needs post-hospital services based on physician/advanced practitioner order or complex needs related to functional status, cognitive ability, or social support system  Outcome: Progressing     Problem: Knowledge Deficit  Goal: Patient/family/caregiver demonstrates understanding of disease process, treatment plan, medications, and discharge instructions  Description: Complete learning assessment and assess knowledge base.  Interventions:  - Provide teaching at level of understanding  - Provide teaching via preferred learning methods  Outcome: Progressing     Problem: Prexisting or High Potential for Compromised Skin Integrity  Goal: Skin integrity is maintained or improved  Description: INTERVENTIONS:  - Identify patients at risk for skin breakdown  - Assess and monitor skin integrity  - Assess and monitor nutrition and hydration status  - Monitor labs   - Assess for incontinence   - Turn and reposition patient  - Assist with mobility/ambulation  - Relieve pressure over bony prominences  - Avoid friction and shearing  - Provide appropriate hygiene as needed including keeping skin clean and dry  - Evaluate need for skin moisturizer/barrier cream  - Collaborate with interdisciplinary team   - Patient/family teaching  - Consider wound care consult   Outcome: Progressing     Problem: Nutrition/Hydration-ADULT  Goal: Nutrient/Hydration intake appropriate for improving, restoring or maintaining nutritional needs  Description: Monitor and assess patient's nutrition/hydration status for malnutrition. Collaborate with interdisciplinary team and initiate plan and interventions as ordered.  Monitor patient's weight and dietary intake as ordered or  per policy. Utilize nutrition screening tool and intervene as necessary. Determine patient's food preferences and provide high-protein, high-caloric foods as appropriate.     INTERVENTIONS:  - Monitor oral intake, urinary output, labs, and treatment plans  - Assess nutrition and hydration status and recommend course of action  - Evaluate amount of meals eaten  - Assist patient with eating if necessary   - Allow adequate time for meals  - Recommend/ encourage appropriate diets, oral nutritional supplements, and vitamin/mineral supplements  - Order, calculate, and assess calorie counts as needed  - Recommend, monitor, and adjust tube feedings and TPN/PPN based on assessed needs  - Assess need for intravenous fluids  - Provide specific nutrition/hydration education as appropriate  - Include patient/family/caregiver in decisions related to nutrition  Outcome: Progressing

## 2025-01-11 NOTE — ASSESSMENT & PLAN NOTE
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

## 2025-01-14 ENCOUNTER — TRANSITIONAL CARE MANAGEMENT (OUTPATIENT)
Age: 75
End: 2025-01-14

## 2025-01-14 DIAGNOSIS — Z51.5 PALLIATIVE CARE BY SPECIALIST: ICD-10-CM

## 2025-01-14 DIAGNOSIS — G89.3 CANCER ASSOCIATED PAIN: ICD-10-CM

## 2025-01-14 DIAGNOSIS — K62.89 RECTAL MASS: ICD-10-CM

## 2025-01-14 DIAGNOSIS — K62.89 RECTAL PAIN: ICD-10-CM

## 2025-01-14 RX ORDER — OXYCODONE HYDROCHLORIDE 5 MG/1
5 TABLET ORAL EVERY 4 HOURS PRN
Qty: 30 TABLET | Refills: 0 | Status: SHIPPED | OUTPATIENT
Start: 2025-01-14 | End: 2025-01-20 | Stop reason: SDUPTHER

## 2025-01-14 NOTE — TELEPHONE ENCOUNTER
Last appointment:1/8/25    Next scheduled appointment: left     Pharmacy: [facility]      PDMP review:

## 2025-01-14 NOTE — TELEPHONE ENCOUNTER
Reason for call:   [x] Refill   [] Prior Auth  [] Other:     Office:   [] PCP/Provider -   [x] Specialty/Provider - hospitals careGALLO    Medication:   Oxycodone 5 mg, 1 q4 prn, 30    Pharmacy:   [facility]    Does the patient have enough for 3 days?   [] Yes   [x] No - Send as HP to POD

## 2025-01-16 ENCOUNTER — TELEPHONE (OUTPATIENT)
Dept: PALLIATIVE MEDICINE | Facility: CLINIC | Age: 75
End: 2025-01-16

## 2025-01-17 ENCOUNTER — OFFICE VISIT (OUTPATIENT)
Age: 75
End: 2025-01-17

## 2025-01-17 VITALS
BODY MASS INDEX: 21.6 KG/M2 | SYSTOLIC BLOOD PRESSURE: 104 MMHG | OXYGEN SATURATION: 95 % | RESPIRATION RATE: 18 BRPM | HEIGHT: 60 IN | WEIGHT: 110 LBS | DIASTOLIC BLOOD PRESSURE: 66 MMHG | TEMPERATURE: 98 F | HEART RATE: 80 BPM

## 2025-01-17 DIAGNOSIS — E11.9 TYPE 2 DIABETES MELLITUS WITHOUT COMPLICATION, UNSPECIFIED WHETHER LONG TERM INSULIN USE (HCC): ICD-10-CM

## 2025-01-17 DIAGNOSIS — E11.9 TYPE 2 DIABETES MELLITUS WITHOUT COMPLICATION, WITHOUT LONG-TERM CURRENT USE OF INSULIN (HCC): ICD-10-CM

## 2025-01-17 DIAGNOSIS — K61.1 PERIRECTAL ABSCESS: ICD-10-CM

## 2025-01-17 DIAGNOSIS — Z78.9 TRANSITION OF CARE: Primary | ICD-10-CM

## 2025-01-17 DIAGNOSIS — C20 RECTAL CANCER (HCC): ICD-10-CM

## 2025-01-17 LAB
LEFT EYE DIABETIC RETINOPATHY: NORMAL
LEFT EYE IMAGE QUALITY: NORMAL
LEFT EYE MACULAR EDEMA: NORMAL
LEFT EYE OTHER RETINOPATHY: NORMAL
RIGHT EYE DIABETIC RETINOPATHY: NORMAL
RIGHT EYE IMAGE QUALITY: NORMAL
RIGHT EYE MACULAR EDEMA: NORMAL
RIGHT EYE OTHER RETINOPATHY: NORMAL
SEVERITY (EYE EXAM): NORMAL

## 2025-01-17 PROCEDURE — 99496 TRANSJ CARE MGMT HIGH F2F 7D: CPT | Performed by: FAMILY MEDICINE

## 2025-01-17 RX ORDER — BLOOD SUGAR DIAGNOSTIC
STRIP MISCELLANEOUS
Qty: 200 EACH | Refills: 3 | Status: SHIPPED | OUTPATIENT
Start: 2025-01-17

## 2025-01-17 RX ORDER — LANCETS 33 GAUGE
EACH MISCELLANEOUS
Qty: 200 EACH | Refills: 3 | Status: SHIPPED | OUTPATIENT
Start: 2025-01-17

## 2025-01-17 RX ORDER — BLOOD-GLUCOSE METER
KIT MISCELLANEOUS
Qty: 1 KIT | Refills: 0 | Status: SHIPPED | OUTPATIENT
Start: 2025-01-17

## 2025-01-17 NOTE — PROGRESS NOTES
Transition of Care Visit  Name: Miriam Smith      : 1950      MRN: [id number]  Encounter Provider: Suki Merlos DO  Encounter Date: 2025   Encounter department: [facility]    Assessment & Plan  Transition of care  Admitted from  to  of abdominal pain and ischial anal abscess  Has pending appointment with colorectal  Advised follow-up with GI       Type 2 diabetes mellitus without complication, without long-term current use of insulin (HCC)    Lab Results   Component Value Date    HGBA1C 8.3 (H) 11/10/2024   Chronic, poorly controlled.  Patient has not been measuring blood sugar at home in the setting of lack of glucose monitor    Plan:  -Reorder glucose monitor  -Continue metformin  -Recommend starting Januvia 100 mg daily    Orders:    IRIS Diabetic eye exam    metFORMIN (GLUCOPHAGE) 1000 MG tablet; Take 1 tablet (1,000 mg total) by mouth 2 (two) times a day with meals    Blood Glucose Monitoring Suppl (OneTouch Verio Reflect) w/Device KIT; Check blood sugars twice daily. Please substitute with appropriate alternative as covered by patient's insurance. Dx: E11.65    glucose blood (OneTouch Verio) test strip; Check blood sugars twice daily. Please substitute with appropriate alternative as covered by patient's insurance. Dx: E11.65    OneTouch Delica Lancets 33G MISC; Check blood sugars twice daily. Please substitute with appropriate alternative as covered by patient's insurance. Dx: E11.65    sitaGLIPtin (JANUVIA) 100 mg tablet; Take 1 tablet (100 mg total) by mouth daily    Rectal cancer (HCC)  Chronic follows with colorectal       Perirectal abscess  Chronic, status post I&D  Family noting that the wound is healing but slower than they would like  Discussed that in the setting of poorly controlled diabetes, wound healing to be delayed  Recommend follow-up with GI  Continue sitz bath for pain relief  Orders:    Misc. Devices (Sitz Bath)  MISC; Use in the morning           History of Present Illness     Transitional Care Management Review:   Miriam Smith is a 74 y.o. female here for TCM follow up.     During the TCM phone call patient stated:  TCM Call       Date and time call was made  1/14/2025  5:55 PM    Hospital care reviewed  Records reviewed    Patient was hospitialized at  [facility]    Date of Admission  01/08/25    Date of discharge  01/11/25    Diagnosis  constipation, s/p colostomy    Disposition  Home    Current Symptoms  None          TCM Call       Post hospital issues  None    Should patient be enrolled in anticoag monitoring?  No    Scheduled for follow up?  Yes  1/17 330p    Patients specialists  Other (comment)    Other specialists names  oncology, surgical    Referrals needed  oncology    Did you obtain your prescribed medications  Yes    Do you need help managing your prescriptions or medications  No    Is transportation to your appointment needed  No    I have advised the patient to call PCP with any new or worsening symptoms  Jennifer Chávez, CANDELARIA    Living Arrangements  Children    Support System  Family    Do you have social support  Yes, as much as I need    Are you recieving any outpatient services  No    Are you recieving home care services  No    Are you using any community resources  No    Interperter language line needed  No    Counseling  Family          Presents for follow up:   -noting she is not taking miralax + sennakot b/c it caused diarrhea  -will continue sennakot 2 tablets at night  -will start the atorvastatin 2/2 high ASCVD risk  -does not know what blood sugar is, reader is broken   -noting open anal fissure that is not healing, not using any medications on it , more likely abscess + fissure - family concerned for fistula   -call the GI for appointment   -taking oxycodone for pain sometimes     Review of Systems  Objective   /66 (BP Location: Left arm, Patient Position: Sitting, Cuff  Size: Adult)   Pulse 80   Temp 98 °F (36.7 °C) (Tympanic)   Resp 18   Ht 5' (1.524 m)   Wt 49.9 kg (110 lb)   SpO2 95%   BMI 21.48 kg/m²       Physical Exam  Constitutional:       General: She is not in acute distress.  HENT:      Head: Normocephalic and atraumatic.   Eyes:      Extraocular Movements: Extraocular movements intact.      Conjunctiva/sclera: Conjunctivae normal.   Cardiovascular:      Rate and Rhythm: Normal rate.      Pulses: Normal pulses.   Pulmonary:      Effort: Pulmonary effort is normal. No respiratory distress.   Abdominal:      General: There is no distension.      Palpations: Abdomen is soft.      Tenderness: There is no abdominal tenderness.   Musculoskeletal:         General: Normal range of motion.      Cervical back: Normal range of motion.   Skin:     General: Skin is warm and dry.      Findings: Erythema and lesion (L butt abscess draining seropurulence) present.   Neurological:      General: No focal deficit present.      Mental Status: She is alert and oriented to person, place, and time.   Psychiatric:         Mood and Affect: Mood normal.       Medications have been reviewed by provider in current encounter

## 2025-01-17 NOTE — ASSESSMENT & PLAN NOTE
Lab Results   Component Value Date    HGBA1C 8.3 (H) 11/10/2024   Chronic, poorly controlled.  Patient has not been measuring blood sugar at home in the setting of lack of glucose monitor    Plan:  -Reorder glucose monitor  -Continue metformin  -Recommend starting Januvia 100 mg daily    Orders:    IRIS Diabetic eye exam    metFORMIN (GLUCOPHAGE) 1000 MG tablet; Take 1 tablet (1,000 mg total) by mouth 2 (two) times a day with meals    Blood Glucose Monitoring Suppl (OneTouch Verio Reflect) w/Device KIT; Check blood sugars twice daily. Please substitute with appropriate alternative as covered by patient's insurance. Dx: E11.65    glucose blood (OneTouch Verio) test strip; Check blood sugars twice daily. Please substitute with appropriate alternative as covered by patient's insurance. Dx: E11.65    OneTouch Delica Lancets 33G MISC; Check blood sugars twice daily. Please substitute with appropriate alternative as covered by patient's insurance. Dx: E11.65    sitaGLIPtin (JANUVIA) 100 mg tablet; Take 1 tablet (100 mg total) by mouth daily

## 2025-01-17 NOTE — ASSESSMENT & PLAN NOTE
Chronic, status post I&D  Family noting that the wound is healing but slower than they would like  Discussed that in the setting of poorly controlled diabetes, wound healing to be delayed  Recommend follow-up with GI  Continue sitz bath for pain relief  Orders:    Misc. Devices (Sitz Bath) MISC; Use in the morning

## 2025-01-20 DIAGNOSIS — K62.89 RECTAL PAIN: ICD-10-CM

## 2025-01-20 DIAGNOSIS — G89.3 CANCER ASSOCIATED PAIN: ICD-10-CM

## 2025-01-20 DIAGNOSIS — Z51.5 PALLIATIVE CARE BY SPECIALIST: ICD-10-CM

## 2025-01-20 DIAGNOSIS — K62.89 RECTAL MASS: ICD-10-CM

## 2025-01-20 RX ORDER — OXYCODONE HYDROCHLORIDE 5 MG/1
5 TABLET ORAL EVERY 4 HOURS PRN
Qty: 30 TABLET | Refills: 0 | Status: SHIPPED | OUTPATIENT
Start: 2025-01-20 | End: 2025-01-23 | Stop reason: SDUPTHER

## 2025-01-20 NOTE — TELEPHONE ENCOUNTER
Reason for call:   [x] Refill   [] Prior Auth  [] Other:     Office:   [] PCP/Provider -   [x] Specialty/Provider - Palliative Care    Medication: oxyCODONE (Roxicodone) 5 immediate release tablet      Dose/Frequency: Take 1 tablet (5 mg total) by mouth every 4 (four) hours as needed for moderate pain     Quantity: 30    Pharmacy: [id number]  [place] [address]     Does the patient have enough for 3 days?   [] Yes   [x] No - Send as HP to POD

## 2025-01-20 NOTE — TELEPHONE ENCOUNTER
Refill must be reviewed and completed by the office or provider. The refill is unable to be approved or denied by the medication management team.    Unable to complete PDMP. Will forward to clinical staff to review.

## 2025-01-20 NOTE — TELEPHONE ENCOUNTER
Last appointment: 1/8/25    Next scheduled appointment: 1/23/25    Pharmacy: [facility]      PDMP review:

## 2025-01-21 ENCOUNTER — PATIENT OUTREACH (OUTPATIENT)
Dept: CASE MANAGEMENT | Facility: OTHER | Age: 75
End: 2025-01-21

## 2025-01-21 ENCOUNTER — PATIENT OUTREACH (OUTPATIENT)
Dept: HEMATOLOGY ONCOLOGY | Facility: CLINIC | Age: 75
End: 2025-01-21

## 2025-01-21 ENCOUNTER — OFFICE VISIT (OUTPATIENT)
Dept: HEMATOLOGY ONCOLOGY | Facility: MEDICAL CENTER | Age: 75
End: 2025-01-21

## 2025-01-21 VITALS
HEART RATE: 103 BPM | WEIGHT: 108 LBS | TEMPERATURE: 99.5 F | SYSTOLIC BLOOD PRESSURE: 106 MMHG | OXYGEN SATURATION: 95 % | HEIGHT: 60 IN | BODY MASS INDEX: 21.2 KG/M2 | DIASTOLIC BLOOD PRESSURE: 56 MMHG | RESPIRATION RATE: 19 BRPM

## 2025-01-21 DIAGNOSIS — C20 RECTAL CANCER (HCC): Primary | ICD-10-CM

## 2025-01-21 PROCEDURE — 99214 OFFICE O/P EST MOD 30 MIN: CPT | Performed by: INTERNAL MEDICINE

## 2025-01-21 NOTE — PROGRESS NOTES
Miriam Smith  1950  [facility] [address] [facility] [id number]    DISCUSSION/SUMMARY:    1/23/2025 4:20 PM colorectal surgery was kind enough to get back to me to discuss the case.  Patient can be started on chemotherapy.  Patient is already 4 months behind since the diagnosis, chemotherapy needs to get going.  To start, patient will be given FOLFOX at 80% on all medications.  This will be brought up to 100% if/when patient demonstrates ability to tolerate.    Approximately 40 minutes was spent with the patient in direct consultation/evaluation and with reviewing the chart.    74-year-old female recently admitted to the hospital with abdominal pain and weight loss.  Workup demonstrated a rectal mass (the mass was originally biopsied in [place]).  Issues:    Rectal cancer.  Pathology results from [place] are listed below, there results demonstrated adenocarcinoma.  Patient was recently seen by colorectal surgery.     Patient was recently readmitted to [place], underwent diverting colostomy.  Surgical follow-up is pending for approximately 3 weeks.  At this time, chemotherapy continues to be on hold until surgical clearance.  Patient's performance status is not very good, not sure how much family understands as far as possibility of cure.  Patient understands that the ostomy is permanent.  I will speak to colorectal surgery to see if there is any thought process as far as when patient can begin systemic treatment.    Recent MRI demonstrated T4b, N2, low rectal carcinoma, + sphincter involvement, no suspicious extra mesorectal lymph nodes, no evidence of extramural venous invasion.  AJCC staging eighth edition = IIIC.  Other prior recent scans and not demonstrate evidence of metastatic disease.    NCCN guidelines 4.2024 state that for patients with T4 any N, preliminary treatment is to check pMMR.  At this moment, patient is  not scheduled for any additional biopsies - so there likely will not be any additional tissue to send for evaluation.  The tentative plan is for patient to receive 16 weeks of FOLFOX.  Patient will eventually be evaluated by radiation oncology for eventual long course chemo RT (with infusional 5-FU).  Afterwards, patient will undergo rescanning and reevaluation by colorectal surgery.    Nursing staff previous spent time with patient/family going over the specifics of FOLFOX chemotherapy.  Patient understands that she will also eventually need a port.    Medical issues.  Recent CAT scan the chest demonstrated severe coronary artery calcification.  She now with a PCP, if not already performed, Mrs. José Miguel Smith should also be seen by cardiology.  Glucose control is also ongoing.    Patient was given a 4-week follow-up visit but this will change..  Patient/family know to call the hematology/oncology office if there are any other questions or concerns.    Carefully review your medication list and verify that the list is accurate and up-to-date. Please call the hematology/oncology office if there are medications missing from the list, medications on the list that you are not currently taking or if there is a dosage or instruction that is different from how you're taking that medication.    Patient goals and areas of care: Follow-up with colorectal surgery, palliative care, eventral port  Barriers to care: Multiple serious comorbidities  Patient is able to self-care with help of family members  _____________________________________________________________________________________    Chief Complaint   Patient presents with    Follow-up    Rectal Cancer     History of Present Illness: 74-year-old female first seen in the hospital on November 13, 2024.  Patient was admitted with weight loss, low abdominal pain.  Workup demonstrated a rectal mass.  Patient was being seen by GI and Was consistent with advanced rectal  cancer.  Patient has also been seen by colorectal surgery.  More recently Mrs. José Miguel Smith was admitted to [place].  Patient now has a colostomy, has a rectal fistula.    A recent prior biopsy demonstrated grade 1 adenocarcinoma without evidence of lymphovascular invasion ([place]).    Patient represents with her daughter.  Patient slept through most of the interview today.  Patient states feeling +/-, about the same as before.  Pain is controlled.  Appetite is +/-, patient has lost weight (amount unquantified).  No obvious GI bleeding, no  issues.  No fevers or signs of infection.  Activities are limited, same as before.  No respiratory issues.    Review of Systems   Constitutional:  Positive for activity change, appetite change, fatigue and unexpected weight change.   HENT: Negative.     Eyes: Negative.    Respiratory: Negative.     Cardiovascular: Negative.    Gastrointestinal: Negative.    Endocrine: Negative.    Genitourinary: Negative.    Musculoskeletal: Negative.    Skin: Negative.    Allergic/Immunologic: Negative.    Neurological: Negative.    Hematological: Negative.    Psychiatric/Behavioral: Negative.     All other systems reviewed and are negative.    Patient Active Problem List   Diagnosis    Rectal mass    Type 2 diabetes mellitus (HCC)    Rectal bleeding    History of anal fissures    Electrolyte abnormality    Pain, rectal    Iron deficiency anemia    Leukocytosis    Rectal cancer (HCC)    Cancer associated pain    Palliative care by specialist    Rectal pain    Perirectal abscess    Constipation    S/P colostomy (HCC)    Palliative care encounter    Cancer related pain     Past Medical History:   Diagnosis Date    Diabetes mellitus (HCC)      Past Surgical History:   Procedure Laterality Date    COLONOSCOPY      ILEOSTOMY N/A 12/24/2024    Procedure: LAPAROSCOPIC DIVERTING sigmoid end-loop colostomy;  Surgeon: SAI Bermudez MD;  [facility]: [facility];  Service: Colorectal    INCISION AND  DRAINAGE OF WOUND N/A 12/24/2024    Procedure: INCISION AND DRAINAGE (I&D) BUTTOCK abscess;  Surgeon: SAI Bermudez MD;  Location: [facility];  Service: Colorectal    IR PORT PLACEMENT  1/6/2025     Family History   Problem Relation Age of Onset    Colon cancer Sister      Social History     Socioeconomic History    Marital status: /Civil Union     Spouse name: Not on file    Number of children: Not on file    Years of education: Not on file    Highest education level: Not on file   Occupational History    Not on file   Tobacco Use    Smoking status: Never    Smokeless tobacco: Never   Vaping Use    Vaping status: Never Used   Substance and Sexual Activity    Alcohol use: Not Currently    Drug use: Not Currently    Sexual activity: Not on file   Other Topics Concern    Not on file   Social History Narrative    Not on file     Social Drivers of Health     Financial Resource Strain: Low Risk  (12/19/2024)    Overall Financial Resource Strain (CARDIA)     Difficulty of Paying Living Expenses: Not hard at all   Food Insecurity: No Food Insecurity (1/9/2025)    Nursing - Inadequate Food Risk Classification     Worried About Running Out of Food in the Last Year: Never true     Ran Out of Food in the Last Year: Never true     Ran Out of Food in the Last Year: Never true   Transportation Needs: No Transportation Needs (1/9/2025)    Nursing - Transportation Risk Classification     Lack of Transportation: Not on file     Lack of Transportation: No   Physical Activity: Not on file   Stress: Not on file   Social Connections: Not on file   Intimate Partner Violence: Unknown (1/9/2025)    Nursing IPS     Feels Physically and Emotionally Safe: Not on file     Physically Hurt by Someone: Not on file     Humiliated or Emotionally Abused by Someone: Not on file     Physically Hurt by Someone: No     Hurt or Threatened by Someone: No   Housing Stability: Unknown (1/9/2025)    Nursing: Inadequate Housing Risk Classification      Has Housing: Not on file     Worried About Losing Housing: Not on file     Unable to Get Utilities: Not on file     Unable to Pay for Housing in the Last Year: No     Has Housin       Current Outpatient Medications:     atorvastatin (LIPITOR) 40 mg tablet, Take 1 tablet (40 mg total) by mouth daily, Disp: 30 tablet, Rfl: 3    Blood Glucose Monitoring Suppl (OneTouch Verio Reflect) w/Device KIT, Check blood sugars twice daily. Please substitute with appropriate alternative as covered by patient's insurance. Dx: E11.65, Disp: 1 kit, Rfl: 0    enoxaparin (LOVENOX) 40 mg/0.4 mL, Inject 0.4 mL (40 mg total) under the skin every 24 hours for 25 days, Disp: 10 mL, Rfl: 0    glucose blood (OneTouch Verio) test strip, Check blood sugars twice daily. Please substitute with appropriate alternative as covered by patient's insurance. Dx: E11.65, Disp: 200 each, Rfl: 3    lidocaine-prilocaine (EMLA) cream, Apply topically as needed for mild pain Apply to PAC site prior to access (Patient not taking: Reported on 2025), Disp: 30 g, Rfl: 4    metFORMIN (GLUCOPHAGE) 1000 MG tablet, Take 1 tablet (1,000 mg total) by mouth 2 (two) times a day with meals, Disp: 60 tablet, Rfl: 0    Misc. Devices (Sitz Bath) MISC, Use in the morning, Disp: 1 each, Rfl: 0    OneTouch Delica Lancets 33G MISC, Check blood sugars twice daily. Please substitute with appropriate alternative as covered by patient's insurance. Dx: E11.65, Disp: 200 each, Rfl: 3    oxyCODONE (Roxicodone) 5 immediate release tablet, Take 1 tablet (5 mg total) by mouth every 4 (four) hours as needed for moderate pain Max Daily Amount: 30 mg, Disp: 30 tablet, Rfl: 0    polyethylene glycol (MIRALAX) 17 g packet, Take 17 g by mouth daily, Disp: 510 g, Rfl: 0    senna-docusate sodium (SENOKOT S) 8.6-50 mg per tablet, Take 1 tablet by mouth 2 (two) times a day, Disp: 60 tablet, Rfl: 0    sitaGLIPtin (JANUVIA) 100 mg tablet, Take 1 tablet (100 mg total) by mouth daily, Disp:  30 tablet, Rfl: 1    Allergies   Allergen Reactions    Motrin [Ibuprofen] Itching    Tylenol [Acetaminophen] Itching    Penicillins Rash     Rash and bruise    Sulfa Antibiotics Rash     Price velia syndrome       Vitals:    01/21/25 1436   BP: 106/56   Pulse: 103   Resp: 19   Temp: 99.5 °F (37.5 °C)   SpO2: 95%     Physical Exam  Constitutional:       Appearance: She is well-developed.   HENT:      Head: Normocephalic and atraumatic.      Right Ear: External ear normal.      Left Ear: External ear normal.   Eyes:      Conjunctiva/sclera: Conjunctivae normal.      Pupils: Pupils are equal, round, and reactive to light.   Cardiovascular:      Rate and Rhythm: Normal rate and regular rhythm.      Heart sounds: Normal heart sounds.   Pulmonary:      Effort: Pulmonary effort is normal.      Breath sounds: Normal breath sounds.   Abdominal:      General: Bowel sounds are normal.      Palpations: Abdomen is soft.      Comments: + Bowel sounds, minimal tenderness, no guarding, colostomy in place   Musculoskeletal:         General: Normal range of motion.      Cervical back: Normal range of motion and neck supple.   Skin:     General: Skin is warm.      Comments: Slightly pale, warm, moist, no petechiae or ecchymoses   Neurological:      Mental Status: She is alert and oriented to person, place, and time.      Deep Tendon Reflexes: Reflexes are normal and symmetric.   Psychiatric:         Behavior: Behavior normal.         Thought Content: Thought content normal.         Judgment: Judgment normal.     Extremities: No lower extreme edema bilaterally, no cords, pulses 1+  Lymphatics: No adenopathy in the neck, supraclavicular region, axilla bilaterally    Performance Status: 1 - Symptomatic but completely ambulatory    Labs    1/8/2025 WBC = 8.09 hemoglobin = 13.5 hematocrit = 40.1 MCV = 90 platelet = 337 neutrophil = 81% BUN = 22 creatinine = 0.59 glucose = 154 calcium = 9.3 AST = 14 ALT = 13 alkaline phosphatase = 51  "total bilirubin = 0.53    11/13/2024 CEA = 5.6    Imaging    1/8/2025 CAT scan abdomen pelvis with contrast    IMPRESSION:     Status post diverting sigmoid loop colostomy with a large amount of stool throughout the entire colon to the level of the ostomy.     Large locally invasive lower rectal cancer again shown with decrease in the size of fluid collection in the left ischioanal fat.  Small volume ascites in the abdomen and pelvis, new from the prior study.    12/6/2024 MRI pelvis rectal cancer staging without contrast    Overall MRI stage: T4b, N2, Low rectal cancer  MRF: Not applicable for T4 tumor.  Sphincter involvement: Yes.  Suspicious extra mesorectal lymph nodes: No.  EMVI: No.           11/12/2024 CT chest without contrast.  Impression stated no lung metastases, severe coronary artery calcification indicating atherosclerotic heart disease, cholelithiasis.    11/10/2024 CT abdomen pelvis with contrast    IMPRESSION:     Heterogeneous peripherally enhancing mass/malignancy involving the rectum and infiltrating the left ischiorectal and gluteal fat measuring approximately 7.7 x 7.5 x 8.6 cm with areas of fluid density compatible with internal necrosis.    Pathology    Case Report   Surgical Pathology Report                         Case: [id number]                                   Authorizing Provider:  Morris Tom MD         Collected:           11/12/2024 1439               Ordering Location:     [facility] Received:            11/12/2024 1649                                      [address]                                                                       Pathologist:           Nemo Peterson MD                                                         Specimen:    Rectum, rectal mass r/o malignancy , cancer                                                Final Diagnosis   A. 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 Deeper sections of the specimen have been evaluated. A higher grade lesion cannot be excluded due to the superficial nature of the biopsy; repeat biopsy is recommended as clinically indicated.

## 2025-01-21 NOTE — PROGRESS NOTES
I reached out to Miriam  to complete the Distress Thermometer, review for any barriers to care and to offer any supportive services that may be needed. I left VM with the reason for my call including my direct phone number   3rd attempt for out reach  Social work referral placed per NN  .

## 2025-01-22 ENCOUNTER — TELEPHONE (OUTPATIENT)
Dept: HEMATOLOGY ONCOLOGY | Facility: MEDICAL CENTER | Age: 75
End: 2025-01-22

## 2025-01-22 DIAGNOSIS — C20 RECTAL CANCER (HCC): Primary | ICD-10-CM

## 2025-01-22 RX ORDER — LIDOCAINE/PRILOCAINE 2.5 %-2.5%
CREAM (GRAM) TOPICAL
Qty: 30 G | Refills: 5 | Status: SHIPPED | OUTPATIENT
Start: 2025-01-22

## 2025-01-22 RX ORDER — ONDANSETRON 8 MG/1
8 TABLET, FILM COATED ORAL EVERY 8 HOURS PRN
Qty: 20 TABLET | Refills: 5 | Status: SHIPPED | OUTPATIENT
Start: 2025-01-22

## 2025-01-22 NOTE — TELEPHONE ENCOUNTER
Patient cleared by Dr Pacheco to begin treatment  Patient would like labs taken through PAC day before treatment  Will send to Infusion schedulers to arrange  Son in law Rocky thomas

## 2025-01-23 ENCOUNTER — OFFICE VISIT (OUTPATIENT)
Dept: PALLIATIVE MEDICINE | Facility: CLINIC | Age: 75
End: 2025-01-23

## 2025-01-23 ENCOUNTER — TELEPHONE (OUTPATIENT)
Age: 75
End: 2025-01-23

## 2025-01-23 VITALS
SYSTOLIC BLOOD PRESSURE: 92 MMHG | DIASTOLIC BLOOD PRESSURE: 52 MMHG | TEMPERATURE: 98.5 F | HEART RATE: 84 BPM | OXYGEN SATURATION: 94 % | WEIGHT: 107.5 LBS | HEIGHT: 60 IN | BODY MASS INDEX: 21.1 KG/M2

## 2025-01-23 DIAGNOSIS — K62.89 RECTAL MASS: ICD-10-CM

## 2025-01-23 DIAGNOSIS — K62.89 RECTAL PAIN: ICD-10-CM

## 2025-01-23 DIAGNOSIS — Z51.5 PALLIATIVE CARE BY SPECIALIST: ICD-10-CM

## 2025-01-23 DIAGNOSIS — G89.3 CANCER ASSOCIATED PAIN: ICD-10-CM

## 2025-01-23 DIAGNOSIS — E11.9 TYPE 2 DIABETES MELLITUS WITHOUT COMPLICATION, WITHOUT LONG-TERM CURRENT USE OF INSULIN (HCC): ICD-10-CM

## 2025-01-23 DIAGNOSIS — C20 RECTAL CANCER (HCC): Primary | ICD-10-CM

## 2025-01-23 DIAGNOSIS — G89.3 CANCER RELATED PAIN: Primary | ICD-10-CM

## 2025-01-23 DIAGNOSIS — K59.00 CONSTIPATION: ICD-10-CM

## 2025-01-23 PROCEDURE — 99214 OFFICE O/P EST MOD 30 MIN: CPT | Performed by: STUDENT IN AN ORGANIZED HEALTH CARE EDUCATION/TRAINING PROGRAM

## 2025-01-23 RX ORDER — FLUOROURACIL 50 MG/ML
320 INJECTION, SOLUTION INTRAVENOUS ONCE
OUTPATIENT
Start: 2025-01-29

## 2025-01-23 RX ORDER — DEXTROSE MONOHYDRATE 50 MG/ML
20 INJECTION, SOLUTION INTRAVENOUS ONCE
OUTPATIENT
Start: 2025-01-29

## 2025-01-23 RX ORDER — PIOGLITAZONE 15 MG/1
15 TABLET ORAL DAILY
Qty: 100 TABLET | Refills: 3 | Status: SHIPPED | OUTPATIENT
Start: 2025-01-23

## 2025-01-23 RX ORDER — FLUOROURACIL 50 MG/ML
400 INJECTION, SOLUTION INTRAVENOUS ONCE
Status: CANCELLED | OUTPATIENT
Start: 2025-01-29

## 2025-01-23 RX ORDER — SODIUM CHLORIDE 9 MG/ML
20 INJECTION, SOLUTION INTRAVENOUS ONCE AS NEEDED
OUTPATIENT
Start: 2025-01-29

## 2025-01-23 RX ORDER — OXYCODONE HYDROCHLORIDE 5 MG/1
5 TABLET ORAL EVERY 4 HOURS PRN
Qty: 90 TABLET | Refills: 0 | Status: SHIPPED | OUTPATIENT
Start: 2025-01-23

## 2025-01-23 NOTE — TELEPHONE ENCOUNTER
Patients son is regarding Rx for Januvia. He states this is too costly out of pocket and would like to know if there is something that would be relatively close or generic, Please advise.

## 2025-01-23 NOTE — TELEPHONE ENCOUNTER
Attempted to call charlette Sepulveda message with call back number in order to schedule appointments for the patient.

## 2025-01-23 NOTE — PROGRESS NOTES
Name: Miriam Smith      : 1950      MRN: [id number]  Encounter Provider: Derek Alba DO  Encounter Date: 2025   Encounter department: [facility]  :  Assessment & Plan  Rectal mass  CyraCom  available. Patient elected to have her daughter, Coco, as  (Hungarian) during today's visit.    Following Dr. Perea of Medical Oncology  -plans for FOLFOX6, however, patient/family are pending follow-up with Surgical team prior to initiation of chemotherapy.    Will have close follow-up with patient to monitor for symptoms and assist with any cancer related pain.    Patient denies nausea or vomiting. Has an appetite (some days better than others).  Orders:    oxyCODONE (Roxicodone) 5 immediate release tablet; Take 1 tablet (5 mg total) by mouth every 4 (four) hours as needed for moderate pain Max Daily Amount: 30 mg    Cancer associated pain  Well controlled at this time.  Patient takes OxyIR 5mg q4 hours ATC for which she sometimes takes it preemptively to help with being ahead of her abdominal and rectal pain.  She denies any side effects with this dosing. Daughter also endorses no concerns with patient's current dosing. They are in need of are refill.    Plan:  1) OxyIR 5mg q4 hours PRN  #90 tabs sent today to provide more supply for patient.  2) bowel regimen to continue daily to avoid OIC     Orders:    oxyCODONE (Roxicodone) 5 immediate release tablet; Take 1 tablet (5 mg total) by mouth every 4 (four) hours as needed for moderate pain Max Daily Amount: 30 mg    Palliative care by specialist  Psychosocial   Supportive listening provided  Normalized experience of patient/family  Provided anxiety containment     Referrals Placed / Medical Equipment Ordered  -None    Follow-Up Recommendations  -Follow-up with PCP and current medical specialists  -Follow-up with palliative care: 4 weeks    Orders:    oxyCODONE (Roxicodone) 5 immediate release tablet; Take 1  tablet (5 mg total) by mouth every 4 (four) hours as needed for moderate pain Max Daily Amount: 30 mg    Rectal pain    Orders:    oxyCODONE (Roxicodone) 5 immediate release tablet; Take 1 tablet (5 mg total) by mouth every 4 (four) hours as needed for moderate pain Max Daily Amount: 30 mg    Constipation  Resolved.  Patient does use senokot daily along with Miralax as needed.  She has had regular output from her colostomy bag without similar issues during our last office visit resulting in hospital admission in management and evaluation of her constipation and colostomy.    Patient advised to continue with her bowel regimen to avoid OIC. She and family state understanding and agreement.           PDMP Review: I have reviewed the patient's controlled substance dispensing history in the Prescription Drug Monitoring Program in compliance with the [place] regulations before prescribing any controlled substances.    History of Present Illness   Miriam Smith is a 74 y.o. female who presents for follow-up.    Palliative Diagnosis - Rectal cancer     PMHx - iron deficiency anemia, diabetes mellitus     Patient is well supported by daughter and grandchildren.  She is ,  is residing in [place] at this time.  Patient has a daughter in [place] as well along with one son in the .  Patient's main caregiver is her daughter, Coco and grand children. Patient resides with her daughter, Coco, at this time.     Spiritual - Mormon.      Medical History Reviewed by provider this encounter:  Tobacco  Allergies  Meds  Problems  Med Hx  Surg Hx  Fam Hx     .  Past Medical History   Past Medical History:   Diagnosis Date    Diabetes mellitus (HCC)      Past Surgical History:   Procedure Laterality Date    COLONOSCOPY      ILEOSTOMY N/A 12/24/2024    Procedure: LAPAROSCOPIC DIVERTING sigmoid end-loop colostomy;  Surgeon: SAI Bermudez MD;  Location: [facility];  Service: Colorectal    INCISION AND  DRAINAGE OF WOUND N/A 12/24/2024    Procedure: INCISION AND DRAINAGE (I&D) BUTTOCK abscess;  Surgeon: SAI Bermudez MD;  Location: [facility];  Service: Colorectal    IR PORT PLACEMENT  1/6/2025     Family History   Problem Relation Age of Onset    Colon cancer Sister       reports that she has never smoked. She has never used smokeless tobacco. She reports that she does not currently use alcohol. She reports that she does not currently use drugs.  Current Outpatient Medications on File Prior to Visit   Medication Sig Dispense Refill    atorvastatin (LIPITOR) 40 mg tablet Take 1 tablet (40 mg total) by mouth daily 30 tablet 3    Blood Glucose Monitoring Suppl (OneTouch Verio Reflect) w/Device KIT Check blood sugars twice daily. Please substitute with appropriate alternative as covered by patient's insurance. Dx: E11.65 1 kit 0    glucose blood (OneTouch Verio) test strip Check blood sugars twice daily. Please substitute with appropriate alternative as covered by patient's insurance. Dx: E11.65 200 each 3    lidocaine-prilocaine (EMLA) cream Apply topically as needed for mild pain Apply to PAC site prior to access 30 g 4    lidocaine-prilocaine (EMLA) cream Apply a thin layer over PAC site prior to access prn 30 g 5    OneTouch Delica Lancets 33G MISC Check blood sugars twice daily. Please substitute with appropriate alternative as covered by patient's insurance. Dx: E11.65 200 each 3    polyethylene glycol (MIRALAX) 17 g packet Take 17 g by mouth daily 510 g 0    senna-docusate sodium (SENOKOT S) 8.6-50 mg per tablet Take 1 tablet by mouth 2 (two) times a day 60 tablet 0    [DISCONTINUED] metFORMIN (GLUCOPHAGE) 1000 MG tablet Take 1 tablet (1,000 mg total) by mouth 2 (two) times a day with meals 60 tablet 0    [DISCONTINUED] oxyCODONE (Roxicodone) 5 immediate release tablet Take 1 tablet (5 mg total) by mouth every 4 (four) hours as needed for moderate pain Max Daily Amount: 30 mg 30 tablet 0    enoxaparin  (LOVENOX) 40 mg/0.4 mL Inject 0.4 mL (40 mg total) under the skin every 24 hours for 25 days 10 mL 0    Misc. Devices (Sitz Bath) MISC Use in the morning (Patient not taking: Reported on 1/23/2025) 1 each 0    ondansetron (ZOFRAN) 8 mg tablet Take 1 tablet (8 mg total) by mouth every 8 (eight) hours as needed for nausea or vomiting (Patient not taking: Reported on 1/23/2025) 20 tablet 5    [DISCONTINUED] sitaGLIPtin (JANUVIA) 100 mg tablet Take 1 tablet (100 mg total) by mouth daily (Patient not taking: Reported on 1/23/2025) 30 tablet 1     No current facility-administered medications on file prior to visit.     Allergies   Allergen Reactions    Motrin [Ibuprofen] Itching    Tylenol [Acetaminophen] Itching    Penicillins Rash     Rash and bruise    Sulfa Antibiotics Rash     Price velia syndrome      Current Outpatient Medications on File Prior to Visit   Medication Sig Dispense Refill    atorvastatin (LIPITOR) 40 mg tablet Take 1 tablet (40 mg total) by mouth daily 30 tablet 3    Blood Glucose Monitoring Suppl (OneTouch Verio Reflect) w/Device KIT Check blood sugars twice daily. Please substitute with appropriate alternative as covered by patient's insurance. Dx: E11.65 1 kit 0    glucose blood (OneTouch Verio) test strip Check blood sugars twice daily. Please substitute with appropriate alternative as covered by patient's insurance. Dx: E11.65 200 each 3    lidocaine-prilocaine (EMLA) cream Apply topically as needed for mild pain Apply to PAC site prior to access 30 g 4    lidocaine-prilocaine (EMLA) cream Apply a thin layer over PAC site prior to access prn 30 g 5    OneTouch Delica Lancets 33G MISC Check blood sugars twice daily. Please substitute with appropriate alternative as covered by patient's insurance. Dx: E11.65 200 each 3    polyethylene glycol (MIRALAX) 17 g packet Take 17 g by mouth daily 510 g 0    senna-docusate sodium (SENOKOT S) 8.6-50 mg per tablet Take 1 tablet by mouth 2 (two) times a day  60 tablet 0    [DISCONTINUED] metFORMIN (GLUCOPHAGE) 1000 MG tablet Take 1 tablet (1,000 mg total) by mouth 2 (two) times a day with meals 60 tablet 0    [DISCONTINUED] oxyCODONE (Roxicodone) 5 immediate release tablet Take 1 tablet (5 mg total) by mouth every 4 (four) hours as needed for moderate pain Max Daily Amount: 30 mg 30 tablet 0    enoxaparin (LOVENOX) 40 mg/0.4 mL Inject 0.4 mL (40 mg total) under the skin every 24 hours for 25 days 10 mL 0    Misc. Devices (Sitz Bath) MISC Use in the morning (Patient not taking: Reported on 1/23/2025) 1 each 0    ondansetron (ZOFRAN) 8 mg tablet Take 1 tablet (8 mg total) by mouth every 8 (eight) hours as needed for nausea or vomiting (Patient not taking: Reported on 1/23/2025) 20 tablet 5    [DISCONTINUED] sitaGLIPtin (JANUVIA) 100 mg tablet Take 1 tablet (100 mg total) by mouth daily (Patient not taking: Reported on 1/23/2025) 30 tablet 1     No current facility-administered medications on file prior to visit.      Social History     Tobacco Use    Smoking status: Never    Smokeless tobacco: Never   Vaping Use    Vaping status: Never Used   Substance and Sexual Activity    Alcohol use: Not Currently    Drug use: Not Currently    Sexual activity: Not on file        Objective   There were no vitals taken for this visit.    Physical Exam  Vitals reviewed.   Constitutional:       General: She is not in acute distress.     Appearance: She is not ill-appearing, toxic-appearing or diaphoretic.   HENT:      Head: Normocephalic and atraumatic.      Nose: Nose normal.      Mouth/Throat:      Mouth: Mucous membranes are moist.   Eyes:      General:         Right eye: No discharge.         Left eye: No discharge.   Cardiovascular:      Rate and Rhythm: Normal rate.   Pulmonary:      Effort: Pulmonary effort is normal. No respiratory distress.      Breath sounds: No wheezing.   Abdominal:      General: Abdomen is flat. There is no distension.   Musculoskeletal:         General: No  swelling.   Skin:     General: Skin is warm and dry.      Coloration: Skin is not jaundiced or pale.   Neurological:      General: No focal deficit present.      Mental Status: She is alert. Mental status is at baseline.   Psychiatric:         Mood and Affect: Mood normal.         Behavior: Behavior normal.         Thought Content: Thought content normal.         Judgment: Judgment normal.         Recent labs:  Lab Results   Component Value Date/Time    SODIUM 134 (L) 01/08/2025 02:49 PM    K 3.8 01/08/2025 02:49 PM    BUN 22 01/08/2025 02:49 PM    CREATININE 0.59 (L) 01/08/2025 02:49 PM    GLUC 154 (H) 01/08/2025 02:49 PM    CALCIUM 9.3 01/08/2025 02:49 PM    AST 14 01/08/2025 02:49 PM    ALT 13 01/08/2025 02:49 PM    ALB 3.9 01/08/2025 02:49 PM    TP 7.2 01/08/2025 02:49 PM    EGFR 90 01/08/2025 02:49 PM     Lab Results   Component Value Date/Time    HGB 13.5 01/08/2025 02:49 PM    WBC 8.09 01/08/2025 02:49 PM     01/08/2025 02:49 PM    INR 1.17 11/12/2024 04:36 AM     Lab Results   Component Value Date/Time    TUZ2KTYZWSQL 2.328 01/08/2025 02:49 PM       Recent Imaging:  Procedure: CT abdomen pelvis with contrast  Result Date: 1/8/2025  Narrative: CT ABDOMEN AND PELVIS WITH IV CONTRAST INDICATION: Persistent abd pain/tn since 12/24 surgery (laparoscoping diverting sigmoid & loop colostomy), passage into ostomy only starting 3 d ago, maxmimal tn L sided. COMPARISON: Multiple prior studies, the most recent is dated December 23, 2024. TECHNIQUE: CT examination of the abdomen and pelvis was performed. Multiplanar 2D reformatted images were created from the source data. This examination, like all CT scans performed in the [facility], was performed utilizing techniques to minimize radiation dose exposure, including the use of iterative reconstruction and automated exposure control. Radiation dose length product (DLP) for this visit: 418 mGy-cm IV Contrast: 85 mL of iohexol (OMNIPAQUE) Enteric  Contrast: Not administered. FINDINGS: ABDOMEN LOWER CHEST: No clinically significant abnormality in the visualized lower chest. LIVER/BILIARY TREE: Unremarkable. GALLBLADDER: Cholelithiasis without findings of acute cholecystitis. SPLEEN: Unremarkable. PANCREAS: Unremarkable. ADRENAL GLANDS: Unremarkable. KIDNEYS/URETERS: Few subcentimeter lesions bilaterally too small to characterize but statistically benign. Focal cortical scar upper pole right kidney. STOMACH AND BOWEL: Again shown is a locally invasive lower rectal cancer measuring approximately 9.7 x 8.4 x 8 cm, with a marked decrease in size of a rim-enhancing abscess in the left ischioanal fat, which now measures approximately 4.4 x 1.8 x 1.5 cm (301, 163 and 601, 139) with fistulous communication between both the mass and the skin surface. There is packing material in the abscess cavity. Patient is status post left lower quadrant colostomy with a large amount of retained stool throughout the entire colon proximal to the ostomy. Small bowel is normal in caliber. APPENDIX: No findings to suggest appendicitis. ABDOMINOPELVIC CAVITY: Small volume ascites throughout the abdomen and pelvis new from the prior study. Enlarged but stable superior rectal lymph nodes. VESSELS: Unremarkable for patient's age. PELVIS REPRODUCTIVE ORGANS: Calcified uterine fibroids. URINARY BLADDER: Unremarkable. ABDOMINAL WALL/INGUINAL REGIONS: Unremarkable. BONES: No acute fracture or suspicious osseous lesion.     Impression: Status post diverting sigmoid loop colostomy with a large amount of stool throughout the entire colon to the level of the ostomy. Large locally invasive lower rectal cancer again shown with decrease in the size of fluid collection in the left ischioanal fat. Small volume ascites in the abdomen and pelvis, new from the prior study. The study was marked in EPIC for immediate notification. Workstation performed: [id number]     Procedure: IR port placement  Result  "Date: 1/6/2025  Narrative: IR PORT PLACEMENT PROCEDURE: Implantable infusion port placement CLINICAL INDICATION: Rectal cancer COMPARISON: CT 12/23/2024 and 11/12/2024 PERFORMING PHYSICIAN: Fermín Camejo MD ASSISTANT PHYSICIAN: None MEDICATIONS: 1% lidocaine with epinephrine. 2 mg Versed. 100 mcg fentanyl. 1 gm Vancomycin. SEDATION TIME: Continuous cardiopulmonary monitoring by the interventional radiology physician and/or nurse for  45 MIN CONTRAST: None FLUOROSCOPY TIME: 0.7 MIN RADIATION DOSE: 3 mGy   (air Kerma). NUMBER OF IMAGES: 2 TECHNIQUE: Informed written consent was obtained from the patient after a thorough discussion with the patient and her son of risks, benefits, and alternatives to the procedure . All questions were answered. The patient was brought to the procedure room and a time-out was performed utilizing universal protocol. The patient was identified verbally and via wrist band. The right neck and chest wall was prepped and draped in the usual sterile fashion. All elements of maximal sterile barrier technique were followed (cap, mask, sterile gown, sterile gloves, large sterile sheet, hand hygiene, and 2% chlorhexidine for cutaneous antisepsis). Sterile ultrasound technique [facility] sterile gel and sterile probe cover was also utilized. The skin of the neck infiltrated with 1% lidocaine/epinephrine solution.  1 cm incision made with a #11 blade.  Using real-time ultrasound guidance with permanent image documentation, access to the internal jugular vein in the supraclavicular region was achieved with a micropuncture system.  An 0.035\" stiff wire was used to secure the access with advancement to the inferior vena cava. 1% lidocaine/epinephrine used to infiltrate the infraclavicular chest wall in the 2nd anterior interspace and a 2 cm long incision was made with a #15 blade.  Using blunt dissection a small pocket was fashioned distal to the incision. 8-Panamanian port catheter tubing was pulled through " a subcutaneous tunnel from the port pocket site to the neck venotomy site.  8-Nigerian peel-away sheath placed over the guidewire.  Catheter was placed through the peel-away sheath and the peel-away sheath was removed.  Catheter length was adjusted so the catheter tip was at the cavoatrial junction and the catheter was cut to the appropriate length and attached to the hub of the port reservoir.  The system was easily aspirated and flushed with saline. The pocket was closed in 2 layers with 3-0 deep interrupted Vicryl sutures and 4-0 running subcuticular Vicryl suture with the venotomy and port pocket skin sites then closed with cyanoacrylate adhesive.  The sites were dressed and bandaged. The patient tolerated the procedure well without difficulty or complication encountered and left the section in satisfactory stable condition. FINDINGS: As above.     Impression: Tecnically successful port placement as described.  The port is available for immediate use. Workstation performed: [id number]     Procedure: CT abdomen pelvis w contrast  Result Date: 12/23/2024  Narrative: CT ABDOMEN AND PELVIS WITH IV CONTRAST INDICATION: abscess on buttock? known hx of rectal CA. now with large, painful, bleeding wound to buttock. . COMPARISON: MRI pelvis dated 12/6/2024, CT abdomen and pelvis dated 11/10/2024 TECHNIQUE: CT examination of the abdomen and pelvis was performed. Multiplanar 2D reformatted images were created from the source data. This examination, like all CT scans performed in the [facility], was performed utilizing techniques to minimize radiation dose exposure, including the use of iterative reconstruction and automated exposure control. Radiation dose length product (DLP) for this visit: 493.45 mGy-cm IV Contrast: 100 mL of iohexol (OMNIPAQUE) Enteric Contrast: Not administered. FINDINGS: ABDOMEN LOWER CHEST: Linear right lower lobe atelectasis. Coronary artery calcifications. LIVER/BILIARY TREE:  Unremarkable. GALLBLADDER: Cholelithiasis without findings of acute cholecystitis. SPLEEN: Unremarkable. PANCREAS: Unremarkable. ADRENAL GLANDS: Unremarkable. KIDNEYS/URETERS: Stable right upper pole cortical scarring. No hydronephrosis or urinary tract calculi. Subcentimeter hypoattenuating renal lesion(s), too small to characterize but statistically likely benign, which do not warrant follow-up (Radiology June 2019). STOMACH AND BOWEL: Again seen is a locally invasive low rectal tumor measuring 9.0 x 6.6 x 7.8 cm, similar to recent MRI, but increased in size from 11/10/2024. The mass is again noted to invade the left side internal and external sphincters, levator ani  and gluteus shae muscle with extension into the left ischio anal fat. There has been interval development of rim-enhancing collection/abscess in the left ischioanal fat measuring 5.7 x 5.2 x 8.2 cm (2/175, 61/93) with fistulization into the overlying  gluteal skin. The collection extends into the tumor with development of multiple internal foci of air. APPENDIX: Normal. ABDOMINOPELVIC CAVITY: Enlarged mesorectal and superior rectal chain lymph nodes, similar to recent MRI. No ascites or free air. VESSELS: Atherosclerosis without abdominal aortic aneurysm. PELVIS REPRODUCTIVE ORGANS: Tumor invasion of the posterior wall of the vagina, better seen on prior MRI. URINARY BLADDER: Unremarkable. ABDOMINAL WALL/INGUINAL REGIONS: Locally invasive tumor with development of rim-enhancing collection/abscess in the left ischio anal fat as described. BONES: No acute fracture or suspicious osseous lesion. Transitional lumbosacral junction.     Impression: 1. Locally invasive lower rectal cancer invading the sphincters, left levator ani and gluteus shae muscle, similar to recent MRI, but progressed from 11/10/2024. 2. New rim-enhancing collection/abscess in the left ischioanal fat measuring 5.7 x 5.2 x 8.2 cm, with fistulization into the overlying gluteal  skin. The collection extends into the tumor with development of multiple internal foci of air, likely related to fistulization of the tumor or superinfection. 3. 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 The study was marked in EPIC for immediate notification. Workstation performed: [id number]     Procedure: MRI pelvis rectal cancer staging wo contrast  Result Date: 12/6/2024  Narrative: MRI PELVIS - WITHOUT CONTRAST (RECTAL CANCER STAGING) INDICATION: K62.89: Other specified diseases of anus and rectum K92.1: Melena K62.89: Other specified diseases of anus and rectum. Based on colonoscopy report from 11/12/2024, the tumor is a single ulcerated mass measuring 15 cm in the rectum and anal region covering 1/2 the circumference. Pathology demonstrated fragments of tubovillous adenoma with features suggestive of traditional serrated adenoma. Samples were negative for high-grade dysplasia or carcinoma. Patient has not had preoperative chemoradiation treatment. COMPARISON: None TECHNIQUE: Multiplanar/multisequence MRI of the pelvis without contrast was performed using rectal cancer imaging protocol. An additional small field of view, axial oblique T2-weighted sequence was performed through the tumor, perpendicular to the long axis of the rectum at that level. IV contrast was not given. FINDINGS: TUMOR LOCATION AND CHARACTERISTICS -  Tumor location: Low rectal cancer (0 to 5 cm), 2.2 cm from the anal verge (series 2 image 19.) -  Distance of the lowest extent of tumor from the top of the anal sphincter: 0 cm. -  Relationship to the sigmoid takeoff (STO): Completely below sigmoid takeoff. -  Relationship to anterior peritoneal reflection: Below. -  Morphology: Polypoid. -  Circumference (percent, relative to wall): The base of the polypoid mass is difficult to determine. The polypoid mass fills the entire lumen of the rectum. -  Tumor craniocaudal length: 7.5 cm. -  Mucinous:  Mostly mucin. T-STAGING: T4b* (tumor invades or adherent to adjacent organs or structures). This is based on invasion of the levator ani described below. Also, right anterolaterally, tumor penetrates through the mesorectal fascia to involve the right posterolateral vaginal wall (series 3 image 21 and series 2 image 27.) ANAL SPHINCTER INVOLVEMENT (FOR LOW RECTAL CA): On the left tumor invades the top of the IS and extends into intersphincteric space (ISS) (series 9 images 19-22.) Also separately, the tumor broadly penetrates through the posterior rectal wall (series 3 images 11-18) fills the posterior mesorectal space, penetrates through the left posterolateral levator ani (series 3 images 21-26,) and extends into the left ischioanal fossa and invades the left gluteus shae (series 3 images 26-31.) EXTRAMURAL VASCULAR INVASION: EMVI is not present. MESORECTAL FASCIA (MRF) STATUS: - Not applicable for T4 tumors. TUMOR DEPOSITS: No. LYMPH NODES: There are 4 or greater suspicious locoregional nodes (N2). Suspicious mesorectal/superior rectal lymph nodes described on series 7 and 3: Image 13 series 7, Superior rectal, 9 mm. Image 1 series 3, Superior rectal, 6 mm. This node is mucinous evidenced by T2 hyperintensity. Image 2 series 3, Superior rectal, 6 mm. This node is mucinous Image 9 series 3, right mesorectal, 3 mm. This node is mucinous Suspicious extra-mesorectal locoregional nodes: None. Suspicious non-locoregional nodes: None REST OF THE PELVIS: REPRODUCTIVE STRUCTURES: Small uterine fibroids. See above regarding vaginal involvement. BLADDER: Normal. OTHER BOWEL LOOPS: Unremarkable MRI appearance. PELVIC CAVITY: Unremarkable. VASCULAR STRUCTURES: Limited evaluation of the visualized vasculature on this non-contrast MRI is unremarkable. PELVIC WALL: Unremarkable. OSSEOUS STRUCTURES: No osseous destruction.     Impression: Unenhanced MRI of the Pelvis (Rectal Protocol) Please note that although the current  biopsy results does not demonstrate definite cancer, the imaging characteristics of this tumor is clearly invasive and will be described as a rectal cancer. Large polypoid, predominately mucinous low rectal cancer, 2.2 cm from the anal verge, 7.5 cm long (series 2 image 19, series 3 images 9-32.) On the left tumor invades the top of the internal sphincter and extends into intersphincteric space (ISS) (series 9 images 19-22.) Also separately, the tumor broadly penetrates through the posterior rectal wall (series 3 images 11-18) fills the posterior mesorectal space, penetrates through the left posterolateral levator ani (series 3 images 21-26,) and extends into the left ischioanal fossa and invades the left gluteus shae (series 3 images 26-31.) Right anterolaterally, tumor penetrates through the mesorectal fascia to involve the right posterolateral vaginal wall (series 3 image 21 and series 2 image 27.) Overall MRI stage: T4b, N2, Low rectal cancer MRF: Not applicable for T4 tumor. Sphincter involvement: Yes. Suspicious extra mesorectal lymph nodes: No. EMVI: No. For the purpose of radiation therapy planning, series 3 would be most useful. Workstation performed: [id number]     Procedure: CT chest wo contrast  Result Date: 11/13/2024  Narrative: CT CHEST WITHOUT IV CONTRAST INDICATION:   Cancer, staging, abnorm ct abd. per my review of the medical record, patient presented with left gluteal pain with rectal mass. COMPARISON: Abdomen CT 11/10/2024. TECHNIQUE: Chest CT without intravenous contrast.  Axial, sagittal, coronal 2D reformats and coronal MIPS from source data. Radiation dose length product (DLP):  155.25 mGy-cm . Radiation dose exposure minimized using iterative reconstruction and automated exposure control. FINDINGS: LUNGS: No metastases. No acute disease. Minimal benign bilateral lower lobe subsegmental atelectasis. Benign calcified granulomas. AIRWAYS: No significant filling defects. PLEURA:   Unremarkable. HEART/GREAT VESSELS: Mild cardiomegaly. Severe coronary artery calcification indicating atherosclerotic heart disease. MEDIASTINUM AND YOVANY:  Unremarkable. CHEST WALL AND LOWER NECK: Unremarkable. UPPER ABDOMEN: Cholelithiasis. OSSEOUS STRUCTURES: Mild degenerative disease in the spine. Benign bone islands in the right humeral head.     Impression: No lung metastases. Severe coronary artery calcification indicating atherosclerotic heart disease. Cholelithiasis. Workstation performed: [id number]     Procedure: Colonoscopy  Result Date: 11/12/2024  Narrative: Table formatting from the original result was not included. [facility] Operating Room [address] [place] [id number] [phone] DATE OF SERVICE: 11/12/24 PHYSICIAN(S): Attending: Morris Tom MD Fellow: No Staff Documented INDICATION: Rectal mass POST-OP DIAGNOSIS: See the impression below. HISTORY: Prior colonoscopy: No prior colonoscopy. BOWEL PREPARATION: Miralax/Magnesium Citrate; Golytely/Colyte/Trilyte PREPROCEDURE: Informed consent was obtained for the procedure, including sedation. Risks including but not limited to bleeding, infection, perforation, adverse drug reaction and aspiration were explained in detail. Also explained about less than 100% sensitivity with the exam and other alternatives. The patient was placed in the left lateral decubitus position. Procedure: Colonoscopy DETAILS OF PROCEDURE: Patient was taken to the procedure room where a time out was performed to confirm correct patient and correct procedure. The patient underwent monitored anesthesia care, which was administered by an anesthesia professional. The patient's blood pressure, ECG, ETCO2, heart rate, level of consciousness, oxygen and respirations were monitored throughout the procedure. A digital rectal exam was performed. The scope was introduced through the anus and advanced to the cecum. Retroflexion was performed in the rectum. The  quality of bowel preparation was evaluated using the [place] Bowel Preparation Scale with scores of: right colon = 1, transverse colon = 1, left colon = 1. The total BBPS score was 3. Bowel prep was not adequate. The patient experienced no blood loss. The procedure was not difficult. The patient tolerated the procedure well. There were no apparent adverse events. ANESTHESIA INFORMATION: ASA: II Anesthesia Type: IV Sedation with Anesthesia MEDICATIONS: No administrations occurring from 1406 to 1450 on 11/12/24 FINDINGS: Single malignant-appearing, friable, fungating and ulcerated mass (traversable) measuring 15 cm in the rectum and anal region, covering one half of the circumference; performed cold forceps biopsy with partial removal. The mass was extending up to the dentate line. EVENTS: Procedure Events Event Event Time ENDO CECUM REACHED 11/12/2024  2:28 PM ENDO SCOPE OUT TIME 11/12/2024  2:45 PM SPECIMENS: ID Type Source Tests Collected by Time Destination 1 : rectal mass r/o malignancy , cancer Tissue Rectum TISSUE EXAM Morris Tom MD 11/12/2024  2:39 PM  EQUIPMENT: Colonoscope -     Impression: Single malignant-appearing, friable, fungating and ulcerated mass measuring 15 cm in the rectum and anal region, covering one half of the circumference; performed cold forceps biopsy with partial removal RECOMMENDATION: Await pathology results Repeat colonoscopy in 1 year, due: 11/12/2025 Inadequate bowel preparation  Check CEA levels. Patient will need colorectal surgery evaluation. Resume diet as tolerated. Communicated exam results with patient's daughter who was at bedside.  Morris Tom MD     Procedure: CT abdomen pelvis with contrast  Result Date: 11/10/2024  Narrative: CT ABDOMEN AND PELVIS WITH IV CONTRAST INDICATION: L gluteal pain. . COMPARISON: None. TECHNIQUE: CT examination of the abdomen and pelvis was performed. Multiplanar 2D reformatted images were created from the source data. This examination,  like all CT scans performed in the [facility], was performed utilizing techniques to minimize radiation dose exposure, including the use of iterative reconstruction and automated exposure control. Radiation dose length product (DLP) for this visit: 534 mGy-cm IV Contrast: 100 mL of iohexol (OMNIPAQUE) Enteric Contrast: Not administered. FINDINGS: ABDOMEN LOWER CHEST: No clinically significant abnormality in the visualized lower chest. LIVER/BILIARY TREE: Unremarkable. GALLBLADDER: Cholelithiasis without findings of acute cholecystitis. SPLEEN: Unremarkable. PANCREAS: Unremarkable. ADRENAL GLANDS: Unremarkable. KIDNEYS/URETERS: No hydronephrosis or urinary tract calculi. Subcentimeter hypoattenuating renal lesion(s), too small to characterize but statistically likely benign, which do not warrant follow-up (Radiology June 2019). STOMACH AND BOWEL: There is a heterogeneous peripherally enhancing lesion involving the rectum measuring approximately 7.7 x 7.5 x 8.6 cm with central fluid density which may reflect mass/malignancy with necrosis (series 2, image 160). There is soft tissue infiltration of the perirectal and left ischio rectal and gluteal space where there is a lobulated soft tissue (series 2, image 165). Fluid-filled distal small bowel loops which may be due to superimposed enteritis. APPENDIX: No findings to suggest appendicitis. ABDOMINOPELVIC CAVITY: No ascites. No pneumoperitoneum. No significant lymphadenopathy. A few perirectal lymph nodes measure 7 mm in short axis.. VESSELS: Unremarkable for patient's age. PELVIS REPRODUCTIVE ORGANS: Subcentimeter calcifications in the bladder may be due to fibroids. URINARY BLADDER: Unremarkable. ABDOMINAL WALL/INGUINAL REGIONS: Unremarkable. BONES: No acute fracture or suspicious osseous lesion.     Impression: Heterogeneous peripherally enhancing mass/malignancy involving the rectum and infiltrating the left ischiorectal and gluteal fat measuring  approximately 7.7 x 7.5 x 8.6 cm with areas of fluid density compatible with internal necrosis. I personally discussed this study with ALMA ROSA ROMAN on 11/10/2024 9:49 PM. Workstation performed: [id number]       Administrative Statements   I have spent a total time of 31 minutes in caring for this patient on the day of the visit/encounter including Risks and benefits of tx options, Instructions for management, Patient and family education, Importance of tx compliance, Risk factor reductions, Impressions, Counseling / Coordination of care, Documenting in the medical record, Reviewing / ordering tests, medicine, procedures  , and Obtaining or reviewing history  .    Topics discussed with the patient / family include symptom assessment and management, medication review, psychosocial support, supportive listening, and anticipatory guidance.

## 2025-01-23 NOTE — TELEPHONE ENCOUNTER
No reasonable alternative per search    Would advise starting food diary     Can consider actos & decreasing metformin to 1000 mg daily instead of twice daily    Please advise son that this medication may be more reasonable     Thanks!

## 2025-01-24 ENCOUNTER — TELEPHONE (OUTPATIENT)
Dept: INFUSION CENTER | Facility: CLINIC | Age: 75
End: 2025-01-24

## 2025-01-24 ENCOUNTER — PATIENT OUTREACH (OUTPATIENT)
Dept: CASE MANAGEMENT | Facility: OTHER | Age: 75
End: 2025-01-24

## 2025-01-24 NOTE — PROGRESS NOTES
OSW placed outreach TC to pt this day using the  service # [id number]. Pt did not answer the TC, therefore the  left a detailed VM. OSW will place another call at a later date.

## 2025-01-24 NOTE — ASSESSMENT & PLAN NOTE
Psychosocial   Supportive listening provided  Normalized experience of patient/family  Provided anxiety containment     Referrals Placed / Medical Equipment Ordered  -None    Follow-Up Recommendations  -Follow-up with PCP and current medical specialists  -Follow-up with palliative care: 4 weeks    Orders:    oxyCODONE (Roxicodone) 5 immediate release tablet; Take 1 tablet (5 mg total) by mouth every 4 (four) hours as needed for moderate pain Max Daily Amount: 30 mg

## 2025-01-24 NOTE — TELEPHONE ENCOUNTER
Received call to [facility] from piotr son in law. He is asking is patient could move her appointment for next Wednesday 1/29 at [facility]. They are considering an opening Tuesday 1/28 at [facility] at 11am. He stated they will discuss and call back if they want to change the appointment.

## 2025-01-24 NOTE — ASSESSMENT & PLAN NOTE
Srav[facility]i  available. Patient elected to have her daughter, Coco, as  (Faroese) during today's visit.    Following Dr. Perea of Medical Oncology  -plans for FOLFOX6, however, patient/family are pending follow-up with Surgical team prior to initiation of chemotherapy.    Will have close follow-up with patient to monitor for symptoms and assist with any cancer related pain.    Patient denies nausea or vomiting. Has an appetite (some days better than others).  Orders:    oxyCODONE (Roxicodone) 5 immediate release tablet; Take 1 tablet (5 mg total) by mouth every 4 (four) hours as needed for moderate pain Max Daily Amount: 30 mg

## 2025-01-24 NOTE — ASSESSMENT & PLAN NOTE
Well controlled at this time.  Patient takes OxyIR 5mg q4 hours ATC for which she sometimes takes it preemptively to help with being ahead of her abdominal and rectal pain.  She denies any side effects with this dosing. Daughter also endorses no concerns with patient's current dosing. They are in need of are refill.    Plan:  1) OxyIR 5mg q4 hours PRN  #90 tabs sent today to provide more supply for patient.  2) bowel regimen to continue daily to avoid OIC     Orders:    oxyCODONE (Roxicodone) 5 immediate release tablet; Take 1 tablet (5 mg total) by mouth every 4 (four) hours as needed for moderate pain Max Daily Amount: 30 mg

## 2025-01-24 NOTE — ASSESSMENT & PLAN NOTE
Orders:    oxyCODONE (Roxicodone) 5 immediate release tablet; Take 1 tablet (5 mg total) by mouth every 4 (four) hours as needed for moderate pain Max Daily Amount: 30 mg

## 2025-01-24 NOTE — ASSESSMENT & PLAN NOTE
Resolved.  Patient does use senokot daily along with Miralax as needed.  She has had regular output from her colostomy bag without similar issues during our last office visit resulting in hospital admission in management and evaluation of her constipation and colostomy.    Patient advised to continue with her bowel regimen to avoid OIC. She and family state understanding and agreement.

## 2025-01-27 ENCOUNTER — TELEPHONE (OUTPATIENT)
Dept: HEMATOLOGY ONCOLOGY | Facility: MEDICAL CENTER | Age: 75
End: 2025-01-27

## 2025-01-27 ENCOUNTER — TELEPHONE (OUTPATIENT)
Age: 75
End: 2025-01-27

## 2025-01-27 ENCOUNTER — HOSPITAL ENCOUNTER (OUTPATIENT)
Dept: INFUSION CENTER | Facility: HOSPITAL | Age: 75
Discharge: HOME | End: 2025-01-27

## 2025-01-27 NOTE — TELEPHONE ENCOUNTER
Left voicemail for daughter in law Coco to call [facility] with any questions or concerns regarding upcoming chemo appt

## 2025-01-27 NOTE — TELEPHONE ENCOUNTER
Spoke with patient's son in law  Patient with a two day duration of cough and chest congestion, is feeling weak  Is afebrile, denies chills  Patient is due to start chemo this week  D/W Dr Perea  Son in law instructed to call PCP   We may need to postpone chemo this week depending on evaluation from PCP  JOSE ANTONIOI to [facility] infusion team

## 2025-01-27 NOTE — TELEPHONE ENCOUNTER
Rocky called in with some concerns and questions about chemo treatment. He is requesting a call back from CANDELARIA Lara to discuss. He has an appt today at 3 pm so he is hoping for a call before that if possible. He can be reached at [phone]. Thank you.

## 2025-01-27 NOTE — PROGRESS NOTES
Name: Miriam Smith      : 1950      MRN: [id number]  Encounter Provider: Chandrika Leblanc MD  Encounter Date: 2025   Encounter department: [facility]  :  Assessment & Plan  Viral upper respiratory tract infection  Pt is having viral URI symptoms and patient's family had flu. Pt symptom started 5 days ago. No need to test for Flu and covid today since it is not going to change our management since it is too late for antiviral therapy. Pt has cough and myalgia and loss of appetite. Denies fever, but has some chills. Cough most likely 2/2 post nasal drip and Pt also has seasonal allergies.    -Pt was advised with good hydration and tessalon perles for cough.  -Pt is allergic to motrin and tylenol, so no alternative for myalgia.  -Will start Claritin daily and ocean spray PRN for post nasal drip.  -Will hold off of Flonase for now as Pt does not have insurance and paying medication out of pocket.  -Pt was advised to call back if no improvement with above treatment or fever and SOB starts   Orders:    benzonatate (TESSALON PERLES) 100 mg capsule; Take 1 capsule (100 mg total) by mouth 3 (three) times a day as needed for cough    Post-nasal drip    Orders:    loratadine (CLARITIN) 10 mg tablet; Take 1 tablet (10 mg total) by mouth daily    sodium chloride (OCEAN) 0.65 % nasal spray; 1 spray into each nostril as needed for congestion or rhinitis    Post-menopausal    Orders:    DXA bone density spine hip and pelvis; Future    Impacted cerumen of right ear    Orders:    carbamide peroxide (DEBROX) 6.5 % otic solution; Administer 5 drops to the right ear 2 (two) times a day           History of Present Illness   URI   This is a new problem. Episode onset: 5 days ago. The problem has been unchanged. There has been no fever. Associated symptoms include chest pain (due to cough) and coughing. Pertinent negatives include no abdominal pain, congestion, diarrhea,  dysuria, ear pain, headaches, joint pain, joint swelling, nausea, neck pain, plugged ear sensation, rash, rhinorrhea, sinus pain, sneezing, sore throat, swollen glands, vomiting or wheezing. She has tried nothing for the symptoms.     Review of Systems   Constitutional:  Negative for chills and fever.   HENT:  Negative for congestion, ear pain, rhinorrhea, sinus pain, sneezing and sore throat.    Eyes:  Negative for pain and visual disturbance.   Respiratory:  Positive for cough. Negative for shortness of breath and wheezing.    Cardiovascular:  Positive for chest pain (due to cough). Negative for palpitations.   Gastrointestinal:  Negative for abdominal pain, constipation, diarrhea, nausea and vomiting.   Genitourinary:  Negative for dysuria and hematuria.   Musculoskeletal:  Positive for myalgias. Negative for arthralgias, back pain, joint pain, neck pain and neck stiffness.   Skin:  Negative for color change and rash.   Neurological:  Negative for dizziness, weakness and headaches.   Psychiatric/Behavioral:  Negative for agitation and confusion. The patient is not nervous/anxious.    All other systems reviewed and are negative.      Objective   /68 (BP Location: Left arm, Patient Position: Sitting, Cuff Size: Standard)   Pulse 99   Temp 97.8 °F (36.6 °C) (Tympanic)   Wt 47 kg (103 lb 11.2 oz)   SpO2 96%   BMI 20.25 kg/m²      Physical Exam  Vitals and nursing note reviewed.   Constitutional:       General: She is not in acute distress.     Appearance: Normal appearance. She is well-developed. She is ill-appearing.   HENT:      Head: Normocephalic and atraumatic.      Right Ear: External ear normal. There is impacted cerumen.      Left Ear: Tympanic membrane, ear canal and external ear normal. There is no impacted cerumen.      Ears:      Comments: Fluid behind the TM, but no signs of infection     Nose: Congestion present. No rhinorrhea.      Mouth/Throat:      Mouth: Mucous membranes are moist.       Pharynx: Oropharynx is clear. No oropharyngeal exudate or posterior oropharyngeal erythema.      Comments: Post nasal drip  Eyes:      General:         Right eye: No discharge.         Left eye: No discharge.      Conjunctiva/sclera: Conjunctivae normal.   Cardiovascular:      Rate and Rhythm: Normal rate and regular rhythm.      Pulses: Normal pulses.      Heart sounds: Normal heart sounds. No murmur heard.     No friction rub. No gallop.   Pulmonary:      Effort: Pulmonary effort is normal. No respiratory distress.      Breath sounds: Normal breath sounds. No stridor. No wheezing, rhonchi or rales.   Chest:      Chest wall: No tenderness.   Abdominal:      General: Abdomen is flat. Bowel sounds are normal. There is no distension.      Palpations: Abdomen is soft.      Tenderness: There is no abdominal tenderness. There is no guarding.   Musculoskeletal:         General: No swelling, tenderness, deformity or signs of injury. Normal range of motion.      Cervical back: Normal range of motion and neck supple. No rigidity or tenderness.      Right lower leg: No edema.      Left lower leg: No edema.   Feet:      Right foot:      Skin integrity: No ulcer, skin breakdown, erythema, warmth, callus or dry skin.      Left foot:      Skin integrity: No ulcer, skin breakdown, erythema, warmth, callus or dry skin.   Lymphadenopathy:      Cervical: No cervical adenopathy.   Skin:     General: Skin is warm and dry.      Capillary Refill: Capillary refill takes less than 2 seconds.      Findings: No bruising, erythema, lesion or rash.   Neurological:      General: No focal deficit present.      Mental Status: She is alert and oriented to person, place, and time. Mental status is at baseline.      Motor: No weakness.      Gait: Gait normal.      Deep Tendon Reflexes: Reflexes normal.   Psychiatric:         Mood and Affect: Mood normal.         Behavior: Behavior normal.         Thought Content: Thought content normal.        normal (ped)...

## 2025-01-28 ENCOUNTER — TELEPHONE (OUTPATIENT)
Dept: HEMATOLOGY ONCOLOGY | Facility: MEDICAL CENTER | Age: 75
End: 2025-01-28

## 2025-01-28 ENCOUNTER — OFFICE VISIT (OUTPATIENT)
Age: 75
End: 2025-01-28

## 2025-01-28 ENCOUNTER — TELEPHONE (OUTPATIENT)
Age: 75
End: 2025-01-28

## 2025-01-28 VITALS
SYSTOLIC BLOOD PRESSURE: 107 MMHG | HEART RATE: 99 BPM | WEIGHT: 103.7 LBS | TEMPERATURE: 97.8 F | DIASTOLIC BLOOD PRESSURE: 68 MMHG | BODY MASS INDEX: 20.25 KG/M2 | OXYGEN SATURATION: 96 %

## 2025-01-28 DIAGNOSIS — H61.21 IMPACTED CERUMEN OF RIGHT EAR: ICD-10-CM

## 2025-01-28 DIAGNOSIS — R09.82 POST-NASAL DRIP: ICD-10-CM

## 2025-01-28 DIAGNOSIS — Z78.0 POST-MENOPAUSAL: ICD-10-CM

## 2025-01-28 DIAGNOSIS — J06.9 VIRAL UPPER RESPIRATORY TRACT INFECTION: Primary | ICD-10-CM

## 2025-01-28 PROCEDURE — 99213 OFFICE O/P EST LOW 20 MIN: CPT | Performed by: FAMILY MEDICINE

## 2025-01-28 RX ORDER — BENZONATATE 100 MG/1
100 CAPSULE ORAL 3 TIMES DAILY PRN
Qty: 20 CAPSULE | Refills: 0 | Status: SHIPPED | OUTPATIENT
Start: 2025-01-28

## 2025-01-28 RX ORDER — LORATADINE 10 MG/1
10 TABLET ORAL DAILY
Qty: 30 TABLET | Refills: 0 | Status: SHIPPED | OUTPATIENT
Start: 2025-01-28

## 2025-01-28 NOTE — TELEPHONE ENCOUNTER
Rocky is calling to request a  call his mom, please use the language line when call patient is Nepali speaking only. Also he is inquiring if there is any assistance out there to help his mom with her stoma supplies.

## 2025-01-29 ENCOUNTER — TELEPHONE (OUTPATIENT)
Age: 75
End: 2025-01-29

## 2025-01-29 ENCOUNTER — TELEPHONE (OUTPATIENT)
Dept: HEMATOLOGY ONCOLOGY | Facility: MEDICAL CENTER | Age: 75
End: 2025-01-29

## 2025-01-29 ENCOUNTER — TELEPHONE (OUTPATIENT)
Dept: INFUSION CENTER | Facility: HOSPITAL | Age: 75
End: 2025-01-29

## 2025-01-29 ENCOUNTER — HOSPITAL ENCOUNTER (OUTPATIENT)
Dept: INFUSION CENTER | Facility: HOSPITAL | Age: 75
Discharge: HOME | End: 2025-01-29
Attending: INTERNAL MEDICINE

## 2025-01-29 DIAGNOSIS — C20 RECTAL CANCER (HCC): Primary | ICD-10-CM

## 2025-01-29 NOTE — TELEPHONE ENCOUNTER
Please advise    Pts son in law Rocky calling in, on consent form, pt had I&D BUTTOCK abscess, with packing  LAPAROSCOPIC DIVERTING sigmoid end-loop colostomy on 12/24/24.     Rocky noticed a few days ago bad smelling odor from wound on buttock and reports he was told to call in if that happened. He is unsure if any drainage but no fevers noted. Pt has pain but reports it has been painful for a while now.     Pt has appt for follow up on 2/10/25. Any recs before OV?

## 2025-01-30 ENCOUNTER — PATIENT OUTREACH (OUTPATIENT)
Dept: CASE MANAGEMENT | Facility: OTHER | Age: 75
End: 2025-01-30

## 2025-01-30 NOTE — TELEPHONE ENCOUNTER
Aurora , patient's daughter calling back in today with cont. Concerns regarding foul smelling drainage.  She was made aware that we are still waiting on guidance from Dr. Knox.           number [id number] used for this encounter

## 2025-01-30 NOTE — PROGRESS NOTES
OSW placed second outreach TC to pt this day suing the  service,  #[id number]. The pt did not answer the call, therefore a VM was left.

## 2025-02-03 NOTE — PROGRESS NOTES
Name: Miriam Smith      : 1950      MRN: [id number]  Encounter Provider: Trino Knox MD  Encounter Date: 2025   Encounter department: [facility]  :  Assessment & Plan  Rectal cancer (HCC)  Miriam returns today, locally advanced rectal cancer, she is 6 weeks out from laparoscopic colostomy, ischiorectal abscess drainage from ingrowth of rectal tumor with Dr. Bermudez 24.     Despite a flulike recent viral illness she is doing well, chemotherapy has not yet started due to this.    On examination she has a quite healthy pink/viable colostomy that is well pouched.  Left posterior lateral ischiorectal space with wide open granulating cavity, minimal mucus drainage, there is no sign of infection, there is no concern for cellulitis or abscess, this is quite healthy in appearance.    I discussed with her and grandson today that her survival is linked to her neoadjuvant chemoradiation therapy and this should be started as soon as medically possible after she has gotten over her recent respiratory infection.                History of Present Illness   HPI    Miriam Smith is a 74 y.o. female who presents for a post operative evaluation.     The patient is status post I&D buttock abscess, with packing, laparoscopic diverting sigmoid end-loop colostomy on 24.    Operative Findings:  -- Very large, soft, rectal mass with apparent central necrosis   -- Erosion of rectal wall by tumor leaving a large defect in the left lateral rectal wall and a very large cavity in the pelvic wall and buttock tissues, approximating 10 cm in diameter.  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  -- Buttock abscess secondary to the above    Final Diagnosis  A. Skin, Buttock:  ABSCESS AND GRANULATION TISSUE  Note: There is no evidence of malignancy.    Status post IR port placement  1/6/25. She has not started neoadjuvant therapy yet.    CT abdomen pelvis with contrast 1/8/25  IMPRESSION:  -- Status post diverting sigmoid loop colostomy with a large amount of stool throughout the entire colon to the level of the ostomy.  -- Large locally invasive lower rectal cancer again shown with decrease in the size of fluid collection in the left ischioanal fat.  -- Small volume ascites in the abdomen and pelvis, new from the prior study.    She has daily ostomy output.   Her appetite is good.   She reports foul smelling drainage from her buttock wound.     Lab Results   Component Value Date    CEA 5.6 (H) 11/13/2024     Lab Results   Component Value Date    WBC 8.09 01/08/2025    HGB 13.5 01/08/2025    HCT 40.1 01/08/2025    MCV 90 01/08/2025     01/08/2025     Lab Results   Component Value Date    SODIUM 134 (L) 01/08/2025    K 3.8 01/08/2025    CL 98 01/08/2025    CO2 21 01/08/2025    AGAP 15 (H) 01/08/2025    BUN 22 01/08/2025    CREATININE 0.59 (L) 01/08/2025    GLUC 154 (H) 01/08/2025    CALCIUM 9.3 01/08/2025    AST 14 01/08/2025    ALT 13 01/08/2025    ALKPHOS 51 01/08/2025    TP 7.2 01/08/2025    TBILI 0.53 01/08/2025    EGFR 90 01/08/2025     Review of Systems    Objective   /68   Ht 5' (1.524 m)   Wt 47.2 kg (104 lb)   BMI 20.31 kg/m²      Physical Exam  Constitutional:       General: She is not in acute distress.  Cardiovascular:      Rate and Rhythm: Normal rate and regular rhythm.   Pulmonary:      Effort: Pulmonary effort is normal.      Breath sounds: Normal breath sounds.   Abdominal:      General: There is no distension.      Palpations: Abdomen is soft.      Comments: Healthy/well pouched, viable pink colostomy   Genitourinary:     Comments: Left posterior lateral healthy/granulating wound cavity, no signs of infection  Neurological:      Mental Status: She is alert.

## 2025-02-04 ENCOUNTER — OFFICE VISIT (OUTPATIENT)
Age: 75
End: 2025-02-04

## 2025-02-04 ENCOUNTER — TELEPHONE (OUTPATIENT)
Dept: HEMATOLOGY ONCOLOGY | Facility: MEDICAL CENTER | Age: 75
End: 2025-02-04

## 2025-02-04 VITALS
DIASTOLIC BLOOD PRESSURE: 68 MMHG | HEIGHT: 60 IN | BODY MASS INDEX: 20.42 KG/M2 | SYSTOLIC BLOOD PRESSURE: 124 MMHG | WEIGHT: 104 LBS

## 2025-02-04 DIAGNOSIS — C20 RECTAL CANCER (HCC): Primary | ICD-10-CM

## 2025-02-04 PROCEDURE — 99024 POSTOP FOLLOW-UP VISIT: CPT | Performed by: COLON & RECTAL SURGERY

## 2025-02-04 NOTE — TELEPHONE ENCOUNTER
Patient seen by Dr Pacheco  Per OV note:  I discussed with her and grandson today that her survival is linked to her neoadjuvant chemoradiation therapy and this should be started as soon as medically possible after she has gotten over her recent respiratory infection.     Spoke with son in law Hidalgo  He is aware of need to start treatment ASAP  He will let us know  today if patient feels up to receiving chemotherapy tomorrow,   He is aware that patient will need lab work if receiving treatment     FYI to [facility] infusion team

## 2025-02-04 NOTE — ASSESSMENT & PLAN NOTE
Miriam returns today, locally advanced rectal cancer, she is 6 weeks out from laparoscopic colostomy, ischiorectal abscess drainage from ingrowth of rectal tumor with Dr. Bermudez 12/24/24.     Despite a flulike recent viral illness she is doing well, chemotherapy has not yet started due to this.    On examination she has a quite healthy pink/viable colostomy that is well pouched.  Left posterior lateral ischiorectal space with wide open granulating cavity, minimal mucus drainage, there is no sign of infection, there is no concern for cellulitis or abscess, this is quite healthy in appearance.    I discussed with her and grandson today that her survival is linked to her neoadjuvant chemoradiation therapy and this should be started as soon as medically possible after she has gotten over her recent respiratory infection.

## 2025-02-05 NOTE — TELEPHONE ENCOUNTER
Left voicemail for Rocky to call to discuss treatment plan  Left second voicemail for Rocky, emergency contact, to discuss treatment plan and when patient is up to beginning chemotherapy

## 2025-02-05 NOTE — TELEPHONE ENCOUNTER
Call received by Rocky.       Per Rocky, patient seen  yesterday and was told patient should not start chemotherapy until patient is stable enough. Rocky requested to speak to Jane Faulkner.     Per Rocky Amador was suppose to reach out to  to give update.    Rocky will like a call back to further discuss.       Thank you!

## 2025-02-07 ENCOUNTER — PATIENT OUTREACH (OUTPATIENT)
Dept: CASE MANAGEMENT | Facility: OTHER | Age: 75
End: 2025-02-07

## 2025-02-07 ENCOUNTER — HOSPITAL ENCOUNTER (OUTPATIENT)
Dept: INFUSION CENTER | Facility: HOSPITAL | Age: 75
Discharge: HOME | End: 2025-02-07
Attending: INTERNAL MEDICINE

## 2025-02-07 NOTE — PROGRESS NOTES
OSW placed third outreach TC to pt this day using the -Chin[facility] # [id number]. The pt did not answer the TC, therefore a VM was left.

## 2025-02-12 ENCOUNTER — OFFICE VISIT (OUTPATIENT)
Dept: HEMATOLOGY ONCOLOGY | Facility: MEDICAL CENTER | Age: 75
End: 2025-02-12

## 2025-02-12 ENCOUNTER — TELEPHONE (OUTPATIENT)
Dept: HEMATOLOGY ONCOLOGY | Facility: MEDICAL CENTER | Age: 75
End: 2025-02-12

## 2025-02-12 VITALS
SYSTOLIC BLOOD PRESSURE: 111 MMHG | BODY MASS INDEX: 20.81 KG/M2 | DIASTOLIC BLOOD PRESSURE: 56 MMHG | RESPIRATION RATE: 17 BRPM | HEIGHT: 60 IN | TEMPERATURE: 97.9 F | OXYGEN SATURATION: 97 % | WEIGHT: 106 LBS | HEART RATE: 103 BPM

## 2025-02-12 DIAGNOSIS — C20 RECTAL CANCER (HCC): Primary | ICD-10-CM

## 2025-02-12 PROCEDURE — 99214 OFFICE O/P EST MOD 30 MIN: CPT | Performed by: INTERNAL MEDICINE

## 2025-02-12 RX ORDER — SODIUM CHLORIDE 9 MG/ML
20 INJECTION, SOLUTION INTRAVENOUS ONCE AS NEEDED
Status: CANCELLED | OUTPATIENT
Start: 2025-02-19

## 2025-02-12 RX ORDER — DEXTROSE MONOHYDRATE 50 MG/ML
20 INJECTION, SOLUTION INTRAVENOUS ONCE
Status: CANCELLED | OUTPATIENT
Start: 2025-02-19

## 2025-02-12 RX ORDER — FLUOROURACIL 50 MG/ML
320 INJECTION, SOLUTION INTRAVENOUS ONCE
Status: CANCELLED | OUTPATIENT
Start: 2025-02-19

## 2025-02-12 NOTE — PROGRESS NOTES
Name: Miriam Smith      : 1950      MRN: [id number]  Encounter Provider: Twan Perea MD  Encounter Date: 2025   Encounter department: [facility]  :  Assessment & Plan  Rectal cancer (HCC)             History of Present Illness {?Quick Links Encounters * My Last Note * Last Note in Specialty * Snapshot * Since Last Visit * History :99440}  No chief complaint on file.  74-year-old female first seen in the hospital on 2024.  Patient was admitted with weight loss, low abdominal pain.  Workup demonstrated a rectal mass.  Patient was being seen by GI and a colonoscopy was pending.  Patient returns for follow-up.     Patient is from [place], has been worked up in [place] for this rectal mass.  When patient was seen initially, no pathology results were available.  The [facility] pathology result is listed below.     The biopsy demonstrated grade 1 adenocarcinoma without evidence of lymphovascular invasion.     Patient presents with her son and grandson.  Mrs. José Miguel Smith states feeling okay, about the same as before.  Patient has been seen by palliative care, pain is controlled.  Appetite is okay, patient has lost some weight but it has been more steady recently.  No fevers, chills or sweats.  No recent obvious/significant GI bleeding.  Activities are limited.  No abdominal pain, no respiratory issues.    Interval HPI: ***  Oncology History   Cancer Staging   Rectal cancer (HCC)  Staging form: Colon and Rectum, AJCC 8th Edition  - Clinical stage from 2025: cT4, cN2, cM0 - Signed by Twan Perea MD on 2025  Stage prefix: Initial diagnosis  Histologic grade (G): GX  Histologic grading system: 4 grade system  Oncology History   Rectal cancer (HCC)   2024 Initial Diagnosis    Rectal cancer (HCC)     2025 -  Cancer Staged    Staging form: Colon and Rectum, AJCC 8th Edition  - Clinical stage from 2025: cT4, cN2, cM0 -  Signed by Twan Perea MD on 1/21/2025  Stage prefix: Initial diagnosis  Histologic grade (G): GX  Histologic grading system: 4 grade system       2/5/2025 -  Chemotherapy    alteplase (CATHFLO), 2 mg, Intracatheter, Every 1 Minute as needed, 1 of 8 cycles  fluorouracil (ADRUCIL), 320 mg/m2 = 460 mg (80 % of original dose 400 mg/m2), Intravenous, Once, 1 of 8 cycles  Dose modification: 320 mg/m2 (80 % of original dose 400 mg/m2, Cycle 1, Reason: Other (see comments))  leucovorin calcium IVPB, 320 mg/m2 = 461 mg (80 % of original dose 400 mg/m2), Intravenous, Once, 1 of 8 cycles  Dose modification: 320 mg/m2 (80 % of original dose 400 mg/m2, Cycle 1, Reason: Other (see comments))  oxaliplatin (ELOXATIN) chemo infusion, 68 mg/m2 = 97.9 mg (80 % of original dose 85 mg/m2), Intravenous, Once, 1 of 8 cycles  Dose modification: 68 mg/m2 (80 % of original dose 85 mg/m2, Cycle 1, Reason: Other (see comments))  fluorouracil (ADRUCIL) ambulatory infusion Soln, 960 mg/m2/day = 2,765 mg (80 % of original dose 1,200 mg/m2/day), Intravenous, Over 46 hours, 1 of 8 cycles  Dose modification: 960 mg/m2/day (80 % of original dose 1,200 mg/m2/day, Cycle 1, Reason: Other (see comments))        Pertinent Medical History   02/12/25: ***  {There is no content from the last Pertinent Medical History section.}   Review of Systems  {Select to insert medical history sections (Optional):32361}      Objective {?Quick Links Trend Vitals * Enter New Vitals * Results Review * Timeline (Adult) * Labs * Imaging * Cardiology * Procedures * Lung Cancer Screening * Surgical eConsent :97718}  There were no vitals taken for this visit.    Pain Screening:     ECOG   ***  Physical Exam    Labs: I have reviewed the following labs:  Lab Results   Component Value Date/Time    WBC 8.09 01/08/2025 02:49 PM    RBC 4.47 01/08/2025 02:49 PM    Hemoglobin 13.5 01/08/2025 02:49 PM    Hematocrit 40.1 01/08/2025 02:49 PM    MCV 90 01/08/2025 02:49 PM    MCH 30.2  01/08/2025 02:49 PM    RDW 19.3 (H) 01/08/2025 02:49 PM    Platelets 337 01/08/2025 02:49 PM    Segmented % 81 (H) 01/08/2025 02:49 PM    Lymphocytes % 11 (L) 01/08/2025 02:49 PM    Monocytes % 8 01/08/2025 02:49 PM    Eosinophils Relative 0 01/08/2025 02:49 PM    Basophils Relative 0 01/08/2025 02:49 PM    Immature Grans % 0 01/08/2025 02:49 PM    Absolute Neutrophils 6.49 01/08/2025 02:49 PM     Lab Results   Component Value Date/Time    Potassium 3.8 01/08/2025 02:49 PM    Chloride 98 01/08/2025 02:49 PM    CO2 21 01/08/2025 02:49 PM    BUN 22 01/08/2025 02:49 PM    Creatinine 0.59 (L) 01/08/2025 02:49 PM    Glucose, Fasting 99 11/12/2024 04:36 AM    Calcium 9.3 01/08/2025 02:49 PM    Corrected Calcium 9.7 12/23/2024 08:24 AM    AST 14 01/08/2025 02:49 PM    ALT 13 01/08/2025 02:49 PM    Alkaline Phosphatase 51 01/08/2025 02:49 PM    Total Protein 7.2 01/08/2025 02:49 PM    Albumin 3.9 01/08/2025 02:49 PM    Total Bilirubin 0.53 01/08/2025 02:49 PM    eGFR 90 01/08/2025 02:49 PM     {SL AMB ONCOLOGY LABS (Optional):18474}    {Radiology Results Review (Optional):02486}    {Administrative / Billing Section (Optional):29284}

## 2025-02-12 NOTE — PROGRESS NOTES
Miriam Smith  1950  [facility]  [address] [place] [id number]    DISCUSSION/SUMMARY:    Approximately 30 minutes was spent with the patient/daughter in direct consultation/evaluation and with reviewing the chart.    74-year-old female recently admitted to the hospital with abdominal pain and weight loss.  Workup demonstrated a rectal mass (the mass was originally biopsied in [place]).  Issues:    Rectal cancer.  Pathology results from [place] are listed below, there results demonstrated adenocarcinoma.  Patient was recently seen by colorectal surgery.     Patient was recently readmitted to [place], abscess was drained, underwent diverting colostomy.  Surgical follow-up ongoing.      I was recently able to speak to the colorectal surgeon.  Patient was cleared to begin chemotherapy.  Patient was called last week to begin; Mrs. José Miguel Smith stated that she needed more time to heal.  Now patient states that she is ready to start.  Previously nursing staff spent time with patient and family going over the specifics of FOLFOX, potential side effects and toxicities.  Literature was given to the patient in Persian.    No ostomy issues.  Patient will continue to monitor.    Recent MRI demonstrated T4b, N2, low rectal carcinoma, + sphincter involvement, no suspicious extra mesorectal lymph nodes, no evidence of extramural venous invasion.  AJCC staging eighth edition = IIIC.  Other prior recent scans did not demonstrate evidence of metastatic disease.    NCCN guidelines 4.2024 state that for patients with T4 any N, preliminary treatment is to check pMMR.  At this moment, patient is not scheduled for any additional biopsies - so there likely will not be any additional tissue to send for evaluation.  Before, the plan is to start with 16 weeks of FOLFOX chemotherapy.  Obviously this regimen may change depending upon how patient  does.  Patient will eventually be evaluated by radiation oncology for eventual long course chemo RT (with infusional 5-FU).  Afterwards, patient will undergo rescanning and reevaluation by colorectal surgery.    Port is in place, no issues.    Medical issues.  Recent CAT scan the chest demonstrated severe coronary artery calcification.  Patient is still working on a PCP.    Patient was given a 3-4-week follow-up visit but this change depending upon how patient does.   Patient/family know to call the hematology/oncology office if there are any other questions or concerns.    Carefully review your medication list and verify that the list is accurate and up-to-date. Please call the hematology/oncology office if there are medications missing from the list, medications on the list that you are not currently taking or if there is a dosage or instruction that is different from how you're taking that medication.    Patient goals and areas of care: Begin chemotherapy  Barriers to care: Multiple serious comorbidities  Patient is able to self-care with help of family members  _____________________________________________________________________________________    Chief Complaint   Patient presents with    Follow-up    Rectal Cancer     History of Present Illness: 74-year-old female first seen in the hospital on November 13, 2024.  Patient was admitted with weight loss, low abdominal pain.  Workup demonstrated a rectal mass.  Patient was being seen by GI and Was consistent with advanced rectal cancer.  Patient has also been seen by colorectal surgery.  More recently Mrs. José Miguel Smith was admitted to [place] patient was reevaluated, required drainage from the rectal lesion.  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    Patient Vega presents with her daughter.  Mrs. José Miguel Smith states feeling better than before.  Appetite is okay, patient believes she is lost  approximately 30 pounds since first being diagnosed in [place] late summer 2024.  No fevers or signs of infection.  Patient has\rectal pain, 4/10.  Patient has pain medications at home.  Activities are limited but about the same as before.  No obvious GI bleeding, no  or GYN bleeding.  No respiratory issues.    Review of Systems   Constitutional:  Positive for activity change, appetite change, fatigue and unexpected weight change.   HENT: Negative.     Eyes: Negative.    Respiratory: Negative.     Cardiovascular: Negative.    Gastrointestinal: Negative.    Endocrine: Negative.    Genitourinary: Negative.    Musculoskeletal: Negative.    Skin: Negative.    Allergic/Immunologic: Negative.    Neurological: Negative.    Hematological: Negative.    Psychiatric/Behavioral: Negative.     All other systems reviewed and are negative.    Patient Active Problem List   Diagnosis    Rectal mass    Type 2 diabetes mellitus (HCC)    Rectal bleeding    History of anal fissures    Electrolyte abnormality    Pain, rectal    Iron deficiency anemia    Leukocytosis    Rectal cancer (HCC)    Cancer associated pain    Palliative care by specialist    Rectal pain    Perirectal abscess    Constipation    S/P colostomy (HCC)    Palliative care encounter    Cancer related pain     Past Medical History:   Diagnosis Date    Diabetes mellitus (HCC)      Past Surgical History:   Procedure Laterality Date    COLONOSCOPY      ILEOSTOMY N/A 12/24/2024    Procedure: LAPAROSCOPIC DIVERTING sigmoid end-loop colostomy;  Surgeon: SAI Bermudez MD;  Location: [facility];  Service: Colorectal    INCISION AND DRAINAGE OF WOUND N/A 12/24/2024    Procedure: INCISION AND DRAINAGE (I&D) BUTTOCK abscess;  Surgeon: SAI Bermudez MD;  Location: [facility];  Service: Colorectal    IR PORT PLACEMENT  1/6/2025     Family History   Problem Relation Age of Onset    Colon cancer Sister      Social History     Socioeconomic History    Marital status:  /Civil Union     Spouse name: Not on file    Number of children: Not on file    Years of education: Not on file    Highest education level: Not on file   Occupational History    Not on file   Tobacco Use    Smoking status: Never    Smokeless tobacco: Never   Vaping Use    Vaping status: Never Used   Substance and Sexual Activity    Alcohol use: Not Currently    Drug use: Not Currently    Sexual activity: Not on file   Other Topics Concern    Not on file   Social History Narrative    Not on file     Social Drivers of Health     Financial Resource Strain: Low Risk  (2024)    Overall Financial Resource Strain (CARDIA)     Difficulty of Paying Living Expenses: Not hard at all   Food Insecurity: No Food Insecurity (2025)    Nursing - Inadequate Food Risk Classification     Worried About Running Out of Food in the Last Year: Never true     Ran Out of Food in the Last Year: Never true     Ran Out of Food in the Last Year: Never true   Transportation Needs: No Transportation Needs (2025)    Nursing - Transportation Risk Classification     Lack of Transportation: Not on file     Lack of Transportation: No   Physical Activity: Not on file   Stress: Not on file   Social Connections: Not on file   Intimate Partner Violence: Unknown (2025)    Nursing IPS     Feels Physically and Emotionally Safe: Not on file     Physically Hurt by Someone: Not on file     Humiliated or Emotionally Abused by Someone: Not on file     Physically Hurt by Someone: No     Hurt or Threatened by Someone: No   Housing Stability: Unknown (2025)    Nursing: Inadequate Housing Risk Classification     Has Housing: Not on file     Worried About Losing Housing: Not on file     Unable to Get Utilities: Not on file     Unable to Pay for Housing in the Last Year: No     Has Housin       Current Outpatient Medications:     atorvastatin (LIPITOR) 40 mg tablet, Take 1 tablet (40 mg total) by mouth daily, Disp: 30 tablet, Rfl: 3     benzonatate (TESSALON PERLES) 100 mg capsule, Take 1 capsule (100 mg total) by mouth 3 (three) times a day as needed for cough, Disp: 20 capsule, Rfl: 0    Blood Glucose Monitoring Suppl (OneTouch Verio Reflect) w/Device KIT, Check blood sugars twice daily. Please substitute with appropriate alternative as covered by patient's insurance. Dx: E11.65, Disp: 1 kit, Rfl: 0    carbamide peroxide (DEBROX) 6.5 % otic solution, Administer 5 drops to the right ear 2 (two) times a day, Disp: 15 mL, Rfl: 0    enoxaparin (LOVENOX) 40 mg/0.4 mL, Inject 0.4 mL (40 mg total) under the skin every 24 hours for 25 days (Patient not taking: Reported on 2/4/2025), Disp: 10 mL, Rfl: 0    glucose blood (OneTouch Verio) test strip, Check blood sugars twice daily. Please substitute with appropriate alternative as covered by patient's insurance. Dx: E11.65, Disp: 200 each, Rfl: 3    lidocaine-prilocaine (EMLA) cream, Apply topically as needed for mild pain Apply to PAC site prior to access, Disp: 30 g, Rfl: 4    lidocaine-prilocaine (EMLA) cream, Apply a thin layer over PAC site prior to access prn, Disp: 30 g, Rfl: 5    loratadine (CLARITIN) 10 mg tablet, Take 1 tablet (10 mg total) by mouth daily, Disp: 30 tablet, Rfl: 0    metFORMIN (GLUCOPHAGE) 1000 MG tablet, Take 1 tablet (1,000 mg total) by mouth daily with breakfast (Patient taking differently: Take 1,000 mg by mouth 2 (two) times a day with meals), Disp: 30 tablet, Rfl: 0    Misc. Devices (Sitz Bath) MISC, Use in the morning (Patient not taking: Reported on 1/23/2025), Disp: 1 each, Rfl: 0    ondansetron (ZOFRAN) 8 mg tablet, Take 1 tablet (8 mg total) by mouth every 8 (eight) hours as needed for nausea or vomiting (Patient not taking: Reported on 1/23/2025), Disp: 20 tablet, Rfl: 5    OneTouch Delica Lancets 33G MISC, Check blood sugars twice daily. Please substitute with appropriate alternative as covered by patient's insurance. Dx: E11.65, Disp: 200 each, Rfl: 3    oxyCODONE  (Roxicodone) 5 immediate release tablet, Take 1 tablet (5 mg total) by mouth every 4 (four) hours as needed for moderate pain Max Daily Amount: 30 mg, Disp: 90 tablet, Rfl: 0    pioglitazone (ACTOS) 15 mg tablet, Take 1 tablet (15 mg total) by mouth daily, Disp: 100 tablet, Rfl: 3    polyethylene glycol (MIRALAX) 17 g packet, Take 17 g by mouth daily (Patient not taking: Reported on 2/4/2025), Disp: 510 g, Rfl: 0    senna-docusate sodium (SENOKOT S) 8.6-50 mg per tablet, Take 1 tablet by mouth 2 (two) times a day, Disp: 60 tablet, Rfl: 0    sodium chloride (OCEAN) 0.65 % nasal spray, 1 spray into each nostril as needed for congestion or rhinitis, Disp: 50 mL, Rfl: 0    Allergies   Allergen Reactions    Motrin [Ibuprofen] Itching    Tylenol [Acetaminophen] Itching    Penicillins Rash     Rash and bruise    Sulfa Antibiotics Rash     Price velia syndrome       Vitals:    02/12/25 1451   BP: 111/56   Pulse: 103   Resp: 17   Temp: 97.9 °F (36.6 °C)   SpO2: 97%     Physical Exam  Constitutional:       Appearance: She is well-developed.   HENT:      Head: Normocephalic and atraumatic.      Right Ear: External ear normal.      Left Ear: External ear normal.   Eyes:      Conjunctiva/sclera: Conjunctivae normal.      Pupils: Pupils are equal, round, and reactive to light.   Cardiovascular:      Rate and Rhythm: Normal rate and regular rhythm.      Heart sounds: Normal heart sounds.   Pulmonary:      Effort: Pulmonary effort is normal.      Breath sounds: Normal breath sounds.   Abdominal:      General: Bowel sounds are normal.      Palpations: Abdomen is soft.      Comments: + Bowel sounds, minimal tenderness, no guarding, colostomy in place   Musculoskeletal:         General: Normal range of motion.      Cervical back: Normal range of motion and neck supple.   Skin:     General: Skin is warm.      Comments: Slightly pale, warm, moist, no petechiae or ecchymoses   Neurological:      Mental Status: She is alert and  oriented to person, place, and time.      Deep Tendon Reflexes: Reflexes are normal and symmetric.   Psychiatric:         Behavior: Behavior normal.         Thought Content: Thought content normal.         Judgment: Judgment normal.     Extremities: No lower extreme edema bilaterally, no cords, pulses 1+  Lymphatics: No adenopathy in the neck, supraclavicular region, axilla bilaterally    Performance Status: 1 - Symptomatic but completely ambulatory    Labs    1/8/2025 WBC = 8.09 hemoglobin = 13.5 hematocrit = 40.1 MCV = 90 platelet = 337 neutrophil = 81% BUN = 22 creatinine = 0.59 glucose = 154 calcium = 9.3 AST = 14 ALT = 13 alkaline phosphatase = 51 total bilirubin = 0.53    11/13/2024 CEA = 5.6    Imaging    1/8/2025 CAT scan abdomen pelvis with contrast    IMPRESSION:     Status post diverting sigmoid loop colostomy with a large amount of stool throughout the entire colon to the level of the ostomy.     Large locally invasive lower rectal cancer again shown with decrease in the size of fluid collection in the left ischioanal fat.  Small volume ascites in the abdomen and pelvis, new from the prior study.    12/6/2024 MRI pelvis rectal cancer staging without contrast    Overall MRI stage: T4b, N2, Low rectal cancer  MRF: Not applicable for T4 tumor.  Sphincter involvement: Yes.  Suspicious extra mesorectal lymph nodes: No.  EMVI: No.           11/12/2024 CT chest without contrast.  Impression stated no lung metastases, severe coronary artery calcification indicating atherosclerotic heart disease, cholelithiasis.    11/10/2024 CT abdomen pelvis with contrast    IMPRESSION:     Heterogeneous peripherally enhancing mass/malignancy involving the rectum and infiltrating the left ischiorectal and gluteal fat measuring approximately 7.7 x 7.5 x 8.6 cm with areas of fluid density compatible with internal necrosis.    Pathology    Case Report   Surgical Pathology Report                         Case: [id number]              "                      Authorizing Provider:  Morris Tom MD         Collected:           11/12/2024 1439               Ordering Location:     [facility] Received:            11/12/2024 5389                                      [address]                                                                       Pathologist:           Nemo Peterson MD                                                         Specimen:    Rectum, rectal mass r/o malignancy , cancer                                                Final Diagnosis   A. 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 Deeper sections of the specimen have been evaluated. A higher grade lesion cannot be excluded due to the superficial nature of the biopsy; repeat biopsy is recommended as clinically indicated.                    "

## 2025-02-13 DIAGNOSIS — K62.89 RECTAL MASS: ICD-10-CM

## 2025-02-13 DIAGNOSIS — K62.89 RECTAL PAIN: ICD-10-CM

## 2025-02-13 DIAGNOSIS — Z51.5 PALLIATIVE CARE BY SPECIALIST: ICD-10-CM

## 2025-02-13 DIAGNOSIS — G89.3 CANCER ASSOCIATED PAIN: ICD-10-CM

## 2025-02-13 RX ORDER — OXYCODONE HYDROCHLORIDE 5 MG/1
5 TABLET ORAL EVERY 4 HOURS PRN
Qty: 90 TABLET | Refills: 0 | Status: SHIPPED | OUTPATIENT
Start: 2025-02-13

## 2025-02-13 NOTE — TELEPHONE ENCOUNTER
Reason for call:   [x] Refill   [] Prior Auth  [] Other:     Office:   [] PCP/Provider -   [x] Specialty/Provider - [facility]  Authorized By: Derek Alba DO      Medication: oxyCODONE (Roxicodone) 5 immediate release tablet    Dose/Frequency: Take 1 tablet (5 mg total) by mouth every 4 (four) hours as needed for moderate pain     Quantity: 90 tablet    Pharmacy: [id number]  [address]     Does the patient have enough for 3 days?   [] Yes   [x] No - Send as HP to POD

## 2025-02-14 DIAGNOSIS — E11.9 TYPE 2 DIABETES MELLITUS WITHOUT COMPLICATION, WITHOUT LONG-TERM CURRENT USE OF INSULIN (HCC): ICD-10-CM

## 2025-02-17 NOTE — PROGRESS NOTES
Spoke with patient and daughter about upcoming appointment on 2/19 and the need for blood work to be completed prior. They both verbalized understanding and pre-chemo lab appointment scheduled for 02/18/25 at 0930. All questions answered.

## 2025-02-18 ENCOUNTER — HOSPITAL ENCOUNTER (OUTPATIENT)
Dept: INFUSION CENTER | Facility: HOSPITAL | Age: 75
Discharge: HOME | End: 2025-02-18
Payer: SUBSIDIZED

## 2025-02-18 DIAGNOSIS — C20 RECTAL CANCER (HCC): Primary | ICD-10-CM

## 2025-02-18 LAB
ALBUMIN SERPL BCG-MCNC: 3.4 G/DL (ref 3.5–5)
ALP SERPL-CCNC: 40 U/L (ref 34–104)
ALT SERPL W P-5'-P-CCNC: 11 U/L (ref 7–52)
ANION GAP SERPL CALCULATED.3IONS-SCNC: 11 MMOL/L (ref 4–13)
AST SERPL W P-5'-P-CCNC: 12 U/L (ref 13–39)
BASOPHILS # BLD AUTO: 0.03 THOUSANDS/ΜL (ref 0–0.1)
BASOPHILS NFR BLD AUTO: 0 % (ref 0–1)
BILIRUB SERPL-MCNC: 0.3 MG/DL (ref 0.2–1)
BUN SERPL-MCNC: 18 MG/DL (ref 5–25)
CALCIUM ALBUM COR SERPL-MCNC: 9.5 MG/DL (ref 8.3–10.1)
CALCIUM SERPL-MCNC: 9 MG/DL (ref 8.4–10.2)
CHLORIDE SERPL-SCNC: 102 MMOL/L (ref 96–108)
CO2 SERPL-SCNC: 23 MMOL/L (ref 21–32)
CREAT SERPL-MCNC: 0.41 MG/DL (ref 0.6–1.3)
EOSINOPHIL # BLD AUTO: 0.13 THOUSAND/ΜL (ref 0–0.61)
EOSINOPHIL NFR BLD AUTO: 2 % (ref 0–6)
ERYTHROCYTE [DISTWIDTH] IN BLOOD BY AUTOMATED COUNT: 15.9 % (ref 11.6–15.1)
GFR SERPL CREATININE-BSD FRML MDRD: 102 ML/MIN/1.73SQ M
GLUCOSE SERPL-MCNC: 115 MG/DL (ref 65–140)
HCT VFR BLD AUTO: 32.8 % (ref 34.8–46.1)
HGB BLD-MCNC: 10.8 G/DL (ref 11.5–15.4)
IMM GRANULOCYTES # BLD AUTO: 0.02 THOUSAND/UL (ref 0–0.2)
IMM GRANULOCYTES NFR BLD AUTO: 0 % (ref 0–2)
LYMPHOCYTES # BLD AUTO: 1.6 THOUSANDS/ΜL (ref 0.6–4.47)
LYMPHOCYTES NFR BLD AUTO: 20 % (ref 14–44)
MCH RBC QN AUTO: 29.1 PG (ref 26.8–34.3)
MCHC RBC AUTO-ENTMCNC: 32.9 G/DL (ref 31.4–37.4)
MCV RBC AUTO: 88 FL (ref 82–98)
MONOCYTES # BLD AUTO: 0.76 THOUSAND/ΜL (ref 0.17–1.22)
MONOCYTES NFR BLD AUTO: 9 % (ref 4–12)
NEUTROPHILS # BLD AUTO: 5.62 THOUSANDS/ΜL (ref 1.85–7.62)
NEUTS SEG NFR BLD AUTO: 69 % (ref 43–75)
NRBC BLD AUTO-RTO: 0 /100 WBCS
PLATELET # BLD AUTO: 316 THOUSANDS/UL (ref 149–390)
PMV BLD AUTO: 8.6 FL (ref 8.9–12.7)
POTASSIUM SERPL-SCNC: 3.7 MMOL/L (ref 3.5–5.3)
PROT SERPL-MCNC: 6.8 G/DL (ref 6.4–8.4)
RBC # BLD AUTO: 3.71 MILLION/UL (ref 3.81–5.12)
SODIUM SERPL-SCNC: 136 MMOL/L (ref 135–147)
WBC # BLD AUTO: 8.16 THOUSAND/UL (ref 4.31–10.16)

## 2025-02-18 PROCEDURE — 80053 COMPREHEN METABOLIC PANEL: CPT | Performed by: INTERNAL MEDICINE

## 2025-02-18 PROCEDURE — 85025 COMPLETE CBC W/AUTO DIFF WBC: CPT | Performed by: INTERNAL MEDICINE

## 2025-02-18 NOTE — PLAN OF CARE
Problem: Potential for Falls  Goal: Patient will remain free of falls  Description: INTERVENTIONS:  - Educate patient/family on patient safety including physical limitations  - Instruct patient to call for assistance with activity   - Consult OT/PT to assist with strengthening/mobility   - Keep Call bell within reach  - Keep bed low and locked with side rails adjusted as appropriate  - Keep care items and personal belongings within reach  - Initiate and maintain comfort rounds  - Make Fall Risk Sign visible to staff  --Outcome: Progressing

## 2025-02-19 ENCOUNTER — HOSPITAL ENCOUNTER (OUTPATIENT)
Dept: INFUSION CENTER | Facility: HOSPITAL | Age: 75
Discharge: HOME | End: 2025-02-19
Attending: INTERNAL MEDICINE
Payer: SUBSIDIZED

## 2025-02-19 VITALS
OXYGEN SATURATION: 92 % | TEMPERATURE: 97.6 F | HEIGHT: 60 IN | RESPIRATION RATE: 18 BRPM | BODY MASS INDEX: 20.21 KG/M2 | WEIGHT: 102.95 LBS | HEART RATE: 83 BPM | DIASTOLIC BLOOD PRESSURE: 56 MMHG | SYSTOLIC BLOOD PRESSURE: 115 MMHG

## 2025-02-19 DIAGNOSIS — C20 RECTAL CANCER (HCC): Primary | ICD-10-CM

## 2025-02-19 PROCEDURE — 96411 CHEMO IV PUSH ADDL DRUG: CPT

## 2025-02-19 PROCEDURE — 96415 CHEMO IV INFUSION ADDL HR: CPT

## 2025-02-19 PROCEDURE — 96367 TX/PROPH/DG ADDL SEQ IV INF: CPT

## 2025-02-19 PROCEDURE — 96413 CHEMO IV INFUSION 1 HR: CPT

## 2025-02-19 PROCEDURE — G0498 CHEMO EXTEND IV INFUS W/PUMP: HCPCS

## 2025-02-19 PROCEDURE — 96368 THER/DIAG CONCURRENT INF: CPT

## 2025-02-19 RX ORDER — SODIUM CHLORIDE 9 MG/ML
20 INJECTION, SOLUTION INTRAVENOUS ONCE AS NEEDED
Status: DISCONTINUED | OUTPATIENT
Start: 2025-02-19 | End: 2025-02-22 | Stop reason: HOSPADM

## 2025-02-19 RX ORDER — DEXTROSE MONOHYDRATE 50 MG/ML
20 INJECTION, SOLUTION INTRAVENOUS ONCE
Status: COMPLETED | OUTPATIENT
Start: 2025-02-19 | End: 2025-02-19

## 2025-02-19 RX ORDER — FLUOROURACIL 50 MG/ML
320 INJECTION, SOLUTION INTRAVENOUS ONCE
Status: COMPLETED | OUTPATIENT
Start: 2025-02-19 | End: 2025-02-19

## 2025-02-19 RX ADMIN — DEXTROSE 20 ML/HR: 50 INJECTION, SOLUTION INTRAVENOUS at 11:50

## 2025-02-19 RX ADMIN — SODIUM CHLORIDE 20 ML/HR: 0.9 INJECTION, SOLUTION INTRAVENOUS at 10:44

## 2025-02-19 RX ADMIN — LEUCOVORIN CALCIUM 460 MG: 10 INJECTION INTRAMUSCULAR; INTRAVENOUS at 11:50

## 2025-02-19 RX ADMIN — OXALIPLATIN 97.9 MG: 5 INJECTION, SOLUTION INTRAVENOUS at 11:50

## 2025-02-19 RX ADMIN — DEXAMETHASONE SODIUM PHOSPHATE: 10 INJECTION, SOLUTION INTRAMUSCULAR; INTRAVENOUS at 10:43

## 2025-02-19 RX ADMIN — FLUOROURACIL 460 MG: 50 INJECTION, SOLUTION INTRAVENOUS at 13:57

## 2025-02-19 NOTE — PLAN OF CARE
Problem: Potential for Falls  Goal: Patient will remain free of falls  Description: INTERVENTIONS:  - Educate patient/family on patient safety including physical limitations  - Instruct patient to call for assistance with activity   - Keep Call bell within reach  - Keep care items and personal belongings within reach  - Initiate and maintain comfort rounds  - Consider moving patient to room near nurses station  Outcome: Progressing     Problem: DISCHARGE PLANNING  Goal: Discharge to home or other facility with appropriate resources  Description: INTERVENTIONS:  - Identify barriers to discharge w/patient and caregiver  - Arrange for needed discharge resources and transportation as appropriate  - Identify discharge learning needs (meds, wound care, etc.)  - Arrange for interpretive services to assist at discharge as needed  - Refer to Case Management Department for coordinating discharge planning if the patient needs post-hospital services based on physician/advanced practitioner order or complex needs related to functional status, cognitive ability, or social support system  Outcome: Progressing     Problem: Knowledge Deficit  Goal: Patient/family/caregiver demonstrates understanding of disease process, treatment plan, medications, and discharge instructions  Description: Complete learning assessment and assess knowledge base.  Interventions:  - Provide teaching at level of understanding  - Provide teaching via preferred learning methods  Outcome: Progressing     Problem: INFECTION - ADULT  Goal: Absence or prevention of progression during hospitalization  Description: INTERVENTIONS:  - Assess and monitor for signs and symptoms of infection  - Monitor lab/diagnostic results  - Monitor all insertion sites, i.e. indwelling lines, tubes, and drains  - Monitor endotracheal if appropriate and nasal secretions for changes in amount and color  - [place] appropriate cooling/warming therapies per order  - Administer  medications as ordered  - Instruct and encourage patient and family to use good hand hygiene technique  - Identify and instruct in appropriate isolation precautions for identified infection/condition  Outcome: Progressing

## 2025-02-19 NOTE — PROGRESS NOTES
Miriam Smith  tolerated treatment well with no complications.      Miriam Smith is aware of future appt on 02/21 at 1500.     AVS printed and given to Miriam Smith:  No (Declined by Miriam Smith)

## 2025-02-21 ENCOUNTER — HOSPITAL ENCOUNTER (OUTPATIENT)
Dept: INFUSION CENTER | Facility: HOSPITAL | Age: 75
Discharge: HOME | End: 2025-02-21
Attending: INTERNAL MEDICINE

## 2025-02-21 DIAGNOSIS — C20 RECTAL CANCER (HCC): Primary | ICD-10-CM

## 2025-02-21 NOTE — PROGRESS NOTES
Miriam Smith  tolerated treatment well with no complications.      Miriam Smith is aware of future appt on 3/3 at 0830.     AVS printed and given to Miriam Smith:    No (Declined by Miriam Smith)

## 2025-02-24 ENCOUNTER — PATIENT OUTREACH (OUTPATIENT)
Dept: CASE MANAGEMENT | Facility: OTHER | Age: 75
End: 2025-02-24

## 2025-02-24 NOTE — PROGRESS NOTES
OS[facility] placed three outreach TC's to the pt. She did not return any of the outreach's, therefore the SW referral will be closed. If any needs present in the future another referral can be placed at that time.

## 2025-02-25 DIAGNOSIS — C20 RECTAL CANCER (HCC): Primary | ICD-10-CM

## 2025-02-25 RX ORDER — DEXTROSE MONOHYDRATE 50 MG/ML
20 INJECTION, SOLUTION INTRAVENOUS ONCE
Status: CANCELLED | OUTPATIENT
Start: 2025-03-05

## 2025-02-25 RX ORDER — SODIUM CHLORIDE 9 MG/ML
20 INJECTION, SOLUTION INTRAVENOUS ONCE AS NEEDED
Status: CANCELLED | OUTPATIENT
Start: 2025-03-05

## 2025-02-25 RX ORDER — FLUOROURACIL 50 MG/ML
320 INJECTION, SOLUTION INTRAVENOUS ONCE
Status: CANCELLED | OUTPATIENT
Start: 2025-03-05

## 2025-02-26 ENCOUNTER — CONSULT (OUTPATIENT)
Dept: OBGYN CLINIC | Facility: CLINIC | Age: 75
End: 2025-02-26

## 2025-02-26 ENCOUNTER — TELEPHONE (OUTPATIENT)
Age: 75
End: 2025-02-26

## 2025-02-26 ENCOUNTER — HOSPITAL ENCOUNTER (OUTPATIENT)
Dept: RADIOLOGY | Facility: HOSPITAL | Age: 75
Discharge: HOME | End: 2025-02-26
Payer: SUBSIDIZED

## 2025-02-26 ENCOUNTER — OFFICE VISIT (OUTPATIENT)
Age: 75
End: 2025-02-26

## 2025-02-26 ENCOUNTER — RESULTS FOLLOW-UP (OUTPATIENT)
Age: 75
End: 2025-02-26

## 2025-02-26 ENCOUNTER — APPOINTMENT (OUTPATIENT)
Dept: RADIOLOGY | Facility: HOSPITAL | Age: 75
End: 2025-02-26
Payer: SUBSIDIZED

## 2025-02-26 VITALS — WEIGHT: 103 LBS | HEIGHT: 60 IN | BODY MASS INDEX: 20.22 KG/M2

## 2025-02-26 VITALS
DIASTOLIC BLOOD PRESSURE: 72 MMHG | TEMPERATURE: 97.7 F | BODY MASS INDEX: 19.73 KG/M2 | SYSTOLIC BLOOD PRESSURE: 114 MMHG | OXYGEN SATURATION: 97 % | RESPIRATION RATE: 16 BRPM | HEART RATE: 83 BPM | WEIGHT: 101 LBS
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
## 2025-02-26 DIAGNOSIS — S52.501A CLOSED FRACTURE OF DISTAL END OF RIGHT RADIUS, UNSPECIFIED FRACTURE MORPHOLOGY, INITIAL ENCOUNTER: Primary | ICD-10-CM
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
## 2025-02-26 DIAGNOSIS — S52.571A OTHER CLOSED INTRA-ARTICULAR FRACTURE OF DISTAL END OF RIGHT RADIUS, INITIAL ENCOUNTER: Primary | ICD-10-CM

## 2025-02-26 DIAGNOSIS — S09.90XA TRAUMATIC INJURY OF HEAD, INITIAL ENCOUNTER: ICD-10-CM

## 2025-02-26 DIAGNOSIS — C20 RECTAL CANCER (HCC): ICD-10-CM

## 2025-02-26 DIAGNOSIS — Z23 ENCOUNTER FOR IMMUNIZATION: ICD-10-CM

## 2025-02-26 DIAGNOSIS — E11.9 TYPE 2 DIABETES MELLITUS WITHOUT COMPLICATION, UNSPECIFIED WHETHER LONG TERM INSULIN USE (HCC): ICD-10-CM

## 2025-02-26 PROCEDURE — 90471 IMMUNIZATION ADMIN: CPT | Performed by: FAMILY MEDICINE

## 2025-02-26 PROCEDURE — 25600 CLTX DST RDL FX/EPHYS SEP WO: CPT | Performed by: ORTHOPAEDIC SURGERY

## 2025-02-26 PROCEDURE — 99243 OFF/OP CNSLTJ NEW/EST LOW 30: CPT | Performed by: ORTHOPAEDIC SURGERY

## 2025-02-26 PROCEDURE — 90677 PCV20 VACCINE IM: CPT | Performed by: FAMILY MEDICINE

## 2025-02-26 PROCEDURE — 90472 IMMUNIZATION ADMIN EACH ADD: CPT | Performed by: FAMILY MEDICINE

## 2025-02-26 PROCEDURE — 73110 X-RAY EXAM OF WRIST: CPT

## 2025-02-26 PROCEDURE — 90662 IIV NO PRSV INCREASED AG IM: CPT | Performed by: FAMILY MEDICINE

## 2025-02-26 PROCEDURE — 70450 CT HEAD/BRAIN W/O DYE: CPT

## 2025-02-26 PROCEDURE — 99213 OFFICE O/P EST LOW 20 MIN: CPT | Performed by: FAMILY MEDICINE

## 2025-02-26 RX ORDER — ATORVASTATIN CALCIUM 40 MG/1
40 TABLET, FILM COATED ORAL DAILY
Qty: 30 TABLET | Refills: 3 | Status: SHIPPED | OUTPATIENT
Start: 2025-02-26

## 2025-02-26 NOTE — PROGRESS NOTES
Patient Name: Miriam Smith      : 1950       MRN: [id number]   Encounter Provider: Wing Moran MD   Encounter Date: 25  Encounter department: [facility]         Assessment & Plan  Other closed intra-articular fracture of distal end of right radius, initial encounter  DOI 2025  XR demonstrates nondisplaced fracture  Previously scheduled CT not necessary at this time  Short arm cast x 4 weeks  Orders:  •  Ambulatory Referral to Orthopedic Surgery  •  Fracture / Dislocation Treatment       Plan:  Miriam is a pleasant 74 y.o. female who presents for consultation of her right wrist. A bilingual medical assistant was available to help translate. After obtaining a thorough history, orthopedic exam, and reviewing imaging I believe she is symptomatic of a nondisplaced intra-articular fracture of the right distal radius, sustained on 2025. She was placed in a short arm cast today. She must keep the cast clean and dry at all times. She will return to the office in 4 weeks for re-evaluation of the right wrist with repeat X-rays with the cast off. All of her questions and concerns were addressed today.    Follow-up:  Return in about 4 weeks (around 3/26/2025) for Recheck., Cast off repeat xrays.    _____________________________________________________  CHIEF COMPLAINT:  Chief Complaint   Patient presents with   • Right Wrist - Pain, Fracture         SUBJECTIVE:  Miriam Smith is a 74 y.o. female who presents for consultation of the right wrist. She was referred by Dr. Medeiros. A bilingual medical assistant was available to help translate. She sustained a fall down 10 stairs on 2025. She was subsequently seen by her PCP who ordered an XR and CT of the right wrist. She states the wrist is still painful. She has been taking oxycodone as prescribed by her palliative care doctor. She denies any paresthesias of the right hand or  fingers.    PAST MEDICAL HISTORY:  Past Medical History:   Diagnosis Date   • Diabetes mellitus (HCC)    • Hypercholesteremia    • Rectal cancer (HCC)        PAST SURGICAL HISTORY:  Past Surgical History:   Procedure Laterality Date   • COLONOSCOPY     • ILEOSTOMY N/A 12/24/2024    Procedure: LAPAROSCOPIC DIVERTING sigmoid end-loop colostomy;  Surgeon: SAI Bermudez MD;  Location: [facility];  Service: Colorectal   • INCISION AND DRAINAGE OF WOUND N/A 12/24/2024    Procedure: INCISION AND DRAINAGE (I&D) BUTTOCK abscess;  Surgeon: SAI Bermudez MD;  Location: [facility];  Service: Colorectal   • IR PORT PLACEMENT  1/6/2025       FAMILY HISTORY:  Family History   Problem Relation Age of Onset   • Colon cancer Sister        SOCIAL HISTORY:  Social History     Tobacco Use   • Smoking status: Never   • Smokeless tobacco: Never   Vaping Use   • Vaping status: Never Used   Substance Use Topics   • Alcohol use: Not Currently   • Drug use: Not Currently       MEDICATIONS:    Current Outpatient Medications:   •  atorvastatin (LIPITOR) 40 mg tablet, Take 1 tablet (40 mg total) by mouth daily, Disp: 30 tablet, Rfl: 3  •  benzonatate (TESSALON PERLES) 100 mg capsule, Take 1 capsule (100 mg total) by mouth 3 (three) times a day as needed for cough, Disp: 20 capsule, Rfl: 0  •  Blood Glucose Monitoring Suppl (OneTouch Verio Reflect) w/Device KIT, Check blood sugars twice daily. Please substitute with appropriate alternative as covered by patient's insurance. Dx: E11.65, Disp: 1 kit, Rfl: 0  •  [START ON 3/5/2025] fluorouracil 2,765 mg in CADD/Elastomeric Infusion Device, Infuse 2,765 mg (960 mg/m2/day x 1.44 m2) into a catheter in a vein via infusion device over 46 hours for 2 days  Infusion planned for March 5, 2025., Disp: 1 each, Rfl: 2  •  glucose blood (OneTouch Verio) test strip, Check blood sugars twice daily. Please substitute with appropriate alternative as covered by patient's insurance. Dx: E11.65, Disp: 200  each, Rfl: 3  •  lidocaine-prilocaine (EMLA) cream, Apply topically as needed for mild pain Apply to PAC site prior to access (Patient not taking: Reported on 2/27/2025), Disp: 30 g, Rfl: 4  •  loratadine (CLARITIN) 10 mg tablet, Take 1 tablet (10 mg total) by mouth daily, Disp: 30 tablet, Rfl: 0  •  metFORMIN (GLUCOPHAGE) 1000 MG tablet, take 1 tablet by mouth EVERY MORNING WITH BREAKFAST, Disp: 30 tablet, Rfl: 0  •  ondansetron (ZOFRAN) 8 mg tablet, Take 1 tablet (8 mg total) by mouth every 8 (eight) hours as needed for nausea or vomiting, Disp: 20 tablet, Rfl: 5  •  OneTouch Delica Lancets 33G MISC, Check blood sugars twice daily. Please substitute with appropriate alternative as covered by patient's insurance. Dx: E11.65, Disp: 200 each, Rfl: 3  •  pioglitazone (ACTOS) 15 mg tablet, Take 1 tablet (15 mg total) by mouth daily, Disp: 100 tablet, Rfl: 3  •  polyethylene glycol (MIRALAX) 17 g packet, Take 17 g by mouth daily, Disp: 510 g, Rfl: 0  •  senna-docusate sodium (SENOKOT S) 8.6-50 mg per tablet, Take 1 tablet by mouth 2 (two) times a day, Disp: 60 tablet, Rfl: 0  •  carbamide peroxide (DEBROX) 6.5 % otic solution, Administer 5 drops to the right ear 2 (two) times a day (Patient not taking: Reported on 2/27/2025), Disp: 15 mL, Rfl: 0  •  enoxaparin (LOVENOX) 40 mg/0.4 mL, Inject 0.4 mL (40 mg total) under the skin every 24 hours for 25 days (Patient not taking: Reported on 2/4/2025), Disp: 10 mL, Rfl: 0  •  lidocaine-prilocaine (EMLA) cream, Apply a thin layer over PAC site prior to access prn, Disp: 30 g, Rfl: 5  •  Misc. Devices ([place]) MISC, Use in the morning (Patient not taking: Reported on 2/26/2025), Disp: 1 each, Rfl: 0  •  oxyCODONE (Roxicodone) 5 immediate release tablet, Take 1 tablet (5 mg total) by mouth every 4 (four) hours as needed for moderate pain Max Daily Amount: 30 mg, Disp: 90 tablet, Rfl: 0  •  sodium chloride (OCEAN) 0.65 % nasal spray, 1 spray into each nostril as needed for  "congestion or rhinitis (Patient not taking: Reported on 2/27/2025), Disp: 50 mL, Rfl: 0    ALLERGIES:  Allergies   Allergen Reactions   • Motrin [Ibuprofen] Itching   • Tylenol [Acetaminophen] Itching   • Penicillins Rash     Rash and bruise   • Sulfa Antibiotics Rash     Price velia syndrome       LABS:  HgA1c:   Lab Results   Component Value Date    HGBA1C 8.3 (H) 11/10/2024     BMP:   Lab Results   Component Value Date    CALCIUM 9.0 02/18/2025    K 3.7 02/18/2025    CO2 23 02/18/2025     02/18/2025    BUN 18 02/18/2025    CREATININE 0.41 (L) 02/18/2025     CBC: No components found for: \"CBC\"    _____________________________________________________  Review of Systems   Constitutional:  Positive for activity change. Negative for chills and fever.   HENT:  Negative for ear pain and sore throat.    Eyes:  Negative for pain and visual disturbance.   Respiratory:  Negative for cough and shortness of breath.    Cardiovascular:  Negative for chest pain and palpitations.   Gastrointestinal:  Negative for abdominal pain and vomiting.   Genitourinary:  Negative for dysuria and hematuria.   Musculoskeletal:  Positive for arthralgias. Negative for back pain.   Skin:  Negative for color change and rash.   Neurological:  Negative for seizures and syncope.   All other systems reviewed and are negative.       Right Hand Exam     Tenderness   The patient is experiencing tenderness in the radial area.    Other   Erythema: absent  Scars: absent  Sensation: normal  Pulse: present    Comments:  ROM and strength deferred due to known fracture  Demonstrates full elbow and shoulder motion  Forearm compartments are soft and supple  2+ Distal radial pulse with brisk capillary refill to the fingers  Radial, median, ulnar motor and sensory distribution intact  Sensation to light touch intact distally                 Physical Exam  Vitals and nursing note reviewed.   Constitutional:       Appearance: Normal appearance.   HENT:      " Head: Normocephalic and atraumatic.      Right Ear: External ear normal.      Left Ear: External ear normal.      Nose: Nose normal.   Eyes:      General: No scleral icterus.     Extraocular Movements: Extraocular movements intact.      Conjunctiva/sclera: Conjunctivae normal.   Cardiovascular:      Rate and Rhythm: Normal rate.   Pulmonary:      Effort: Pulmonary effort is normal. No respiratory distress.   Musculoskeletal:         General: Tenderness and signs of injury present.      Cervical back: Normal range of motion and neck supple.      Comments: See ortho exam   Skin:     General: Skin is warm and dry.   Neurological:      Mental Status: She is alert and oriented to person, place, and time.   Psychiatric:         Mood and Affect: Mood normal.         Behavior: Behavior normal.        _____________________________________________________  STUDIES REVIEWED:  I personally reviewed the pertinent images and my independent interpretation is as follows: X-rays of the right wrist obtained on 2/26/2025 demonstrates a non displaced intra-articular distal radius fracture.      PROCEDURES PERFORMED:  Fracture / Dislocation Treatment    Date/Time: 2/26/2025 3:00 PM    Performed by: Wing Moran MD  Authorized by: Wing Moran MD  Universal Protocol:  Site marked: the operative site was marked  Radiology Images displayed and confirmed. If images not available, report reviewed: imaging studies available    Injury location:  Wrist  Location details:  Right wrist  Injury type:  Fracture  Fracture type: distal radius    Fracture type: distal radius    Neurovascular status: Neurovascularly intact    Distal perfusion: normal    Neurological function: normal    Range of motion: reduced    Manipulation performed?: No    Immobilization:  Cast  Supplies used:  Fiberglass  Neurovascular status: Neurovascularly intact    Distal perfusion: normal    Neurological function: normal    Patient tolerance:  Patient  tolerated the procedure well with no immediate complications        Scribe Attestation    I,:  Shweta Chaudhari am acting as a scribe while in the presence of the attending physician.:       I,:  Wing Moran MD personally performed the services described in this documentation    as scribed in my presence.:

## 2025-02-26 NOTE — PROGRESS NOTES
Name: Miriam Smith      : 1950      MRN: [id number]  Encounter Provider: Surjit Medeiros MD  Encounter Date: 2025   Encounter department: [facility]  :  Assessment & Plan  Fall down stairs, initial encounter  Unwitnessed fall last night down 10 stairs  - Hx of rectal CA currently on chemotherapy  - Reports mechanical fall, tripping on the stairs, does not think she had LOC  - (+) Head trauma and (+) Right wrist injury  - Also reporting bilateral neck pain and bilateral lower back pain  - On exam, abrasion noted on right forehead, no fluctuance. Right wrist mild tenderness over radial side of the wrist. ROM limited.    Plan:  - STAT CT Head w/o contrast  - STAT X-ray right wrist  - Follow up for annual physical  - Continue topical heating pad/ice, Lidoacine patch, Oxycodone (chronic med for cancer pain)    Orders:    CT head wo contrast; Future    XR wrist 3+ vw right; Future    Traumatic injury of head, initial encounter    Orders:    CT head wo contrast; Future    XR wrist 3+ vw right; Future    Type 2 diabetes mellitus without complication, unspecified whether long term insulin use (HCC)    Refill  - Follow up for DM follow up    Orders:    atorvastatin (LIPITOR) 40 mg tablet; Take 1 tablet (40 mg total) by mouth daily    Encounter for immunization    Orders:    influenza vaccine, high-dose, PF 0.5 mL (Fluzone High Dose)    Pneumococcal Conjugate Vaccine 20-valent (Pcv20)    Rectal cancer (HCC)  Sees Heme/Onc  - Currently getting chemotherapy           Depression Screening and Follow-up Plan: Patient's depression screening was positive with a PHQ-2 score of 3. Their PHQ-9 score was 10.   Depression likely due to other medical condition. Will treat underlying condition. Patient advised to follow-up with PCP for further management.       History of Present Illness   Patient presents after a fall last night. Per daughter, unwitnessed fall at home, down 10  stairs. Patient reports she hit her head and injured her right wrist. States she tripped on the stairs, does not think she lost consciousness. Denies dizziness, vision changes. Mild soreness over abrasion of right forehead. Also has neck pain and lower back pain. Currently using heating pads and Lidocaine, Oxycodone (which she has for cancer pain)      Review of Systems   Constitutional:  Negative for fatigue and fever.   HENT:          Head trauma   Respiratory:  Negative for shortness of breath.    Cardiovascular:  Negative for chest pain.   Gastrointestinal:  Negative for abdominal pain.   Musculoskeletal:  Positive for back pain and neck pain.   Neurological:  Positive for headaches. Negative for dizziness.        Fall   All other systems reviewed and are negative.      Objective   /72 (BP Location: Left arm, Patient Position: Sitting, Cuff Size: Adult) Comment (Cuff Size): small  Pulse 83   Temp 97.7 °F (36.5 °C)   Resp 16   Wt 45.8 kg (101 lb)   SpO2 97%   BMI 19.73 kg/m²      Physical Exam  Vitals reviewed.   Constitutional:       General: She is not in acute distress.     Appearance: Normal appearance. She is not ill-appearing.   HENT:      Head: Normocephalic. Abrasion (Right forehead) present.      Right Ear: External ear normal.      Left Ear: External ear normal.      Mouth/Throat:      Mouth: Mucous membranes are moist.   Eyes:      Extraocular Movements: Extraocular movements intact.   Cardiovascular:      Rate and Rhythm: Normal rate and regular rhythm.      Pulses: Normal pulses.      Heart sounds: Normal heart sounds. No murmur heard.  Pulmonary:      Effort: No respiratory distress.      Breath sounds: Normal breath sounds. No wheezing.   Abdominal:      General: There is no distension.      Palpations: Abdomen is soft.      Tenderness: There is no abdominal tenderness. There is no guarding.   Musculoskeletal:      Comments: Right wrist: Tenderness over radial aspect of right wrist. ROM  limited  Neck: Paraspinal tenderness. No midline tenderness. Full active ROM  Lumbar spine: Paraspinal tenderness/spasm bilaterally. No midline tenderness  Knees: No significant tenderness   Skin:     General: Skin is warm and dry.   Neurological:      Mental Status: She is alert.      Motor: No weakness.      Gait: Gait normal.   Psychiatric:         Mood and Affect: Mood normal.         Behavior: Behavior normal.

## 2025-02-26 NOTE — ASSESSMENT & PLAN NOTE
DOI 2/25/2025  XR demonstrates nondisplaced fracture  Previously scheduled CT not necessary at this time  Short arm cast x 4 weeks  Orders:  •  Ambulatory Referral to Orthopedic Surgery  •  Fracture / Dislocation Treatment

## 2025-02-26 NOTE — TELEPHONE ENCOUNTER
Dr. Medeiros -    Patients son in law called to ask what the next steps are now for his mother in law, she had the xray's done. He is wondering if she will need a cast?    Please advise.    Rocky (son in law) [phone]

## 2025-02-26 NOTE — ASSESSMENT & PLAN NOTE
Refill  - Follow up for DM follow up    Orders:    atorvastatin (LIPITOR) 40 mg tablet; Take 1 tablet (40 mg total) by mouth daily

## 2025-02-27 ENCOUNTER — OFFICE VISIT (OUTPATIENT)
Dept: PALLIATIVE MEDICINE | Facility: CLINIC | Age: 75
End: 2025-02-27

## 2025-02-27 VITALS
HEART RATE: 96 BPM | HEIGHT: 60 IN | BODY MASS INDEX: 20.22 KG/M2 | DIASTOLIC BLOOD PRESSURE: 50 MMHG | TEMPERATURE: 98.2 F | SYSTOLIC BLOOD PRESSURE: 100 MMHG | WEIGHT: 103 LBS | OXYGEN SATURATION: 98 %

## 2025-02-27 DIAGNOSIS — G89.3 CANCER ASSOCIATED PAIN: ICD-10-CM

## 2025-02-27 DIAGNOSIS — Z51.5 PALLIATIVE CARE BY SPECIALIST: ICD-10-CM

## 2025-02-27 DIAGNOSIS — C20 RECTAL CANCER (HCC): Primary | ICD-10-CM

## 2025-02-27 DIAGNOSIS — Z93.3 S/P COLOSTOMY (HCC): ICD-10-CM

## 2025-02-27 DIAGNOSIS — S52.501A CLOSED FRACTURE OF RIGHT DISTAL RADIUS: ICD-10-CM

## 2025-02-27 DIAGNOSIS — K62.89 RECTAL MASS: ICD-10-CM

## 2025-02-27 DIAGNOSIS — K62.89 RECTAL PAIN: ICD-10-CM

## 2025-02-27 PROCEDURE — 99214 OFFICE O/P EST MOD 30 MIN: CPT | Performed by: STUDENT IN AN ORGANIZED HEALTH CARE EDUCATION/TRAINING PROGRAM

## 2025-02-27 RX ORDER — OXYCODONE HYDROCHLORIDE 5 MG/1
5 TABLET ORAL EVERY 4 HOURS PRN
Qty: 90 TABLET | Refills: 0 | Status: SHIPPED | OUTPATIENT
Start: 2025-02-27

## 2025-02-27 NOTE — ASSESSMENT & PLAN NOTE
Well controlled at this time with OxyIR 5mg q4 hours PRN  -continue current regimen.  -no adverse side effects from this regimen.  -currently taking about 5-6 tablets a day which has been helpful.    Patient does take Senokot daily and therefore does not endorse any issues with constipation or diarrhea at this time.  Did discuss that if she has loose stools or diarrhea, she is to hold her Senokot for that day.  All questions and concerns answered at this time.    Orders:    oxyCODONE (Roxicodone) 5 immediate release tablet; Take 1 tablet (5 mg total) by mouth every 4 (four) hours as needed for moderate pain Max Daily Amount: 30 mg

## 2025-02-27 NOTE — ASSESSMENT & PLAN NOTE
Psychosocial   Supportive listening provided  Normalized experience of patient/family  Provided anxiety containment     Referrals Placed / Medical Equipment Ordered  -None     Follow-Up Recommendations  -Follow-up with PCP and current medical specialists  -Follow-up with palliative care: 6 weeks    Orders:    oxyCODONE (Roxicodone) 5 immediate release tablet; Take 1 tablet (5 mg total) by mouth every 4 (four) hours as needed for moderate pain Max Daily Amount: 30 mg

## 2025-02-27 NOTE — ASSESSMENT & PLAN NOTE
Following Dr. Perea of medical oncology.  Currently on modified FOLFOX6.  She is status post first cycle which she tolerated very well without any issues or concerns.  No issues with nausea, vomiting, constipation or diarrhea.    Visit today was completed without the use of a [employer]  as patient's daughter elected to serve as  with patient's approval.    Orders:    oxyCODONE (Roxicodone) 5 immediate release tablet; Take 1 tablet (5 mg total) by mouth every 4 (four) hours as needed for moderate pain Max Daily Amount: 30 mg

## 2025-02-27 NOTE — PROGRESS NOTES
Name: Miriam Smith      : 1950      MRN: [id number]  Encounter Provider: Derek Alba DO  Encounter Date: 2025   Encounter department: [facility] PALLIATIVE [facility]  :  Assessment & Plan  Rectal mass  Following Dr. Perea of medical oncology.  Currently on modified FOLFOX6.  She is status post first cycle which she tolerated very well without any issues or concerns.  No issues with nausea, vomiting, constipation or diarrhea.    Visit today was completed without the use of a eROI  as patient's daughter elected to serve as  with patient's approval.    Orders:    oxyCODONE (Roxicodone) 5 immediate release tablet; Take 1 tablet (5 mg total) by mouth every 4 (four) hours as needed for moderate pain Max Daily Amount: 30 mg    Rectal cancer (HCC)         Cancer associated pain  Well controlled at this time with OxyIR 5mg q4 hours PRN  -continue current regimen.  -no adverse side effects from this regimen.  -currently taking about 5-6 tablets a day which has been helpful.    Patient does take Senokot daily and therefore does not endorse any issues with constipation or diarrhea at this time.  Did discuss that if she has loose stools or diarrhea, she is to hold her Senokot for that day.  All questions and concerns answered at this time.    Orders:    oxyCODONE (Roxicodone) 5 immediate release tablet; Take 1 tablet (5 mg total) by mouth every 4 (four) hours as needed for moderate pain Max Daily Amount: 30 mg    Palliative care by specialist  Psychosocial   Supportive listening provided  Normalized experience of patient/family  Provided anxiety containment     Referrals Placed / Medical Equipment Ordered  -None     Follow-Up Recommendations  -Follow-up with PCP and current medical specialists  -Follow-up with palliative care: 6 weeks    Orders:    oxyCODONE (Roxicodone) 5 immediate release tablet; Take 1 tablet (5 mg total) by mouth every 4 (four) hours as needed for  moderate pain Max Daily Amount: 30 mg    Rectal pain  Well controlled at this time with OxyIR 5mg q4 hours PRN  -currently taking about 5-6 tablets a day which has been helpful.  -continue current regimen.  -no adverse side effects from this regimen.      Orders:    oxyCODONE (Roxicodone) 5 immediate release tablet; Take 1 tablet (5 mg total) by mouth every 4 (four) hours as needed for moderate pain Max Daily Amount: 30 mg    S/P colostomy ([facility])  No issues or concerns with her colostomy at this time.       Closed fracture of right distal radius  Following orthopedics.  Currently in a cast of the right wrist.  States she has had pain, however, the oxycodone has also helped with this along with the rectal mass/rectal cancer pain.  She states she did not have any dizziness or troubles related to her medications from the fall.  She states she was trying to close the door, however, lost her balance as she was moving to get away from the door but had a mechanical fall with subsequent right hand and knee strike.  She states she has a small abrasion to the right knee, however, no issues at this time.             Decisional apparatus: Patient is competent on my exam today. If competence is lost, patient's substitute decision maker would default to spouse by [place] Act 169.   Advance Directive / Living Will / POLST: None on file     PDMP Review: I have reviewed the patient's controlled substance dispensing history in the Prescription Drug Monitoring Program in compliance with the [facility] regulations before prescribing any controlled substances.    History of Present Illness   Miriam Smith is a 74 y.o. female who presents for follow-up.    Palliative diagnosis: Rectal cancer    Patient is seen today in office. Alert, oriented, pleasantly conversant.  Patient is seen in [facility], accompanied by her daughter, Coco, who elected to serve as  for patient. [employer]  was therefore not used.    Appetite  has been good with no issues with nausea or vomiting.    Pain is well controlled with current regimen. No need for adjustments at this time.    Patient is well supported by her family (daughter and NED).      Medical History Reviewed by provider this encounter:     .  Past Medical History   Past Medical History:   Diagnosis Date    Diabetes mellitus (HCC)     Hypercholesteremia     Rectal cancer (HCC)      Past Surgical History:   Procedure Laterality Date    COLONOSCOPY      ILEOSTOMY N/A 12/24/2024    Procedure: LAPAROSCOPIC DIVERTING sigmoid end-loop colostomy;  Surgeon: SAI Bermudez MD;  Location: [facility];  Service: Colorectal    INCISION AND DRAINAGE OF WOUND N/A 12/24/2024    Procedure: INCISION AND DRAINAGE (I&D) BUTTOCK abscess;  Surgeon: SAI Bermudez MD;  Location: [facility];  Service: Colorectal    IR PORT PLACEMENT  1/6/2025     Family History   Problem Relation Age of Onset    Colon cancer Sister       reports that she has never smoked. She has never used smokeless tobacco. She reports that she does not currently use alcohol. She reports that she does not currently use drugs.  Current Outpatient Medications   Medication Instructions    atorvastatin (LIPITOR) 40 mg, Oral, Daily    benzonatate (TESSALON PERLES) 100 mg, Oral, 3 times daily PRN    Blood Glucose Monitoring Suppl (OneTouch Verio Reflect) w/Device KIT Check blood sugars twice daily. Please substitute with appropriate alternative as covered by patient's insurance. Dx: E11.65    carbamide peroxide (DEBROX) 6.5 % otic solution 5 drops, Right Ear, 2 times daily    enoxaparin (LOVENOX) 40 mg, Subcutaneous, Every 24 hours scheduled    [START ON 3/5/2025] fluorouracil 2,765 mg in CADD/Elastomeric Infusion Device 960 mg/m2/day, Intravenous, Over 46 hours    glucose blood (OneTouch Verio) test strip Check blood sugars twice daily. Please substitute with appropriate alternative as covered by patient's insurance. Dx: E11.65     lidocaine-prilocaine (EMLA) cream Topical, As needed, Apply to PAC site prior to access    lidocaine-prilocaine (EMLA) cream Apply a thin layer over PAC site prior to access prn    loratadine (CLARITIN) 10 mg, Oral, Daily    metFORMIN (GLUCOPHAGE) 1000 MG tablet take 1 tablet by mouth EVERY MORNING WITH BREAKFAST    Misc. Devices (Sitz Bath) MISC Does not apply, Daily    ondansetron (ZOFRAN) 8 mg, Oral, Every 8 hours PRN    OneTouch Delica Lancets 33G MISC Check blood sugars twice daily. Please substitute with appropriate alternative as covered by patient's insurance. Dx: E11.65    oxyCODONE (ROXICODONE) 5 mg, Oral, Every 4 hours PRN    pioglitazone (ACTOS) 15 mg, Oral, Daily    polyethylene glycol (MIRALAX) 17 g, Oral, Daily    senna-docusate sodium (SENOKOT S) 8.6-50 mg per tablet 1 tablet, Oral, 2 times daily    sodium chloride (OCEAN) 0.65 % nasal spray 1 spray, Nasal, As needed     Allergies   Allergen Reactions    Motrin [Ibuprofen] Itching    Tylenol [Acetaminophen] Itching    Penicillins Rash     Rash and bruise    Sulfa Antibiotics Rash     Price velia syndrome      Current Outpatient Medications on File Prior to Visit   Medication Sig Dispense Refill    atorvastatin (LIPITOR) 40 mg tablet Take 1 tablet (40 mg total) by mouth daily 30 tablet 3    benzonatate (TESSALON PERLES) 100 mg capsule Take 1 capsule (100 mg total) by mouth 3 (three) times a day as needed for cough 20 capsule 0    Blood Glucose Monitoring Suppl (OneTouch Verio Reflect) w/Device KIT Check blood sugars twice daily. Please substitute with appropriate alternative as covered by patient's insurance. Dx: E11.65 1 kit 0    [START ON 3/5/2025] fluorouracil 2,765 mg in CADD/Elastomeric Infusion Device Infuse 2,765 mg (960 mg/m2/day x 1.44 m2) into a catheter in a vein via infusion device over 46 hours for 2 days  Infusion planned for March 5, 2025. 1 each 2    glucose blood (OneTouch Verio) test strip Check blood sugars twice daily. Please  substitute with appropriate alternative as covered by patient's insurance. Dx: E11.65 200 each 3    lidocaine-prilocaine (EMLA) cream Apply a thin layer over PAC site prior to access prn 30 g 5    loratadine (CLARITIN) 10 mg tablet Take 1 tablet (10 mg total) by mouth daily 30 tablet 0    metFORMIN (GLUCOPHAGE) 1000 MG tablet take 1 tablet by mouth EVERY MORNING WITH BREAKFAST 30 tablet 0    ondansetron (ZOFRAN) 8 mg tablet Take 1 tablet (8 mg total) by mouth every 8 (eight) hours as needed for nausea or vomiting 20 tablet 5    OneTouch Delica Lancets 33G MISC Check blood sugars twice daily. Please substitute with appropriate alternative as covered by patient's insurance. Dx: E11.65 200 each 3    pioglitazone (ACTOS) 15 mg tablet Take 1 tablet (15 mg total) by mouth daily 100 tablet 3    polyethylene glycol (MIRALAX) 17 g packet Take 17 g by mouth daily 510 g 0    senna-docusate sodium (SENOKOT S) 8.6-50 mg per tablet Take 1 tablet by mouth 2 (two) times a day 60 tablet 0    [DISCONTINUED] oxyCODONE (Roxicodone) 5 immediate release tablet Take 1 tablet (5 mg total) by mouth every 4 (four) hours as needed for moderate pain Max Daily Amount: 30 mg 90 tablet 0    carbamide peroxide (DEBROX) 6.5 % otic solution Administer 5 drops to the right ear 2 (two) times a day (Patient not taking: Reported on 2/27/2025) 15 mL 0    enoxaparin (LOVENOX) 40 mg/0.4 mL Inject 0.4 mL (40 mg total) under the skin every 24 hours for 25 days (Patient not taking: Reported on 2/4/2025) 10 mL 0    lidocaine-prilocaine (EMLA) cream Apply topically as needed for mild pain Apply to PAC site prior to access (Patient not taking: Reported on 2/27/2025) 30 g 4    Misc. Devices (Sitz Bath) MISC Use in the morning (Patient not taking: Reported on 2/26/2025) 1 each 0    sodium chloride (OCEAN) 0.65 % nasal spray 1 spray into each nostril as needed for congestion or rhinitis (Patient not taking: Reported on 2/27/2025) 50 mL 0     No current  facility-administered medications on file prior to visit.      Social History     Tobacco Use    Smoking status: Never    Smokeless tobacco: Never   Vaping Use    Vaping status: Never Used   Substance and Sexual Activity    Alcohol use: Not Currently    Drug use: Not Currently    Sexual activity: Not on file        Objective   /50 (BP Location: Left arm, Patient Position: Sitting, Cuff Size: Standard)   Pulse 96   Temp 98.2 °F (36.8 °C) (Temporal)   Ht 5' (1.524 m)   Wt 46.7 kg (103 lb)   SpO2 98%   BMI 20.12 kg/m²     Physical Exam  Vitals reviewed.   Constitutional:       General: She is not in acute distress.     Appearance: She is not ill-appearing, toxic-appearing or diaphoretic.   HENT:      Head: Normocephalic and atraumatic.      Nose: Nose normal.      Mouth/Throat:      Mouth: Mucous membranes are moist.   Eyes:      General:         Right eye: No discharge.         Left eye: No discharge.   Cardiovascular:      Rate and Rhythm: Normal rate.   Pulmonary:      Effort: Pulmonary effort is normal. No respiratory distress.   Abdominal:      General: Abdomen is flat. There is no distension.   Musculoskeletal:         General: No swelling.      Comments: Right wrist in a cast   Skin:     General: Skin is warm and dry.      Coloration: Skin is not jaundiced or pale.   Neurological:      General: No focal deficit present.      Mental Status: She is alert. Mental status is at baseline.   Psychiatric:         Mood and Affect: Mood normal.         Behavior: Behavior normal.         Thought Content: Thought content normal.         Judgment: Judgment normal.         Recent labs:  Lab Results   Component Value Date/Time    SODIUM 136 02/18/2025 09:42 AM    K 3.7 02/18/2025 09:42 AM    BUN 18 02/18/2025 09:42 AM    CREATININE 0.41 (L) 02/18/2025 09:42 AM    GLUC 115 02/18/2025 09:42 AM    CALCIUM 9.0 02/18/2025 09:42 AM    AST 12 (L) 02/18/2025 09:42 AM    ALT 11 02/18/2025 09:42 AM    ALB 3.4 (L) 02/18/2025  09:42 AM    TP 6.8 02/18/2025 09:42 AM    EGFR 102 02/18/2025 09:42 AM     Lab Results   Component Value Date/Time    HGB 10.8 (L) 02/18/2025 09:42 AM    WBC 8.16 02/18/2025 09:42 AM     02/18/2025 09:42 AM    INR 1.17 11/12/2024 04:36 AM     Lab Results   Component Value Date/Time    [id number] 2.328 01/08/2025 02:49 PM       Recent Imaging:  Procedure: XR wrist 3+ vw right  Result Date: 2/26/2025  Impression: Possible nondisplaced intra-articular fracture of the distal radius as above. If clinically warranted, this can be further evaluated with CT Computerized Assisted Algorithm (CAA) may have been used to analyze all applicable images. Workstation performed: [id number]     Procedure: CT head wo contrast  Result Date: 2/26/2025  Impression: No acute intracranial abnormality. Workstation performed: [id number]     Procedure: CT abdomen pelvis with contrast  Result Date: 1/8/2025  Impression: Status post diverting sigmoid loop colostomy with a large amount of stool throughout the entire colon to the level of the ostomy. Large locally invasive lower rectal cancer again shown with decrease in the size of fluid collection in the left ischioanal fat. Small volume ascites in the abdomen and pelvis, new from the prior study. The study was marked in EPIC for immediate notification. Workstation performed: [id number]     Procedure: IR port placement  Result Date: 1/6/2025  Impression: Tecnically successful port placement as described.  The port is available for immediate use. Workstation performed: [id number]     Procedure: CT abdomen pelvis w contrast  Result Date: 12/23/2024  Impression: 1. Locally invasive lower rectal cancer invading the sphincters, left levator ani and gluteus shae muscle, similar to recent MRI, but progressed from 11/10/2024. 2. New rim-enhancing collection/abscess in the left ischioanal fat measuring 5.7 x 5.2 x 8.2 cm, with fistulization into the overlying gluteal skin. The collection  extends into the tumor with development of multiple internal foci of air, likely related to fistulization of the tumor or superinfection. 3. 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 The study was marked in EPIC for immediate notification. Workstation performed: [id number]     Procedure: MRI pelvis rectal cancer staging wo contrast  Result Date: 12/6/2024  Impression: Unenhanced MRI of the Pelvis (Rectal Protocol) Please note that although the current biopsy results does not demonstrate definite cancer, the imaging characteristics of this tumor is clearly invasive and will be described as a rectal cancer. Large polypoid, predominately mucinous low rectal cancer, 2.2 cm from the anal verge, 7.5 cm long (series 2 image 19, series 3 images 9-32.) On the left tumor invades the top of the internal sphincter and extends into intersphincteric space (ISS) (series 9 images 19-22.) Also separately, the tumor broadly penetrates through the posterior rectal wall (series 3 images 11-18) fills the posterior mesorectal space, penetrates through the left posterolateral levator ani (series 3 images 21-26,) and extends into the left ischioanal fossa and invades the left gluteus shae (series 3 images 26-31.) Right anterolaterally, tumor penetrates through the mesorectal fascia to involve the right posterolateral vaginal wall (series 3 image 21 and series 2 image 27.) Overall MRI stage: T4b, N2, Low rectal cancer MRF: Not applicable for T4 tumor. Sphincter involvement: Yes. Suspicious extra mesorectal lymph nodes: No. EMVI: No. For the purpose of radiation therapy planning, series 3 would be most useful. Workstation performed: [id number]     Procedure: CT chest wo contrast  Result Date: 11/13/2024  Impression: No lung metastases. Severe coronary artery calcification indicating atherosclerotic heart disease. Cholelithiasis. Workstation performed: [id number]     Procedure:  Colonoscopy  Result Date: 11/12/2024  Impression: Single malignant-appearing, friable, fungating and ulcerated mass measuring 15 cm in the rectum and anal region, covering one half of the circumference; performed cold forceps biopsy with partial removal RECOMMENDATION: Await pathology results Repeat colonoscopy in 1 year, due: 11/12/2025 Inadequate bowel preparation  Check CEA levels. Patient will need colorectal surgery evaluation. Resume diet as tolerated. Communicated exam results with patient's daughter who was at bedside.  Morris Tom MD     Procedure: CT abdomen pelvis with contrast  Result Date: 11/10/2024  Impression: Heterogeneous peripherally enhancing mass/malignancy involving the rectum and infiltrating the left ischiorectal and gluteal fat measuring approximately 7.7 x 7.5 x 8.6 cm with areas of fluid density compatible with internal necrosis. I personally discussed this study with ALMA ROSA ROMAN on 11/10/2024 9:49 PM. Workstation performed: [id number]       Administrative Statements   I have spent a total time of 34 minutes in caring for this patient on the day of the visit/encounter including Risks and benefits of tx options, Instructions for management, Patient and family education, Importance of tx compliance, Risk factor reductions, Impressions, Counseling / Coordination of care, Documenting in the medical record, Reviewing/placing orders in the medical record (including tests, medications, and/or procedures), and Obtaining or reviewing history  .   Topics discussed with the patient / family include symptom assessment and management, medication review, psychosocial support, supportive listening, and anticipatory guidance.

## 2025-02-27 NOTE — ASSESSMENT & PLAN NOTE
Following orthopedics.  Currently in a cast of the right wrist.  States she has had pain, however, the oxycodone has also helped with this along with the rectal mass/rectal cancer pain.  She states she did not have any dizziness or troubles related to her medications from the fall.  She states she was trying to close the door, however, lost her balance as she was moving to get away from the door but had a mechanical fall with subsequent right hand and knee strike.  She states she has a small abrasion to the right knee, however, no issues at this time.

## 2025-02-27 NOTE — ASSESSMENT & PLAN NOTE
Well controlled at this time with OxyIR 5mg q4 hours PRN  -currently taking about 5-6 tablets a day which has been helpful.  -continue current regimen.  -no adverse side effects from this regimen.      Orders:    oxyCODONE (Roxicodone) 5 immediate release tablet; Take 1 tablet (5 mg total) by mouth every 4 (four) hours as needed for moderate pain Max Daily Amount: 30 mg

## 2025-02-28 ENCOUNTER — PATIENT OUTREACH (OUTPATIENT)
Dept: HEMATOLOGY ONCOLOGY | Facility: CLINIC | Age: 75
End: 2025-02-28

## 2025-02-28 NOTE — PROGRESS NOTES
I reached out to Miriam  to review for any barriers to care and to offer any supportive services that may be needed. I left  with the reason for my call including my direct phone number .

## 2025-03-03 ENCOUNTER — HOSPITAL ENCOUNTER (OUTPATIENT)
Dept: INFUSION CENTER | Facility: HOSPITAL | Age: 75
Discharge: HOME | End: 2025-03-03
Payer: SUBSIDIZED

## 2025-03-03 ENCOUNTER — HOSPITAL ENCOUNTER (EMERGENCY)
Facility: HOSPITAL | Age: 75
Discharge: HOME | End: 2025-03-03
Attending: STUDENT IN AN ORGANIZED HEALTH CARE EDUCATION/TRAINING PROGRAM | Admitting: STUDENT IN AN ORGANIZED HEALTH CARE EDUCATION/TRAINING PROGRAM
Payer: SUBSIDIZED

## 2025-03-03 ENCOUNTER — APPOINTMENT (EMERGENCY)
Dept: RADIOLOGY | Facility: HOSPITAL | Age: 75
End: 2025-03-03
Payer: SUBSIDIZED

## 2025-03-03 ENCOUNTER — RESULTS FOLLOW-UP (OUTPATIENT)
Dept: EMERGENCY DEPT | Facility: HOSPITAL | Age: 75
End: 2025-03-03

## 2025-03-03 VITALS
OXYGEN SATURATION: 95 % | HEIGHT: 60 IN | RESPIRATION RATE: 16 BRPM | TEMPERATURE: 97.7 F | BODY MASS INDEX: 20.62 KG/M2 | HEART RATE: 68 BPM | DIASTOLIC BLOOD PRESSURE: 62 MMHG | SYSTOLIC BLOOD PRESSURE: 120 MMHG | WEIGHT: 105 LBS

## 2025-03-03 DIAGNOSIS — R10.9 ABDOMINAL PAIN: ICD-10-CM

## 2025-03-03 DIAGNOSIS — S22.080A COMPRESSION FRACTURE OF T11 VERTEBRA, INITIAL ENCOUNTER (HCC): Primary | ICD-10-CM

## 2025-03-03 DIAGNOSIS — C20 RECTAL CANCER (HCC): Primary | ICD-10-CM

## 2025-03-03 LAB
ALBUMIN SERPL BCG-MCNC: 2.8 G/DL (ref 3.5–5)
ALBUMIN SERPL BCG-MCNC: 3.3 G/DL (ref 3.5–5)
ALP SERPL-CCNC: 47 U/L (ref 34–104)
ALP SERPL-CCNC: 95 U/L (ref 34–104)
ALT SERPL W P-5'-P-CCNC: 10 U/L (ref 7–52)
ALT SERPL W P-5'-P-CCNC: 30 U/L (ref 7–52)
ANION GAP SERPL CALCULATED.3IONS-SCNC: 8 MMOL/L (ref 4–13)
ANION GAP SERPL CALCULATED.3IONS-SCNC: 9 MMOL/L (ref 4–13)
ANISOCYTOSIS BLD QL SMEAR: PRESENT
ANISOCYTOSIS BLD QL SMEAR: PRESENT
AST SERPL W P-5'-P-CCNC: 12 U/L (ref 13–39)
AST SERPL W P-5'-P-CCNC: 48 U/L (ref 13–39)
BASOPHILS # BLD MANUAL: 0 THOUSAND/UL (ref 0–0.1)
BASOPHILS # BLD MANUAL: 0.05 THOUSAND/UL (ref 0–0.1)
BASOPHILS NFR MAR MANUAL: 0 % (ref 0–1)
BASOPHILS NFR MAR MANUAL: 1 % (ref 0–1)
BILIRUB SERPL-MCNC: 0.33 MG/DL (ref 0.2–1)
BILIRUB SERPL-MCNC: 0.43 MG/DL (ref 0.2–1)
BUN SERPL-MCNC: 15 MG/DL (ref 5–25)
BUN SERPL-MCNC: 15 MG/DL (ref 5–25)
CALCIUM ALBUM COR SERPL-MCNC: 8.7 MG/DL (ref 8.3–10.1)
CALCIUM ALBUM COR SERPL-MCNC: 9.3 MG/DL (ref 8.3–10.1)
CALCIUM SERPL-MCNC: 7.7 MG/DL (ref 8.4–10.2)
CALCIUM SERPL-MCNC: 8.7 MG/DL (ref 8.4–10.2)
CHLORIDE SERPL-SCNC: 107 MMOL/L (ref 96–108)
CHLORIDE SERPL-SCNC: 107 MMOL/L (ref 96–108)
CO2 SERPL-SCNC: 23 MMOL/L (ref 21–32)
CO2 SERPL-SCNC: 23 MMOL/L (ref 21–32)
CREAT SERPL-MCNC: 0.33 MG/DL (ref 0.6–1.3)
CREAT SERPL-MCNC: 0.34 MG/DL (ref 0.6–1.3)
EOSINOPHIL # BLD MANUAL: 0 THOUSAND/UL (ref 0–0.4)
EOSINOPHIL # BLD MANUAL: 0.21 THOUSAND/UL (ref 0–0.4)
EOSINOPHIL NFR BLD MANUAL: 0 % (ref 0–6)
EOSINOPHIL NFR BLD MANUAL: 4 % (ref 0–6)
ERYTHROCYTE [DISTWIDTH] IN BLOOD BY AUTOMATED COUNT: 15.3 % (ref 11.6–15.1)
ERYTHROCYTE [DISTWIDTH] IN BLOOD BY AUTOMATED COUNT: 15.4 % (ref 11.6–15.1)
GFR SERPL CREATININE-BSD FRML MDRD: 108 ML/MIN/1.73SQ M
GFR SERPL CREATININE-BSD FRML MDRD: 109 ML/MIN/1.73SQ M
GIANT PLATELETS BLD QL SMEAR: PRESENT
GLUCOSE P FAST SERPL-MCNC: 106 MG/DL (ref 65–99)
GLUCOSE SERPL-MCNC: 106 MG/DL (ref 65–140)
GLUCOSE SERPL-MCNC: 165 MG/DL (ref 65–140)
HCT VFR BLD AUTO: 31 % (ref 34.8–46.1)
HCT VFR BLD AUTO: 32.3 % (ref 34.8–46.1)
HGB BLD-MCNC: 10 G/DL (ref 11.5–15.4)
HGB BLD-MCNC: 10.5 G/DL (ref 11.5–15.4)
LIPASE SERPL-CCNC: 53 U/L (ref 11–82)
LYMPHOCYTES # BLD AUTO: 1.16 THOUSAND/UL (ref 0.6–4.47)
LYMPHOCYTES # BLD AUTO: 1.55 THOUSAND/UL (ref 0.6–4.47)
LYMPHOCYTES # BLD AUTO: 13 % (ref 14–44)
LYMPHOCYTES # BLD AUTO: 28 % (ref 14–44)
MCH RBC QN AUTO: 28.7 PG (ref 26.8–34.3)
MCH RBC QN AUTO: 28.8 PG (ref 26.8–34.3)
MCHC RBC AUTO-ENTMCNC: 32.3 G/DL (ref 31.4–37.4)
MCHC RBC AUTO-ENTMCNC: 32.5 G/DL (ref 31.4–37.4)
MCV RBC AUTO: 89 FL (ref 82–98)
MCV RBC AUTO: 89 FL (ref 82–98)
MICROCYTES BLD QL AUTO: PRESENT
MICROCYTES BLD QL AUTO: PRESENT
MONOCYTES # BLD AUTO: 0.32 THOUSAND/UL (ref 0–1.22)
MONOCYTES # BLD AUTO: 0.62 THOUSAND/UL (ref 0–1.22)
MONOCYTES NFR BLD: 6 % (ref 4–12)
MONOCYTES NFR BLD: 8 % (ref 4–12)
NEUTROPHILS # BLD MANUAL: 3.2 THOUSAND/UL (ref 1.85–7.62)
NEUTROPHILS # BLD MANUAL: 5.96 THOUSAND/UL (ref 1.85–7.62)
NEUTS BAND NFR BLD MANUAL: 2 % (ref 0–8)
NEUTS SEG NFR BLD AUTO: 60 % (ref 43–75)
NEUTS SEG NFR BLD AUTO: 75 % (ref 43–75)
OVALOCYTES BLD QL SMEAR: PRESENT
PLATELET # BLD AUTO: 201 THOUSANDS/UL (ref 149–390)
PLATELET # BLD AUTO: 227 THOUSANDS/UL (ref 149–390)
PLATELET BLD QL SMEAR: ADEQUATE
PLATELET BLD QL SMEAR: ADEQUATE
PMV BLD AUTO: 8.9 FL (ref 8.9–12.7)
PMV BLD AUTO: 9 FL (ref 8.9–12.7)
POIKILOCYTOSIS BLD QL SMEAR: PRESENT
POIKILOCYTOSIS BLD QL SMEAR: PRESENT
POLYCHROMASIA BLD QL SMEAR: PRESENT
POTASSIUM SERPL-SCNC: 3.1 MMOL/L (ref 3.5–5.3)
POTASSIUM SERPL-SCNC: 3.3 MMOL/L (ref 3.5–5.3)
PROT SERPL-MCNC: 5.4 G/DL (ref 6.4–8.4)
PROT SERPL-MCNC: 6.3 G/DL (ref 6.4–8.4)
RBC # BLD AUTO: 3.49 MILLION/UL (ref 3.81–5.12)
RBC # BLD AUTO: 3.65 MILLION/UL (ref 3.81–5.12)
RBC MORPH BLD: PRESENT
RBC MORPH BLD: PRESENT
SODIUM SERPL-SCNC: 138 MMOL/L (ref 135–147)
SODIUM SERPL-SCNC: 139 MMOL/L (ref 135–147)
VARIANT LYMPHS # BLD AUTO: 1 %
VARIANT LYMPHS # BLD AUTO: 2 %
WBC # BLD AUTO: 5.33 THOUSAND/UL (ref 4.31–10.16)
WBC # BLD AUTO: 7.74 THOUSAND/UL (ref 4.31–10.16)

## 2025-03-03 PROCEDURE — 85027 COMPLETE CBC AUTOMATED: CPT | Performed by: INTERNAL MEDICINE

## 2025-03-03 PROCEDURE — 85007 BL SMEAR W/DIFF WBC COUNT: CPT | Performed by: STUDENT IN AN ORGANIZED HEALTH CARE EDUCATION/TRAINING PROGRAM

## 2025-03-03 PROCEDURE — 96374 THER/PROPH/DIAG INJ IV PUSH: CPT

## 2025-03-03 PROCEDURE — 97760 ORTHOTIC MGMT&TRAING 1ST ENC: CPT

## 2025-03-03 PROCEDURE — 36415 COLL VENOUS BLD VENIPUNCTURE: CPT | Performed by: STUDENT IN AN ORGANIZED HEALTH CARE EDUCATION/TRAINING PROGRAM

## 2025-03-03 PROCEDURE — 72072 X-RAY EXAM THORAC SPINE 3VWS: CPT

## 2025-03-03 PROCEDURE — 99285 EMERGENCY DEPT VISIT HI MDM: CPT | Performed by: STUDENT IN AN ORGANIZED HEALTH CARE EDUCATION/TRAINING PROGRAM

## 2025-03-03 PROCEDURE — 74177 CT ABD & PELVIS W/CONTRAST: CPT

## 2025-03-03 PROCEDURE — 85007 BL SMEAR W/DIFF WBC COUNT: CPT | Performed by: INTERNAL MEDICINE

## 2025-03-03 PROCEDURE — 96375 TX/PRO/DX INJ NEW DRUG ADDON: CPT

## 2025-03-03 PROCEDURE — 99285 EMERGENCY DEPT VISIT HI MDM: CPT

## 2025-03-03 PROCEDURE — 83690 ASSAY OF LIPASE: CPT | Performed by: STUDENT IN AN ORGANIZED HEALTH CARE EDUCATION/TRAINING PROGRAM

## 2025-03-03 PROCEDURE — 96361 HYDRATE IV INFUSION ADD-ON: CPT

## 2025-03-03 PROCEDURE — 93005 ELECTROCARDIOGRAM TRACING: CPT

## 2025-03-03 PROCEDURE — 80053 COMPREHEN METABOLIC PANEL: CPT | Performed by: STUDENT IN AN ORGANIZED HEALTH CARE EDUCATION/TRAINING PROGRAM

## 2025-03-03 PROCEDURE — 80053 COMPREHEN METABOLIC PANEL: CPT | Performed by: INTERNAL MEDICINE

## 2025-03-03 PROCEDURE — 85027 COMPLETE CBC AUTOMATED: CPT | Performed by: STUDENT IN AN ORGANIZED HEALTH CARE EDUCATION/TRAINING PROGRAM

## 2025-03-03 RX ORDER — ONDANSETRON 2 MG/ML
4 INJECTION INTRAMUSCULAR; INTRAVENOUS ONCE
Status: COMPLETED | OUTPATIENT
Start: 2025-03-03 | End: 2025-03-03

## 2025-03-03 RX ORDER — HYDROMORPHONE HCL/PF 1 MG/ML
0.5 SYRINGE (ML) INJECTION ONCE
Refills: 0 | Status: COMPLETED | OUTPATIENT
Start: 2025-03-03 | End: 2025-03-03

## 2025-03-03 RX ADMIN — SODIUM CHLORIDE 1000 ML: 0.9 INJECTION, SOLUTION INTRAVENOUS at 11:18

## 2025-03-03 RX ADMIN — HYDROMORPHONE HYDROCHLORIDE 0.5 MG: 1 INJECTION, SOLUTION INTRAMUSCULAR; INTRAVENOUS; SUBCUTANEOUS at 11:18

## 2025-03-03 RX ADMIN — IOHEXOL 100 ML: 350 INJECTION, SOLUTION INTRAVENOUS at 11:01

## 2025-03-03 RX ADMIN — ONDANSETRON 4 MG: 2 INJECTION INTRAMUSCULAR; INTRAVENOUS at 11:18

## 2025-03-03 NOTE — DISCHARGE INSTRUCTIONS
"You have been evaluated in the Emergency Department today for abdominal pain. Your evaluation was not suggestive of any emergent condition requiring medical intervention at this time. However, some abdominal problems make take more time to appear. Therefore, it is important for you to watch for any new symptoms or worsening of your current condition.      Please follow up with your primary care physician as soon as possible. If you do not have a primary doctor, you can call \"[facility]\" to schedule an appointment with one.     Please follow-up with neurosurgery.  A referral was sent.  Please call to schedule an appointment.  You will need repeat x-rays prior to your appointment.  These have been ordered.    Return to the Emergency Department if you experience worsening or uncontrolled pain, fevers 100.4°F or greater, recurrent vomiting, inability to tolerate food or fluids by mouth, bloody stools or vomit, black or tarry stools, or any other concerning symptoms.  "

## 2025-03-03 NOTE — ED PROVIDER NOTES
Time reflects when diagnosis was documented in both MDM as applicable and the Disposition within this note       Time User Action Codes Description Comment    3/3/2025  2:07 PM Darrius Santana Add [S22.080A] Compression fracture of T11 vertebra, initial encounter ([facility])     3/3/2025  2:07 PM Darrius Santana Add [R10.9] Abdominal pain           ED Disposition       ED Disposition   Discharge    Condition   Stable    Date/Time   Mon Mar 3, 2025 12:18 PM    Comment   Miriam Smith discharge to home/self care.                   Assessment & Plan       Medical Decision Making  Patient is a 74 y.o. female who presents to the ED for abdominal pain.  Patient is nontoxic, well-appearing.     Differential includes but is not limited to: Gastritis, pancreatitis.  Presentation not consistent with obstruction, perforation    Plan: Labs, CT scan, pain control, reassess                   Amount and/or Complexity of Data Reviewed  Labs: ordered.  Radiology: ordered. Decision-making details documented in ED Course.    Risk  Prescription drug management.        ED Course as of 03/03/25 1604   Mon Mar 03, 2025   1158 CT abdomen pelvis w contrast  Mild subacute appearing compression fracture of the superior endplate of T11 without retropulsion new since 1/8/2025.  No evidence of acute intraabdominopelvic process.  Stable lower rectal mass.  Smaller left ischioanal abscess.  Additional chronic findings and negatives as above.   1426 Reevaluated.  Resting comfortably.  Feels much better.  Discussed results and findings.  Discussed thoracic fracture (patient did fall about a week or 2 ago, possibly related to this however did not injure her back during the fall).  Discussed discharge with neurosurgery and PCP follow-up.  Strict return precautions discussed.  Patient and daughter verbalized understanding and agreed to plan of care.       Medications   HYDROmorphone (DILAUDID) injection 0.5 mg (0.5 mg Intravenous Given 3/3/25  1118)   ondansetron (ZOFRAN) injection 4 mg (4 mg Intravenous Given 3/3/25 1118)   sodium chloride 0.9 % bolus 1,000 mL (0 mL Intravenous Stopped 3/3/25 1318)   iohexol (OMNIPAQUE) 350 MG/ML injection (MULTI-DOSE) 100 mL (100 mL Intravenous Given 3/3/25 1101)       ED Risk Strat Scores                            SBIRT 20yo+      Flowsheet Row Most Recent Value   Initial Alcohol Screen: US AUDIT-C     1. How often do you have a drink containing alcohol? 0 Filed at: 03/03/2025 1033   2. How many drinks containing alcohol do you have on a typical day you are drinking?  0 Filed at: 03/03/2025 1033   3a. Male UNDER 65: How often do you have five or more drinks on one occasion? 0 Filed at: 03/03/2025 1033   3b. FEMALE Any Age, or MALE 65+: How often do you have 4 or more drinks on one occassion? 0 Filed at: 03/03/2025 1033   Audit-C Score 0 Filed at: 03/03/2025 1033   FELA: How many times in the past year have you...    Used an illegal drug or used a prescription medication for non-medical reasons? Never Filed at: 03/03/2025 1033                            History of Present Illness       Chief Complaint   Patient presents with    Vomiting     States she started with upper abd pain, chills and vomiting about an hour ago       Past Medical History:   Diagnosis Date    Diabetes mellitus (HCC)     Hypercholesteremia     Rectal cancer (HCC)       Past Surgical History:   Procedure Laterality Date    COLONOSCOPY      ILEOSTOMY N/A 12/24/2024    Procedure: LAPAROSCOPIC DIVERTING sigmoid end-loop colostomy;  Surgeon: SAI Bermudez MD;  Location: [facility];  Service: Colorectal    INCISION AND DRAINAGE OF WOUND N/A 12/24/2024    Procedure: INCISION AND DRAINAGE (I&D) BUTTOCK abscess;  Surgeon: SAI Bermudez MD;  Location: [facility];  Service: Colorectal    IR PORT PLACEMENT  1/6/2025      Family History   Problem Relation Age of Onset    Colon cancer Sister       Social History     Tobacco Use    Smoking status: Never     Smokeless tobacco: Never   Vaping Use    Vaping status: Never Used   Substance Use Topics    Alcohol use: Not Currently    Drug use: Not Currently      E-Cigarette/Vaping    E-Cigarette Use Never User       E-Cigarette/Vaping Substances    Nicotine No     THC No     CBD No     Flavoring No     Other No     Unknown No       I have reviewed and agree with the history as documented.     74-year-old female, past medical history including rectal cancer, who presents the emergency room for epigastric abdominal pain.  Started about an hour or 2 ago.  Constant, nonradiating.  No modifying factors.  Associated with nausea and vomiting.  No fevers.  No urinary symptoms.  No chest pain, shortness of breath.  No recent illnesses.  No other complaints or concerns.          Vomiting  Associated symptoms: abdominal pain        Review of Systems   Gastrointestinal:  Positive for abdominal pain, nausea and vomiting.   All other systems reviewed and are negative.          Objective       ED Triage Vitals   Temperature Pulse Blood Pressure Respirations SpO2 Patient Position - Orthostatic VS   03/03/25 1033 03/03/25 1035 03/03/25 1035 03/03/25 1033 03/03/25 1035 03/03/25 1422   97.7 °F (36.5 °C) 85 145/63 20 98 % Lying      Temp src Heart Rate Source BP Location FiO2 (%) Pain Score    -- 03/03/25 1130 03/03/25 1130 -- 03/03/25 1033     Monitor Left arm  5      Vitals      Date and Time Temp Pulse SpO2 Resp BP Pain Score FACES Pain Rating User   03/03/25 1422 -- 68 95 % 16 120/62 -- -- OE   03/03/25 1230 -- 74 97 % 13 149/67 -- -- CS   03/03/25 1200 -- 67 91 % 18 130/62 -- -- CS   03/03/25 1135 -- -- -- -- -- 3 -- CS   03/03/25 1130 -- 69 95 % 18 130/60 -- -- CS   03/03/25 1118 -- -- -- -- -- 5 -- CS   03/03/25 1035 -- 85 98 % -- 145/63 -- -- JEFFERSON   03/03/25 1033 97.7 °F (36.5 °C) -- -- 20 -- 5 -- JEFFERSON            Physical Exam  Vitals and nursing note reviewed.   Constitutional:       General: She is not in acute distress.      Appearance: She is well-developed. She is not diaphoretic.   HENT:      Head: Normocephalic and atraumatic.      Right Ear: External ear normal.      Left Ear: External ear normal.      Nose: Nose normal.   Eyes:      General: Lids are normal. No scleral icterus.  Cardiovascular:      Rate and Rhythm: Normal rate and regular rhythm.      Heart sounds: Normal heart sounds. No murmur heard.     No friction rub. No gallop.   Pulmonary:      Effort: Pulmonary effort is normal. No respiratory distress.      Breath sounds: Normal breath sounds. No wheezing or rales.   Abdominal:      Palpations: Abdomen is soft.      Tenderness: There is abdominal tenderness in the epigastric area. There is no guarding or rebound.      Comments: Colostomy in place.  Stoma is pink and well-perfused   Musculoskeletal:         General: No deformity. Normal range of motion.      Cervical back: Normal range of motion and neck supple.   Skin:     General: Skin is warm and dry.   Neurological:      General: No focal deficit present.      Mental Status: She is alert.   Psychiatric:         Mood and Affect: Mood normal.         Behavior: Behavior normal.         Results Reviewed       Procedure Component Value Units Date/Time    RBC Morphology Reflex Test [[id number]] Collected: 03/03/25 1117    Lab Status: Final result Specimen: Blood from Arm, Right Updated: 03/03/25 1301    Comprehensive metabolic panel [[id number]]  (Abnormal) Collected: 03/03/25 1203    Lab Status: Final result Specimen: Blood from Arm, Right Updated: 03/03/25 1234     Sodium 138 mmol/L      Potassium 3.1 mmol/L      Chloride 107 mmol/L      CO2 23 mmol/L      ANION GAP 8 mmol/L      BUN 15 mg/dL      Creatinine 0.34 mg/dL      Glucose 165 mg/dL      Calcium 7.7 mg/dL      Corrected Calcium 8.7 mg/dL      AST 48 U/L      ALT 30 U/L      Alkaline Phosphatase 95 U/L      Total Protein 5.4 g/dL      Albumin 2.8 g/dL      Total Bilirubin 0.43 mg/dL      eGFR 108 ml/min/1.73sq m      Narrative:      National Kidney Disease Foundation guidelines for Chronic Kidney Disease (CKD):     Stage 1 with normal or high GFR (GFR > 90 mL/min/1.73 square meters)    Stage 2 Mild CKD (GFR = 60-89 mL/min/1.73 square meters)    Stage 3A Moderate CKD (GFR = 45-59 mL/min/1.73 square meters)    Stage 3B Moderate CKD (GFR = 30-44 mL/min/1.73 square meters)    Stage 4 Severe CKD (GFR = 15-29 mL/min/1.73 square meters)    Stage 5 End Stage CKD (GFR <15 mL/min/1.73 square meters)  Note: GFR calculation is accurate only with a steady state creatinine    Lipase [[id number]]  (Normal) Collected: 03/03/25 1203    Lab Status: Final result Specimen: Blood from Arm, Right Updated: 03/03/25 1234     Lipase 53 u/L     CBC and differential [[id number]]  (Abnormal) Collected: 03/03/25 1117    Lab Status: Final result Specimen: Blood from Arm, Right Updated: 03/03/25 1216     WBC 7.74 Thousand/uL      RBC 3.49 Million/uL      Hemoglobin 10.0 g/dL      Hematocrit 31.0 %      MCV 89 fL      MCH 28.7 pg      MCHC 32.3 g/dL      RDW 15.4 %      MPV 9.0 fL      Platelets 201 Thousands/uL     Narrative:      This is an appended report.  These results have been appended to a previously verified report.    Manual Differential(PHLEBS Do Not Order) [[id number]]  (Abnormal) Collected: 03/03/25 1117    Lab Status: Final result Specimen: Blood from Arm, Right Updated: 03/03/25 1216     Segmented % 75 %      Bands % 2 %      Lymphocytes % 13 %      Monocytes % 8 %      Eosinophils % 0 %      Basophils % 0 %      Atypical Lymphocytes % 2 %      Absolute Neutrophils 5.96 Thousand/uL      Absolute Lymphocytes 1.16 Thousand/uL      Absolute Monocytes 0.62 Thousand/uL      Absolute Eosinophils 0.00 Thousand/uL      Absolute Basophils 0.00 Thousand/uL      Total Counted --     RBC Morphology Present     Platelet Estimate Adequate     Anisocytosis Present     Microcytes Present     Ovalocytes Present     Poikilocytes Present            XR spine thoracic  3 views   Final Interpretation by Andres Rosas MD (03/03 9122)      Slight loss of stature at T11 with superior endplate compression deformity which is new since the CT scan of 1/8/2025 suggesting subacute injury. Anterolisthesis C4 on C5.         Computerized Assisted Algorithm (CAA) may have been used to analyze all applicable images.         Workstation performed: [id number]         CT abdomen pelvis w contrast   Final Interpretation by Emanuel Elizabeth MD (03/03 5657)      Mild subacute appearing compression fracture of the superior endplate of T11 without retropulsion new since 1/8/2025.      No evidence of acute intraabdominopelvic process.      Stable lower rectal mass.      Smaller left ischioanal abscess.      Additional chronic findings and negatives as above.      The study was marked in EPIC for immediate notification.         Workstation performed: [id number]         XR spine thoracic 3 vw    (Results Pending)       ECG 12 Lead Documentation Only    Date/Time: 3/3/2025 3:44 PM    Performed by: Darrius Santana DO  Authorized by: Darrius Santana DO    ECG reviewed by me, the ED Provider: yes    Patient location:  ED  Interpretation:     Interpretation: normal    Rate:     ECG rate:  67    ECG rate assessment: normal    Rhythm:     Rhythm: sinus rhythm    Ectopy:     Ectopy: none    QRS:     QRS axis:  Normal  Conduction:     Conduction: normal    ST segments:     ST segments:  Normal  T waves:     T waves: normal        ED Medication and Procedure Management   Prior to Admission Medications   Prescriptions Last Dose Informant Patient Reported? Taking?   Blood Glucose Monitoring Suppl (OneTouch Verio Reflect) w/Device KIT   No No   Sig: Check blood sugars twice daily. Please substitute with appropriate alternative as covered by patient's insurance. Dx: E11.65   Misc. Devices (Sitz Bath) MISC   No No   Sig: Use in the morning   Patient not taking: Reported on 2/26/2025   OneTouch  Delica Lancets 33G MISC   No No   Sig: Check blood sugars twice daily. Please substitute with appropriate alternative as covered by patient's insurance. Dx: E11.65   atorvastatin (LIPITOR) 40 mg tablet   No No   Sig: Take 1 tablet (40 mg total) by mouth daily   benzonatate (TESSALON PERLES) 100 mg capsule   No No   Sig: Take 1 capsule (100 mg total) by mouth 3 (three) times a day as needed for cough   carbamide peroxide (DEBROX) 6.5 % otic solution   No No   Sig: Administer 5 drops to the right ear 2 (two) times a day   Patient not taking: Reported on 2/27/2025   enoxaparin (LOVENOX) 40 mg/0.4 mL   No No   Sig: Inject 0.4 mL (40 mg total) under the skin every 24 hours for 25 days   Patient not taking: Reported on 2/4/2025   fluorouracil 2,765 mg in CADD/Elastomeric Infusion Device   No No   Sig: Infuse 2,765 mg (960 mg/m2/day x 1.44 m2) into a catheter in a vein via infusion device over 46 hours for 2 days  Infusion planned for March 5, 2025.   glucose blood (OneTouch Verio) test strip   No No   Sig: Check blood sugars twice daily. Please substitute with appropriate alternative as covered by patient's insurance. Dx: E11.65   lidocaine-prilocaine (EMLA) cream   No No   Sig: Apply topically as needed for mild pain Apply to PAC site prior to access   Patient not taking: Reported on 2/27/2025   lidocaine-prilocaine (EMLA) cream   No No   Sig: Apply a thin layer over PAC site prior to access prn   loratadine (CLARITIN) 10 mg tablet   No No   Sig: Take 1 tablet (10 mg total) by mouth daily   metFORMIN (GLUCOPHAGE) 1000 MG tablet   No No   Sig: take 1 tablet by mouth EVERY MORNING WITH BREAKFAST   ondansetron (ZOFRAN) 8 mg tablet   No No   Sig: Take 1 tablet (8 mg total) by mouth every 8 (eight) hours as needed for nausea or vomiting   oxyCODONE (Roxicodone) 5 immediate release tablet   No No   Sig: Take 1 tablet (5 mg total) by mouth every 4 (four) hours as needed for moderate pain Max Daily Amount: 30 mg   pioglitazone  (ACTOS) 15 mg tablet   No No   Sig: Take 1 tablet (15 mg total) by mouth daily   polyethylene glycol (MIRALAX) 17 g packet  Self No No   Sig: Take 17 g by mouth daily   senna-docusate sodium (SENOKOT S) 8.6-50 mg per tablet   No No   Sig: Take 1 tablet by mouth 2 (two) times a day   sodium chloride (OCEAN) 0.65 % nasal spray   No No   Si spray into each nostril as needed for congestion or rhinitis   Patient not taking: Reported on 2025      Facility-Administered Medications: None     Discharge Medication List as of 3/3/2025  2:26 PM        CONTINUE these medications which have NOT CHANGED    Details   atorvastatin (LIPITOR) 40 mg tablet Take 1 tablet (40 mg total) by mouth daily, Starting 2025, Normal      benzonatate (TESSALON PERLES) 100 mg capsule Take 1 capsule (100 mg total) by mouth 3 (three) times a day as needed for cough, Starting 2025, Normal      Blood Glucose Monitoring Suppl (OneTouch Verio Reflect) w/Device KIT Check blood sugars twice daily. Please substitute with appropriate alternative as covered by patient's insurance. Dx: E11.65, Normal      carbamide peroxide (DEBROX) 6.5 % otic solution Administer 5 drops to the right ear 2 (two) times a day, Starting 2025, Normal      enoxaparin (LOVENOX) 40 mg/0.4 mL Inject 0.4 mL (40 mg total) under the skin every 24 hours for 25 days, Starting Sat 2024, Until 2025, Normal      fluorouracil 2,765 mg in CADD/Elastomeric Infusion Device Infuse 2,765 mg (960 mg/m2/day x 1.44 m2) into a catheter in a vein via infusion device over 46 hours for 2 days  Infusion planned for 2025., Starting Wed 3/5/2025, Until Fri 3/7/2025, Normal      glucose blood (OneTouch Verio) test strip Check blood sugars twice daily. Please substitute with appropriate alternative as covered by patient's insurance. Dx: E11.65, Normal      !! lidocaine-prilocaine (EMLA) cream Apply topically as needed for mild pain Apply to PAC site  prior to access, Starting Sun 12/22/2024, Normal      !! lidocaine-prilocaine (EMLA) cream Apply a thin layer over PAC site prior to access prn, Normal      loratadine (CLARITIN) 10 mg tablet Take 1 tablet (10 mg total) by mouth daily, Starting Tue 1/28/2025, Normal      metFORMIN (GLUCOPHAGE) 1000 MG tablet take 1 tablet by mouth EVERY MORNING WITH BREAKFAST, Normal      Misc. Devices (Sitz Bath) MISC Use in the morning, Starting Fri 1/17/2025, Normal      ondansetron (ZOFRAN) 8 mg tablet Take 1 tablet (8 mg total) by mouth every 8 (eight) hours as needed for nausea or vomiting, Starting Wed 1/22/2025, Normal      OneTouch Delica Lancets 33G MISC Check blood sugars twice daily. Please substitute with appropriate alternative as covered by patient's insurance. Dx: E11.65, Normal      oxyCODONE (Roxicodone) 5 immediate release tablet Take 1 tablet (5 mg total) by mouth every 4 (four) hours as needed for moderate pain Max Daily Amount: 30 mg, Starting Thu 2/27/2025, Normal      pioglitazone (ACTOS) 15 mg tablet Take 1 tablet (15 mg total) by mouth daily, Starting Thu 1/23/2025, Normal      polyethylene glycol (MIRALAX) 17 g packet Take 17 g by mouth daily, Starting Sat 1/11/2025, Normal      senna-docusate sodium (SENOKOT S) 8.6-50 mg per tablet Take 1 tablet by mouth 2 (two) times a day, Starting Sat 1/11/2025, Normal      sodium chloride (OCEAN) 0.65 % nasal spray 1 spray into each nostril as needed for congestion or rhinitis, Starting Tue 1/28/2025, Normal       !! - Potential duplicate medications found. Please discuss with provider.        Outpatient Discharge Orders   XR spine thoracic 3 vw   Standing Status: Future Standing Exp. Date: 03/03/29     ED SEPSIS DOCUMENTATION   Time reflects when diagnosis was documented in both MDM as applicable and the Disposition within this note       Time User Action Codes Description Comment    3/3/2025  2:07 PM Darrius Santana Add [S22.080A] Compression fracture of T11  vertebra, initial encounter ([facility])     3/3/2025  2:07 PM Darrius Santana Add [R10.9] Abdominal pain                  Darrius Santana, DO  03/03/25 6933

## 2025-03-03 NOTE — PHYSICAL THERAPY NOTE
Physical Therapy Orthotic Management Note       03/03/25 1315   PT Last Visit   PT Visit Date 03/03/25   Note Type   Note type Orthotic Management/Training   Type of Brace   Brace Applied Pomerene Horizon 456 Back Pack TLSO   Patient Position When Brace Applied Standing  (Pt demonstrated ability to don/doff TLSO brace with supervision + increased time while standing x 2 times throughout session + able to transfer on to toilet/perform pericare IND without difficulty.)   Bracing Recommendations Recommendation (comment)  (To be worn when OOB + HOB > 45 degrees)   Education   Education Provided Yes;Family or social support of family present for education by provider  (Daughter present at bedside + assisted with translation ; TLSO brace educational handout provided and discussed with pt + family who verbalized understanding.)   End of Consult   Patient Position at End of Consult Supine   Nurse Communication Nurse aware of consult, application of brace   End of Consult   Patient Position at End of Consult Supine;All needs within reach   Licensure   [place] License Number  Gabriela Marsh [id number]

## 2025-03-03 NOTE — PLAN OF CARE
Problem: Potential for Falls  Goal: Patient will remain free of falls  Description: INTERVENTIONS:  - Educate patient/family on patient safety including physical limitations  - Instruct patient to call for assistance with activity   - Keep Call bell within reach  - Keep care items and personal belongings within reach  Outcome: Progressing     Problem: DISCHARGE PLANNING  Goal: Discharge to home or other facility with appropriate resources  Description: INTERVENTIONS:  - Identify barriers to discharge w/patient and caregiver  - Arrange for needed discharge resources and transportation as appropriate  - Identify discharge learning needs (meds, wound care, etc.)  - Arrange for interpretive services to assist at discharge as needed  - Refer to Case Management Department for coordinating discharge planning if the patient needs post-hospital services based on physician/advanced practitioner order or complex needs related to functional status, cognitive ability, or social support system  Outcome: Progressing     Problem: Knowledge Deficit  Goal: Patient/family/caregiver demonstrates understanding of disease process, treatment plan, medications, and discharge instructions  Description: Complete learning assessment and assess knowledge base.  Interventions:  - Provide teaching at level of understanding  - Provide teaching via preferred learning methods  Outcome: Progressing

## 2025-03-03 NOTE — PROGRESS NOTES
Labs drawn from port per order. Miriam Smith  tolerated treatment well with no complications.      Miriam Smith is aware of future appt on 03/05 at 1100.     AVS printed and given to Miriam Smith:  No (Declined by Miriam Smith)

## 2025-03-04 ENCOUNTER — HOSPITAL ENCOUNTER (OUTPATIENT)
Dept: RADIOLOGY | Facility: HOSPITAL | Age: 75
Discharge: HOME | End: 2025-03-04

## 2025-03-04 ENCOUNTER — TELEPHONE (OUTPATIENT)
Dept: HEMATOLOGY ONCOLOGY | Facility: MEDICAL CENTER | Age: 75
End: 2025-03-04

## 2025-03-04 DIAGNOSIS — S22.080A COMPRESSION FRACTURE OF T11 VERTEBRA, INITIAL ENCOUNTER (HCC): ICD-10-CM

## 2025-03-04 NOTE — TELEPHONE ENCOUNTER
Per infusion RN:  Pt was in ED yesterday s/p fall down stairs. Compression fx T11. Also fell down stairs prior to that and has cast to fx wrist.   Plan is on hold. Is she ok to treat tomorrow?      F/U appt moved to tomorrow  Dr Perea will see patient in infusion tomorrow prior to starting treatment  Will keep plan on hold until seen by Dr Perea  Infusion RN aware of plan

## 2025-03-05 ENCOUNTER — OFFICE VISIT (OUTPATIENT)
Dept: HEMATOLOGY ONCOLOGY | Facility: MEDICAL CENTER | Age: 75
End: 2025-03-05

## 2025-03-05 ENCOUNTER — HOSPITAL ENCOUNTER (OUTPATIENT)
Dept: INFUSION CENTER | Facility: HOSPITAL | Age: 75
Discharge: HOME | End: 2025-03-05
Attending: INTERNAL MEDICINE
Payer: SUBSIDIZED

## 2025-03-05 VITALS
TEMPERATURE: 98.2 F | OXYGEN SATURATION: 95 % | SYSTOLIC BLOOD PRESSURE: 125 MMHG | RESPIRATION RATE: 18 BRPM | BODY MASS INDEX: 20.91 KG/M2 | WEIGHT: 106.48 LBS | HEIGHT: 60 IN | DIASTOLIC BLOOD PRESSURE: 58 MMHG | HEART RATE: 89 BPM

## 2025-03-05 DIAGNOSIS — C20 RECTAL CANCER (HCC): Primary | ICD-10-CM

## 2025-03-05 LAB
ATRIAL RATE: 67 BPM
P AXIS: 16 DEGREES
PR INTERVAL: 192 MS
QRS AXIS: 80 DEGREES
QRSD INTERVAL: 78 MS
QT INTERVAL: 410 MS
QTC INTERVAL: 433 MS
T WAVE AXIS: 44 DEGREES
VENTRICULAR RATE: 67 BPM

## 2025-03-05 PROCEDURE — 96415 CHEMO IV INFUSION ADDL HR: CPT

## 2025-03-05 PROCEDURE — G0498 CHEMO EXTEND IV INFUS W/PUMP: HCPCS

## 2025-03-05 PROCEDURE — 99213 OFFICE O/P EST LOW 20 MIN: CPT | Performed by: INTERNAL MEDICINE

## 2025-03-05 PROCEDURE — 96411 CHEMO IV PUSH ADDL DRUG: CPT

## 2025-03-05 PROCEDURE — 96413 CHEMO IV INFUSION 1 HR: CPT

## 2025-03-05 PROCEDURE — 93010 ELECTROCARDIOGRAM REPORT: CPT | Performed by: INTERNAL MEDICINE

## 2025-03-05 PROCEDURE — 96368 THER/DIAG CONCURRENT INF: CPT

## 2025-03-05 PROCEDURE — 96367 TX/PROPH/DG ADDL SEQ IV INF: CPT

## 2025-03-05 RX ORDER — FLUOROURACIL 50 MG/ML
320 INJECTION, SOLUTION INTRAVENOUS ONCE
Status: COMPLETED | OUTPATIENT
Start: 2025-03-05 | End: 2025-03-05

## 2025-03-05 RX ORDER — DEXTROSE MONOHYDRATE 50 MG/ML
20 INJECTION, SOLUTION INTRAVENOUS ONCE
Status: COMPLETED | OUTPATIENT
Start: 2025-03-05 | End: 2025-03-05

## 2025-03-05 RX ORDER — SODIUM CHLORIDE 9 MG/ML
20 INJECTION, SOLUTION INTRAVENOUS ONCE AS NEEDED
Status: DISCONTINUED | OUTPATIENT
Start: 2025-03-05 | End: 2025-03-08 | Stop reason: HOSPADM

## 2025-03-05 RX ADMIN — DEXAMETHASONE SODIUM PHOSPHATE: 10 INJECTION, SOLUTION INTRAMUSCULAR; INTRAVENOUS at 11:45

## 2025-03-05 RX ADMIN — FLUOROURACIL 460 MG: 50 INJECTION, SOLUTION INTRAVENOUS at 14:51

## 2025-03-05 RX ADMIN — DEXTROSE 20 ML/HR: 50 INJECTION, SOLUTION INTRAVENOUS at 12:28

## 2025-03-05 RX ADMIN — OXALIPLATIN 97.9 MG: 5 INJECTION, SOLUTION INTRAVENOUS at 12:38

## 2025-03-05 RX ADMIN — LEUCOVORIN CALCIUM 461 MG: 500 INJECTION, POWDER, LYOPHILIZED, FOR SOLUTION INTRAMUSCULAR; INTRAVENOUS at 12:38

## 2025-03-05 RX ADMIN — SODIUM CHLORIDE 20 ML/HR: 0.9 INJECTION, SOLUTION INTRAVENOUS at 11:45

## 2025-03-05 NOTE — PLAN OF CARE
Problem: Potential for Falls  Goal: Patient will remain free of falls  Description: INTERVENTIONS:  - Educate patient/family on patient safety including physical limitations  - Instruct patient to call for assistance with activity   - Keep Call bell within reach  - Keep care items and personal belongings within reach  - Initiate and maintain comfort rounds  Outcome: Progressing     Problem: DISCHARGE PLANNING  Goal: Discharge to home or other facility with appropriate resources  Description: INTERVENTIONS:  - Identify barriers to discharge w/patient and caregiver  - Arrange for needed discharge resources and transportation as appropriate  - Identify discharge learning needs (meds, wound care, etc.)  - Arrange for interpretive services to assist at discharge as needed  - Refer to Case Management Department for coordinating discharge planning if the patient needs post-hospital services based on physician/advanced practitioner order or complex needs related to functional status, cognitive ability, or social support system  Outcome: Progressing     Problem: Knowledge Deficit  Goal: Patient/family/caregiver demonstrates understanding of disease process, treatment plan, medications, and discharge instructions  Description: Complete learning assessment and assess knowledge base.  Interventions:  - Provide teaching at level of understanding  - Provide teaching via preferred learning methods  Outcome: Progressing     Problem: INFECTION - ADULT  Goal: Absence of fever/infection during neutropenic period  Description: INTERVENTIONS:  - Monitor WBC    Outcome: Progressing

## 2025-03-05 NOTE — PROGRESS NOTES
Miriam Smith  1950  [facility] [address] [address]    DISCUSSION/SUMMARY:    Approximately 25 minutes was spent with the patient/daughter in direct consultation/evaluation and with reviewing the chart.  Patient was seen in the chemotherapy infusion room    74-year-old female recently admitted to the hospital with abdominal pain and weight loss.  Workup demonstrated a rectal mass (the mass was originally biopsied in [place]).  Issues:    Rectal cancer.  Pathology results from [place] are listed below, there results demonstrated adenocarcinoma.  Patient was recently seen by colorectal surgery.     Patient was recently readmitted to [place], abscess was drained, underwent diverting colostomy.  Patient states that her bowel movements are normal, no evidence of bleeding.  Surgical follow-up ongoing.      Staging: Recent MRI demonstrated T4b, N2, low rectal carcinoma, + sphincter involvement, no suspicious extra mesorectal lymph nodes, no evidence of extramural venous invasion.  AJCC staging eighth edition = IIIC.  Other prior recent scans did not demonstrate evidence of metastatic disease.    NCCN guidelines 4.2024 state that for patients with T4 any N, preliminary treatment is to check pMMR.  At this moment, patient is not scheduled for any additional biopsies - so there likely will not be any additional tissue to send for evaluation.  Before, the plan is to start with 16 weeks of FOLFOX chemotherapy.  Obviously this regimen may change depending upon how patient does.  Patient will eventually be evaluated by radiation oncology for eventual long course chemo RT (with infusional 5-FU).  Afterwards, patient will undergo rescanning and reevaluation by colorectal surgery.    Mrs. Valdez is now on FOLFOX.  Patient states feeling okay, about the same as before.  No colostomy problems, no evidence of any infection.   Recent blood work was good/acceptable.  The plan is to continue with chemotherapy.    Port is in place, no issues.    Recent back x-rays.  Patient denies any significant back pain but the recent x-rays demonstrated endplate compression deformity, new.  Daughter has the name and telephone number of an orthopedic surgeon, needs to make an appointment.    Medical issues.  Recent CAT scan the chest demonstrated severe coronary artery calcification.  Patient is still working on a PCP.    Patient is to follow-up in 4 weeks.  If doable, patient can be seen again in the infusion center to make it easier for Mrs. Valdez.  Patient/daughter know to call the office if there are any other oncology questions or concerns.    Carefully review your medication list and verify that the list is accurate and up-to-date. Please call the hematology/oncology office if there are medications missing from the list, medications on the list that you are not currently taking or if there is a dosage or instruction that is different from how you're taking that medication.    Patient goals and areas of care: Continue with chemotherapy, orthopedics evaluation  Barriers to care: Multiple serious comorbidities  Patient is able to self-care with help of family members  _____________________________________________________________________________________    Chief Complaint   Patient presents with    Follow-up    Rectal Cancer     History of Present Illness: 74-year-old female first seen in the hospital on November 13, 2024.  Patient was admitted with weight loss, low abdominal pain.  Workup demonstrated a rectal mass.  Patient was being seen by GI and Was consistent with advanced rectal cancer.  Patient has also been seen by colorectal surgery.  More recently Mrs. José Miguel Smith was subsequently admitted to [place] patient was reevaluated, required drainage from the rectal lesion.  Patient also underwent colostomy, rectal fistula is being addressed  by colorectal surgery.    Patient was seen in the [facility] chemotherapy infusion room today.  Mrs. Valdez stated feeling okay, closer to baseline.  Appetite is okay, no nausea or vomiting.  No fevers or signs of infection.  No colostomy problems, still with some tenderness in the buttocks.  Activities are limited but the same as before.  No respiratory issues, no headaches or dizziness.  No specific problems with the port, no problems with the chemotherapy.    Review of Systems   Constitutional:  Positive for activity change, appetite change, fatigue and unexpected weight change.   HENT: Negative.     Eyes: Negative.    Respiratory: Negative.     Cardiovascular: Negative.    Gastrointestinal: Negative.    Endocrine: Negative.    Genitourinary: Negative.    Musculoskeletal: Negative.    Skin: Negative.    Allergic/Immunologic: Negative.    Neurological: Negative.    Hematological: Negative.    Psychiatric/Behavioral: Negative.     All other systems reviewed and are negative.    Patient Active Problem List   Diagnosis    Rectal mass    Type 2 diabetes mellitus (HCC)    Rectal bleeding    History of anal fissures    Electrolyte abnormality    Pain, rectal    Iron deficiency anemia    Leukocytosis    Rectal cancer (HCC)    Cancer associated pain    Palliative care by specialist    Rectal pain    Perirectal abscess    Constipation    S/P colostomy (HCC)    Palliative care encounter    Cancer related pain    Closed fracture of right distal radius     Past Medical History:   Diagnosis Date    Diabetes mellitus (HCC)     Hypercholesteremia     Rectal cancer (HCC)      Past Surgical History:   Procedure Laterality Date    COLONOSCOPY      ILEOSTOMY N/A 12/24/2024    Procedure: LAPAROSCOPIC DIVERTING sigmoid end-loop colostomy;  Surgeon: SAI Bermudez MD;  Location: [facility];  Service: Colorectal    INCISION AND DRAINAGE OF WOUND N/A 12/24/2024    Procedure: INCISION AND DRAINAGE (I&D) BUTTOCK abscess;   Surgeon: SAI Bermudez MD;  Location: [facility];  Service: Colorectal    IR PORT PLACEMENT  1/6/2025     Family History   Problem Relation Age of Onset    Colon cancer Sister      Social History     Socioeconomic History    Marital status: /Civil Union     Spouse name: Not on file    Number of children: Not on file    Years of education: Not on file    Highest education level: Not on file   Occupational History    Not on file   Tobacco Use    Smoking status: Never    Smokeless tobacco: Never   Vaping Use    Vaping status: Never Used   Substance and Sexual Activity    Alcohol use: Not Currently    Drug use: Not Currently    Sexual activity: Not on file   Other Topics Concern    Not on file   Social History Narrative    Not on file     Social Drivers of Health     Financial Resource Strain: Low Risk  (12/19/2024)    Overall Financial Resource Strain (CARDIA)     Difficulty of Paying Living Expenses: Not hard at all   Food Insecurity: No Food Insecurity (1/9/2025)    Nursing - Inadequate Food Risk Classification     Worried About Running Out of Food in the Last Year: Never true     Ran Out of Food in the Last Year: Never true     Ran Out of Food in the Last Year: Never true   Transportation Needs: No Transportation Needs (1/9/2025)    Nursing - Transportation Risk Classification     Lack of Transportation: Not on file     Lack of Transportation: No   Physical Activity: Not on file   Stress: Not on file   Social Connections: Not on file   Intimate Partner Violence: Unknown (1/9/2025)    Nursing IPS     Feels Physically and Emotionally Safe: Not on file     Physically Hurt by Someone: Not on file     Humiliated or Emotionally Abused by Someone: Not on file     Physically Hurt by Someone: No     Hurt or Threatened by Someone: No   Housing Stability: Unknown (1/9/2025)    Nursing: Inadequate Housing Risk Classification     Has Housing: Not on file     Worried About Losing Housing: Not on file     Unable to Get  Utilities: Not on file     Unable to Pay for Housing in the Last Year: No     Has Housin       Current Outpatient Medications:     atorvastatin (LIPITOR) 40 mg tablet, Take 1 tablet (40 mg total) by mouth daily, Disp: 30 tablet, Rfl: 3    benzonatate (TESSALON PERLES) 100 mg capsule, Take 1 capsule (100 mg total) by mouth 3 (three) times a day as needed for cough, Disp: 20 capsule, Rfl: 0    Blood Glucose Monitoring Suppl (OneTouch Verio Reflect) w/Device KIT, Check blood sugars twice daily. Please substitute with appropriate alternative as covered by patient's insurance. Dx: E11.65, Disp: 1 kit, Rfl: 0    carbamide peroxide (DEBROX) 6.5 % otic solution, Administer 5 drops to the right ear 2 (two) times a day (Patient not taking: Reported on 2025), Disp: 15 mL, Rfl: 0    enoxaparin (LOVENOX) 40 mg/0.4 mL, Inject 0.4 mL (40 mg total) under the skin every 24 hours for 25 days (Patient not taking: Reported on 2025), Disp: 10 mL, Rfl: 0    fluorouracil 2,765 mg in CADD/Elastomeric Infusion Device, Infuse 2,765 mg (960 mg/m2/day x 1.44 m2) into a catheter in a vein via infusion device over 46 hours for 2 days  Infusion planned for 2025., Disp: 1 each, Rfl: 2    glucose blood (OneTouch Verio) test strip, Check blood sugars twice daily. Please substitute with appropriate alternative as covered by patient's insurance. Dx: E11.65, Disp: 200 each, Rfl: 3    lidocaine-prilocaine (EMLA) cream, Apply topically as needed for mild pain Apply to PAC site prior to access (Patient not taking: Reported on 2025), Disp: 30 g, Rfl: 4    lidocaine-prilocaine (EMLA) cream, Apply a thin layer over PAC site prior to access prn, Disp: 30 g, Rfl: 5    loratadine (CLARITIN) 10 mg tablet, Take 1 tablet (10 mg total) by mouth daily, Disp: 30 tablet, Rfl: 0    metFORMIN (GLUCOPHAGE) 1000 MG tablet, take 1 tablet by mouth EVERY MORNING WITH BREAKFAST, Disp: 30 tablet, Rfl: 0    Misc. Devices (Sitz Bath) MISC, Use in the  morning (Patient not taking: Reported on 2/26/2025), Disp: 1 each, Rfl: 0    ondansetron (ZOFRAN) 8 mg tablet, Take 1 tablet (8 mg total) by mouth every 8 (eight) hours as needed for nausea or vomiting, Disp: 20 tablet, Rfl: 5    OneTouch Delica Lancets 33G MISC, Check blood sugars twice daily. Please substitute with appropriate alternative as covered by patient's insurance. Dx: E11.65, Disp: 200 each, Rfl: 3    oxyCODONE (Roxicodone) 5 immediate release tablet, Take 1 tablet (5 mg total) by mouth every 4 (four) hours as needed for moderate pain Max Daily Amount: 30 mg, Disp: 90 tablet, Rfl: 0    pioglitazone (ACTOS) 15 mg tablet, Take 1 tablet (15 mg total) by mouth daily, Disp: 100 tablet, Rfl: 3    polyethylene glycol (MIRALAX) 17 g packet, Take 17 g by mouth daily, Disp: 510 g, Rfl: 0    senna-docusate sodium (SENOKOT S) 8.6-50 mg per tablet, Take 1 tablet by mouth 2 (two) times a day, Disp: 60 tablet, Rfl: 0    sodium chloride (OCEAN) 0.65 % nasal spray, 1 spray into each nostril as needed for congestion or rhinitis (Patient not taking: Reported on 2/27/2025), Disp: 50 mL, Rfl: 0  No current facility-administered medications for this visit.    Facility-Administered Medications Ordered in Other Visits:     alteplase (CATHFLO) injection 2 mg, 2 mg, Intracatheter, Q1MIN PRN, Twan Perea MD    alteplase (CATHFLO) injection 2 mg, 2 mg, Intracatheter, Q1MIN PRN, Twan Perea MD    fluorouracil (ADRUCIL) injection 460 mg, 320 mg/m2 (Treatment Plan Recorded), Intravenous, Once, Twan Perea MD    leucovorin 461 mg in dextrose 5 % 250 mL IVPB, 320 mg/m2 (Treatment Plan Recorded), Intravenous, Once, Twan Perea MD    oxaliplatin (ELOXATIN) 97.9 mg in dextrose 5 % 250 mL chemo infusion, 68 mg/m2 (Treatment Plan Recorded), Intravenous, Once, Twan Perea MD    sodium chloride 0.9 % infusion, 20 mL/hr, Intravenous, Once PRN, Twan Perea MD, Stopped at 03/05/25 1227    Allergies   Allergen Reactions    Motrin  [Ibuprofen] Itching    Tylenol [Acetaminophen] Itching    Penicillins Rash     Rash and bruise    Sulfa Antibiotics Rash     Price velia syndrome     There were no vitals filed for this visit.  Physical Exam  Constitutional:       Appearance: She is well-developed.   HENT:      Head: Normocephalic and atraumatic.      Right Ear: External ear normal.      Left Ear: External ear normal.   Eyes:      Conjunctiva/sclera: Conjunctivae normal.      Pupils: Pupils are equal, round, and reactive to light.   Cardiovascular:      Rate and Rhythm: Normal rate and regular rhythm.      Heart sounds: Normal heart sounds.   Pulmonary:      Effort: Pulmonary effort is normal.      Breath sounds: Normal breath sounds.   Abdominal:      General: Bowel sounds are normal.      Palpations: Abdomen is soft.      Comments: + Bowel sounds, minimal tenderness, no guarding, colostomy in place   Musculoskeletal:         General: Normal range of motion.      Cervical back: Normal range of motion and neck supple.   Skin:     General: Skin is warm.      Comments: Slightly pale, warm, moist, no petechiae or ecchymoses   Neurological:      Mental Status: She is alert and oriented to person, place, and time.      Deep Tendon Reflexes: Reflexes are normal and symmetric.   Psychiatric:         Behavior: Behavior normal.         Thought Content: Thought content normal.         Judgment: Judgment normal.     Extremities: No lower extreme edema bilaterally, no cords, pulses 1+  Lymphatics: No adenopathy in the neck, supraclavicular region, axilla bilaterally    Performance Status: 1 - Symptomatic but completely ambulatory    Labs    3/3/2025 WBC = 7.74 hemoglobin = 10 hematocrit = 31 platelet = 201 neutrophil = 75% bands = 2% BUN = 15 creatinine = 0.34 corrected calcium = 8.7 AST = 48 ALT = 30 alkaline phosphatase = 95 total bilirubin = 0.43    11/13/2024 CEA = 5.6    Imaging    3/3/2025 spine 3 views thoracic    IMPRESSION:     Slight loss of  stature at T11 with superior endplate compression deformity which is new since the CT scan of 1/8/2025 suggesting subacute injury. Anterolisthesis C4 on C5.    1/8/2025 CAT scan abdomen pelvis with contrast    IMPRESSION:     Status post diverting sigmoid loop colostomy with a large amount of stool throughout the entire colon to the level of the ostomy.     Large locally invasive lower rectal cancer again shown with decrease in the size of fluid collection in the left ischioanal fat.  Small volume ascites in the abdomen and pelvis, new from the prior study.    12/6/2024 MRI pelvis rectal cancer staging without contrast    Overall MRI stage: T4b, N2, Low rectal cancer  MRF: Not applicable for T4 tumor.  Sphincter involvement: Yes.  Suspicious extra mesorectal lymph nodes: No.  EMVI: No.           11/12/2024 CT chest without contrast.  Impression stated no lung metastases, severe coronary artery calcification indicating atherosclerotic heart disease, cholelithiasis.    11/10/2024 CT abdomen pelvis with contrast    IMPRESSION:     Heterogeneous peripherally enhancing mass/malignancy involving the rectum and infiltrating the left ischiorectal and gluteal fat measuring approximately 7.7 x 7.5 x 8.6 cm with areas of fluid density compatible with internal necrosis.    Pathology    Case Report   Surgical Pathology Report                         Case: [id number]                                   Authorizing Provider:  Morris Tom MD         Collected:           11/12/2024 1439               Ordering Location:     [facility] Received:            11/12/2024 1649                                      [address]                                                                       Pathologist:           Nemo Peterson MD                                                         Specimen:    Rectum, rectal mass r/o malignancy , cancer                                                Final Diagnosis   A. 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 Deeper sections of the specimen have been evaluated. A higher grade lesion cannot be excluded due to the superficial nature of the biopsy; repeat biopsy is recommended as clinically indicated.                    "

## 2025-03-05 NOTE — PROGRESS NOTES
Continuous infusion ball connected with all clamps open. Miriam Smith  tolerated treatment well with no complications.      Miriam Smith is aware of future appt on 03/07 at 1315.     AVS printed and given to Miriam Smith:  Yes

## 2025-03-06 ENCOUNTER — OFFICE VISIT (OUTPATIENT)
Age: 75
End: 2025-03-06

## 2025-03-06 VITALS
DIASTOLIC BLOOD PRESSURE: 62 MMHG | RESPIRATION RATE: 19 BRPM | SYSTOLIC BLOOD PRESSURE: 109 MMHG | BODY MASS INDEX: 21.44 KG/M2 | OXYGEN SATURATION: 93 % | HEART RATE: 79 BPM | WEIGHT: 109.2 LBS | HEIGHT: 60 IN | TEMPERATURE: 98 F

## 2025-03-06 DIAGNOSIS — S22.000A COMPRESSION FRACTURE OF BODY OF THORACIC VERTEBRA (HCC): ICD-10-CM

## 2025-03-06 DIAGNOSIS — S52.501A CLOSED FRACTURE OF DISTAL END OF RIGHT RADIUS, UNSPECIFIED FRACTURE MORPHOLOGY, INITIAL ENCOUNTER: ICD-10-CM

## 2025-03-06 DIAGNOSIS — Z23 ENCOUNTER FOR IMMUNIZATION: Primary | ICD-10-CM

## 2025-03-06 DIAGNOSIS — M80.00XD AGE-RELATED OSTEOPOROSIS WITH CURRENT PATHOLOGICAL FRACTURE WITH ROUTINE HEALING, SUBSEQUENT ENCOUNTER: ICD-10-CM

## 2025-03-06 DIAGNOSIS — E11.9 TYPE 2 DIABETES MELLITUS WITHOUT COMPLICATION, UNSPECIFIED WHETHER LONG TERM INSULIN USE (HCC): ICD-10-CM

## 2025-03-06 DIAGNOSIS — C20 RECTAL CANCER (HCC): ICD-10-CM

## 2025-03-06 DIAGNOSIS — D50.8 OTHER IRON DEFICIENCY ANEMIA: ICD-10-CM

## 2025-03-06 PROBLEM — M80.00XA AGE-RELATED OSTEOPOROSIS WITH CURRENT PATHOLOGICAL FRACTURE: Status: ACTIVE | Noted: 2025-03-06

## 2025-03-06 LAB — SL AMB POCT HEMOGLOBIN AIC: 7.2 (ref ?–6.5)

## 2025-03-06 PROCEDURE — 99214 OFFICE O/P EST MOD 30 MIN: CPT | Performed by: FAMILY MEDICINE

## 2025-03-06 PROCEDURE — 83036 HEMOGLOBIN GLYCOSYLATED A1C: CPT | Performed by: FAMILY MEDICINE

## 2025-03-06 RX ORDER — PHENOL 1.4 %
600 AEROSOL, SPRAY (ML) MUCOUS MEMBRANE 2 TIMES DAILY WITH MEALS
Qty: 60 TABLET | Refills: 3 | Status: SHIPPED | OUTPATIENT
Start: 2025-03-06

## 2025-03-06 NOTE — ASSESSMENT & PLAN NOTE
Lab Results   Component Value Date    HGBA1C 7.2 (A) 03/06/2025   Well-controlled diabetes at this time continue current medication.    Orders:    POCT hemoglobin A1c

## 2025-03-06 NOTE — ASSESSMENT & PLAN NOTE
Orders:    DXA bone density spine hip and pelvis; Future    Cholecalciferol (VITAMIN D3) 1,000 units tablet; Take 1 tablet (1,000 Units total) by mouth daily    calcium carbonate (OS-MARY) 600 MG tablet; Take 1 tablet (600 mg total) by mouth 2 (two) times a day with meals    Ambulatory Referral to [facility]; Future

## 2025-03-06 NOTE — PROGRESS NOTES
Name: Miriam Smith      : 1950      MRN: [id number]  Encounter Provider: Yumiko Thomas MD  Encounter Date: 3/6/2025   Encounter department: [facility]  :  Assessment & Plan  Encounter for immunization    Orders:    TDAP VACCINE GREATER THAN OR EQUAL TO 6YO IM    Type 2 diabetes mellitus without complication, unspecified whether long term insulin use (HCC)    Lab Results   Component Value Date    HGBA1C 7.2 (A) 2025   Well-controlled diabetes at this time continue current medication.    Orders:    POCT hemoglobin A1c    Age-related osteoporosis with current pathological fracture with routine healing, subsequent encounter    Orders:    DXA bone density spine hip and pelvis; Future    Cholecalciferol (VITAMIN D3) 1,000 units tablet; Take 1 tablet (1,000 Units total) by mouth daily    calcium carbonate (OS-MARY) 600 MG tablet; Take 1 tablet (600 mg total) by mouth 2 (two) times a day with meals    Ambulatory Referral to [facility]; Future    Closed fracture of distal end of right radius, unspecified fracture morphology, initial encounter  Currently wearing a short arm cast.  Follow-up with orthopedics as advised       Compression fracture of body of thoracic vertebra (HCC)  Osteoporosis causing compression fracture of the thoracic vertebra..  Supplement with calcium and vitamin  May be a candidate of Prolia in the future       Orders:    Ambulatory Referral to [facility]; Future    Other iron deficiency anemia  Low iron saturation as well as low serum iron.  Last iron panel completed in 2024  Continue p.o. iron         Rectal cancer (HCC)                History of Present Illness   HPI patient is here for follow-up it has been 2 weeks since she started her chemotherapy for rectal cancer  She states that she is tolerating it well.  Has a history of recent fall resulting in subacute thoracic Fracture as  well as fracture of the right radius for which she is on a short arm cast    Review of Systems   Constitutional:  Negative for chills, fatigue and fever.   HENT:  Negative for hearing loss.    Respiratory:  Negative for chest tightness and shortness of breath.    Cardiovascular:  Negative for chest pain, palpitations and leg swelling.   Gastrointestinal:  Negative for abdominal pain.   Endocrine: Negative for polyuria.   Musculoskeletal:  Negative for arthralgias.   Skin:  Negative for color change.   Neurological:  Negative for dizziness.   Psychiatric/Behavioral:  Negative for agitation.        Objective   /62 (BP Location: Left arm, Patient Position: Sitting, Cuff Size: Standard)   Pulse 79   Temp 98 °F (36.7 °C) (Oral)   Resp 19   Ht 5' (1.524 m)   Wt 49.5 kg (109 lb 3.2 oz)   SpO2 93%   BMI 21.33 kg/m²      Physical Exam  Constitutional:       Appearance: She is well-developed.   HENT:      Head: Normocephalic.   Eyes:      Conjunctiva/sclera: Conjunctivae normal.      Pupils: Pupils are equal, round, and reactive to light.   Cardiovascular:      Rate and Rhythm: Normal rate and regular rhythm.      Heart sounds: Normal heart sounds.      Comments: Wearing a brace for her thoracic vertebra.  Also has an infusion for chemotherapy  Pulmonary:      Effort: Pulmonary effort is normal.      Breath sounds: Normal breath sounds.   Abdominal:      General: Bowel sounds are normal.      Palpations: Abdomen is soft.   Musculoskeletal:         General: No tenderness.      Cervical back: Normal range of motion and neck supple.   Skin:     General: Skin is warm and dry.   Neurological:      Mental Status: She is alert and oriented to person, place, and time.

## 2025-03-06 NOTE — ASSESSMENT & PLAN NOTE
Low iron saturation as well as low serum iron.  Last iron panel completed in December 2024  Continue p.o. iron

## 2025-03-07 ENCOUNTER — HOSPITAL ENCOUNTER (OUTPATIENT)
Dept: INFUSION CENTER | Facility: HOSPITAL | Age: 75
Discharge: HOME | End: 2025-03-07
Attending: INTERNAL MEDICINE

## 2025-03-07 ENCOUNTER — TELEPHONE (OUTPATIENT)
Dept: PHYSICAL THERAPY | Facility: OTHER | Age: 75
End: 2025-03-07

## 2025-03-07 ENCOUNTER — NURSE TRIAGE (OUTPATIENT)
Dept: PHYSICAL THERAPY | Facility: OTHER | Age: 75
End: 2025-03-07

## 2025-03-07 DIAGNOSIS — C20 RECTAL CANCER (HCC): Primary | ICD-10-CM

## 2025-03-07 NOTE — PROGRESS NOTES
Miriam Smith  tolerated treatment well with no complications. Elastomeric ball disconnected after 46 hour infusion.    Miriam Smith is aware of future appt on 3/17 at 0800.     AVS printed and given to Miriam Smith:  No (Declined by Miriam Smith)

## 2025-03-07 NOTE — TELEPHONE ENCOUNTER
Called the patient to complete the triage process started today @ 10:30 am. Called the patiebnt daughter as instructed. [phone].    Call rang multiple times with no answer or VM option.    Referral Closed.  Triage can be completed if a call back is received.    Patient with T11 CF. AVS states follow up with Neurosurgery and the number was provided on the paperwork.

## 2025-03-07 NOTE — TELEPHONE ENCOUNTER
"Swedish speaking, needs IS.    Please call her daughter Mi, she is with the patient right now. She is on communication consent and speaks Swedish. [phone] .    Additional Information   Negative: Is this related to a work injury?   Negative: Is this related to an MVA?   Negative: Are you currently recieving homecare services?    Background - Initial Assessment  Clinical complaint: ED visit on 03/03 due to Lower Back pain. Hx of sacral fracture 6 years ago and Osteoporosis. Patient states she fell on 02/25 and has pain since. It radiates into her shoulders, neck and down to her waist. No pain down her legs.No numbness or tingling. Seen by FM yesterday 03/06, Doctor referred to csp . X Ray done 3/3/2025 CF at T11. Pain is intermittent \"on and off\". Worse with movements and certain positions. Patient described pain as aching. No previous back or neck sx.  Date of onset: 02/25/2025   Frequency of pain: intermittent  Quality of pain: aching    Protocols used: [facility] Protocol    "

## 2025-03-07 NOTE — PLAN OF CARE
Problem: Potential for Falls  Goal: Patient will remain free of falls  Description: INTERVENTIONS:  - Educate patient/family on patient safety including physical limitations  - Keep Call bell within reach  - Keep care items and personal belongings within reach  Outcome: Progressing

## 2025-03-07 NOTE — TELEPHONE ENCOUNTER
Per TRIAGE NURSE:    Patient with T11 CF. AVS states follow up with Neurosurgery and the number was provided on the paperwork.     Spoke with Aurora (patient's daughter) she is on communication consent. She is aware. Provided [facility] dept direct phone number [phone].     Aurora will call today to schedule an evaluation.    CSP referral already closed per protocol.

## 2025-03-10 ENCOUNTER — PATIENT OUTREACH (OUTPATIENT)
Dept: HEMATOLOGY ONCOLOGY | Facility: CLINIC | Age: 75
End: 2025-03-10

## 2025-03-10 NOTE — PROGRESS NOTES
I reached out to Miriam  now that she is on Tx to reassess for any barriers to care and offer any supportive services that may be needed. I left  with reason for my call including my direct phone number

## 2025-03-12 ENCOUNTER — HOSPITAL ENCOUNTER (OUTPATIENT)
Dept: RADIOLOGY | Facility: HOSPITAL | Age: 75
Discharge: HOME | End: 2025-03-12
Attending: FAMILY MEDICINE
Payer: SUBSIDIZED

## 2025-03-12 VITALS — HEIGHT: 60 IN | BODY MASS INDEX: 20.62 KG/M2 | WEIGHT: 105 LBS

## 2025-03-12 DIAGNOSIS — M80.00XD AGE-RELATED OSTEOPOROSIS WITH CURRENT PATHOLOGICAL FRACTURE WITH ROUTINE HEALING, SUBSEQUENT ENCOUNTER: ICD-10-CM

## 2025-03-12 PROCEDURE — 77080 DXA BONE DENSITY AXIAL: CPT

## 2025-03-13 ENCOUNTER — TELEPHONE (OUTPATIENT)
Age: 75
End: 2025-03-13

## 2025-03-13 DIAGNOSIS — C20 RECTAL CANCER (HCC): Primary | ICD-10-CM

## 2025-03-13 RX ORDER — SODIUM CHLORIDE 9 MG/ML
20 INJECTION, SOLUTION INTRAVENOUS ONCE AS NEEDED
Status: CANCELLED | OUTPATIENT
Start: 2025-03-19

## 2025-03-13 RX ORDER — DEXTROSE MONOHYDRATE 50 MG/ML
20 INJECTION, SOLUTION INTRAVENOUS ONCE
Status: CANCELLED | OUTPATIENT
Start: 2025-03-19

## 2025-03-13 RX ORDER — FLUOROURACIL 50 MG/ML
320 INJECTION, SOLUTION INTRAVENOUS ONCE
Status: CANCELLED | OUTPATIENT
Start: 2025-03-19

## 2025-03-13 NOTE — LETTER
Influenza Split High Dose Preservative Free IM    Linked Order: [id number]   Current Status    Updated on: 2/26/2025  8:30 AM    Name Date Status Dose VIS Date Route Site  Lot# Given By Verified By   Influenza Split High Dose Preservative Free IM 2/26/2025 Given 0.5 mL 08/06/2021 IM LEFT ARM [employer] [id number] Jennifer Chávez RN --   Exp. Date NDC # Product Time Location External Inventory Class Comment   6/30/2025 55059-140-05 Fluzone High-Dose -- -- -- -- --                                                   Updated using: Immunizations Activity - Single Admin Form               Previous Statuses    Updated on: 2/26/2025  8:29 AM    Name Date Status Dose VIS Date Route Site  Lot# Given By Verified By   Influenza Split High Dose Preservative Free IM 2/26/2025 Incomplete 0.5 mL 08/06/2021 IM -- --      Pneumococcal Conjugate Vaccine 20-valent (Pcv20), Polysace    Linked Order: [id number]   Current Status    Updated on: 2/26/2025  8:32 AM    Name Date Status Dose VIS Date Route Site  Lot# Given By Verified By   Pneumococcal Conjugate Vaccine 20-valent (Pcv20), Polysace 2/26/2025 Given 0.5 mL 05/12/2023 IM RD [employer] [id number] Jennifer Chávez RN --   Exp. Date NDC # Product Time Location External Inventory Class Comment   6/30/2026 3148-9065-12 Prevnar 20 -- -- -- -- --   Question Answer Comment                                              Updated using: Immunizations Activity - Single Admin Form               Previous Statuses    Updated on: 2/26/2025  8:29 AM    Name Date Status Dose VIS Date Route Site  Lot# Given By Verified By   Pneumococcal Conjugate Vaccine 20-valent (Pcv20), Polysace 2/26/2025 Incomplete 0.5 mL 05/12/2023 IM -- [employer]

## 2025-03-13 NOTE — LETTER
March 13, 2025     Patient: Miriam Smith  YOB: 1950  Date of Visit: 3/13/2025      To Whom it May Concern:    Miriam Smith is under my professional care. Miriam was seen in my office on     .    If you have any questions or concerns, please don't hesitate to call.         Sincerely,          Ana Whittington MA        CC: No Recipients

## 2025-03-13 NOTE — TELEPHONE ENCOUNTER
Patient is calling in with Dr. Sen requesting Immunization record on letter head as he is helping her with immigration. Letter has been faxed to the number listed below as requested by patient.       [phone]

## 2025-03-16 DIAGNOSIS — E11.9 TYPE 2 DIABETES MELLITUS WITHOUT COMPLICATION, WITHOUT LONG-TERM CURRENT USE OF INSULIN (HCC): ICD-10-CM

## 2025-03-17 ENCOUNTER — HOSPITAL ENCOUNTER (OUTPATIENT)
Dept: INFUSION CENTER | Facility: HOSPITAL | Age: 75
Discharge: HOME | End: 2025-03-17
Payer: SUBSIDIZED

## 2025-03-17 DIAGNOSIS — C20 RECTAL CANCER (HCC): Primary | ICD-10-CM

## 2025-03-17 LAB
ALBUMIN SERPL BCG-MCNC: 3.6 G/DL (ref 3.5–5)
ALP SERPL-CCNC: 83 U/L (ref 34–104)
ALT SERPL W P-5'-P-CCNC: 12 U/L (ref 7–52)
ANION GAP SERPL CALCULATED.3IONS-SCNC: 10 MMOL/L (ref 4–13)
AST SERPL W P-5'-P-CCNC: 13 U/L (ref 13–39)
BASOPHILS # BLD AUTO: 0.03 THOUSANDS/ÂΜL (ref 0–0.1)
BASOPHILS NFR BLD AUTO: 1 % (ref 0–1)
BILIRUB SERPL-MCNC: 0.35 MG/DL (ref 0.2–1)
BUN SERPL-MCNC: 22 MG/DL (ref 5–25)
CALCIUM SERPL-MCNC: 9.1 MG/DL (ref 8.4–10.2)
CHLORIDE SERPL-SCNC: 103 MMOL/L (ref 96–108)
CO2 SERPL-SCNC: 24 MMOL/L (ref 21–32)
CREAT SERPL-MCNC: 0.47 MG/DL (ref 0.6–1.3)
EOSINOPHIL # BLD AUTO: 0.05 THOUSAND/ÂΜL (ref 0–0.61)
EOSINOPHIL NFR BLD AUTO: 1 % (ref 0–6)
ERYTHROCYTE [DISTWIDTH] IN BLOOD BY AUTOMATED COUNT: 16 % (ref 11.6–15.1)
GFR SERPL CREATININE-BSD FRML MDRD: 97 ML/MIN/1.73SQ M
GLUCOSE SERPL-MCNC: 96 MG/DL (ref 65–140)
HCT VFR BLD AUTO: 32.9 % (ref 34.8–46.1)
HGB BLD-MCNC: 10.7 G/DL (ref 11.5–15.4)
IMM GRANULOCYTES # BLD AUTO: 0.01 THOUSAND/UL (ref 0–0.2)
IMM GRANULOCYTES NFR BLD AUTO: 0 % (ref 0–2)
LYMPHOCYTES # BLD AUTO: 1.29 THOUSANDS/ÂΜL (ref 0.6–4.47)
LYMPHOCYTES NFR BLD AUTO: 28 % (ref 14–44)
MCH RBC QN AUTO: 29.2 PG (ref 26.8–34.3)
MCHC RBC AUTO-ENTMCNC: 32.5 G/DL (ref 31.4–37.4)
MCV RBC AUTO: 90 FL (ref 82–98)
MONOCYTES # BLD AUTO: 0.6 THOUSAND/ÂΜL (ref 0.17–1.22)
MONOCYTES NFR BLD AUTO: 13 % (ref 4–12)
NEUTROPHILS # BLD AUTO: 2.61 THOUSANDS/ÂΜL (ref 1.85–7.62)
NEUTS SEG NFR BLD AUTO: 57 % (ref 43–75)
NRBC BLD AUTO-RTO: 0 /100 WBCS
PLATELET # BLD AUTO: 195 THOUSANDS/UL (ref 149–390)
PMV BLD AUTO: 8.7 FL (ref 8.9–12.7)
POTASSIUM SERPL-SCNC: 3.8 MMOL/L (ref 3.5–5.3)
PROT SERPL-MCNC: 6.7 G/DL (ref 6.4–8.4)
RBC # BLD AUTO: 3.67 MILLION/UL (ref 3.81–5.12)
SODIUM SERPL-SCNC: 137 MMOL/L (ref 135–147)
WBC # BLD AUTO: 4.59 THOUSAND/UL (ref 4.31–10.16)

## 2025-03-17 PROCEDURE — 80053 COMPREHEN METABOLIC PANEL: CPT | Performed by: INTERNAL MEDICINE

## 2025-03-17 PROCEDURE — 85025 COMPLETE CBC W/AUTO DIFF WBC: CPT | Performed by: INTERNAL MEDICINE

## 2025-03-17 NOTE — PROGRESS NOTES
Labs drawn via port    Miriam Smith is aware of future appt on 3/19 at 0830.     AVS printed and given to Miriam Smith:   No (Declined by Miriam Smith)

## 2025-03-17 NOTE — PLAN OF CARE
Problem: Potential for Falls  Goal: Patient will remain free of falls  Description: INTERVENTIONS:  - Educate patient/family on patient safety including physical limitations  - Instruct patient to call for assistance with activity   - Keep Call bell within reach  - Keep care items and personal belongings within reach  - Initiate and maintain comfort rounds  - Make Fall Risk Sign visible to staff  Outcome: Progressing

## 2025-03-19 ENCOUNTER — HOSPITAL ENCOUNTER (OUTPATIENT)
Dept: INFUSION CENTER | Facility: HOSPITAL | Age: 75
Discharge: HOME | End: 2025-03-19
Attending: INTERNAL MEDICINE
Payer: SUBSIDIZED

## 2025-03-19 VITALS
HEIGHT: 60 IN | HEART RATE: 93 BPM | OXYGEN SATURATION: 91 % | WEIGHT: 103.62 LBS | BODY MASS INDEX: 20.34 KG/M2 | DIASTOLIC BLOOD PRESSURE: 58 MMHG | RESPIRATION RATE: 18 BRPM | SYSTOLIC BLOOD PRESSURE: 123 MMHG | TEMPERATURE: 96.5 F

## 2025-03-19 DIAGNOSIS — C20 RECTAL CANCER (HCC): Primary | ICD-10-CM

## 2025-03-19 RX ORDER — FLUOROURACIL 50 MG/ML
320 INJECTION, SOLUTION INTRAVENOUS ONCE
Status: COMPLETED | OUTPATIENT
Start: 2025-03-19 | End: 2025-03-19

## 2025-03-19 RX ORDER — SODIUM CHLORIDE 9 MG/ML
20 INJECTION, SOLUTION INTRAVENOUS ONCE AS NEEDED
Status: DISCONTINUED | OUTPATIENT
Start: 2025-03-19 | End: 2025-03-22 | Stop reason: HOSPADM

## 2025-03-19 RX ORDER — DEXTROSE MONOHYDRATE 50 MG/ML
20 INJECTION, SOLUTION INTRAVENOUS ONCE
Status: COMPLETED | OUTPATIENT
Start: 2025-03-19 | End: 2025-03-19

## 2025-03-19 RX ADMIN — LEUCOVORIN CALCIUM 460 MG: 10 INJECTION INTRAMUSCULAR; INTRAVENOUS at 10:15

## 2025-03-19 RX ADMIN — OXALIPLATIN 97.9 MG: 5 INJECTION, SOLUTION INTRAVENOUS at 10:15

## 2025-03-19 RX ADMIN — DEXTROSE 20 ML/HR: 50 INJECTION, SOLUTION INTRAVENOUS at 10:08

## 2025-03-19 RX ADMIN — SODIUM CHLORIDE 20 ML/HR: 0.9 INJECTION, SOLUTION INTRAVENOUS at 09:10

## 2025-03-19 RX ADMIN — FLUOROURACIL 460 MG: 50 INJECTION, SOLUTION INTRAVENOUS at 12:41

## 2025-03-19 RX ADMIN — DEXAMETHASONE SODIUM PHOSPHATE: 10 INJECTION, SOLUTION INTRAMUSCULAR; INTRAVENOUS at 09:10

## 2025-03-19 NOTE — PROGRESS NOTES
Continuous infusion device connected and with all clamps open. Miriam Smith  tolerated treatment well with no complications.      Miriam Smith is aware of future appt on 03/21 at 1200.     AVS printed and given to Miriam Smith:  Yes - Calendar

## 2025-03-20 ENCOUNTER — NURSE TRIAGE (OUTPATIENT)
Age: 75
End: 2025-03-20

## 2025-03-20 NOTE — TELEPHONE ENCOUNTER
"FOLLOW UP: Please advise     REASON FOR CONVERSATION: Wound Infection    SYMPTOMS: \"Residue\" from abscess wound site and pain with oozing. Looks like pinkish blood.    OTHER: Pt and son in-law Rocky mccauley. Started chemo recently. Had laparoscopic colostomy, ischiorectal abscess drainage from ingrowth of rectal tumor with Dr. Bermudez 12/24/24. Post-op appt 2/4/25 Dr. Knox. Denies fever, wound covered, no inflammation. Taking oxycodone for pain.    DISPOSITION: Home Care with Provider Message (overriding Home Care)      Reason for Disposition   Wound doesn't sound infected, or only mild redness    Answer Assessment - Initial Assessment Questions  1. LOCATION: \"Where is the wound located?\"       Ischiorectal    2. WOUND APPEARANCE: \"What does the wound look like?\"       Open, pinkish discharge  3. SIZE: If redness is present, ask: \"What is the size of the red area?\" (Inches, centimeters, or compare to size of a coin)       Denies  4. SPREAD: \"What's changed in the last day?\"  \"Do you see any red streaks coming from the wound?\"      Denies  5. ONSET: \"When did it start to look infected?\"       A couple of days ago  7. PAIN: \"Is there any pain?\" If Yes, ask: \"How bad is the pain?\"   (Scale 1-10; or mild, moderate, severe)      Yes moderate  8. FEVER: \"Do you have a fever?\" If Yes, ask: \"What is your temperature, how was it measured, and when did it start?\"      Denies  9. OTHER SYMPTOMS: \"Do you have any other symptoms?\" (e.g., shaking chills, weakness, rash elsewhere on body)      Denies    Protocols used: Wound Infection-ADULT-[place]    "

## 2025-03-21 ENCOUNTER — OFFICE VISIT (OUTPATIENT)
Age: 75
End: 2025-03-21

## 2025-03-21 ENCOUNTER — HOSPITAL ENCOUNTER (OUTPATIENT)
Dept: INFUSION CENTER | Facility: HOSPITAL | Age: 75
Discharge: HOME | End: 2025-03-21
Attending: INTERNAL MEDICINE

## 2025-03-21 VITALS
SYSTOLIC BLOOD PRESSURE: 130 MMHG | OXYGEN SATURATION: 97 % | HEART RATE: 101 BPM | DIASTOLIC BLOOD PRESSURE: 78 MMHG | BODY MASS INDEX: 20.3 KG/M2 | HEIGHT: 60 IN | WEIGHT: 103.4 LBS

## 2025-03-21 DIAGNOSIS — K62.89 RECTAL PAIN: ICD-10-CM

## 2025-03-21 DIAGNOSIS — Z51.5 PALLIATIVE CARE BY SPECIALIST: ICD-10-CM

## 2025-03-21 DIAGNOSIS — G89.3 CANCER ASSOCIATED PAIN: ICD-10-CM

## 2025-03-21 DIAGNOSIS — K62.89 RECTAL MASS: ICD-10-CM

## 2025-03-21 DIAGNOSIS — H11.32 SUBCONJUNCTIVAL HEMORRHAGE OF LEFT EYE: Primary | ICD-10-CM

## 2025-03-21 DIAGNOSIS — C20 RECTAL CANCER (HCC): Primary | ICD-10-CM

## 2025-03-21 DIAGNOSIS — H54.7 VISION PROBLEM: ICD-10-CM

## 2025-03-21 PROCEDURE — 99214 OFFICE O/P EST MOD 30 MIN: CPT | Performed by: FAMILY MEDICINE

## 2025-03-21 NOTE — PLAN OF CARE
Problem: Potential for Falls  Goal: Patient will remain free of falls  Description: INTERVENTIONS:- Educate patient/family on patient safety including physical limitations- Instruct patient to call for assistance with activity - Consult OT/PT to assist with strengthening/mobility - Keep Call bell within reach- Keep bed low and locked with side rails adjusted as appropriate- Keep care items and personal belongings within reach- Initiate and maintain comfort rounds  Outcome: Progressing     Problem: DISCHARGE PLANNING  Goal: Discharge to home or other facility with appropriate resources  Description: INTERVENTIONS:- Identify barriers to discharge w/patient and caregiver- Arrange for needed discharge resources and transportation as appropriate- Identify discharge learning needs (meds, wound care, etc.)- Arrange for interpretive services to assist at discharge as needed- Refer to Case Management Department for coordinating discharge planning if the patient needs post-hospital services based on physician/advanced practitioner order or complex needs related to functional status, cognitive ability, or social support system  Outcome: Progressing     Problem: Knowledge Deficit  Goal: Patient/family/caregiver demonstrates understanding of disease process, treatment plan, medications, and discharge instructions  Description: Complete learning assessment and assess knowledge base.Interventions:- Provide teaching at level of understanding- Provide teaching via preferred learning methods  Outcome: Progressing

## 2025-03-21 NOTE — PROGRESS NOTES
Name: Miriam Smith      : 1950      MRN: [id number]  Encounter Provider: Saul Palomo MD  Encounter Date: 3/21/2025   Encounter department: [facility]  :  Assessment & Plan  Subconjunctival hemorrhage of left eye  Patient was accompanied with her daughter in the clinic, who brought her in with history of trauma to her left eye while folding clothes a day prior.    Per patient, she hit the corner of a hard clothing while folding clothes that got into her eye.  She denies any vision problems, watery eyes, foreign sensation, floaters.  On exam, full range of motion EOM, no nystagmus noted vision testing done in clinic revealing right eye vision 20/100 and left eye vision 20/60.  Patient was wearing contact lenses, hence fluorescein staining could not be performed.      Plan:  Patient and daughter were reassured that this seems to be subconjunctival hemorrhage, that is benign in nature.  She was advised to avoid wearing contact lenses while having redness in her eye.  Continue to monitor for any changes in vision.  Use ice packs if needed.  Tylenol for pain.  Follow-up in 1 week.       Vision problem  Patient was found to have considerable vision while doing a vision test in clinic for subconjunctival hemorrhage.  Referral placed for ophthalmology for vision testing.    Orders:    Ambulatory Referral to Ophthalmology; Future           History of Present Illness   74 year old female with past medical history of Rectal cancer s/p colostomy bag 3 months ago, diabetic, hypercholesterol, On chemotherapy came with the complain of eye redness in the left side    Eye Problem   The left eye is affected. This is a new problem. The current episode started yesterday. The problem has been unchanged. The injury mechanism was a direct trauma. The pain is at a severity of 3/10. The pain is mild. There is No known exposure to pink eye. She Wears contacts. Associated symptoms  include eye redness. Pertinent negatives include no blurred vision, eye discharge, double vision, fever, foreign body sensation, itching, nausea, photophobia, recent URI, vomiting or weakness. She has tried nothing for the symptoms.     Review of Systems   Constitutional:  Negative for chills and fever.   HENT:  Negative for ear pain and sore throat.    Eyes:  Positive for pain and redness. Negative for blurred vision, double vision, photophobia, discharge, itching and visual disturbance.   Respiratory:  Negative for cough and shortness of breath.    Cardiovascular:  Negative for chest pain and palpitations.   Gastrointestinal:  Negative for abdominal pain, nausea and vomiting.   Endocrine: Negative.    Genitourinary:  Negative for dysuria and hematuria.   Musculoskeletal:  Negative for arthralgias and back pain.   Skin:  Negative for color change and rash.   Allergic/Immunologic: Negative.    Neurological:  Negative for seizures, syncope and weakness.   Psychiatric/Behavioral: Negative.     All other systems reviewed and are negative.      Objective   /78 (BP Location: Left arm, Patient Position: Sitting, Cuff Size: Standard)   Pulse 101   Ht 5' (1.524 m)   Wt 46.9 kg (103 lb 6.4 oz)   SpO2 97%   BMI 20.19 kg/m²      Physical Exam  Vitals and nursing note reviewed.   Constitutional:       General: She is not in acute distress.     Appearance: She is well-developed.   HENT:      Head: Normocephalic and atraumatic.   Eyes:      General: No scleral icterus.        Right eye: No discharge.         Left eye: No discharge.      Extraocular Movements: Extraocular movements intact.      Pupils: Pupils are equal, round, and reactive to light.      Comments: See media.  Bright red hemorrhage seen in the left eye.  Right eye normal.   Cardiovascular:      Rate and Rhythm: Normal rate and regular rhythm.      Pulses: Normal pulses.      Heart sounds: Normal heart sounds. No murmur heard.  Pulmonary:      Effort:  "Pulmonary effort is normal. No respiratory distress.      Breath sounds: Normal breath sounds. No rales.   Chest:      Chest wall: No tenderness.   Abdominal:      General: Bowel sounds are normal.      Palpations: Abdomen is soft.      Tenderness: There is no abdominal tenderness.   Musculoskeletal:         General: No swelling.      Cervical back: Neck supple.      Right lower leg: No edema.      Left lower leg: No edema.   Skin:     General: Skin is warm and dry.      Capillary Refill: Capillary refill takes less than 2 seconds.   Neurological:      Mental Status: She is alert and oriented to person, place, and time.      Gait: Gait normal.      Deep Tendon Reflexes: Reflexes normal.   Psychiatric:         Mood and Affect: Mood normal.           Portions of the record may have been created with voice recognition software. Occasional wrong word or \"sound a like\" substitutions may have occurred due to the inherent limitations of voice recognition software. Read the chart carefully and recognize, using context, where substitutions have occurred.If you have any questions, please contact the dictating provider.       Saul Palomo MD  PGY2 Family Medicine Resident  [facility]          "

## 2025-03-21 NOTE — PROGRESS NOTES
Miriam Smith  tolerated treatment well with no complications.      Miriam Smith is aware of future appt on 03/31 at 0900.     AVS printed and given to Miriam Smith:  No (Declined by Miriam Smith)

## 2025-03-21 NOTE — TELEPHONE ENCOUNTER
Reason for call:   [x] Refill   [] Prior Auth  [] Other:     Office:   [] PCP/Provider -    [x] Specialty/Provider -  Derek Alba DO       Medication: oxyCODONE 5 mg    Dose/Frequency: 1 tab every 4 hrs    Quantity: 90 tabs    Pharmacy: [id number]  [place] [address] [facility]   Does the patient have enough for 3 days?   [] Yes   [x] No - Send as HP to POD    Mail Away Pharmacy   Does the patient have enough for 10 days?   [] Yes   [] No - Send as HP to POD

## 2025-03-24 RX ORDER — DEXTROSE MONOHYDRATE 50 MG/ML
20 INJECTION, SOLUTION INTRAVENOUS ONCE
OUTPATIENT
Start: 2025-04-02

## 2025-03-24 RX ORDER — SODIUM CHLORIDE 9 MG/ML
20 INJECTION, SOLUTION INTRAVENOUS ONCE AS NEEDED
OUTPATIENT
Start: 2025-04-02

## 2025-03-24 RX ORDER — OXYCODONE HYDROCHLORIDE 5 MG/1
5 TABLET ORAL EVERY 4 HOURS PRN
Qty: 90 TABLET | Refills: 0 | Status: SHIPPED | OUTPATIENT
Start: 2025-03-24

## 2025-03-24 RX ORDER — FLUOROURACIL 50 MG/ML
320 INJECTION, SOLUTION INTRAVENOUS ONCE
OUTPATIENT
Start: 2025-04-02

## 2025-03-25 DIAGNOSIS — C20 RECTAL CANCER (HCC): Primary | ICD-10-CM

## 2025-03-26 ENCOUNTER — RESULTS FOLLOW-UP (OUTPATIENT)
Age: 75
End: 2025-03-26

## 2025-03-26 ENCOUNTER — OFFICE VISIT (OUTPATIENT)
Dept: OBGYN CLINIC | Facility: CLINIC | Age: 75
End: 2025-03-26

## 2025-03-26 ENCOUNTER — APPOINTMENT (OUTPATIENT)
Dept: RADIOLOGY | Facility: CLINIC | Age: 75
End: 2025-03-26
Payer: SUBSIDIZED

## 2025-03-26 VITALS — WEIGHT: 103 LBS | HEIGHT: 60 IN | BODY MASS INDEX: 20.22 KG/M2

## 2025-03-26 DIAGNOSIS — S52.571A OTHER CLOSED INTRA-ARTICULAR FRACTURE OF DISTAL END OF RIGHT RADIUS, INITIAL ENCOUNTER: ICD-10-CM

## 2025-03-26 DIAGNOSIS — S52.571D OTHER CLOSED INTRA-ARTICULAR FRACTURE OF DISTAL END OF RIGHT RADIUS WITH ROUTINE HEALING, SUBSEQUENT ENCOUNTER: Primary | ICD-10-CM

## 2025-03-26 PROBLEM — S52.501A CLOSED FRACTURE OF RIGHT DISTAL RADIUS: Status: RESOLVED | Noted: 2025-02-26 | Resolved: 2025-03-26

## 2025-03-26 PROCEDURE — 99024 POSTOP FOLLOW-UP VISIT: CPT | Performed by: ORTHOPAEDIC SURGERY

## 2025-03-26 PROCEDURE — 73110 X-RAY EXAM OF WRIST: CPT

## 2025-03-26 NOTE — RESULT ENCOUNTER NOTE
Attached is your dexa scan report. Shows mild osteoporosis in left forearm.  Continue with calcium and vitamin d supplements.

## 2025-03-26 NOTE — ASSESSMENT & PLAN NOTE
Transition to cock-up wrist splint  Around the clock for 2 weeks except for hygiene  Wean out of splint after 2 weeks  Begin OT hand therapy in 2 weeks - referral provided  Repeat XR at next visit  Orders:  •  XR wrist 3+ vw right; Future  •  Ambulatory Referral to PT/OT Hand Therapy; Future  •  Cock Up Wrist Splint

## 2025-03-26 NOTE — PROGRESS NOTES
Patient Name: Miriam Smith      : 1950       MRN: [id number]   Encounter Provider: Wing Moran MD   Encounter Date: 25  Encounter department: [facility]         Assessment & Plan  Other closed intra-articular fracture of distal end of right radius with routine healing, subsequent encounter  Transition to cock-up wrist splint  Around the clock for 2 weeks except for hygiene  Wean out of splint after 2 weeks  Begin OT hand therapy in 2 weeks - referral provided  Repeat XR at next visit  Orders:  •  XR wrist 3+ vw right; Future  •  Ambulatory Referral to PT/OT Hand Therapy; Future  •  Cock Up Wrist Splint       Plan:  Miriam is a pleasant 74 y.o. female who presents 4 weeks s/p closed treatment of a right distal radius fracture sustained on 2025. She is doing well today. At this time, she may transition from a short arm cast to cock-up wrist splint. She will wear this around the clock for 2 weeks. After 2 weeks, she may begin weaning out of the splint. A referral was provided to her for occupational hand therapy. She may begin this in 2 weeks. She will follow-up in 4 weeks for re-evaluation of the right wrist with repeat X-rays upon arrival.    Follow-up:  Return in about 4 weeks (around 2025) for Recheck.     _____________________________________________________  CHIEF COMPLAINT:  Chief Complaint   Patient presents with   • Right Wrist - Fracture, Follow-up         SUBJECTIVE:  Miriam Smith is a 74 y.o. female who presents 4 weeks s/p closed treatment of a right distal radius fracture sustained on 2025. She presents wearing a short arm cast that appears clean, dry, and intact. She denies experiencing significant pain about the right wrist today.    PAST MEDICAL HISTORY:  Past Medical History:   Diagnosis Date   • Diabetes mellitus (HCC)    • Hypercholesteremia    • Rectal cancer (HCC)        PAST SURGICAL HISTORY:  Past  Surgical History:   Procedure Laterality Date   • COLONOSCOPY     • ILEOSTOMY N/A 12/24/2024    Procedure: LAPAROSCOPIC DIVERTING sigmoid end-loop colostomy;  Surgeon: SAI Bermudez MD;  Location: [facility];  Service: Colorectal   • INCISION AND DRAINAGE OF WOUND N/A 12/24/2024    Procedure: INCISION AND DRAINAGE (I&D) BUTTOCK abscess;  Surgeon: SAI Bermudez MD;  Location: [facility];  Service: Colorectal   • IR PORT PLACEMENT  1/6/2025       FAMILY HISTORY:  Family History   Problem Relation Age of Onset   • Colon cancer Sister        SOCIAL HISTORY:  Social History     Tobacco Use   • Smoking status: Never   • Smokeless tobacco: Never   Vaping Use   • Vaping status: Never Used   Substance Use Topics   • Alcohol use: Not Currently   • Drug use: Not Currently       MEDICATIONS:    Current Outpatient Medications:   •  atorvastatin (LIPITOR) 40 mg tablet, Take 1 tablet (40 mg total) by mouth daily, Disp: 30 tablet, Rfl: 3  •  benzonatate (TESSALON PERLES) 100 mg capsule, Take 1 capsule (100 mg total) by mouth 3 (three) times a day as needed for cough, Disp: 20 capsule, Rfl: 0  •  Blood Glucose Monitoring Suppl (OneTouch Verio Reflect) w/Device KIT, Check blood sugars twice daily. Please substitute with appropriate alternative as covered by patient's insurance. Dx: E11.65, Disp: 1 kit, Rfl: 0  •  calcium carbonate (OS-MARY) 600 MG tablet, Take 1 tablet (600 mg total) by mouth 2 (two) times a day with meals, Disp: 60 tablet, Rfl: 3  •  Cholecalciferol (VITAMIN D3) 1,000 units tablet, Take 1 tablet (1,000 Units total) by mouth daily, Disp: 90 tablet, Rfl: 1  •  [START ON 4/2/2025] fluorouracil 2,765 mg in CADD/Elastomeric Infusion Device, Infuse 2,765 mg (960 mg/m2/day x 1.44 m2) into a catheter in a vein via infusion device over 46 hours for 2 days  Infusion planned for April 2, 2025., Disp: 1 each, Rfl: 1  •  glucose blood (OneTouch Verio) test strip, Check blood sugars twice daily. Please substitute with  "appropriate alternative as covered by patient's insurance. Dx: E11.65, Disp: 200 each, Rfl: 3  •  lidocaine-prilocaine (EMLA) cream, Apply a thin layer over PAC site prior to access prn, Disp: 30 g, Rfl: 5  •  loratadine (CLARITIN) 10 mg tablet, Take 1 tablet (10 mg total) by mouth daily, Disp: 30 tablet, Rfl: 0  •  metFORMIN (GLUCOPHAGE) 1000 MG tablet, take 1 tablet by mouth EVERY MORNING WITH BREAKFAST, Disp: 30 tablet, Rfl: 0  •  ondansetron (ZOFRAN) 8 mg tablet, Take 1 tablet (8 mg total) by mouth every 8 (eight) hours as needed for nausea or vomiting, Disp: 20 tablet, Rfl: 5  •  OneTouch Delica Lancets 33G MISC, Check blood sugars twice daily. Please substitute with appropriate alternative as covered by patient's insurance. Dx: E11.65, Disp: 200 each, Rfl: 3  •  oxyCODONE (Roxicodone) 5 immediate release tablet, Take 1 tablet (5 mg total) by mouth every 4 (four) hours as needed for moderate pain Max Daily Amount: 30 mg, Disp: 90 tablet, Rfl: 0  •  pioglitazone (ACTOS) 15 mg tablet, Take 1 tablet (15 mg total) by mouth daily, Disp: 100 tablet, Rfl: 3  •  polyethylene glycol (MIRALAX) 17 g packet, Take 17 g by mouth daily, Disp: 510 g, Rfl: 0  •  senna-docusate sodium (SENOKOT S) 8.6-50 mg per tablet, Take 1 tablet by mouth 2 (two) times a day, Disp: 60 tablet, Rfl: 0    ALLERGIES:  Allergies   Allergen Reactions   • Motrin [Ibuprofen] Itching   • Tylenol [Acetaminophen] Itching   • Penicillins Rash     Rash and bruise   • Sulfa Antibiotics Rash     Price velia syndrome       LABS:  HgA1c:   Lab Results   Component Value Date    HGBA1C 7.2 (A) 03/06/2025     BMP:   Lab Results   Component Value Date    CALCIUM 9.1 03/17/2025    K 3.8 03/17/2025    CO2 24 03/17/2025     03/17/2025    BUN 22 03/17/2025    CREATININE 0.47 (L) 03/17/2025     CBC: No components found for: \"CBC\"    _____________________________________________________  Review of Systems   Constitutional:  Negative for chills and fever. "   HENT:  Negative for ear pain and sore throat.    Eyes:  Negative for pain and visual disturbance.   Respiratory:  Negative for cough and shortness of breath.    Cardiovascular:  Negative for chest pain and palpitations.   Gastrointestinal:  Negative for abdominal pain and vomiting.   Genitourinary:  Negative for dysuria and hematuria.   Musculoskeletal:  Negative for arthralgias and back pain.   Skin:  Negative for color change and rash.   Neurological:  Negative for seizures and syncope.   All other systems reviewed and are negative.       Right Hand Exam     Tenderness   The patient is experiencing no tenderness.     Range of Motion   The patient has normal right wrist ROM.     Other   Erythema: absent  Scars: absent  Sensation: normal  Pulse: present    Comments:  Forearm compartments are soft and supple  2+ Distal radial pulse with brisk capillary refill to the fingers  Radial, median, ulnar motor and sensory distribution intact  Sensation to light touch intact distally                Physical Exam  Vitals and nursing note reviewed.   Constitutional:       Appearance: Normal appearance.   HENT:      Head: Normocephalic and atraumatic.      Right Ear: External ear normal.      Left Ear: External ear normal.      Nose: Nose normal.   Eyes:      General: No scleral icterus.     Extraocular Movements: Extraocular movements intact.      Conjunctiva/sclera: Conjunctivae normal.   Cardiovascular:      Rate and Rhythm: Normal rate.   Pulmonary:      Effort: Pulmonary effort is normal. No respiratory distress.   Musculoskeletal:      Cervical back: Normal range of motion and neck supple.      Comments: See ortho exam   Skin:     General: Skin is warm and dry.   Neurological:      Mental Status: She is alert and oriented to person, place, and time.   Psychiatric:         Mood and Affect: Mood normal.         Behavior: Behavior normal.        _____________________________________________________  STUDIES REVIEWED:  I  personally reviewed the pertinent images and my independent interpretation is as follows: X-rays of the right wrist obtained in the office demonstrate interval callus formation at fracture site of distal radius.      PROCEDURES PERFORMED:  No Procedures performed today    Scribe Attestation    I,:  Shweta Chaudhari am acting as a scribe while in the presence of the attending physician.:       I,:  Wing Moran MD personally performed the services described in this documentation    as scribed in my presence.:

## 2025-03-27 ENCOUNTER — HOSPITAL ENCOUNTER (OUTPATIENT)
Dept: RADIOLOGY | Facility: HOSPITAL | Age: 75
Discharge: HOME | End: 2025-03-27
Attending: FAMILY MEDICINE
Payer: SUBSIDIZED

## 2025-03-27 ENCOUNTER — PATIENT OUTREACH (OUTPATIENT)
Dept: HEMATOLOGY ONCOLOGY | Facility: CLINIC | Age: 75
End: 2025-03-27

## 2025-03-27 VITALS — WEIGHT: 103 LBS | BODY MASS INDEX: 20.22 KG/M2 | HEIGHT: 60 IN

## 2025-03-27 DIAGNOSIS — Z12.31 ENCOUNTER FOR SCREENING MAMMOGRAM FOR BREAST CANCER: ICD-10-CM

## 2025-03-27 PROCEDURE — 77063 BREAST TOMOSYNTHESIS BI: CPT

## 2025-03-27 PROCEDURE — 77067 SCR MAMMO BI INCL CAD: CPT

## 2025-03-27 NOTE — PROGRESS NOTES
I reached out and spoke with Coco pts daughter  to follow up and to review for any new changes in barriers to care and offer supportive services as needed.     Barriers noted previously and outcome of interventions;   None     Reviewed for any new barriers as noted below.    Are you having any side effects from your treatment?Yes, describe: some nausea     Are you eating and drinking normally? yes    Have you been experiencing any uncontrolled pain related to your cancer diagnosis? No    If not already established, have needs changed for a palliative care referral? no    Do you have a good support system? Yes     Are you interested in any support groups?  no    How are you doing with transportation to your appointments? Pt family provides transportation     Do you have any questions or concerns regarding your treatment plan? No    Any new financial concerns for your household or medical bills? No    Do you know when your upcoming appointments are? yes  Future Appointments   Date Time Provider Department Center   3/27/2025  4:30 PM [place] MAMMO 2 [facility]   3/31/2025  9:00 AM [facility] INF CHAIR 12 [facility] Infusion [facility]   4/2/2025 10:00 AM [facility] INF CHAIR 3 [facility]   4/4/2025  1:30 PM [facility] INF CHAIR 10 [facility]   4/10/2025  3:30 PM DO IVAN Holm [facility]   4/14/2025  8:30 AM [place] INF CHAIR 9 [facility] Infusion [facility]   4/16/2025  8:30 AM [place] INF CHAIR 6 [facility]fusion [facility]   4/16/2025 11:20 AM Twan Perea MD HEM ONC [facility]   4/18/2025 12:00 PM [place] INF CHAIR 10 [facility] Infusion [facility]   4/23/2025  4:15 PM Wing Moran MD ORTHO [facility]   5/6/2025 10:30 AM Yumiko Thomas MD  COV [facility]   6/2/2025  3:00 PM CHIP [facility]          Based on individual needs I will follow up with them in 4 weeks. I have provided my direct contact information and welcome them to contact me if their needs as discussed above change.  They were appreciative for the call.

## 2025-03-31 ENCOUNTER — HOSPITAL ENCOUNTER (OUTPATIENT)
Dept: INFUSION CENTER | Facility: HOSPITAL | Age: 75
Discharge: HOME | End: 2025-03-31
Payer: SUBSIDIZED

## 2025-03-31 DIAGNOSIS — C20 RECTAL CANCER (HCC): Primary | ICD-10-CM

## 2025-03-31 LAB
ALBUMIN SERPL BCG-MCNC: 3.6 G/DL (ref 3.5–5)
ALP SERPL-CCNC: 58 U/L (ref 34–104)
ALT SERPL W P-5'-P-CCNC: 12 U/L (ref 7–52)
ANION GAP SERPL CALCULATED.3IONS-SCNC: 9 MMOL/L (ref 4–13)
AST SERPL W P-5'-P-CCNC: 15 U/L (ref 13–39)
BASOPHILS # BLD AUTO: 0.03 THOUSANDS/ÂΜL (ref 0–0.1)
BASOPHILS NFR BLD AUTO: 1 % (ref 0–1)
BILIRUB SERPL-MCNC: 0.35 MG/DL (ref 0.2–1)
BUN SERPL-MCNC: 19 MG/DL (ref 5–25)
CALCIUM SERPL-MCNC: 8.9 MG/DL (ref 8.4–10.2)
CHLORIDE SERPL-SCNC: 101 MMOL/L (ref 96–108)
CO2 SERPL-SCNC: 25 MMOL/L (ref 21–32)
CREAT SERPL-MCNC: 0.42 MG/DL (ref 0.6–1.3)
EOSINOPHIL # BLD AUTO: 0.07 THOUSAND/ÂΜL (ref 0–0.61)
EOSINOPHIL NFR BLD AUTO: 2 % (ref 0–6)
ERYTHROCYTE [DISTWIDTH] IN BLOOD BY AUTOMATED COUNT: 16.3 % (ref 11.6–15.1)
GFR SERPL CREATININE-BSD FRML MDRD: 101 ML/MIN/1.73SQ M
GLUCOSE SERPL-MCNC: 94 MG/DL (ref 65–140)
HCT VFR BLD AUTO: 33.2 % (ref 34.8–46.1)
HGB BLD-MCNC: 10.9 G/DL (ref 11.5–15.4)
IMM GRANULOCYTES # BLD AUTO: 0.02 THOUSAND/UL (ref 0–0.2)
IMM GRANULOCYTES NFR BLD AUTO: 0 % (ref 0–2)
LYMPHOCYTES # BLD AUTO: 1.32 THOUSANDS/ÂΜL (ref 0.6–4.47)
LYMPHOCYTES NFR BLD AUTO: 29 % (ref 14–44)
MCH RBC QN AUTO: 29.7 PG (ref 26.8–34.3)
MCHC RBC AUTO-ENTMCNC: 32.8 G/DL (ref 31.4–37.4)
MCV RBC AUTO: 91 FL (ref 82–98)
MONOCYTES # BLD AUTO: 0.62 THOUSAND/ÂΜL (ref 0.17–1.22)
MONOCYTES NFR BLD AUTO: 14 % (ref 4–12)
NEUTROPHILS # BLD AUTO: 2.48 THOUSANDS/ÂΜL (ref 1.85–7.62)
NEUTS SEG NFR BLD AUTO: 54 % (ref 43–75)
NRBC BLD AUTO-RTO: 0 /100 WBCS
PLATELET # BLD AUTO: 157 THOUSANDS/UL (ref 149–390)
PMV BLD AUTO: 8.7 FL (ref 8.9–12.7)
POTASSIUM SERPL-SCNC: 3.8 MMOL/L (ref 3.5–5.3)
PROT SERPL-MCNC: 6.6 G/DL (ref 6.4–8.4)
RBC # BLD AUTO: 3.67 MILLION/UL (ref 3.81–5.12)
SODIUM SERPL-SCNC: 135 MMOL/L (ref 135–147)
WBC # BLD AUTO: 4.54 THOUSAND/UL (ref 4.31–10.16)

## 2025-03-31 PROCEDURE — 85025 COMPLETE CBC W/AUTO DIFF WBC: CPT | Performed by: INTERNAL MEDICINE

## 2025-03-31 PROCEDURE — 80053 COMPREHEN METABOLIC PANEL: CPT | Performed by: INTERNAL MEDICINE

## 2025-03-31 NOTE — PROGRESS NOTES
Pt to clinic for labs. Labs obtained via port as per protocol. Pt aware of next apt on 4/2/25 at 1000, declined AVS.

## 2025-03-31 NOTE — PLAN OF CARE
Problem: Potential for Falls  Goal: Patient will remain free of falls  Description: INTERVENTIONS:- Educate patient/family on patient safety including physical limitations- Instruct patient to call for assistance with activity - Consult OT/PT to assist with strengthening/mobility - Keep Call bell within reach- Keep bed low and locked with side rails adjusted as appropriate- Keep care items and personal belongings within reach- Initiate and maintain comfort rounds- Make Fall Risk Sign visible to staff- Offer Toileting every hour, in advance of need- Initiate/Maintain alarm. Obtain necessary fall risk management equipment: apply yellow socks and bracelet for high fall risk patients- Consider moving patient to room near nurses station  Outcome: Progressing

## 2025-04-01 ENCOUNTER — RESULTS FOLLOW-UP (OUTPATIENT)
Age: 75
End: 2025-04-01

## 2025-04-02 ENCOUNTER — HOSPITAL ENCOUNTER (OUTPATIENT)
Dept: INFUSION CENTER | Facility: HOSPITAL | Age: 75
Discharge: HOME | End: 2025-04-02
Attending: INTERNAL MEDICINE
Payer: SUBSIDIZED

## 2025-04-02 VITALS
SYSTOLIC BLOOD PRESSURE: 116 MMHG | RESPIRATION RATE: 18 BRPM | OXYGEN SATURATION: 93 % | HEIGHT: 60 IN | BODY MASS INDEX: 20.73 KG/M2 | HEART RATE: 87 BPM | TEMPERATURE: 97.8 F | DIASTOLIC BLOOD PRESSURE: 56 MMHG | WEIGHT: 105.6 LBS

## 2025-04-02 DIAGNOSIS — C20 RECTAL CANCER (HCC): Primary | ICD-10-CM

## 2025-04-02 PROCEDURE — 96368 THER/DIAG CONCURRENT INF: CPT

## 2025-04-02 PROCEDURE — G0498 CHEMO EXTEND IV INFUS W/PUMP: HCPCS

## 2025-04-02 PROCEDURE — 96413 CHEMO IV INFUSION 1 HR: CPT

## 2025-04-02 PROCEDURE — 96411 CHEMO IV PUSH ADDL DRUG: CPT

## 2025-04-02 PROCEDURE — 96415 CHEMO IV INFUSION ADDL HR: CPT

## 2025-04-02 PROCEDURE — 96367 TX/PROPH/DG ADDL SEQ IV INF: CPT

## 2025-04-02 RX ORDER — SODIUM CHLORIDE 9 MG/ML
20 INJECTION, SOLUTION INTRAVENOUS ONCE AS NEEDED
Status: DISCONTINUED | OUTPATIENT
Start: 2025-04-02 | End: 2025-04-05 | Stop reason: HOSPADM

## 2025-04-02 RX ORDER — FLUOROURACIL 50 MG/ML
320 INJECTION, SOLUTION INTRAVENOUS ONCE
Status: COMPLETED | OUTPATIENT
Start: 2025-04-02 | End: 2025-04-02

## 2025-04-02 RX ORDER — DEXTROSE MONOHYDRATE 50 MG/ML
20 INJECTION, SOLUTION INTRAVENOUS ONCE
Status: COMPLETED | OUTPATIENT
Start: 2025-04-02 | End: 2025-04-02

## 2025-04-02 RX ADMIN — DEXAMETHASONE SODIUM PHOSPHATE: 10 INJECTION, SOLUTION INTRAMUSCULAR; INTRAVENOUS at 10:37

## 2025-04-02 RX ADMIN — OXALIPLATIN 97.9 MG: 5 INJECTION, SOLUTION INTRAVENOUS at 11:53

## 2025-04-02 RX ADMIN — DEXTROSE 20 ML/HR: 50 INJECTION, SOLUTION INTRAVENOUS at 11:53

## 2025-04-02 RX ADMIN — LEUCOVORIN CALCIUM 461 MG: 500 INJECTION, POWDER, LYOPHILIZED, FOR SOLUTION INTRAMUSCULAR; INTRAVENOUS at 11:53

## 2025-04-02 RX ADMIN — SODIUM CHLORIDE 20 ML/HR: 0.9 INJECTION, SOLUTION INTRAVENOUS at 10:37

## 2025-04-02 RX ADMIN — FLUOROURACIL 460 MG: 50 INJECTION, SOLUTION INTRAVENOUS at 13:58

## 2025-04-02 NOTE — PROGRESS NOTES
Miriam Smith  tolerated treatment well with no complications.      Miriam Smith is aware of future appt on 04/04 at 1330.     AVS printed and given to Miriam Smith:  No (Declined by Miriam Smith)

## 2025-04-02 NOTE — PLAN OF CARE
Problem: Potential for Falls  Goal: Patient will remain free of falls  Description: INTERVENTIONS:- Educate patient/family on patient safety including physical limitations- Instruct patient to call for assistance with activity - Consult OT/PT to assist with strengthening/mobility - Keep Call bell within reach- Keep care items and personal belongings within reach- Initiate and maintain comfort rounds-  Outcome: Progressing     Problem: DISCHARGE PLANNING  Goal: Discharge to home or other facility with appropriate resources  Description: INTERVENTIONS:- Identify barriers to discharge w/patient and caregiver- Arrange for needed discharge resources and transportation as appropriate- Identify discharge learning needs (meds, wound care, etc.)- Arrange for interpretive services to assist at discharge as needed- Refer to Case Management Department for coordinating discharge planning if the patient needs post-hospital services based on physician/advanced practitioner order or complex needs related to functional status, cognitive ability, or social support system  Outcome: Progressing     Problem: Knowledge Deficit  Goal: Patient/family/caregiver demonstrates understanding of disease process, treatment plan, medications, and discharge instructions  Description: Complete learning assessment and assess knowledge base.Interventions:- Provide teaching at level of understanding- Provide teaching via preferred learning methods  Outcome: Progressing

## 2025-04-04 ENCOUNTER — HOSPITAL ENCOUNTER (OUTPATIENT)
Dept: INFUSION CENTER | Facility: HOSPITAL | Age: 75
Discharge: HOME | End: 2025-04-04
Attending: INTERNAL MEDICINE

## 2025-04-04 DIAGNOSIS — C20 RECTAL CANCER (HCC): Primary | ICD-10-CM

## 2025-04-04 NOTE — PROGRESS NOTES
Miriam Smith  tolerated treatment well with no complications.      Miriam Smith is aware of future appt on 4/14/25.     AVS printed and given to Miriam Smith:    No (Declined by Miriam Smith)

## 2025-04-07 RX ORDER — SODIUM CHLORIDE 9 MG/ML
20 INJECTION, SOLUTION INTRAVENOUS ONCE AS NEEDED
Status: CANCELLED | OUTPATIENT
Start: 2025-04-16

## 2025-04-07 RX ORDER — FLUOROURACIL 50 MG/ML
320 INJECTION, SOLUTION INTRAVENOUS ONCE
Status: CANCELLED | OUTPATIENT
Start: 2025-04-16

## 2025-04-07 RX ORDER — DEXTROSE MONOHYDRATE 50 MG/ML
20 INJECTION, SOLUTION INTRAVENOUS ONCE
Status: CANCELLED | OUTPATIENT
Start: 2025-04-16

## 2025-04-08 DIAGNOSIS — C20 RECTAL CANCER (HCC): Primary | ICD-10-CM

## 2025-04-10 ENCOUNTER — OFFICE VISIT (OUTPATIENT)
Dept: PALLIATIVE MEDICINE | Facility: CLINIC | Age: 75
End: 2025-04-10

## 2025-04-10 VITALS
HEIGHT: 60 IN | BODY MASS INDEX: 20.32 KG/M2 | TEMPERATURE: 97.7 F | OXYGEN SATURATION: 97 % | DIASTOLIC BLOOD PRESSURE: 52 MMHG | WEIGHT: 103.5 LBS | RESPIRATION RATE: 16 BRPM | HEART RATE: 84 BPM | SYSTOLIC BLOOD PRESSURE: 100 MMHG

## 2025-04-10 DIAGNOSIS — G89.3 CANCER ASSOCIATED PAIN: ICD-10-CM

## 2025-04-10 DIAGNOSIS — K62.89 RECTAL MASS: ICD-10-CM

## 2025-04-10 DIAGNOSIS — Z51.5 PALLIATIVE CARE BY SPECIALIST: ICD-10-CM

## 2025-04-10 DIAGNOSIS — K62.89 RECTAL PAIN: ICD-10-CM

## 2025-04-10 PROCEDURE — 99214 OFFICE O/P EST MOD 30 MIN: CPT | Performed by: STUDENT IN AN ORGANIZED HEALTH CARE EDUCATION/TRAINING PROGRAM

## 2025-04-10 RX ORDER — OXYCODONE HYDROCHLORIDE 5 MG/1
5 TABLET ORAL EVERY 4 HOURS PRN
Qty: 90 TABLET | Refills: 0 | Status: SHIPPED | OUTPATIENT
Start: 2025-04-10

## 2025-04-10 NOTE — PROGRESS NOTES
Name: Miriam Smith      : 1950      MRN: [id number]  Encounter Provider: Derek Alba DO  Encounter Date: 4/10/2025   Encounter department: [facility]  :  Assessment & Plan  Rectal mass  Treatment with Modified FOLFOX6  Following Dr. Perea of Medical Oncology    Patient is tolerating treatments at this time without issues or adverse effects.  Appetite is good without nausea or vomiting issues.   Sleeping well without concerns.    Orders:    oxyCODONE (Roxicodone) 5 immediate release tablet; Take 1 tablet (5 mg total) by mouth every 4 (four) hours as needed for moderate pain Max Daily Amount: 30 mg    Cancer associated pain  Rectal pain 2/2 to malignancy    Continue current analgesic regimen-  Doing well with OxyIR 5mg q4 hours PRN  -takes on average 5-6 tabs in a day    No issues with PO intake, nausea, vomiting, or constipation or diarrhea.    Bowel regimen to avoid OIC (Senokot-S daily with Miralax daily PRN    Orders:    oxyCODONE (Roxicodone) 5 immediate release tablet; Take 1 tablet (5 mg total) by mouth every 4 (four) hours as needed for moderate pain Max Daily Amount: 30 mg    Palliative care by specialist  Psychosocial   Supportive listening provided  Normalized experience of patient/family  Provided anxiety containment     Referrals Placed / Medical Equipment Ordered  -None    Follow-Up Recommendations  -Follow-up with PCP and current medical specialists  -Follow-up with palliative care: 6 weeks    Orders:    oxyCODONE (Roxicodone) 5 immediate release tablet; Take 1 tablet (5 mg total) by mouth every 4 (four) hours as needed for moderate pain Max Daily Amount: 30 mg    Rectal pain  Continue OxyIR 5 mg q4 hours PRN  -effective at this time.  -bowel regimen to avoid OIC and irritation pain to rectum.  Orders:    oxyCODONE (Roxicodone) 5 immediate release tablet; Take 1 tablet (5 mg total) by mouth every 4 (four) hours as needed for moderate pain Max Daily Amount: 30  mg        Decisional apparatus: Patient is competent on my exam today. If competence is lost, patient's substitute decision maker would default to spouse by [place] Act 169.   Advance Directive / Living Will / POLST: none on file     PDMP Review: I have reviewed the patient's controlled substance dispensing history in the Prescription Drug Monitoring Program in compliance with the [facility] regulations before prescribing any controlled substances.    History of Present Illness   Miriam Smith is a 74 y.o. female who presents for follow-up.    Patient is seen today in office. Alert, oriented, pleasantly conversant.  Patient is seen in NAD.    Appetite has been good.     Pain - well controlled with OxyIR at this time. Takes 5mg tabs up to 5-6 times a day as needed.  Patient is following orthopedics for a mechanical fall a few months back. Recovering at this time. No issues with ambulation, no issues with balance or ambulation. Doing well overall at this time. Denies any pain to the right wrist which is in a splint after her fall.    Patient is well supported by daughter and NED whom patient live with and is cared for by them.      Medical History Reviewed by provider this encounter:     .  Past Medical History   Past Medical History:   Diagnosis Date    Diabetes mellitus (HCC)     Hypercholesteremia     Rectal cancer (HCC)      Past Surgical History:   Procedure Laterality Date    COLONOSCOPY      ILEOSTOMY N/A 12/24/2024    Procedure: LAPAROSCOPIC DIVERTING sigmoid end-loop colostomy;  Surgeon: SAI Bermudez MD;  Location: [facility];  Service: Colorectal    INCISION AND DRAINAGE OF WOUND N/A 12/24/2024    Procedure: INCISION AND DRAINAGE (I&D) BUTTOCK abscess;  Surgeon: SAI Bermudez MD;  Location: [facility];  Service: Colorectal    IR PORT PLACEMENT  1/6/2025     Family History   Problem Relation Age of Onset    Colon cancer Sister       reports that she has never smoked. She has never used smokeless  tobacco. She reports that she does not currently use alcohol. She reports that she does not currently use drugs.  Current Outpatient Medications   Medication Instructions    atorvastatin (LIPITOR) 40 mg, Oral, Daily    benzonatate (TESSALON PERLES) 100 mg, Oral, 3 times daily PRN    Blood Glucose Monitoring Suppl (OneTouch Verio Reflect) w/Device KIT Check blood sugars twice daily. Please substitute with appropriate alternative as covered by patient's insurance. Dx: E11.65    calcium carbonate (OS-MARY) 600 mg, Oral, 2 times daily with meals    Cholecalciferol (VITAMIN D3) 1,000 Units, Oral, Daily    [START ON 4/16/2025] fluorouracil 2,765 mg in CADD/Elastomeric Infusion Device 960 mg/m2/day, Intravenous, Over 46 hours    glucose blood (OneTouch Verio) test strip Check blood sugars twice daily. Please substitute with appropriate alternative as covered by patient's insurance. Dx: E11.65    lidocaine-prilocaine (EMLA) cream Apply a thin layer over PAC site prior to access prn    loratadine (CLARITIN) 10 mg, Oral, Daily    metFORMIN (GLUCOPHAGE) 1000 MG tablet take 1 tablet by mouth EVERY MORNING WITH BREAKFAST    ondansetron (ZOFRAN) 8 mg, Oral, Every 8 hours PRN    OneTouch Delica Lancets 33G MISC Check blood sugars twice daily. Please substitute with appropriate alternative as covered by patient's insurance. Dx: E11.65    oxyCODONE (ROXICODONE) 5 mg, Oral, Every 4 hours PRN    pioglitazone (ACTOS) 15 mg, Oral, Daily    polyethylene glycol (MIRALAX) 17 g, Oral, Daily    senna-docusate sodium (SENOKOT S) 8.6-50 mg per tablet 1 tablet, Oral, 2 times daily     Allergies   Allergen Reactions    Motrin [Ibuprofen] Itching    Tylenol [Acetaminophen] Itching    Penicillins Rash     Rash and bruise    Sulfa Antibiotics Rash     Price velia syndrome      Current Outpatient Medications on File Prior to Visit   Medication Sig Dispense Refill    atorvastatin (LIPITOR) 40 mg tablet Take 1 tablet (40 mg total) by mouth daily 30  tablet 3    benzonatate (TESSALON PERLES) 100 mg capsule Take 1 capsule (100 mg total) by mouth 3 (three) times a day as needed for cough 20 capsule 0    Blood Glucose Monitoring Suppl (OneTouch Verio Reflect) w/Device KIT Check blood sugars twice daily. Please substitute with appropriate alternative as covered by patient's insurance. Dx: E11.65 1 kit 0    calcium carbonate (OS-MARY) 600 MG tablet Take 1 tablet (600 mg total) by mouth 2 (two) times a day with meals 60 tablet 3    Cholecalciferol (VITAMIN D3) 1,000 units tablet Take 1 tablet (1,000 Units total) by mouth daily 90 tablet 1    [START ON 4/16/2025] fluorouracil 2,765 mg in CADD/Elastomeric Infusion Device Infuse 2,765 mg (960 mg/m2/day x 1.44 m2) into a catheter in a vein via infusion device over 46 hours for 2 days  Infusion planned for April 16, 2025. 1 each 1    glucose blood (OneTouch Verio) test strip Check blood sugars twice daily. Please substitute with appropriate alternative as covered by patient's insurance. Dx: E11.65 200 each 3    lidocaine-prilocaine (EMLA) cream Apply a thin layer over PAC site prior to access prn 30 g 5    loratadine (CLARITIN) 10 mg tablet Take 1 tablet (10 mg total) by mouth daily 30 tablet 0    metFORMIN (GLUCOPHAGE) 1000 MG tablet take 1 tablet by mouth EVERY MORNING WITH BREAKFAST 30 tablet 0    ondansetron (ZOFRAN) 8 mg tablet Take 1 tablet (8 mg total) by mouth every 8 (eight) hours as needed for nausea or vomiting 20 tablet 5    OneTouch Delica Lancets 33G MISC Check blood sugars twice daily. Please substitute with appropriate alternative as covered by patient's insurance. Dx: E11.65 200 each 3    pioglitazone (ACTOS) 15 mg tablet Take 1 tablet (15 mg total) by mouth daily 100 tablet 3    polyethylene glycol (MIRALAX) 17 g packet Take 17 g by mouth daily 510 g 0    senna-docusate sodium (SENOKOT S) 8.6-50 mg per tablet Take 1 tablet by mouth 2 (two) times a day 60 tablet 0     No current facility-administered  medications on file prior to visit.      Social History     Tobacco Use    Smoking status: Never    Smokeless tobacco: Never   Vaping Use    Vaping status: Never Used   Substance and Sexual Activity    Alcohol use: Not Currently    Drug use: Not Currently    Sexual activity: Not on file        Objective   /52 (BP Location: Left arm, Patient Position: Sitting, Cuff Size: Standard)   Pulse 84   Temp 97.7 °F (36.5 °C) (Temporal)   Resp 16   Ht 5' (1.524 m)   Wt 46.9 kg (103 lb 8 oz)   SpO2 97%   BMI 20.21 kg/m²     Physical Exam  Vitals reviewed.   Constitutional:       General: She is not in acute distress.     Appearance: She is not ill-appearing, toxic-appearing or diaphoretic.   HENT:      Head: Normocephalic and atraumatic.      Nose: Nose normal.   Eyes:      General:         Right eye: No discharge.         Left eye: No discharge.   Cardiovascular:      Rate and Rhythm: Normal rate.   Pulmonary:      Effort: Pulmonary effort is normal. No respiratory distress.   Abdominal:      General: Abdomen is flat. There is no distension.   Musculoskeletal:         General: No swelling.      Comments: Right wrist in a splint.   Skin:     General: Skin is warm and dry.      Coloration: Skin is not jaundiced or pale.   Neurological:      General: No focal deficit present.      Mental Status: She is alert. Mental status is at baseline.   Psychiatric:         Mood and Affect: Mood normal.         Behavior: Behavior normal.         Thought Content: Thought content normal.         Judgment: Judgment normal.         Recent labs:  Lab Results   Component Value Date/Time    SODIUM 135 03/31/2025 08:52 AM    K 3.8 03/31/2025 08:52 AM    BUN 19 03/31/2025 08:52 AM    CREATININE 0.42 (L) 03/31/2025 08:52 AM    GLUC 94 03/31/2025 08:52 AM    CALCIUM 8.9 03/31/2025 08:52 AM    AST 15 03/31/2025 08:52 AM    ALT 12 03/31/2025 08:52 AM    ALB 3.6 03/31/2025 08:52 AM    TP 6.6 03/31/2025 08:52 AM    EGFR 101 03/31/2025 08:52 AM      Lab Results   Component Value Date/Time    HGB 10.9 (L) 03/31/2025 08:52 AM    WBC 4.54 03/31/2025 08:52 AM     03/31/2025 08:52 AM    INR 1.17 11/12/2024 04:36 AM     Lab Results   Component Value Date/Time    MPE2LJSTFHJO 2.328 01/08/2025 02:49 PM       Recent Imaging:  Procedure: Mammo screening bilateral w 3d and cad  Result Date: 3/28/2025  Impression: No mammographic evidence of malignancy. ASSESSMENT/BI-RADS CATEGORY: Left: 1 - Negative Right: 1 - Negative Overall: 1 - Negative RECOMMENDATION:      - Routine screening mammogram in 1 year for both breasts. Workstation ID: [id number] Signed by:  Keely Gage MD     Procedure: XR wrist 3+ vw right  Result Date: 3/26/2025  Impression: Subacute nondisplaced fracture of the distal radius. . Electronically signed: 03/26/2025 05:56 PM Colton Jean MD    Procedure: DXA bone density spine hip and pelvis  Result Date: 3/14/2025  Impression: 1. Osteoporosis. Based on the left radius 2.  The 10 year risk of hip fracture is 3.3% with the 10 year risk of major osteoporotic fracture being 9.9% as calculated by the [employer] fracture risk assessment tool (FRAX, which is based on data generated by the WHO Collaborating [place] for Metabolic Bone Diseases). 3.  The current Bone Health and Osteoporosis Foundation (BHOF) guidelines recommend treating patients with a T-score of -2.5 or less in the lumbar spine or hips, or in post-menopausal women and men over the age of 50 with low bone mass (osteopenia; T-score between -1.0 and -2.5) and a FRAX 10 year risk score of > 3% for hip fracture and/or > 20% for major osteoporosis-related fracture (clinical vertebral, hip, forearm, or proximal humerus). 4.  The BHOF recommends follow-up DXA in 1-2 years after initiating or changing medical therapy for osteoporosis and at appropriate intervals thereafter, according to clinical circumstances. More frequent BMD testing may be warranted in  higher-risk individuals (multiple fractures, older age, very low BMD). Less frequent BMD testing is warranted as follow-up in patients with initial T-scores in the normal or slightly below normal range (osteopenia) and for patients who have remained fracture free on  treatment. The FRAX algorithm has certain limitations: -FRAX has not been validated in patients currently or previously treated with pharmacotherapy for osteoporosis.  In such patients, clinical judgment must be exercised in interpreting FRAX scores. -Prior hip, vertebral and humeral fragility fractures appear to confer greater risk of subsequent fracture than fractures at other sites (this is especially true for individuals with severe vertebral fractures), but quantification of this incremental risk is not possible with FRAX. -FRAX underestimates fracture risk in patients with history of multiple fragility fractures. -FRAX may underestimate fracture risk in patients with history of frequent falls. -It is not appropriate to use FRAX to monitor treatment response. WHO CLASSIFICATION: Normal (a T-score of -1.0 or higher) Low bone mineral density (a T-score of less than -1.0 but higher than -2.5) Osteoporosis (a T-score of -2.5 or less) Severe osteoporosis (a T-score of -2.5 or less with a fragility fracture) SELECTED REFERENCES: LeBoff MS, Vikki SL, Myron KL, et al. The clinician's guide to prevention and treatment of osteoporosis. Osteoporos Int 2022; 33:4172-6644. Paola D, Rosales SB, Bri A, et al. DXA reporting updates: 2023 Official Positions of the International Society for Clinical Densitometry. J Clin Densitom 2024; 27: 110945 (https://doi.org/10.1016/j.jocd.2023.963092). Workstation performed: [id number]     Procedure: XR spine thoracic 3 views  Result Date: 3/3/2025  Impression: Slight loss of stature at T11 with superior endplate compression deformity which is new since the CT scan of 1/8/2025 suggesting subacute injury.  Anterolisthesis C4 on C5. Computerized Assisted Algorithm (CAA) may have been used to analyze all applicable images. Workstation performed: [id number]     Procedure: CT abdomen pelvis w contrast  Result Date: 3/3/2025  Impression: Mild subacute appearing compression fracture of the superior endplate of T11 without retropulsion new since 1/8/2025. No evidence of acute intraabdominopelvic process. Stable lower rectal mass. Smaller left ischioanal abscess. Additional chronic findings and negatives as above. The study was marked in EPIC for immediate notification. Workstation performed: [id number]     Procedure: XR wrist 3+ vw right  Result Date: 2/26/2025  Impression: Possible nondisplaced intra-articular fracture of the distal radius as above. If clinically warranted, this can be further evaluated with CT Computerized Assisted Algorithm (CAA) may have been used to analyze all applicable images. Workstation performed: [id number]     Procedure: CT head wo contrast  Result Date: 2/26/2025  Impression: No acute intracranial abnormality. Workstation performed: [id number]     Procedure: CT abdomen pelvis with contrast  Result Date: 1/8/2025  Impression: Status post diverting sigmoid loop colostomy with a large amount of stool throughout the entire colon to the level of the ostomy. Large locally invasive lower rectal cancer again shown with decrease in the size of fluid collection in the left ischioanal fat. Small volume ascites in the abdomen and pelvis, new from the prior study. The study was marked in EPIC for immediate notification. Workstation performed: [id number]     Procedure: IR port placement  Result Date: 1/6/2025  Impression: Tecnically successful port placement as described.  The port is available for immediate use. Workstation performed: [id number]     Procedure: CT abdomen pelvis w contrast  Result Date: 12/23/2024  Impression: 1. Locally invasive lower rectal cancer invading the sphincters, left levator ani  and gluteus shae muscle, similar to recent MRI, but progressed from 11/10/2024. 2. New rim-enhancing collection/abscess in the left ischioanal fat measuring 5.7 x 5.2 x 8.2 cm, with fistulization into the overlying gluteal skin. The collection extends into the tumor with development of multiple internal foci of air, likely related to fistulization of the tumor or superinfection. 3. 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 The study was marked in EPIC for immediate notification. Workstation performed: [id number]       Administrative Statements   I have spent a total time of 25 minutes in caring for this patient on the day of the visit/encounter including Risks and benefits of tx options, Instructions for management, Patient and family education, Importance of tx compliance, Risk factor reductions, Impressions, Counseling / Coordination of care, Documenting in the medical record, Reviewing/placing orders in the medical record (including tests, medications, and/or procedures), and Obtaining or reviewing history  .   Topics discussed with the patient / family include symptom assessment and management, medication review, psychosocial support, supportive listening, and anticipatory guidance.

## 2025-04-10 NOTE — ASSESSMENT & PLAN NOTE
Continue OxyIR 5 mg q4 hours PRN  -effective at this time.  -bowel regimen to avoid OIC and irritation pain to rectum.  Orders:    oxyCODONE (Roxicodone) 5 immediate release tablet; Take 1 tablet (5 mg total) by mouth every 4 (four) hours as needed for moderate pain Max Daily Amount: 30 mg

## 2025-04-10 NOTE — ASSESSMENT & PLAN NOTE
Rectal pain 2/2 to malignancy    Continue current analgesic regimen-  Doing well with OxyIR 5mg q4 hours PRN  -takes on average 5-6 tabs in a day    No issues with PO intake, nausea, vomiting, or constipation or diarrhea.    Bowel regimen to avoid OIC (Senokot-S daily with Miralax daily PRN    Orders:    oxyCODONE (Roxicodone) 5 immediate release tablet; Take 1 tablet (5 mg total) by mouth every 4 (four) hours as needed for moderate pain Max Daily Amount: 30 mg

## 2025-04-10 NOTE — ASSESSMENT & PLAN NOTE
Treatment with Modified FOLFOX6  Following Dr. Perea of Medical Oncology    Patient is tolerating treatments at this time without issues or adverse effects.  Appetite is good without nausea or vomiting issues.   Sleeping well without concerns.    Orders:    oxyCODONE (Roxicodone) 5 immediate release tablet; Take 1 tablet (5 mg total) by mouth every 4 (four) hours as needed for moderate pain Max Daily Amount: 30 mg

## 2025-04-14 ENCOUNTER — PATIENT MESSAGE (OUTPATIENT)
Age: 75
End: 2025-04-14

## 2025-04-14 ENCOUNTER — HOSPITAL ENCOUNTER (OUTPATIENT)
Dept: INFUSION CENTER | Facility: HOSPITAL | Age: 75
Discharge: HOME | End: 2025-04-14
Payer: SUBSIDIZED

## 2025-04-14 DIAGNOSIS — C20 RECTAL CANCER (HCC): Primary | ICD-10-CM

## 2025-04-14 DIAGNOSIS — M80.00XD AGE-RELATED OSTEOPOROSIS WITH CURRENT PATHOLOGICAL FRACTURE WITH ROUTINE HEALING, SUBSEQUENT ENCOUNTER: ICD-10-CM

## 2025-04-14 DIAGNOSIS — E11.9 TYPE 2 DIABETES MELLITUS WITHOUT COMPLICATION, WITHOUT LONG-TERM CURRENT USE OF INSULIN (HCC): ICD-10-CM

## 2025-04-14 LAB
ALBUMIN SERPL BCG-MCNC: 3.4 G/DL (ref 3.5–5)
ALP SERPL-CCNC: 43 U/L (ref 34–104)
ALT SERPL W P-5'-P-CCNC: 11 U/L (ref 7–52)
ANION GAP SERPL CALCULATED.3IONS-SCNC: 9 MMOL/L (ref 4–13)
AST SERPL W P-5'-P-CCNC: 13 U/L (ref 13–39)
BASOPHILS # BLD AUTO: 0.03 THOUSANDS/ÂΜL (ref 0–0.1)
BASOPHILS NFR BLD AUTO: 1 % (ref 0–1)
BILIRUB SERPL-MCNC: 0.36 MG/DL (ref 0.2–1)
BUN SERPL-MCNC: 17 MG/DL (ref 5–25)
CALCIUM ALBUM COR SERPL-MCNC: 9.4 MG/DL (ref 8.3–10.1)
CALCIUM SERPL-MCNC: 8.9 MG/DL (ref 8.4–10.2)
CHLORIDE SERPL-SCNC: 105 MMOL/L (ref 96–108)
CO2 SERPL-SCNC: 24 MMOL/L (ref 21–32)
CREAT SERPL-MCNC: 0.43 MG/DL (ref 0.6–1.3)
EOSINOPHIL # BLD AUTO: 0.05 THOUSAND/ÂΜL (ref 0–0.61)
EOSINOPHIL NFR BLD AUTO: 2 % (ref 0–6)
ERYTHROCYTE [DISTWIDTH] IN BLOOD BY AUTOMATED COUNT: 17.3 % (ref 11.6–15.1)
GFR SERPL CREATININE-BSD FRML MDRD: 100 ML/MIN/1.73SQ M
GLUCOSE SERPL-MCNC: 98 MG/DL (ref 65–140)
HCT VFR BLD AUTO: 31.2 % (ref 34.8–46.1)
HGB BLD-MCNC: 10.2 G/DL (ref 11.5–15.4)
IMM GRANULOCYTES # BLD AUTO: 0.01 THOUSAND/UL (ref 0–0.2)
IMM GRANULOCYTES NFR BLD AUTO: 0 % (ref 0–2)
LYMPHOCYTES # BLD AUTO: 1.15 THOUSANDS/ÂΜL (ref 0.6–4.47)
LYMPHOCYTES NFR BLD AUTO: 35 % (ref 14–44)
MCH RBC QN AUTO: 29.4 PG (ref 26.8–34.3)
MCHC RBC AUTO-ENTMCNC: 32.7 G/DL (ref 31.4–37.4)
MCV RBC AUTO: 90 FL (ref 82–98)
MONOCYTES # BLD AUTO: 0.45 THOUSAND/ÂΜL (ref 0.17–1.22)
MONOCYTES NFR BLD AUTO: 14 % (ref 4–12)
NEUTROPHILS # BLD AUTO: 1.56 THOUSANDS/ÂΜL (ref 1.85–7.62)
NEUTS SEG NFR BLD AUTO: 48 % (ref 43–75)
NRBC BLD AUTO-RTO: 0 /100 WBCS
PLATELET # BLD AUTO: 171 THOUSANDS/UL (ref 149–390)
PMV BLD AUTO: 9.3 FL (ref 8.9–12.7)
POTASSIUM SERPL-SCNC: 3.6 MMOL/L (ref 3.5–5.3)
PROT SERPL-MCNC: 6.2 G/DL (ref 6.4–8.4)
RBC # BLD AUTO: 3.47 MILLION/UL (ref 3.81–5.12)
SODIUM SERPL-SCNC: 138 MMOL/L (ref 135–147)
WBC # BLD AUTO: 3.25 THOUSAND/UL (ref 4.31–10.16)

## 2025-04-14 PROCEDURE — 80053 COMPREHEN METABOLIC PANEL: CPT | Performed by: INTERNAL MEDICINE

## 2025-04-14 PROCEDURE — 85025 COMPLETE CBC W/AUTO DIFF WBC: CPT | Performed by: INTERNAL MEDICINE

## 2025-04-14 RX ORDER — PHENOL 1.4 %
600 AEROSOL, SPRAY (ML) MUCOUS MEMBRANE 2 TIMES DAILY WITH MEALS
Qty: 60 TABLET | Refills: 0 | Status: SHIPPED | OUTPATIENT
Start: 2025-04-14

## 2025-04-14 RX ORDER — PIOGLITAZONE 15 MG/1
15 TABLET ORAL DAILY
Qty: 100 TABLET | Refills: 3 | Status: SHIPPED | OUTPATIENT
Start: 2025-04-14

## 2025-04-14 NOTE — PROGRESS NOTES
Labs drawn via port.    Miriam Smith is aware of future appt on 4/16 at 0830.     AVS printed and given to Miriam Smith:   No (Declined by Miriam Smith)

## 2025-04-14 NOTE — PLAN OF CARE
Problem: Potential for Falls  Goal: Patient will remain free of falls  Description: INTERVENTIONS:- Educate patient/family on patient safety including physical limitations- Instruct patient to call for assistance with activity - Keep Call bell within reach- Keep care items and personal belongings within reach  Outcome: Progressing

## 2025-04-16 ENCOUNTER — OFFICE VISIT (OUTPATIENT)
Dept: HEMATOLOGY ONCOLOGY | Facility: MEDICAL CENTER | Age: 75
End: 2025-04-16

## 2025-04-16 ENCOUNTER — HOSPITAL ENCOUNTER (OUTPATIENT)
Dept: INFUSION CENTER | Facility: HOSPITAL | Age: 75
Discharge: HOME | End: 2025-04-16
Attending: INTERNAL MEDICINE
Payer: SUBSIDIZED

## 2025-04-16 VITALS
DIASTOLIC BLOOD PRESSURE: 54 MMHG | BODY MASS INDEX: 20.95 KG/M2 | RESPIRATION RATE: 18 BRPM | WEIGHT: 106.7 LBS | HEART RATE: 88 BPM | HEIGHT: 60 IN | TEMPERATURE: 97.5 F | SYSTOLIC BLOOD PRESSURE: 106 MMHG | OXYGEN SATURATION: 95 %

## 2025-04-16 DIAGNOSIS — C20 RECTAL CANCER (HCC): Primary | ICD-10-CM

## 2025-04-16 PROCEDURE — 96411 CHEMO IV PUSH ADDL DRUG: CPT

## 2025-04-16 PROCEDURE — G0498 CHEMO EXTEND IV INFUS W/PUMP: HCPCS

## 2025-04-16 PROCEDURE — 96415 CHEMO IV INFUSION ADDL HR: CPT

## 2025-04-16 PROCEDURE — 96368 THER/DIAG CONCURRENT INF: CPT

## 2025-04-16 PROCEDURE — 99213 OFFICE O/P EST LOW 20 MIN: CPT | Performed by: INTERNAL MEDICINE

## 2025-04-16 PROCEDURE — 96367 TX/PROPH/DG ADDL SEQ IV INF: CPT

## 2025-04-16 PROCEDURE — 96413 CHEMO IV INFUSION 1 HR: CPT

## 2025-04-16 RX ORDER — DEXTROSE MONOHYDRATE 50 MG/ML
20 INJECTION, SOLUTION INTRAVENOUS ONCE
Status: COMPLETED | OUTPATIENT
Start: 2025-04-16 | End: 2025-04-16

## 2025-04-16 RX ORDER — FLUOROURACIL 50 MG/ML
320 INJECTION, SOLUTION INTRAVENOUS ONCE
Status: COMPLETED | OUTPATIENT
Start: 2025-04-16 | End: 2025-04-16

## 2025-04-16 RX ORDER — SODIUM CHLORIDE 9 MG/ML
20 INJECTION, SOLUTION INTRAVENOUS ONCE AS NEEDED
Status: DISCONTINUED | OUTPATIENT
Start: 2025-04-16 | End: 2025-04-19 | Stop reason: HOSPADM

## 2025-04-16 RX ADMIN — DEXTROSE 20 ML/HR: 50 INJECTION, SOLUTION INTRAVENOUS at 10:09

## 2025-04-16 RX ADMIN — DEXAMETHASONE SODIUM PHOSPHATE: 10 INJECTION, SOLUTION INTRAMUSCULAR; INTRAVENOUS at 09:06

## 2025-04-16 RX ADMIN — OXALIPLATIN 97.9 MG: 5 INJECTION, SOLUTION INTRAVENOUS at 10:09

## 2025-04-16 RX ADMIN — FLUOROURACIL 460 MG: 50 INJECTION, SOLUTION INTRAVENOUS at 12:15

## 2025-04-16 RX ADMIN — LEUCOVORIN CALCIUM 461 MG: 500 INJECTION, POWDER, LYOPHILIZED, FOR SOLUTION INTRAMUSCULAR; INTRAVENOUS at 10:09

## 2025-04-16 RX ADMIN — SODIUM CHLORIDE 20 ML/HR: 0.9 INJECTION, SOLUTION INTRAVENOUS at 09:06

## 2025-04-16 NOTE — PROGRESS NOTES
Miriam Smith  1950  [facility]  [address] [place] [id number]    DISCUSSION/SUMMARY:    Approximately 20 minutes was spent with the patient/daughter in direct consultation/evaluation and with reviewing the chart.  Patient was seen in the chemotherapy infusion room    74-year-old female recently admitted to the hospital with abdominal pain and weight loss.  Workup demonstrated a rectal mass (the mass was originally biopsied in [place]).  Issues:    Rectal cancer.  Pathology results from [place] are listed below, there results demonstrated adenocarcinoma.  Patient was recently seen by colorectal surgery.     Patient was recently readmitted to [place], abscess was drained, underwent diverting colostomy.  Patient states that her bowel movements are normal, no evidence of bleeding.  Surgical follow-up ongoing.      Staging: Recent MRI demonstrated T4b, N2, low rectal carcinoma, + sphincter involvement, no suspicious extra mesorectal lymph nodes, no evidence of extramural venous invasion.  AJCC staging eighth edition = IIIC.  Other prior recent scans did not demonstrate evidence of metastatic disease.    NCCN guidelines 4.2024 state that for patients with T4 any N, preliminary treatment is to check pMMR.  At this moment, patient is not scheduled for any additional biopsies - so there likely will not be any additional tissue to send for evaluation.  Before, the plan is to start with 16 weeks of FOLFOX chemotherapy.  Patient is now on cycle 5, doing relatively well.  As before, the regimen will change depending upon how patient does.  Renal function and liver function test are okay, WBC is slightly decreased, H/H values okay, platelet count okay.    Patient will eventually be evaluated by radiation oncology for eventual long course chemo RT (with infusional 5-FU).  Afterwards, patient will undergo rescanning and reevaluation by  colorectal surgery.    Mrs. Valdez states having all required medications at home.    Port is in place, no issues.    Medical issues.  Recent CAT scan the chest demonstrated severe coronary artery calcification.  Patient is still working on finding a PCP.    Patient is to follow-up in 4 weeks.  If doable, patient can be seen again in the infusion center to make it easier for Mrs. Valdez.  Patient/daughter know to call the office if there are any other oncology questions or concerns.    Carefully review your medication list and verify that the list is accurate and up-to-date. Please call the hematology/oncology office if there are medications missing from the list, medications on the list that you are not currently taking or if there is a dosage or instruction that is different from how you're taking that medication.    Patient goals and areas of care: Continue with chemotherapy  Barriers to care: Multiple serious comorbidities  Patient is able to self-care with help of family members  _____________________________________________________________________________________    Chief Complaint   Patient presents with    Follow-up    Rectal Cancer     History of Present Illness: 74-year-old female first seen in the hospital on November 13, 2024.  Patient was admitted with weight loss, low abdominal pain.  Workup demonstrated a rectal mass.  Patient was being seen by GI and workup was consistent with advanced rectal cancer.  Patient has also been seen by colorectal surgery.  More recently Mrs. José Miguel Smith was subsequently admitted to [address].  Patient was reevaluated, required drainage from the rectal lesion.  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    Patient was seen in the [facility] chemotherapy infusion room today.  Mrs. Valdez stated feeling okay, fatigue is the biggest complaint.  No obvious GI bleeding, colostomy is working well.  Activities are  limited, same as before.  No respiratory issues.  No lower extremity swelling.  No  or GYN problems.  Appetite is okay, weight is stable.  No headaches or dizziness, no respiratory issues.  No specific problems with the port or chemotherapy.    Review of Systems   Constitutional:  Positive for activity change, appetite change, fatigue and unexpected weight change.   HENT: Negative.     Eyes: Negative.    Respiratory: Negative.     Cardiovascular: Negative.    Gastrointestinal: Negative.    Endocrine: Negative.    Genitourinary: Negative.    Musculoskeletal: Negative.    Skin: Negative.    Allergic/Immunologic: Negative.    Neurological: Negative.    Hematological: Negative.    Psychiatric/Behavioral: Negative.     All other systems reviewed and are negative.    Patient Active Problem List   Diagnosis    Rectal mass    Type 2 diabetes mellitus (HCC)    Rectal bleeding    History of anal fissures    Electrolyte abnormality    Pain, rectal    Iron deficiency anemia    Leukocytosis    Rectal cancer (HCC)    Cancer associated pain    Palliative care by specialist    Rectal pain    Perirectal abscess    Constipation    S/P colostomy (HCC)    Palliative care encounter    Cancer related pain    Age-related osteoporosis with current pathological fracture    Other closed intra-articular fracture of distal end of right radius with routine healing, subsequent encounter     Past Medical History:   Diagnosis Date    Diabetes mellitus (HCC)     Hypercholesteremia     Rectal cancer (HCC)      Past Surgical History:   Procedure Laterality Date    COLONOSCOPY      ILEOSTOMY N/A 12/24/2024    Procedure: LAPAROSCOPIC DIVERTING sigmoid end-loop colostomy;  Surgeon: SAI Bermudez MD;  [facility];  Service: Colorectal    INCISION AND DRAINAGE OF WOUND N/A 12/24/2024    Procedure: INCISION AND DRAINAGE (I&D) BUTTOCK abscess;  Surgeon: SAI Bermudez MD;  [facility];  Service: Colorectal    IR PORT PLACEMENT   2025     Family History   Problem Relation Age of Onset    Colon cancer Sister      Social History     Socioeconomic History    Marital status: /Civil Union     Spouse name: Not on file    Number of children: Not on file    Years of education: Not on file    Highest education level: Not on file   Occupational History    Not on file   Tobacco Use    Smoking status: Never    Smokeless tobacco: Never   Vaping Use    Vaping status: Never Used   Substance and Sexual Activity    Alcohol use: Not Currently    Drug use: Not Currently    Sexual activity: Not on file   Other Topics Concern    Not on file   Social History Narrative    Not on file     Social Drivers of Health     Financial Resource Strain: Low Risk  (2024)    Overall Financial Resource Strain (CARDIA)     Difficulty of Paying Living Expenses: Not hard at all   Food Insecurity: No Food Insecurity (2025)    Nursing - Inadequate Food Risk Classification     Worried About Running Out of Food in the Last Year: Never true     Ran Out of Food in the Last Year: Never true     Ran Out of Food in the Last Year: Never true   Transportation Needs: No Transportation Needs (2025)    Nursing - Transportation Risk Classification     Lack of Transportation: Not on file     Lack of Transportation: No   Physical Activity: Not on file   Stress: Not on file   Social Connections: Not on file   Intimate Partner Violence: Unknown (2025)    Nursing IPS     Feels Physically and Emotionally Safe: Not on file     Physically Hurt by Someone: Not on file     Humiliated or Emotionally Abused by Someone: Not on file     Physically Hurt by Someone: No     Hurt or Threatened by Someone: No   Housing Stability: Unknown (2025)    Nursing: Inadequate Housing Risk Classification     Has Housing: Not on file     Worried About Losing Housing: Not on file     Unable to Get Utilities: Not on file     Unable to Pay for Housing in the Last Year: No     Has Housin        Current Outpatient Medications:     atorvastatin (LIPITOR) 40 mg tablet, Take 1 tablet (40 mg total) by mouth daily, Disp: 30 tablet, Rfl: 3    benzonatate (TESSALON PERLES) 100 mg capsule, Take 1 capsule (100 mg total) by mouth 3 (three) times a day as needed for cough, Disp: 20 capsule, Rfl: 0    Blood Glucose Monitoring Suppl (OneTouch Verio Reflect) w/Device KIT, Check blood sugars twice daily. Please substitute with appropriate alternative as covered by patient's insurance. Dx: E11.65, Disp: 1 kit, Rfl: 0    calcium carbonate (OS-MARY) 600 MG tablet, Take 1 tablet (600 mg total) by mouth 2 (two) times a day with meals, Disp: 60 tablet, Rfl: 0    Cholecalciferol (VITAMIN D3) 1,000 units tablet, Take 1 tablet (1,000 Units total) by mouth daily, Disp: 90 tablet, Rfl: 1    fluorouracil 2,765 mg in CADD/Elastomeric Infusion Device, Infuse 2,765 mg (960 mg/m2/day x 1.44 m2) into a catheter in a vein via infusion device over 46 hours for 2 days  Infusion planned for April 16, 2025., Disp: 1 each, Rfl: 1    glucose blood (OneTouch Verio) test strip, Check blood sugars twice daily. Please substitute with appropriate alternative as covered by patient's insurance. Dx: E11.65, Disp: 200 each, Rfl: 3    lidocaine-prilocaine (EMLA) cream, Apply a thin layer over PAC site prior to access prn, Disp: 30 g, Rfl: 5    loratadine (CLARITIN) 10 mg tablet, Take 1 tablet (10 mg total) by mouth daily, Disp: 30 tablet, Rfl: 0    metFORMIN (GLUCOPHAGE) 1000 MG tablet, take 1 tablet by mouth EVERY MORNING WITH BREAKFAST, Disp: 30 tablet, Rfl: 0    ondansetron (ZOFRAN) 8 mg tablet, Take 1 tablet (8 mg total) by mouth every 8 (eight) hours as needed for nausea or vomiting, Disp: 20 tablet, Rfl: 5    OneTouch Delica Lancets 33G MISC, Check blood sugars twice daily. Please substitute with appropriate alternative as covered by patient's insurance. Dx: E11.65, Disp: 200 each, Rfl: 3    oxyCODONE (Roxicodone) 5 immediate release tablet,  Take 1 tablet (5 mg total) by mouth every 4 (four) hours as needed for moderate pain Max Daily Amount: 30 mg, Disp: 90 tablet, Rfl: 0    pioglitazone (ACTOS) 15 mg tablet, Take 1 tablet (15 mg total) by mouth daily, Disp: 100 tablet, Rfl: 3    polyethylene glycol (MIRALAX) 17 g packet, Take 17 g by mouth daily, Disp: 510 g, Rfl: 0    senna-docusate sodium (SENOKOT S) 8.6-50 mg per tablet, Take 1 tablet by mouth 2 (two) times a day, Disp: 60 tablet, Rfl: 0  No current facility-administered medications for this visit.    Facility-Administered Medications Ordered in Other Visits:     alteplase (CATHFLO) injection 2 mg, 2 mg, Intracatheter, Q1MIN PRN, Twan Perea MD    alteplase (CATHFLO) injection 2 mg, 2 mg, Intracatheter, Q1MIN PRN, Twan Perea MD    sodium chloride 0.9 % infusion, 20 mL/hr, Intravenous, Once PRN, Twan Perea MD, Stopped at 04/16/25 1215    Allergies   Allergen Reactions    Motrin [Ibuprofen] Itching    Tylenol [Acetaminophen] Itching    Penicillins Rash     Rash and bruise    Sulfa Antibiotics Rash     Price velia syndrome     There were no vitals filed for this visit.  Physical Exam  Constitutional:       Appearance: She is well-developed.   HENT:      Head: Normocephalic and atraumatic.      Right Ear: External ear normal.      Left Ear: External ear normal.   Eyes:      Conjunctiva/sclera: Conjunctivae normal.      Pupils: Pupils are equal, round, and reactive to light.   Cardiovascular:      Rate and Rhythm: Normal rate and regular rhythm.      Heart sounds: Normal heart sounds.   Pulmonary:      Effort: Pulmonary effort is normal.      Breath sounds: Normal breath sounds.   Abdominal:      General: Bowel sounds are normal.      Palpations: Abdomen is soft.      Comments: + Bowel sounds, minimal tenderness, no guarding, colostomy in place   Musculoskeletal:         General: Normal range of motion.      Cervical back: Normal range of motion and neck supple.   Skin:     General: Skin is  warm.      Comments: Slightly pale, warm, moist, no petechiae or ecchymoses   Neurological:      Mental Status: She is alert and oriented to person, place, and time.      Deep Tendon Reflexes: Reflexes are normal and symmetric.   Psychiatric:         Behavior: Behavior normal.         Thought Content: Thought content normal.         Judgment: Judgment normal.     Extremities: No lower extreme edema bilaterally, no cords, pulses 1+  Lymphatics: No adenopathy in the neck, supraclavicular region, axilla bilaterally    Performance Status: 1 - Symptomatic but completely ambulatory    Labs    4/14/2025 WBC = 3.25 hemoglobin = 10.2 hematocrit = 31.2 MCV = 90 platelet = 171 neutrophil = 48% BUN = 17 creatinine = 0.43 LFTs WNL    11/13/2024 CEA = 5.6    Imaging    3/3/2025 spine 3 views thoracic    IMPRESSION:     Slight loss of stature at T11 with superior endplate compression deformity which is new since the CT scan of 1/8/2025 suggesting subacute injury. Anterolisthesis C4 on C5.    1/8/2025 CAT scan abdomen pelvis with contrast    IMPRESSION:     Status post diverting sigmoid loop colostomy with a large amount of stool throughout the entire colon to the level of the ostomy.     Large locally invasive lower rectal cancer again shown with decrease in the size of fluid collection in the left ischioanal fat.  Small volume ascites in the abdomen and pelvis, new from the prior study.    12/6/2024 MRI pelvis rectal cancer staging without contrast    Overall MRI stage: T4b, N2, Low rectal cancer  MRF: Not applicable for T4 tumor.  Sphincter involvement: Yes.  Suspicious extra mesorectal lymph nodes: No.  EMVI: No.           11/12/2024 CT chest without contrast.  Impression stated no lung metastases, severe coronary artery calcification indicating atherosclerotic heart disease, cholelithiasis.    11/10/2024 CT abdomen pelvis with contrast    IMPRESSION:     Heterogeneous peripherally enhancing mass/malignancy involving the rectum  "and infiltrating the left ischiorectal and gluteal fat measuring approximately 7.7 x 7.5 x 8.6 cm with areas of fluid density compatible with internal necrosis.    Pathology    Case Report   Surgical Pathology Report                         Case: [id number]                                   Authorizing Provider:  Morris Tom MD         Collected:           11/12/2024 1439               Ordering Location:     [facility] Received:            11/12/2024 1649                                      [address]                                                                       Pathologist:           Nemo Peterson MD                                                         Specimen:    Rectum, rectal mass r/o malignancy , cancer                                                Final Diagnosis   A. 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 Deeper sections of the specimen have been evaluated. A higher grade lesion cannot be excluded due to the superficial nature of the biopsy; repeat biopsy is recommended as clinically indicated.                    "

## 2025-04-16 NOTE — PROGRESS NOTES
Miriam Smith  tolerated treatment well with no complications.      Miriam Smith is aware of future appt on 04/18 at 1200.     AVS printed and given to Miriam Smith:  Yes - calendar

## 2025-04-18 ENCOUNTER — HOSPITAL ENCOUNTER (OUTPATIENT)
Dept: INFUSION CENTER | Facility: HOSPITAL | Age: 75
Discharge: HOME | End: 2025-04-18
Attending: INTERNAL MEDICINE

## 2025-04-18 DIAGNOSIS — C20 RECTAL CANCER (HCC): Primary | ICD-10-CM

## 2025-04-18 NOTE — PROGRESS NOTES
Patient presents for chemo ball disconnect. Ball appears deflated after infusing for 46hrs. Port flushed and de accessed per order. Patient offers no complaints.    Miriam Smith  tolerated treatment well with no complications.      Miriam Smith is aware of future appt on 04/28 at 1200.     AVS printed and given to Miriam Smith:  Yes

## 2025-04-21 RX ORDER — DEXTROSE MONOHYDRATE 50 MG/ML
20 INJECTION, SOLUTION INTRAVENOUS ONCE
Status: CANCELLED | OUTPATIENT
Start: 2025-04-30

## 2025-04-21 RX ORDER — SODIUM CHLORIDE 9 MG/ML
20 INJECTION, SOLUTION INTRAVENOUS ONCE AS NEEDED
Status: CANCELLED | OUTPATIENT
Start: 2025-04-30

## 2025-04-21 RX ORDER — FLUOROURACIL 50 MG/ML
320 INJECTION, SOLUTION INTRAVENOUS ONCE
Status: CANCELLED | OUTPATIENT
Start: 2025-04-30

## 2025-04-22 DIAGNOSIS — E11.9 TYPE 2 DIABETES MELLITUS WITHOUT COMPLICATION, WITHOUT LONG-TERM CURRENT USE OF INSULIN (HCC): ICD-10-CM

## 2025-04-23 ENCOUNTER — APPOINTMENT (OUTPATIENT)
Dept: RADIOLOGY | Facility: CLINIC | Age: 75
End: 2025-04-23
Attending: ORTHOPAEDIC SURGERY
Payer: SUBSIDIZED

## 2025-04-23 ENCOUNTER — OFFICE VISIT (OUTPATIENT)
Dept: OBGYN CLINIC | Facility: CLINIC | Age: 75
End: 2025-04-23

## 2025-04-23 VITALS — BODY MASS INDEX: 20.22 KG/M2 | HEIGHT: 60 IN | WEIGHT: 103 LBS

## 2025-04-23 DIAGNOSIS — S52.571A OTHER CLOSED INTRA-ARTICULAR FRACTURE OF DISTAL END OF RIGHT RADIUS, INITIAL ENCOUNTER: ICD-10-CM

## 2025-04-23 DIAGNOSIS — C20 RECTAL CANCER (HCC): Primary | ICD-10-CM

## 2025-04-23 DIAGNOSIS — S52.571D OTHER CLOSED INTRA-ARTICULAR FRACTURE OF DISTAL END OF RIGHT RADIUS WITH ROUTINE HEALING, SUBSEQUENT ENCOUNTER: ICD-10-CM

## 2025-04-23 DIAGNOSIS — S52.571D OTHER CLOSED INTRA-ARTICULAR FRACTURE OF DISTAL END OF RIGHT RADIUS WITH ROUTINE HEALING, SUBSEQUENT ENCOUNTER: Primary | ICD-10-CM

## 2025-04-23 PROCEDURE — 99024 POSTOP FOLLOW-UP VISIT: CPT | Performed by: ORTHOPAEDIC SURGERY

## 2025-04-23 PROCEDURE — 73110 X-RAY EXAM OF WRIST: CPT

## 2025-04-23 NOTE — PROGRESS NOTES
Patient Name: Miriam Smith      : 1950       MRN: [id number]   Encounter Provider: Wing Moran MD   Encounter Date: 25  Encounter department: [facility]         Assessment & Plan  Other closed intra-articular fracture of distal end of right radius with routine healing, subsequent encounter  Discontinue wrist brace  Continue with HEP  Activities as tolerated  Orders:  •  XR wrist 3+ vw right; Future       Plan:  Miriam is a pleasant 75 y.o. who presents for follow up evaluation of right wrist distal radius fracture sustained 25. I am pleased with her clinical exam and review of imaging today. She can discontinue use of wrist brace. I recommend she continue with HEP for range of motion. She can continue to resume activities as tolerated. Patient no longer requires orthopedic follow up.     Follow-up:  Return if symptoms worsen or fail to improve.     _____________________________________________________  CHIEF COMPLAINT:  Chief Complaint   Patient presents with   • Right Wrist - Follow-up, Fracture         SUBJECTIVE:  Miriam Smith is a 75 y.o. female who presents for follow up evaluation of right distal radius fracture sustained 25. Patient states she is doing well and her pain has improved. She has been wearing a universal wrist brace for 4 weeks and working on her range of motion at home, she was unable to attend formal physical therapy.       PAST MEDICAL HISTORY:  Past Medical History:   Diagnosis Date   • Diabetes mellitus (HCC)    • Hypercholesteremia    • Rectal cancer (HCC)        PAST SURGICAL HISTORY:  Past Surgical History:   Procedure Laterality Date   • COLONOSCOPY     • ILEOSTOMY N/A 2024    Procedure: LAPAROSCOPIC DIVERTING sigmoid end-loop colostomy;  Surgeon: SAI Bermudez MD;  [facility];  Service: Colorectal   • INCISION AND DRAINAGE OF WOUND N/A 2024    Procedure: INCISION AND  DRAINAGE (I&D) BUTTOCK abscess;  Surgeon: SAI Bermudez MD;  [facility];  Service: Colorectal   • IR PORT PLACEMENT  1/6/2025       FAMILY HISTORY:  Family History   Problem Relation Age of Onset   • Colon cancer Sister        SOCIAL HISTORY:  Social History     Tobacco Use   • Smoking status: Never   • Smokeless tobacco: Never   Vaping Use   • Vaping status: Never Used   Substance Use Topics   • Alcohol use: Not Currently   • Drug use: Not Currently       MEDICATIONS:    Current Outpatient Medications:   •  atorvastatin (LIPITOR) 40 mg tablet, Take 1 tablet (40 mg total) by mouth daily, Disp: 30 tablet, Rfl: 3  •  benzonatate (TESSALON PERLES) 100 mg capsule, Take 1 capsule (100 mg total) by mouth 3 (three) times a day as needed for cough, Disp: 20 capsule, Rfl: 0  •  Blood Glucose Monitoring Suppl (OneTouch Verio Reflect) w/Device KIT, Check blood sugars twice daily. Please substitute with appropriate alternative as covered by patient's insurance. Dx: E11.65, Disp: 1 kit, Rfl: 0  •  calcium carbonate (OS-MARY) 600 MG tablet, Take 1 tablet (600 mg total) by mouth 2 (two) times a day with meals, Disp: 60 tablet, Rfl: 0  •  Cholecalciferol (VITAMIN D3) 1,000 units tablet, Take 1 tablet (1,000 Units total) by mouth daily, Disp: 90 tablet, Rfl: 1  •  [START ON 4/30/2025] fluorouracil 2,765 mg in CADD/Elastomeric Infusion Device, Infuse 2,765 mg (960 mg/m2/day x 1.44 m2) into a catheter in a vein via infusion device over 46 hours for 2 days  Infusion planned for April 30, 2025., Disp: 1 each, Rfl: 1  •  glucose blood (OneTouch Verio) test strip, Check blood sugars twice daily. Please substitute with appropriate alternative as covered by patient's insurance. Dx: E11.65, Disp: 200 each, Rfl: 3  •  lidocaine-prilocaine (EMLA) cream, Apply a thin layer over PAC site prior to access prn, Disp: 30 g, Rfl: 5  •  loratadine (CLARITIN) 10 mg tablet, Take 1 tablet (10 mg total) by mouth daily, Disp: 30 tablet, Rfl:  "0  •  metFORMIN (GLUCOPHAGE) 1000 MG tablet, take 1 tablet by mouth EVERY MORNING WITH BREAKFAST, Disp: 30 tablet, Rfl: 0  •  ondansetron (ZOFRAN) 8 mg tablet, Take 1 tablet (8 mg total) by mouth every 8 (eight) hours as needed for nausea or vomiting, Disp: 20 tablet, Rfl: 5  •  OneTouch Delica Lancets 33G MISC, Check blood sugars twice daily. Please substitute with appropriate alternative as covered by patient's insurance. Dx: E11.65, Disp: 200 each, Rfl: 3  •  pioglitazone (ACTOS) 15 mg tablet, Take 1 tablet (15 mg total) by mouth daily, Disp: 100 tablet, Rfl: 3  •  polyethylene glycol (MIRALAX) 17 g packet, Take 17 g by mouth daily, Disp: 510 g, Rfl: 0  •  senna-docusate sodium (SENOKOT S) 8.6-50 mg per tablet, Take 1 tablet by mouth 2 (two) times a day, Disp: 60 tablet, Rfl: 0  •  oxyCODONE (Roxicodone) 5 immediate release tablet, Take 1 tablet (5 mg total) by mouth every 4 (four) hours as needed for moderate pain Max Daily Amount: 30 mg, Disp: 90 tablet, Rfl: 0    ALLERGIES:  Allergies   Allergen Reactions   • Motrin [Ibuprofen] Itching   • Tylenol [Acetaminophen] Itching   • Penicillins Rash     Rash and bruise   • Sulfa Antibiotics Rash     Price velia syndrome       LABS:  HgA1c:   Lab Results   Component Value Date    HGBA1C 7.2 (A) 03/06/2025     BMP:   Lab Results   Component Value Date    CALCIUM 8.9 04/14/2025    K 3.6 04/14/2025    CO2 24 04/14/2025     04/14/2025    BUN 17 04/14/2025    CREATININE 0.43 (L) 04/14/2025     CBC: No components found for: \"CBC\"    _____________________________________________________  Review of Systems   Constitutional:  Negative for chills and fever.   HENT:  Negative for ear pain and sore throat.    Eyes:  Negative for pain and visual disturbance.   Respiratory:  Negative for cough and shortness of breath.    Cardiovascular:  Negative for chest pain and palpitations.   Gastrointestinal:  Negative for abdominal pain and vomiting.   Genitourinary:  Negative for " dysuria and hematuria.   Musculoskeletal:  Negative for arthralgias and back pain.   Skin:  Negative for color change and rash.   Neurological:  Negative for seizures and syncope.   All other systems reviewed and are negative.       Right Hand Exam     Tenderness   The patient is experiencing no tenderness.     Other   Erythema: absent  Sensation: normal  Pulse: present    Comments:  Improving AROM compared to previous exam  Full FDS, FDP, extensor mechanisms are intact  Wrist is stable to stress on exam  Skin is warm and dry to touch with no signs of erythema, ecchymosis, infection  Forearm compartments are soft and supple  2+ Distal radial pulse with brisk capillary refill to the fingers  Radial, median, ulnar motor and sensory distribution intact  Sensation to light touch intact distally                 Physical Exam  Vitals and nursing note reviewed.   Constitutional:       Appearance: Normal appearance.   HENT:      Head: Normocephalic.      Right Ear: External ear normal.      Left Ear: External ear normal.   Eyes:      Extraocular Movements: Extraocular movements intact.      Conjunctiva/sclera: Conjunctivae normal.   Cardiovascular:      Rate and Rhythm: Normal rate.      Pulses: Normal pulses.   Pulmonary:      Effort: Pulmonary effort is normal.      Breath sounds: Normal breath sounds.   Abdominal:      General: Abdomen is flat.   Musculoskeletal:         General: Normal range of motion.      Cervical back: Normal range of motion and neck supple.   Skin:     General: Skin is warm and dry.   Neurological:      General: No focal deficit present.      Mental Status: She is alert.   Psychiatric:         Mood and Affect: Mood normal.         Behavior: Behavior normal.        _____________________________________________________  STUDIES REVIEWED:  I personally reviewed the pertinent images and my independent interpretation is as follows:  X-ray of the right wrist demonstrates stable fracture in maintained  alignment with interval callus    PROCEDURES PERFORMED:  No Procedures performed today    Scribe Attestation    I,:  Sylvia Mortensen am acting as a scribe while in the presence of the attending physician.:       I,:  Wing Moran MD personally performed the services described in this documentation    as scribed in my presence.:

## 2025-04-23 NOTE — ASSESSMENT & PLAN NOTE
Discontinue wrist brace  Continue with HEP  Activities as tolerated  Orders:  •  XR wrist 3+ vw right; Future

## 2025-04-24 ENCOUNTER — PATIENT OUTREACH (OUTPATIENT)
Dept: HEMATOLOGY ONCOLOGY | Facility: CLINIC | Age: 75
End: 2025-04-24

## 2025-04-24 DIAGNOSIS — G89.3 CANCER ASSOCIATED PAIN: ICD-10-CM

## 2025-04-24 DIAGNOSIS — K62.89 RECTAL PAIN: ICD-10-CM

## 2025-04-24 DIAGNOSIS — K62.89 RECTAL MASS: ICD-10-CM

## 2025-04-24 DIAGNOSIS — Z51.5 PALLIATIVE CARE BY SPECIALIST: ICD-10-CM

## 2025-04-25 RX ORDER — OXYCODONE HYDROCHLORIDE 5 MG/1
5 TABLET ORAL EVERY 4 HOURS PRN
Qty: 90 TABLET | Refills: 0 | Status: SHIPPED | OUTPATIENT
Start: 2025-04-25

## 2025-04-25 NOTE — TELEPHONE ENCOUNTER
Last appointment: 4/10/25    Next scheduled appointment: 5/29/25    Pharmacy: [facility]      PDMP review:

## 2025-04-28 ENCOUNTER — HOSPITAL ENCOUNTER (OUTPATIENT)
Dept: INFUSION CENTER | Facility: HOSPITAL | Age: 75
Discharge: HOME | End: 2025-04-28
Payer: SUBSIDIZED

## 2025-04-28 DIAGNOSIS — C20 RECTAL CANCER (HCC): Primary | ICD-10-CM

## 2025-04-28 LAB
ALBUMIN SERPL BCG-MCNC: 3.7 G/DL (ref 3.5–5)
ALP SERPL-CCNC: 47 U/L (ref 34–104)
ALT SERPL W P-5'-P-CCNC: 12 U/L (ref 7–52)
ANION GAP SERPL CALCULATED.3IONS-SCNC: 10 MMOL/L (ref 4–13)
AST SERPL W P-5'-P-CCNC: 14 U/L (ref 13–39)
BASOPHILS # BLD AUTO: 0.03 THOUSANDS/ÂΜL (ref 0–0.1)
BASOPHILS NFR BLD AUTO: 1 % (ref 0–1)
BILIRUB SERPL-MCNC: 0.49 MG/DL (ref 0.2–1)
BUN SERPL-MCNC: 18 MG/DL (ref 5–25)
CALCIUM SERPL-MCNC: 9.2 MG/DL (ref 8.4–10.2)
CHLORIDE SERPL-SCNC: 100 MMOL/L (ref 96–108)
CO2 SERPL-SCNC: 25 MMOL/L (ref 21–32)
CREAT SERPL-MCNC: 0.48 MG/DL (ref 0.6–1.3)
EOSINOPHIL # BLD AUTO: 0.01 THOUSAND/ÂΜL (ref 0–0.61)
EOSINOPHIL NFR BLD AUTO: 0 % (ref 0–6)
ERYTHROCYTE [DISTWIDTH] IN BLOOD BY AUTOMATED COUNT: 18.1 % (ref 11.6–15.1)
GFR SERPL CREATININE-BSD FRML MDRD: 96 ML/MIN/1.73SQ M
GLUCOSE SERPL-MCNC: 261 MG/DL (ref 65–140)
HCT VFR BLD AUTO: 32.4 % (ref 34.8–46.1)
HGB BLD-MCNC: 10.4 G/DL (ref 11.5–15.4)
IMM GRANULOCYTES # BLD AUTO: 0 THOUSAND/UL (ref 0–0.2)
IMM GRANULOCYTES NFR BLD AUTO: 0 % (ref 0–2)
LYMPHOCYTES # BLD AUTO: 0.74 THOUSANDS/ÂΜL (ref 0.6–4.47)
LYMPHOCYTES NFR BLD AUTO: 30 % (ref 14–44)
MCH RBC QN AUTO: 29.3 PG (ref 26.8–34.3)
MCHC RBC AUTO-ENTMCNC: 32.1 G/DL (ref 31.4–37.4)
MCV RBC AUTO: 91 FL (ref 82–98)
MONOCYTES # BLD AUTO: 0.47 THOUSAND/ÂΜL (ref 0.17–1.22)
MONOCYTES NFR BLD AUTO: 19 % (ref 4–12)
NEUTROPHILS # BLD AUTO: 1.21 THOUSANDS/ÂΜL (ref 1.85–7.62)
NEUTS SEG NFR BLD AUTO: 50 % (ref 43–75)
NRBC BLD AUTO-RTO: 0 /100 WBCS
PLATELET # BLD AUTO: 160 THOUSANDS/UL (ref 149–390)
PMV BLD AUTO: 9.2 FL (ref 8.9–12.7)
POTASSIUM SERPL-SCNC: 3.6 MMOL/L (ref 3.5–5.3)
PROT SERPL-MCNC: 6.7 G/DL (ref 6.4–8.4)
RBC # BLD AUTO: 3.55 MILLION/UL (ref 3.81–5.12)
SODIUM SERPL-SCNC: 135 MMOL/L (ref 135–147)
WBC # BLD AUTO: 2.46 THOUSAND/UL (ref 4.31–10.16)

## 2025-04-28 PROCEDURE — 85025 COMPLETE CBC W/AUTO DIFF WBC: CPT | Performed by: INTERNAL MEDICINE

## 2025-04-28 PROCEDURE — 80053 COMPREHEN METABOLIC PANEL: CPT | Performed by: INTERNAL MEDICINE

## 2025-04-28 NOTE — PLAN OF CARE
Problem: Potential for Falls  Goal: Patient will remain free of falls  Description: INTERVENTIONS:- Educate patient/family on patient safety including physical limitations- Instruct patient to call for assistance with activity - Keep Call bell within reach- Keep care items and personal belongings within reach- Initiate and maintain comfort rounds-   Outcome: Progressing     Problem: DISCHARGE PLANNING  Goal: Discharge to home or other facility with appropriate resources  Description: INTERVENTIONS:- Identify barriers to discharge w/patient and caregiver- Arrange for needed discharge resources and transportation as appropriate- Identify discharge learning needs (meds, wound care, etc.)- Arrange for interpretive services to assist at discharge as needed- Refer to Case Management Department for coordinating discharge planning if the patient needs post-hospital services based on physician/advanced practitioner order or complex needs related to functional status, cognitive ability, or social support system  Outcome: Progressing     Problem: Knowledge Deficit  Goal: Patient/family/caregiver demonstrates understanding of disease process, treatment plan, medications, and discharge instructions  Description: Complete learning assessment and assess knowledge base.Interventions:- Provide teaching at level of understanding- Provide teaching via preferred learning methods  Outcome: Progressing

## 2025-04-28 NOTE — PROGRESS NOTES
Miriam Smith  tolerated treatment well with no complications. Labs drawn from port per order. Port accessed, flushed and de-accessed.      Miriam Smith is aware of future appt on 04/30 at 0900.     AVS printed and given to Miriam Smith:  No (Declined by Miriam Smith)

## 2025-04-30 ENCOUNTER — HOSPITAL ENCOUNTER (OUTPATIENT)
Dept: INFUSION CENTER | Facility: HOSPITAL | Age: 75
Discharge: HOME | End: 2025-04-30
Attending: INTERNAL MEDICINE
Payer: SUBSIDIZED

## 2025-04-30 VITALS
SYSTOLIC BLOOD PRESSURE: 130 MMHG | RESPIRATION RATE: 18 BRPM | HEIGHT: 60 IN | BODY MASS INDEX: 20.39 KG/M2 | HEART RATE: 90 BPM | OXYGEN SATURATION: 96 % | TEMPERATURE: 98.1 F | WEIGHT: 103.84 LBS | DIASTOLIC BLOOD PRESSURE: 60 MMHG

## 2025-04-30 DIAGNOSIS — C20 RECTAL CANCER (HCC): Primary | ICD-10-CM

## 2025-04-30 RX ORDER — FLUOROURACIL 50 MG/ML
320 INJECTION, SOLUTION INTRAVENOUS ONCE
Status: COMPLETED | OUTPATIENT
Start: 2025-04-30 | End: 2025-04-30

## 2025-04-30 RX ORDER — SODIUM CHLORIDE 9 MG/ML
20 INJECTION, SOLUTION INTRAVENOUS ONCE AS NEEDED
Status: DISCONTINUED | OUTPATIENT
Start: 2025-04-30 | End: 2025-05-03 | Stop reason: HOSPADM

## 2025-04-30 RX ORDER — DEXTROSE MONOHYDRATE 50 MG/ML
20 INJECTION, SOLUTION INTRAVENOUS ONCE
Status: COMPLETED | OUTPATIENT
Start: 2025-04-30 | End: 2025-04-30

## 2025-04-30 RX ADMIN — SODIUM CHLORIDE 20 ML/HR: 0.9 INJECTION, SOLUTION INTRAVENOUS at 09:42

## 2025-04-30 RX ADMIN — OXALIPLATIN 97.9 MG: 5 INJECTION, SOLUTION INTRAVENOUS at 11:11

## 2025-04-30 RX ADMIN — FLUOROURACIL 460 MG: 50 INJECTION, SOLUTION INTRAVENOUS at 13:22

## 2025-04-30 RX ADMIN — LEUCOVORIN CALCIUM 461 MG: 500 INJECTION, POWDER, LYOPHILIZED, FOR SOLUTION INTRAMUSCULAR; INTRAVENOUS at 11:11

## 2025-04-30 RX ADMIN — DEXAMETHASONE SODIUM PHOSPHATE: 10 INJECTION, SOLUTION INTRAMUSCULAR; INTRAVENOUS at 09:42

## 2025-04-30 RX ADMIN — DEXTROSE 20 ML/HR: 50 INJECTION, SOLUTION INTRAVENOUS at 11:11

## 2025-04-30 NOTE — PROGRESS NOTES
Miriam Smith  tolerated treatment well with no complications.      Miriam Smith is aware of future appt on 05/02 at 1130.     AVS printed and given to Miriam Smith:  No (Declined by Miriam Smith)

## 2025-05-02 ENCOUNTER — HOSPITAL ENCOUNTER (OUTPATIENT)
Dept: INFUSION CENTER | Facility: HOSPITAL | Age: 75
Discharge: HOME | End: 2025-05-02
Attending: INTERNAL MEDICINE

## 2025-05-02 DIAGNOSIS — C20 RECTAL CANCER (HCC): Primary | ICD-10-CM

## 2025-05-02 NOTE — PROGRESS NOTES
Miriam Smith  tolerated treatment well with no complications. Patient presents for chemo ball disconnect. Ball appears deflated after infusing for 46hrs. Port flushed and de accessed per order. Patient offers no complaints.     Miriam Smith is aware of future appt on 05/12 at 1000.     AVS printed and given to Miriam Smith:  No (Declined by Miriam Smith)

## 2025-05-05 ENCOUNTER — PATIENT OUTREACH (OUTPATIENT)
Dept: HEMATOLOGY ONCOLOGY | Facility: CLINIC | Age: 75
End: 2025-05-05

## 2025-05-05 DIAGNOSIS — C20 RECTAL CANCER (HCC): Primary | ICD-10-CM

## 2025-05-05 RX ORDER — FLUOROURACIL 50 MG/ML
320 INJECTION, SOLUTION INTRAVENOUS ONCE
OUTPATIENT
Start: 2025-05-20

## 2025-05-05 RX ORDER — SODIUM CHLORIDE 9 MG/ML
20 INJECTION, SOLUTION INTRAVENOUS ONCE AS NEEDED
OUTPATIENT
Start: 2025-05-20

## 2025-05-05 RX ORDER — DEXTROSE MONOHYDRATE 50 MG/ML
20 INJECTION, SOLUTION INTRAVENOUS ONCE
OUTPATIENT
Start: 2025-05-20

## 2025-05-06 ENCOUNTER — OFFICE VISIT (OUTPATIENT)
Age: 75
End: 2025-05-06

## 2025-05-06 VITALS
BODY MASS INDEX: 19.53 KG/M2 | OXYGEN SATURATION: 97 % | RESPIRATION RATE: 18 BRPM | HEART RATE: 88 BPM | TEMPERATURE: 98.6 F | DIASTOLIC BLOOD PRESSURE: 58 MMHG | WEIGHT: 100 LBS | SYSTOLIC BLOOD PRESSURE: 94 MMHG

## 2025-05-06 DIAGNOSIS — M80.00XD AGE-RELATED OSTEOPOROSIS WITH CURRENT PATHOLOGICAL FRACTURE WITH ROUTINE HEALING, SUBSEQUENT ENCOUNTER: ICD-10-CM

## 2025-05-06 DIAGNOSIS — C20 RECTAL CANCER (HCC): ICD-10-CM

## 2025-05-06 DIAGNOSIS — K62.89 PAIN, RECTAL: ICD-10-CM

## 2025-05-06 DIAGNOSIS — D50.8 OTHER IRON DEFICIENCY ANEMIA: ICD-10-CM

## 2025-05-06 DIAGNOSIS — S22.000A COMPRESSION FRACTURE OF BODY OF THORACIC VERTEBRA (HCC): ICD-10-CM

## 2025-05-06 DIAGNOSIS — Z23 ENCOUNTER FOR IMMUNIZATION: Primary | ICD-10-CM

## 2025-05-06 DIAGNOSIS — E11.9 TYPE 2 DIABETES MELLITUS WITHOUT COMPLICATION, UNSPECIFIED WHETHER LONG TERM INSULIN USE (HCC): ICD-10-CM

## 2025-05-06 PROCEDURE — 99213 OFFICE O/P EST LOW 20 MIN: CPT | Performed by: FAMILY MEDICINE

## 2025-05-06 PROCEDURE — 83036 HEMOGLOBIN GLYCOSYLATED A1C: CPT | Performed by: FAMILY MEDICINE

## 2025-05-06 NOTE — ASSESSMENT & PLAN NOTE
Orders:    Iron Panel (Includes Ferritin, Iron Sat%, Iron, and TIBC); Future    Magnesium; Future

## 2025-05-06 NOTE — ASSESSMENT & PLAN NOTE
Lab Results   Component Value Date    HGBA1C 7.2 (A) 03/06/2025   DC'd Maria Del Rosario.  Will start her on Januvia.  Encouraged her to ambulate.    Orders:    POCT hemoglobin A1c    Vitamin D 25 hydroxy; Future    Vitamin B12/Folate, Serum Panel; Future    Magnesium; Future    sitaGLIPtin (JANUVIA) 50 mg tablet; Take 1 tablet (50 mg total) by mouth daily

## 2025-05-06 NOTE — PROGRESS NOTES
Name: Miriam Smith      : 1950      MRN: [id number]  Encounter Provider: Yumiko Thomas MD  Encounter Date: 2025   Encounter department: [facility]  :  Assessment & Plan  Encounter for immunization  Patient is going to talk to the infusion center about her vaccination with herpes zoster.       Type 2 diabetes mellitus without complication, unspecified whether long term insulin use (HCC)    Lab Results   Component Value Date    HGBA1C 7.2 (A) 2025   DC'd Actos.  Will start her on Januvia.  Encouraged her to ambulate.    Orders:    POCT hemoglobin A1c    Vitamin D 25 hydroxy; Future    Vitamin B12/Folate, Serum Panel; Future    Magnesium; Future    sitaGLIPtin (JANUVIA) 50 mg tablet; Take 1 tablet (50 mg total) by mouth daily    Age-related osteoporosis with current pathological fracture with routine healing, subsequent encounter  Okay to remove the lumbar brace.  Encouraged her movement at home.  Including some walking with assistance.       Other iron deficiency anemia        Orders:    Iron Panel (Includes Ferritin, Iron Sat%, Iron, and TIBC); Future    Magnesium; Future    Rectal cancer (HCC)  She follows up with him at oncology.       Pain, rectal  States that the rectal pain is better controlled.  Currently on chemotherapy.       Compression fracture of body of thoracic vertebra (HCC)  Okay to remove the brace              History of Present Illness   Patient is here for follow-up.  States that she is currently receiving chemotherapy from [facility] for rectal cancer.      Review of Systems    Objective   BP 94/58 (BP Location: Left arm, Patient Position: Sitting, Cuff Size: Standard)   Pulse 88   Temp 98.6 °F (37 °C) (Tympanic)   Resp 18   Wt 45.4 kg (100 lb)   SpO2 97%   BMI 19.53 kg/m²      Physical Exam  Constitutional:       Appearance: Normal appearance. She is well-developed and normal weight.   HENT:      Head:  Normocephalic.      Right Ear: Tympanic membrane normal.      Left Ear: Tympanic membrane normal.      Nose: Nose normal.      Mouth/Throat:      Mouth: Mucous membranes are moist.   Eyes:      Conjunctiva/sclera: Conjunctivae normal.      Pupils: Pupils are equal, round, and reactive to light.   Cardiovascular:      Rate and Rhythm: Normal rate and regular rhythm.      Heart sounds: Normal heart sounds.   Pulmonary:      Effort: Pulmonary effort is normal.      Breath sounds: Normal breath sounds.   Abdominal:      General: Bowel sounds are normal.      Palpations: Abdomen is soft.   Musculoskeletal:         General: No tenderness.      Cervical back: Normal range of motion and neck supple.   Skin:     General: Skin is warm and dry.      Capillary Refill: Capillary refill takes less than 2 seconds.   Neurological:      General: No focal deficit present.      Mental Status: She is alert and oriented to person, place, and time.   Psychiatric:         Mood and Affect: Mood normal.         Behavior: Behavior normal.

## 2025-05-06 NOTE — ASSESSMENT & PLAN NOTE
Okay to remove the lumbar brace.  Encouraged her movement at home.  Including some walking with assistance.

## 2025-05-08 LAB — SL AMB POCT HEMOGLOBIN AIC: 6.8 (ref ?–6.5)

## 2025-05-12 ENCOUNTER — TELEPHONE (OUTPATIENT)
Dept: HEMATOLOGY ONCOLOGY | Facility: MEDICAL CENTER | Age: 75
End: 2025-05-12

## 2025-05-12 ENCOUNTER — HOSPITAL ENCOUNTER (OUTPATIENT)
Dept: INFUSION CENTER | Facility: HOSPITAL | Age: 75
Discharge: HOME | End: 2025-05-12
Payer: SUBSIDIZED

## 2025-05-12 DIAGNOSIS — C20 RECTAL CANCER (HCC): Primary | ICD-10-CM

## 2025-05-12 LAB
ALBUMIN SERPL BCG-MCNC: 3.5 G/DL (ref 3.5–5)
ALP SERPL-CCNC: 47 U/L (ref 34–104)
ALT SERPL W P-5'-P-CCNC: 10 U/L (ref 7–52)
ANION GAP SERPL CALCULATED.3IONS-SCNC: 11 MMOL/L (ref 4–13)
AST SERPL W P-5'-P-CCNC: 14 U/L (ref 13–39)
BASOPHILS # BLD AUTO: 0.02 THOUSANDS/ÂΜL (ref 0–0.1)
BASOPHILS NFR BLD AUTO: 1 % (ref 0–1)
BILIRUB SERPL-MCNC: 0.4 MG/DL (ref 0.2–1)
BUN SERPL-MCNC: 18 MG/DL (ref 5–25)
CALCIUM SERPL-MCNC: 9.1 MG/DL (ref 8.4–10.2)
CHLORIDE SERPL-SCNC: 104 MMOL/L (ref 96–108)
CO2 SERPL-SCNC: 23 MMOL/L (ref 21–32)
CREAT SERPL-MCNC: 0.51 MG/DL (ref 0.6–1.3)
EOSINOPHIL # BLD AUTO: 0.03 THOUSAND/ÂΜL (ref 0–0.61)
EOSINOPHIL NFR BLD AUTO: 2 % (ref 0–6)
ERYTHROCYTE [DISTWIDTH] IN BLOOD BY AUTOMATED COUNT: 18 % (ref 11.6–15.1)
GFR SERPL CREATININE-BSD FRML MDRD: 94 ML/MIN/1.73SQ M
GLUCOSE SERPL-MCNC: 209 MG/DL (ref 65–140)
HCT VFR BLD AUTO: 30.2 % (ref 34.8–46.1)
HGB BLD-MCNC: 9.7 G/DL (ref 11.5–15.4)
IMM GRANULOCYTES # BLD AUTO: 0 THOUSAND/UL (ref 0–0.2)
IMM GRANULOCYTES NFR BLD AUTO: 0 % (ref 0–2)
LYMPHOCYTES # BLD AUTO: 0.81 THOUSANDS/ÂΜL (ref 0.6–4.47)
LYMPHOCYTES NFR BLD AUTO: 41 % (ref 14–44)
MCH RBC QN AUTO: 30.1 PG (ref 26.8–34.3)
MCHC RBC AUTO-ENTMCNC: 32.1 G/DL (ref 31.4–37.4)
MCV RBC AUTO: 94 FL (ref 82–98)
MONOCYTES # BLD AUTO: 0.38 THOUSAND/ÂΜL (ref 0.17–1.22)
MONOCYTES NFR BLD AUTO: 20 % (ref 4–12)
NEUTROPHILS # BLD AUTO: 0.69 THOUSANDS/ÂΜL (ref 1.85–7.62)
NEUTS SEG NFR BLD AUTO: 36 % (ref 43–75)
NRBC BLD AUTO-RTO: 0 /100 WBCS
PLATELET # BLD AUTO: 142 THOUSANDS/UL (ref 149–390)
PMV BLD AUTO: 9.2 FL (ref 8.9–12.7)
POTASSIUM SERPL-SCNC: 3.6 MMOL/L (ref 3.5–5.3)
PROT SERPL-MCNC: 6.2 G/DL (ref 6.4–8.4)
RBC # BLD AUTO: 3.22 MILLION/UL (ref 3.81–5.12)
SODIUM SERPL-SCNC: 138 MMOL/L (ref 135–147)
WBC # BLD AUTO: 1.93 THOUSAND/UL (ref 4.31–10.16)

## 2025-05-12 PROCEDURE — 80053 COMPREHEN METABOLIC PANEL: CPT | Performed by: INTERNAL MEDICINE

## 2025-05-12 PROCEDURE — 85025 COMPLETE CBC W/AUTO DIFF WBC: CPT | Performed by: INTERNAL MEDICINE

## 2025-05-12 NOTE — PROGRESS NOTES
Miriam Smith  tolerated treatment well with no complications. Labs drawn from port per order. Port accessed, flushed and de-accessed.     Miriam Smith is aware of future appt on 05/14 at 0830.     AVS printed and given to Miriam Smith:  No (Declined by Miriam Smith)         
18-Dec-2021 18:55

## 2025-05-12 NOTE — TELEPHONE ENCOUNTER
C7 treatment to be postponed one week and neulasta added  Patient's son in law Rocky aware of appt changes

## 2025-05-14 ENCOUNTER — HOSPITAL ENCOUNTER (OUTPATIENT)
Dept: INFUSION CENTER | Facility: HOSPITAL | Age: 75
Discharge: HOME | End: 2025-05-14
Attending: INTERNAL MEDICINE

## 2025-05-14 DIAGNOSIS — G89.3 CANCER ASSOCIATED PAIN: ICD-10-CM

## 2025-05-14 DIAGNOSIS — K62.89 RECTAL MASS: ICD-10-CM

## 2025-05-14 DIAGNOSIS — K62.89 RECTAL PAIN: ICD-10-CM

## 2025-05-14 DIAGNOSIS — Z51.5 PALLIATIVE CARE BY SPECIALIST: ICD-10-CM

## 2025-05-14 RX ORDER — OXYCODONE HYDROCHLORIDE 5 MG/1
5 TABLET ORAL EVERY 4 HOURS PRN
Qty: 90 TABLET | Refills: 0 | Status: SHIPPED | OUTPATIENT
Start: 2025-05-14 | End: 2025-05-20 | Stop reason: SDUPTHER

## 2025-05-15 DIAGNOSIS — E11.9 TYPE 2 DIABETES MELLITUS WITHOUT COMPLICATION, WITHOUT LONG-TERM CURRENT USE OF INSULIN (HCC): ICD-10-CM

## 2025-05-15 DIAGNOSIS — Z51.5 PALLIATIVE CARE BY SPECIALIST: ICD-10-CM

## 2025-05-15 DIAGNOSIS — G89.3 CANCER ASSOCIATED PAIN: ICD-10-CM

## 2025-05-15 DIAGNOSIS — K62.89 RECTAL PAIN: ICD-10-CM

## 2025-05-15 DIAGNOSIS — K62.89 RECTAL MASS: ICD-10-CM

## 2025-05-15 RX ORDER — OXYCODONE HYDROCHLORIDE 5 MG/1
5 TABLET ORAL EVERY 4 HOURS PRN
Qty: 90 TABLET | Refills: 0 | Status: CANCELLED | OUTPATIENT
Start: 2025-05-15

## 2025-05-19 ENCOUNTER — HOSPITAL ENCOUNTER (OUTPATIENT)
Dept: INFUSION CENTER | Facility: HOSPITAL | Age: 75
Discharge: HOME | End: 2025-05-19
Payer: SUBSIDIZED

## 2025-05-19 DIAGNOSIS — C20 RECTAL CANCER (HCC): Primary | ICD-10-CM

## 2025-05-19 LAB
ALBUMIN SERPL BCG-MCNC: 3.3 G/DL (ref 3.5–5)
ALP SERPL-CCNC: 59 U/L (ref 34–104)
ALT SERPL W P-5'-P-CCNC: 28 U/L (ref 7–52)
ANION GAP SERPL CALCULATED.3IONS-SCNC: 11 MMOL/L (ref 4–13)
AST SERPL W P-5'-P-CCNC: 25 U/L (ref 13–39)
BASOPHILS # BLD AUTO: 0.02 THOUSANDS/ÂΜL (ref 0–0.1)
BASOPHILS NFR BLD AUTO: 0 % (ref 0–1)
BILIRUB SERPL-MCNC: 0.53 MG/DL (ref 0.2–1)
BUN SERPL-MCNC: 13 MG/DL (ref 5–25)
CALCIUM ALBUM COR SERPL-MCNC: 9.5 MG/DL (ref 8.3–10.1)
CALCIUM SERPL-MCNC: 8.9 MG/DL (ref 8.4–10.2)
CHLORIDE SERPL-SCNC: 102 MMOL/L (ref 96–108)
CO2 SERPL-SCNC: 21 MMOL/L (ref 21–32)
CREAT SERPL-MCNC: 0.47 MG/DL (ref 0.6–1.3)
EOSINOPHIL # BLD AUTO: 0.02 THOUSAND/ÂΜL (ref 0–0.61)
EOSINOPHIL NFR BLD AUTO: 0 % (ref 0–6)
ERYTHROCYTE [DISTWIDTH] IN BLOOD BY AUTOMATED COUNT: 16.9 % (ref 11.6–15.1)
GFR SERPL CREATININE-BSD FRML MDRD: 96 ML/MIN/1.73SQ M
GLUCOSE SERPL-MCNC: 202 MG/DL (ref 65–140)
HCT VFR BLD AUTO: 33.2 % (ref 34.8–46.1)
HGB BLD-MCNC: 10.7 G/DL (ref 11.5–15.4)
IMM GRANULOCYTES # BLD AUTO: 0.07 THOUSAND/UL (ref 0–0.2)
IMM GRANULOCYTES NFR BLD AUTO: 1 % (ref 0–2)
LYMPHOCYTES # BLD AUTO: 1.02 THOUSANDS/ÂΜL (ref 0.6–4.47)
LYMPHOCYTES NFR BLD AUTO: 19 % (ref 14–44)
MCH RBC QN AUTO: 29.9 PG (ref 26.8–34.3)
MCHC RBC AUTO-ENTMCNC: 32.2 G/DL (ref 31.4–37.4)
MCV RBC AUTO: 93 FL (ref 82–98)
MONOCYTES # BLD AUTO: 0.95 THOUSAND/ÂΜL (ref 0.17–1.22)
MONOCYTES NFR BLD AUTO: 18 % (ref 4–12)
NEUTROPHILS # BLD AUTO: 3.27 THOUSANDS/ÂΜL (ref 1.85–7.62)
NEUTS SEG NFR BLD AUTO: 62 % (ref 43–75)
NRBC BLD AUTO-RTO: 0 /100 WBCS
PLATELET # BLD AUTO: 118 THOUSANDS/UL (ref 149–390)
PMV BLD AUTO: 9.3 FL (ref 8.9–12.7)
POTASSIUM SERPL-SCNC: 3.5 MMOL/L (ref 3.5–5.3)
PROT SERPL-MCNC: 6.7 G/DL (ref 6.4–8.4)
RBC # BLD AUTO: 3.58 MILLION/UL (ref 3.81–5.12)
SODIUM SERPL-SCNC: 134 MMOL/L (ref 135–147)
WBC # BLD AUTO: 5.35 THOUSAND/UL (ref 4.31–10.16)

## 2025-05-19 PROCEDURE — 80053 COMPREHEN METABOLIC PANEL: CPT | Performed by: INTERNAL MEDICINE

## 2025-05-19 PROCEDURE — 85025 COMPLETE CBC W/AUTO DIFF WBC: CPT | Performed by: INTERNAL MEDICINE

## 2025-05-19 NOTE — PROGRESS NOTES
Miriam Smith  tolerated treatment well with no complications.  Labs drawn from port per order. Port accessed, flushed and de-accessed.    Miriam Smith is aware of future appt on 05/20 at 1100.     AVS printed and given to Miriam Smith:  No (Declined by Miriam Smith)

## 2025-05-20 ENCOUNTER — TELEPHONE (OUTPATIENT)
Age: 75
End: 2025-05-20

## 2025-05-20 ENCOUNTER — HOSPITAL ENCOUNTER (OUTPATIENT)
Dept: INFUSION CENTER | Facility: HOSPITAL | Age: 75
Discharge: HOME | End: 2025-05-20
Attending: INTERNAL MEDICINE
Payer: SUBSIDIZED

## 2025-05-20 VITALS
TEMPERATURE: 97.1 F | RESPIRATION RATE: 18 BRPM | WEIGHT: 97.88 LBS | HEIGHT: 60 IN | BODY MASS INDEX: 19.22 KG/M2 | OXYGEN SATURATION: 98 % | SYSTOLIC BLOOD PRESSURE: 151 MMHG | HEART RATE: 85 BPM | DIASTOLIC BLOOD PRESSURE: 63 MMHG

## 2025-05-20 DIAGNOSIS — G89.3 CANCER ASSOCIATED PAIN: ICD-10-CM

## 2025-05-20 DIAGNOSIS — C20 RECTAL CANCER (HCC): Primary | ICD-10-CM

## 2025-05-20 DIAGNOSIS — K62.89 RECTAL MASS: ICD-10-CM

## 2025-05-20 DIAGNOSIS — K62.89 RECTAL PAIN: ICD-10-CM

## 2025-05-20 DIAGNOSIS — Z51.5 PALLIATIVE CARE BY SPECIALIST: ICD-10-CM

## 2025-05-20 RX ORDER — SODIUM CHLORIDE 9 MG/ML
20 INJECTION, SOLUTION INTRAVENOUS ONCE AS NEEDED
Status: DISCONTINUED | OUTPATIENT
Start: 2025-05-20 | End: 2025-05-23 | Stop reason: HOSPADM

## 2025-05-20 RX ORDER — OXYCODONE HYDROCHLORIDE 5 MG/1
5 TABLET ORAL EVERY 4 HOURS PRN
Qty: 90 TABLET | Refills: 0 | OUTPATIENT
Start: 2025-05-20

## 2025-05-20 RX ORDER — DEXTROSE MONOHYDRATE 50 MG/ML
20 INJECTION, SOLUTION INTRAVENOUS ONCE
Status: COMPLETED | OUTPATIENT
Start: 2025-05-20 | End: 2025-05-20

## 2025-05-20 RX ORDER — FLUOROURACIL 50 MG/ML
320 INJECTION, SOLUTION INTRAVENOUS ONCE
Status: COMPLETED | OUTPATIENT
Start: 2025-05-20 | End: 2025-05-20

## 2025-05-20 RX ADMIN — DEXTROSE 20 ML/HR: 50 INJECTION, SOLUTION INTRAVENOUS at 12:49

## 2025-05-20 RX ADMIN — DEXAMETHASONE SODIUM PHOSPHATE: 10 INJECTION, SOLUTION INTRAMUSCULAR; INTRAVENOUS at 11:45

## 2025-05-20 RX ADMIN — LEUCOVORIN CALCIUM 461 MG: 500 INJECTION, POWDER, LYOPHILIZED, FOR SOLUTION INTRAMUSCULAR; INTRAVENOUS at 12:49

## 2025-05-20 RX ADMIN — OXALIPLATIN 97.9 MG: 5 INJECTION, SOLUTION INTRAVENOUS at 12:49

## 2025-05-20 RX ADMIN — FLUOROURACIL 460 MG: 50 INJECTION, SOLUTION INTRAVENOUS at 14:49

## 2025-05-20 RX ADMIN — SODIUM CHLORIDE 20 ML/HR: 0.9 INJECTION, SOLUTION INTRAVENOUS at 11:45

## 2025-05-20 NOTE — TELEPHONE ENCOUNTER
Medication: Oxycodone (Roxicodone)    Dose/Frequency: 5 mg/Every 4 hours as needed    Quantity: 90    Pharmacy: [facility], [place]    Office:   [] PCP/Provider -   [x] Speciality/Provider - Palliative Care    Does the patient have enough for 3 days?   [] Yes   [] No - Send as HP to POD  Unknown

## 2025-05-20 NOTE — PROGRESS NOTES
Miriam Smith  tolerated treatment well with no complications. Continuous infusion pump set up with all clamps open.     Miriam Smith is aware of future appt on 05/22 at 1300.     AVS printed and given to Miriam Smith:  No (Declined by Miriam Smith)

## 2025-05-20 NOTE — PLAN OF CARE
Problem: Potential for Falls  Goal: Patient will remain free of falls  Description: INTERVENTIONS:  - Educate patient/family on patient safety including physical limitations  - Instruct patient to call for assistance with activity   - Keep Call bell within reach  - Keep care items and personal belongings within reach  - Initiate and maintain comfort rounds  Outcome: Progressing     Problem: INFECTION - ADULT  Goal: Absence of fever/infection during neutropenic period  Description: INTERVENTIONS:  - Monitor WBC  - Perform strict hand hygiene  - Limit to healthy visitors only  - No plants, dried, fresh or silk flowers with martin in patient room  Outcome: Progressing     Problem: DISCHARGE PLANNING  Goal: Discharge to home or other facility with appropriate resources  Description: INTERVENTIONS:  - Identify barriers to discharge w/patient and caregiver  - Arrange for needed discharge resources and transportation as appropriate  - Identify discharge learning needs (meds, wound care, etc.)  - Arrange for interpretive services to assist at discharge as needed  - Refer to Case Management Department for coordinating discharge planning if the patient needs post-hospital services based on physician/advanced practitioner order or complex needs related to functional status, cognitive ability, or social support system  Outcome: Progressing     Problem: Knowledge Deficit  Goal: Patient/family/caregiver demonstrates understanding of disease process, treatment plan, medications, and discharge instructions  Description: Complete learning assessment and assess knowledge base.  Interventions:  - Provide teaching at level of understanding  - Provide teaching via preferred learning methods  Outcome: Progressing

## 2025-05-20 NOTE — TELEPHONE ENCOUNTER
Pt son in law is  requesting an rx refill of the oxyCODONE (Roxicodone) 5 immediate release tablets. He would like this sent to:    [facility]  [address], [place] [address] [phone]    This is pt's new pharmacy and the only pharmacy they would like to use for all medications. Thank you.   12-Jul-2024 21:02:34

## 2025-05-20 NOTE — TELEPHONE ENCOUNTER
Pharmacy is insisting to patients son that they did not receive rx sent on 5/14/25  Called pharmacy x 2 and got clarification that medication cannot be filled since [facility] is closing   Son did not have new  pharmacy information on hand and will callback     Reason for call:   [x] Refill   [] Prior Auth  [] Other:     Office:   [x] Specialty/Provider - Derek Alba DO/  [facility]     Medication:   oxyCODONE (Roxicodone) 5 immediate release tablet     Dose/Frequency:        Take 1 tablet (5 mg total) by mouth every 4 (four) hours as needed for moderate pain Max Daily Amount: 30 mg               Quantity: 90 tabs     Pharmacy: SON WILL CALL BACK     Local Pharmacy   Does the patient have enough for 3 days?   [] Yes   [x] No - Send as HP to POD

## 2025-05-21 ENCOUNTER — PATIENT OUTREACH (OUTPATIENT)
Dept: HEMATOLOGY ONCOLOGY | Facility: CLINIC | Age: 75
End: 2025-05-21

## 2025-05-21 ENCOUNTER — TELEPHONE (OUTPATIENT)
Age: 75
End: 2025-05-21

## 2025-05-21 RX ORDER — OXYCODONE HYDROCHLORIDE 5 MG/1
5 TABLET ORAL EVERY 4 HOURS PRN
Qty: 90 TABLET | Refills: 0 | Status: SHIPPED | OUTPATIENT
Start: 2025-05-21

## 2025-05-21 NOTE — PROGRESS NOTES
I reached out to Miriam  to  review for any barriers to care and to offer any supportive services that may be needed. I left VM with the reason for my call including my direct phone number   I will follow up once radiation consult has been completed .

## 2025-05-21 NOTE — TELEPHONE ENCOUNTER
Pawan from [facility] called to confirm patient's cancer diagnosis and requested I review her current medication list. I reviewed this information with him, he will also reach out to her old pharmacy to obtain any additional information needed.

## 2025-05-22 ENCOUNTER — HOSPITAL ENCOUNTER (OUTPATIENT)
Dept: INFUSION CENTER | Facility: HOSPITAL | Age: 75
Discharge: HOME | End: 2025-05-22
Attending: INTERNAL MEDICINE

## 2025-05-22 DIAGNOSIS — C20 RECTAL CANCER (HCC): Primary | ICD-10-CM

## 2025-05-22 NOTE — PROGRESS NOTES
Miriam Smith  tolerated treatment well with no complications.      Miriam Smith is aware of future appt on 5/23 at 1300.     AVS printed and given to Miriam Smith:  No (Declined by Miriam Smith)

## 2025-05-23 ENCOUNTER — HOSPITAL ENCOUNTER (OUTPATIENT)
Dept: INFUSION CENTER | Facility: HOSPITAL | Age: 75
End: 2025-05-23
Attending: INTERNAL MEDICINE
Payer: SUBSIDIZED

## 2025-05-23 DIAGNOSIS — C20 RECTAL CANCER (HCC): Primary | ICD-10-CM

## 2025-05-23 PROCEDURE — 96372 THER/PROPH/DIAG INJ SC/IM: CPT

## 2025-05-23 RX ADMIN — PEGFILGRASTIM 3 MG: 6 INJECTION SUBCUTANEOUS at 13:04

## 2025-05-23 NOTE — PLAN OF CARE
Problem: Potential for Falls  Goal: Patient will remain free of falls  Description: INTERVENTIONS:  - Educate patient/family on patient safety including physical limitations  - Instruct patient to call for assistance with activity   - Keep Call bell within reach  - Keep care items and personal belongings within reach  - Initiate and maintain comfort rounds  - Make Fall Risk Sign visible to staff  - Apply yellow socks and bracelet for high fall risk patients  - Consider moving patient to room near nurses station  Outcome: Progressing

## 2025-05-23 NOTE — PROGRESS NOTES
Miriam Smith  tolerated treatment well with no complications.      Miriam Smith is aware of future appt on 6/2 @ 5849.    AVS printed and given to Miriam Smith:   No (Declined by Miriam Smith)

## 2025-05-27 ENCOUNTER — HOSPITAL ENCOUNTER (OUTPATIENT)
Dept: INFUSION CENTER | Facility: HOSPITAL | Age: 75
End: 2025-05-27
Attending: INTERNAL MEDICINE

## 2025-05-27 RX ORDER — SODIUM CHLORIDE 9 MG/ML
20 INJECTION, SOLUTION INTRAVENOUS ONCE AS NEEDED
Status: CANCELLED | OUTPATIENT
Start: 2025-06-03

## 2025-05-27 RX ORDER — FLUOROURACIL 50 MG/ML
320 INJECTION, SOLUTION INTRAVENOUS ONCE
Status: CANCELLED | OUTPATIENT
Start: 2025-06-03

## 2025-05-27 RX ORDER — DEXTROSE MONOHYDRATE 50 MG/ML
20 INJECTION, SOLUTION INTRAVENOUS ONCE
Status: CANCELLED | OUTPATIENT
Start: 2025-06-03

## 2025-05-28 DIAGNOSIS — C20 RECTAL CANCER (HCC): Primary | ICD-10-CM

## 2025-05-29 ENCOUNTER — HOSPITAL ENCOUNTER (OUTPATIENT)
Dept: INFUSION CENTER | Facility: HOSPITAL | Age: 75
End: 2025-05-29
Attending: INTERNAL MEDICINE

## 2025-05-29 ENCOUNTER — OFFICE VISIT (OUTPATIENT)
Dept: PALLIATIVE MEDICINE | Facility: CLINIC | Age: 75
End: 2025-05-29

## 2025-05-29 VITALS
RESPIRATION RATE: 18 BRPM | WEIGHT: 103 LBS | SYSTOLIC BLOOD PRESSURE: 100 MMHG | OXYGEN SATURATION: 95 % | HEIGHT: 60 IN | BODY MASS INDEX: 20.22 KG/M2 | DIASTOLIC BLOOD PRESSURE: 52 MMHG | TEMPERATURE: 97.7 F | HEART RATE: 85 BPM

## 2025-05-29 DIAGNOSIS — K62.89 RECTAL PAIN: ICD-10-CM

## 2025-05-29 DIAGNOSIS — G89.3 CANCER RELATED PAIN: ICD-10-CM

## 2025-05-29 DIAGNOSIS — Z51.5 PALLIATIVE CARE ENCOUNTER: ICD-10-CM

## 2025-05-29 DIAGNOSIS — C20 RECTAL CANCER (HCC): Primary | ICD-10-CM

## 2025-05-29 PROCEDURE — 99214 OFFICE O/P EST MOD 30 MIN: CPT | Performed by: STUDENT IN AN ORGANIZED HEALTH CARE EDUCATION/TRAINING PROGRAM

## 2025-05-29 NOTE — PROGRESS NOTES
Name: Miriam Smith      : 1950      MRN: [id number]  Encounter Provider: Derke Alba DO  Encounter Date: 2025   Encounter department: [facility]  :  Assessment & Plan  Rectal cancer (HCC)  Dr. Perea - Med/Onc  -treatment with FOLFOX6 modified  Tolerating treatments well  No intractable nausea or vomiting.  Rectal pain is well managed with Oxycodone regimen PRN    Does note in the past 2 days that she has had prolapse at the site of the colostomy. I did reach out to Colorectal surgery, Dr. Knox, who previously saw patient. Image taken and reviewed in which recommendations were provided to have ongoing monitoring, keeping stools soft with Miralax (for which patient has been using PRN), along with patient to push prolapsed portion back into stoma..  Patient denies any pain or troubles with stoma output. No discoloration or concerning signs of the stoma on evaluation.    ER precautions provided to patient and daughter, Coco (who served as patient's ) for red flag signs not limited to pain, if stoma is not functioning, or change in color to stoma concerning for pallor/decreased blood flow).         Palliative care encounter  Psychosocial   Supportive listening provided  Normalized experience of patient/family  Provided anxiety containment     Referrals Placed / Medical Equipment Ordered  -None    Follow-Up Recommendations  -Follow-up with PCP and current medical specialists  -Follow-up with palliative care: 6 weeks         Rectal pain  Well controlled with Oxycodone PRN  Uncomplicated.       Cancer related pain  Well controlled with OxyIR 5mg q4 hours PRN.  Can continue as patient is tolerating without issues.  No constipation issues.  Bowel regimen to avoid OIC.  Miralax PRN.    Medication safety issues - Do not drive under the influence of narcotics (including opioids), watch for adverse effects including confusion / altered mental status /  respiratory depression (slowed breathing), keep medications stored in a safe/locked environment, do not use alcohol while opioids or other narcotics are in your system. Do not travel with more than the minimum number of tablets or capsules required for the trip.           PDMP Review: I have reviewed the patient's controlled substance dispensing history in the Prescription Drug Monitoring Program in compliance with the [facility] regulations before prescribing any controlled substances.    History of Present Illness   Miriam Smith is a 75 y.o. female who presents for follow-up.    Patient is seen today in office. Alert, oriented, pleasantly conversant.  Patient is seen in NAD.    Appetite has been stable without intractable nausea or vomiting issues.    Pain - well controlled with current analgesic regimen.    Patient is well supported by family (daughter and NED).          Medical History Reviewed by provider this encounter:  Tobacco  Allergies  Meds  Problems  Med Hx  Surg Hx  Fam Hx     .  Past Medical History   Past Medical History[1]  Past Surgical History[2]  Family History[3]   reports that she has never smoked. She has never used smokeless tobacco. She reports that she does not currently use alcohol. She reports that she does not currently use drugs.  Current Outpatient Medications   Medication Instructions    atorvastatin (LIPITOR) 40 mg, Oral, Daily    benzonatate (TESSALON PERLES) 100 mg, Oral, 3 times daily PRN    Blood Glucose Monitoring Suppl (OneTouch Verio Reflect) w/Device KIT Check blood sugars twice daily. Please substitute with appropriate alternative as covered by patient's insurance. Dx: E11.65    calcium carbonate (OS-MARY) 600 mg, Oral, 2 times daily with meals    Cholecalciferol (VITAMIN D3) 1,000 Units, Oral, Daily    [START ON 6/3/2025] fluorouracil 2,765 mg in CADD/Elastomeric Infusion Device 960 mg/m2/day, Intravenous, Over 46 hours    glucose blood (OneTouch Verio) test strip  Check blood sugars twice daily. Please substitute with appropriate alternative as covered by patient's insurance. Dx: E11.65    lidocaine-prilocaine (EMLA) cream Apply a thin layer over PAC site prior to access prn    loratadine (CLARITIN) 10 mg, Oral, Daily    metFORMIN (GLUCOPHAGE) 1000 MG tablet take 1 tablet by mouth EVERY MORNING WITH BREAKFAST    ondansetron (ZOFRAN) 8 mg, Oral, Every 8 hours PRN    OneTouch Delica Lancets 33G MISC Check blood sugars twice daily. Please substitute with appropriate alternative as covered by patient's insurance. Dx: E11.65    oxyCODONE (ROXICODONE) 5 mg, Oral, Every 4 hours PRN    polyethylene glycol (MIRALAX) 17 g, Oral, Daily    senna-docusate sodium (SENOKOT S) 8.6-50 mg per tablet 1 tablet, Oral, 2 times daily    sitaGLIPtin (JANUVIA) 50 mg, Oral, Daily   Allergies[4]   Medications Ordered Prior to Encounter[5]   Social History[6]     Objective   Ht 5' (1.524 m)   Wt 46.7 kg (103 lb)   BMI 20.12 kg/m²     Physical Exam  Vitals reviewed.   Constitutional:       General: She is not in acute distress.     Appearance: She is not ill-appearing, toxic-appearing or diaphoretic.   HENT:      Head: Normocephalic and atraumatic.      Nose: Nose normal.     Eyes:      General:         Right eye: No discharge.         Left eye: No discharge.       Cardiovascular:      Rate and Rhythm: Normal rate.   Pulmonary:      Effort: Pulmonary effort is normal. No respiratory distress.   Abdominal:      General: Abdomen is flat. There is no distension.      Comments: Prolapse of stoma noted. Picture attached to note and in Media. Color is appropriate without pallor. No pain and no signs of infection /drainage/wound to integument or stoma.     Musculoskeletal:         General: No swelling.     Skin:     General: Skin is warm and dry.      Coloration: Skin is not jaundiced or pale.     Neurological:      General: No focal deficit present.      Mental Status: She is alert. Mental status is at  baseline.     Psychiatric:         Mood and Affect: Mood normal.         Behavior: Behavior normal.         Thought Content: Thought content normal.         Judgment: Judgment normal.         Recent labs:  Lab Results   Component Value Date/Time    SODIUM 134 (L) 05/19/2025 09:06 AM    K 3.5 05/19/2025 09:06 AM    BUN 13 05/19/2025 09:06 AM    CREATININE 0.47 (L) 05/19/2025 09:06 AM    GLUC 202 (H) 05/19/2025 09:06 AM    CALCIUM 8.9 05/19/2025 09:06 AM    AST 25 05/19/2025 09:06 AM    ALT 28 05/19/2025 09:06 AM    ALB 3.3 (L) 05/19/2025 09:06 AM    TP 6.7 05/19/2025 09:06 AM    EGFR 96 05/19/2025 09:06 AM     Lab Results   Component Value Date/Time    HGB 10.7 (L) 05/19/2025 09:06 AM    WBC 5.35 05/19/2025 09:06 AM     (L) 05/19/2025 09:06 AM    INR 1.17 11/12/2024 04:36 AM     Lab Results   Component Value Date/Time    [id number] 2.328 01/08/2025 02:49 PM       Recent Imaging:  Procedure: XR wrist 3+ vw right  Result Date: 4/26/2025  Impression: Healing nondisplaced distal radius fracture. Electronically signed: 04/26/2025 10:46 AM Jh Olivarez MPH,MD    Procedure: Mammo screening bilateral w 3d and cad  Result Date: 3/28/2025  Impression: No mammographic evidence of malignancy. ASSESSMENT/BI-RADS CATEGORY: Left: 1 - Negative Right: 1 - Negative Overall: 1 - Negative RECOMMENDATION:      - Routine screening mammogram in 1 year for both breasts. Workstation ID: [id number] Signed by:  Keely Gage MD     Procedure: XR wrist 3+ vw right  Result Date: 3/26/2025  Impression: Subacute nondisplaced fracture of the distal radius. . Electronically signed: 03/26/2025 05:56 PM Colton Jean MD    Procedure: DXA bone density spine hip and pelvis  Result Date: 3/14/2025  Impression: 1. Osteoporosis. Based on the left radius 2.  The 10 year risk of hip fracture is 3.3% with the 10 year risk of major osteoporotic fracture being 9.9% as calculated by the [employer] fracture risk assessment  tool (FRAX, which is based on data generated by the WHO Collaborating [place] for Metabolic Bone Diseases). 3.  The current Bone Health and Osteoporosis Foundation (BHOF) guidelines recommend treating patients with a T-score of -2.5 or less in the lumbar spine or hips, or in post-menopausal women and men over the age of 50 with low bone mass (osteopenia; T-score between -1.0 and -2.5) and a FRAX 10 year risk score of > 3% for hip fracture and/or > 20% for major osteoporosis-related fracture (clinical vertebral, hip, forearm, or proximal humerus). 4.  The BHOF recommends follow-up DXA in 1-2 years after initiating or changing medical therapy for osteoporosis and at appropriate intervals thereafter, according to clinical circumstances. More frequent BMD testing may be warranted in higher-risk individuals (multiple fractures, older age, very low BMD). Less frequent BMD testing is warranted as follow-up in patients with initial T-scores in the normal or slightly below normal range (osteopenia) and for patients who have remained fracture free on  treatment. The FRAX algorithm has certain limitations: -FRAX has not been validated in patients currently or previously treated with pharmacotherapy for osteoporosis.  In such patients, clinical judgment must be exercised in interpreting FRAX scores. -Prior hip, vertebral and humeral fragility fractures appear to confer greater risk of subsequent fracture than fractures at other sites (this is especially true for individuals with severe vertebral fractures), but quantification of this incremental risk is not possible with FRAX. -FRAX underestimates fracture risk in patients with history of multiple fragility fractures. -FRAX may underestimate fracture risk in patients with history of frequent falls. -It is not appropriate to use FRAX to monitor treatment response. WHO CLASSIFICATION: Normal (a T-score of -1.0 or higher) Low bone mineral density (a T-score of less than -1.0 but  higher than -2.5) Osteoporosis (a T-score of -2.5 or less) Severe osteoporosis (a T-score of -2.5 or less with a fragility fracture) SELECTED REFERENCES: Nicholas MS, Vikki SL, Myron KL, et al. The clinician's guide to prevention and treatment of osteoporosis. Osteoporos Int 2022; 33:2643-4010. Paola D, Rosales SB, Bri A, et al. DXA reporting updates: 2023 Official Positions of the International Society for Clinical Densitometry. J Clin Densitom 2024; 27: 325115 (https://doi.org/10.1016/j.jocd.2023.179799). Workstation performed: [id number]     Procedure: XR spine thoracic 3 views  Result Date: 3/3/2025  Impression: Slight loss of stature at T11 with superior endplate compression deformity which is new since the CT scan of 1/8/2025 suggesting subacute injury. Anterolisthesis C4 on C5. Computerized Assisted Algorithm (CAA) may have been used to analyze all applicable images. Workstation performed: [id number]     Procedure: CT abdomen pelvis w contrast  Result Date: 3/3/2025  Impression: Mild subacute appearing compression fracture of the superior endplate of T11 without retropulsion new since 1/8/2025. No evidence of acute intraabdominopelvic process. Stable lower rectal mass. Smaller left ischioanal abscess. Additional chronic findings and negatives as above. The study was marked in EPIC for immediate notification. Workstation performed: [id number]     Procedure: XR wrist 3+ vw right  Result Date: 2/26/2025  Impression: Possible nondisplaced intra-articular fracture of the distal radius as above. If clinically warranted, this can be further evaluated with CT Computerized Assisted Algorithm (CAA) may have been used to analyze all applicable images. Workstation performed: [id number]     Procedure: CT head wo contrast  Result Date: 2/26/2025  Impression: No acute intracranial abnormality. Workstation performed: [id number]       Administrative Statements   I have spent a total time of 33 minutes in caring for  this patient on the day of the visit/encounter including Risks and benefits of tx options, Instructions for management, Patient and family education, Importance of tx compliance, Risk factor reductions, Impressions, Counseling / Coordination of care, Documenting in the medical record, Reviewing/placing orders in the medical record (including tests, medications, and/or procedures), and Obtaining or reviewing history  .   Topics discussed with the patient / family include symptom assessment and management, medication review, psychosocial support, supportive listening, and anticipatory guidance.         [1]   Past Medical History:  Diagnosis Date    Diabetes mellitus (HCC)     Hypercholesteremia     Rectal cancer (HCC)    [2]   Past Surgical History:  Procedure Laterality Date    COLONOSCOPY      ILEOSTOMY N/A 12/24/2024    Procedure: LAPAROSCOPIC DIVERTING sigmoid end-loop colostomy;  Surgeon: SAI Bermudez MD;  [facility]: [facility];  Service: Colorectal    INCISION AND DRAINAGE OF WOUND N/A 12/24/2024    Procedure: INCISION AND DRAINAGE (I&D) BUTTOCK abscess;  Surgeon: SAI Bermudez MD;  Location: [facility];  Service: Colorectal    IR PORT PLACEMENT  1/6/2025   [3]   Family History  Problem Relation Name Age of Onset    Colon cancer Sister     [4]   Allergies  Allergen Reactions    Motrin [Ibuprofen] Itching    Tylenol [Acetaminophen] Itching    Penicillins Rash     Rash and bruise    Sulfa Antibiotics Rash     Price velia syndrome   [5]   Current Outpatient Medications on File Prior to Visit   Medication Sig Dispense Refill    atorvastatin (LIPITOR) 40 mg tablet Take 1 tablet (40 mg total) by mouth daily 30 tablet 3    Blood Glucose Monitoring Suppl (OneTouch Verio Reflect) w/Device KIT Check blood sugars twice daily. Please substitute with appropriate alternative as covered by patient's insurance. Dx: E11.65 1 kit 0    calcium carbonate (OS-MARY) 600 MG tablet Take 1 tablet (600 mg total) by mouth 2  (two) times a day with meals 60 tablet 0    Cholecalciferol (VITAMIN D3) 1,000 units tablet Take 1 tablet (1,000 Units total) by mouth daily 90 tablet 1    [START ON 6/3/2025] fluorouracil 2,765 mg in CADD/Elastomeric Infusion Device Infuse 2,765 mg (960 mg/m2/day x 1.44 m2) into a catheter in a vein via infusion device over 46 hours for 2 days  Infusion planned for [place] 3, 2025. 1 each 1    glucose blood (OneTouch Verio) test strip Check blood sugars twice daily. Please substitute with appropriate alternative as covered by patient's insurance. Dx: E11.65 200 each 3    lidocaine-prilocaine (EMLA) cream Apply a thin layer over PAC site prior to access prn 30 g 5    metFORMIN (GLUCOPHAGE) 1000 MG tablet take 1 tablet by mouth EVERY MORNING WITH BREAKFAST 30 tablet 0    ondansetron (ZOFRAN) 8 mg tablet Take 1 tablet (8 mg total) by mouth every 8 (eight) hours as needed for nausea or vomiting 20 tablet 5    OneTouch Delica Lancets 33G MISC Check blood sugars twice daily. Please substitute with appropriate alternative as covered by patient's insurance. Dx: E11.65 200 each 3    oxyCODONE (Roxicodone) 5 immediate release tablet Take 1 tablet (5 mg total) by mouth every 4 (four) hours as needed for moderate pain Max Daily Amount: 30 mg 90 tablet 0    polyethylene glycol (MIRALAX) 17 g packet Take 17 g by mouth daily 510 g 0    senna-docusate sodium (SENOKOT S) 8.6-50 mg per tablet Take 1 tablet by mouth 2 (two) times a day 60 tablet 0    benzonatate (TESSALON PERLES) 100 mg capsule Take 1 capsule (100 mg total) by mouth 3 (three) times a day as needed for cough (Patient not taking: Reported on 5/29/2025) 20 capsule 0    loratadine (CLARITIN) 10 mg tablet Take 1 tablet (10 mg total) by mouth daily (Patient not taking: Reported on 5/29/2025) 30 tablet 0    sitaGLIPtin (JANUVIA) 50 mg tablet Take 1 tablet (50 mg total) by mouth daily (Patient not taking: Reported on 5/29/2025) 30 tablet 5     No current facility-administered  medications on file prior to visit.   [6]   Social History  Tobacco Use    Smoking status: Never    Smokeless tobacco: Never   Vaping Use    Vaping status: Never Used   Substance and Sexual Activity    Alcohol use: Not Currently    Drug use: Not Currently

## 2025-05-29 NOTE — ASSESSMENT & PLAN NOTE
Well controlled with OxyIR 5mg q4 hours PRN.  Can continue as patient is tolerating without issues.  No constipation issues.  Bowel regimen to avoid OIC.  Miralax PRN.    Medication safety issues - Do not drive under the influence of narcotics (including opioids), watch for adverse effects including confusion / altered mental status / respiratory depression (slowed breathing), keep medications stored in a safe/locked environment, do not use alcohol while opioids or other narcotics are in your system. Do not travel with more than the minimum number of tablets or capsules required for the trip.

## 2025-05-29 NOTE — ASSESSMENT & PLAN NOTE
Dr. Perea - Johann/Onc  -treatment with FOLFOX6 modified  Tolerating treatments well  No intractable nausea or vomiting.  Rectal pain is well managed with Oxycodone regimen PRN    Does note in the past 2 days that she has had prolapse at the site of the colostomy. I did reach out to Colorectal surgery, Dr. Knox, who previously saw patient. Image taken and reviewed in which recommendations were provided to have ongoing monitoring, keeping stools soft with Miralax (for which patient has been using PRN), along with patient to push prolapsed portion back into stoma..  Patient denies any pain or troubles with stoma output. No discoloration or concerning signs of the stoma on evaluation.    ER precautions provided to patient and daughter, Coco (who served as patient's ) for red flag signs not limited to pain, if stoma is not functioning, or change in color to stoma concerning for pallor/decreased blood flow).

## 2025-06-02 ENCOUNTER — HOSPITAL ENCOUNTER (OUTPATIENT)
Dept: INFUSION CENTER | Facility: HOSPITAL | Age: 75
Discharge: HOME | End: 2025-06-02
Payer: SUBSIDIZED

## 2025-06-02 ENCOUNTER — OFFICE VISIT (OUTPATIENT)
Dept: OBGYN CLINIC | Facility: CLINIC | Age: 75
End: 2025-06-02

## 2025-06-02 VITALS
DIASTOLIC BLOOD PRESSURE: 50 MMHG | OXYGEN SATURATION: 88 % | HEART RATE: 88 BPM | WEIGHT: 101.4 LBS | SYSTOLIC BLOOD PRESSURE: 104 MMHG | BODY MASS INDEX: 19.8 KG/M2

## 2025-06-02 DIAGNOSIS — N89.8 VAGINAL MASS: Primary | ICD-10-CM

## 2025-06-02 DIAGNOSIS — C20 RECTAL CANCER (HCC): Primary | ICD-10-CM

## 2025-06-02 LAB
ALBUMIN SERPL BCG-MCNC: 3.7 G/DL (ref 3.5–5)
ALP SERPL-CCNC: 86 U/L (ref 34–104)
ALT SERPL W P-5'-P-CCNC: 9 U/L (ref 7–52)
ANION GAP SERPL CALCULATED.3IONS-SCNC: 11 MMOL/L (ref 4–13)
AST SERPL W P-5'-P-CCNC: 11 U/L (ref 13–39)
BASOPHILS # BLD AUTO: 0.06 THOUSANDS/ÂΜL (ref 0–0.1)
BASOPHILS NFR BLD AUTO: 1 % (ref 0–1)
BILIRUB SERPL-MCNC: 0.32 MG/DL (ref 0.2–1)
BUN SERPL-MCNC: 11 MG/DL (ref 5–25)
CALCIUM SERPL-MCNC: 9.2 MG/DL (ref 8.4–10.2)
CHLORIDE SERPL-SCNC: 101 MMOL/L (ref 96–108)
CO2 SERPL-SCNC: 25 MMOL/L (ref 21–32)
CREAT SERPL-MCNC: 0.61 MG/DL (ref 0.6–1.3)
EOSINOPHIL # BLD AUTO: 0.04 THOUSAND/ÂΜL (ref 0–0.61)
EOSINOPHIL NFR BLD AUTO: 0 % (ref 0–6)
ERYTHROCYTE [DISTWIDTH] IN BLOOD BY AUTOMATED COUNT: 16.9 % (ref 11.6–15.1)
GFR SERPL CREATININE-BSD FRML MDRD: 88 ML/MIN/1.73SQ M
GLUCOSE SERPL-MCNC: 219 MG/DL (ref 65–140)
HCT VFR BLD AUTO: 34.5 % (ref 34.8–46.1)
HGB BLD-MCNC: 10.8 G/DL (ref 11.5–15.4)
IMM GRANULOCYTES # BLD AUTO: 0.05 THOUSAND/UL (ref 0–0.2)
IMM GRANULOCYTES NFR BLD AUTO: 1 % (ref 0–2)
LYMPHOCYTES # BLD AUTO: 1.35 THOUSANDS/ÂΜL (ref 0.6–4.47)
LYMPHOCYTES NFR BLD AUTO: 13 % (ref 14–44)
MCH RBC QN AUTO: 30 PG (ref 26.8–34.3)
MCHC RBC AUTO-ENTMCNC: 31.3 G/DL (ref 31.4–37.4)
MCV RBC AUTO: 96 FL (ref 82–98)
MONOCYTES # BLD AUTO: 1.12 THOUSAND/ÂΜL (ref 0.17–1.22)
MONOCYTES NFR BLD AUTO: 11 % (ref 4–12)
NEUTROPHILS # BLD AUTO: 7.63 THOUSANDS/ÂΜL (ref 1.85–7.62)
NEUTS SEG NFR BLD AUTO: 74 % (ref 43–75)
NRBC BLD AUTO-RTO: 0 /100 WBCS
PLATELET # BLD AUTO: 164 THOUSANDS/UL (ref 149–390)
PMV BLD AUTO: 9.3 FL (ref 8.9–12.7)
POTASSIUM SERPL-SCNC: 4 MMOL/L (ref 3.5–5.3)
PROT SERPL-MCNC: 6.7 G/DL (ref 6.4–8.4)
RBC # BLD AUTO: 3.6 MILLION/UL (ref 3.81–5.12)
SODIUM SERPL-SCNC: 137 MMOL/L (ref 135–147)
WBC # BLD AUTO: 10.25 THOUSAND/UL (ref 4.31–10.16)

## 2025-06-02 PROCEDURE — 85025 COMPLETE CBC W/AUTO DIFF WBC: CPT | Performed by: INTERNAL MEDICINE

## 2025-06-02 PROCEDURE — 88305 TISSUE EXAM BY PATHOLOGIST: CPT | Performed by: PATHOLOGY

## 2025-06-02 PROCEDURE — 88342 IMHCHEM/IMCYTCHM 1ST ANTB: CPT | Performed by: PATHOLOGY

## 2025-06-02 PROCEDURE — 80053 COMPREHEN METABOLIC PANEL: CPT | Performed by: INTERNAL MEDICINE

## 2025-06-02 PROCEDURE — 57100 BIOPSY VAGINAL MUCOSA SIMPLE: CPT | Performed by: NURSE PRACTITIONER

## 2025-06-02 PROCEDURE — 88341 IMHCHEM/IMCYTCHM EA ADD ANTB: CPT | Performed by: PATHOLOGY

## 2025-06-02 PROCEDURE — 99203 OFFICE O/P NEW LOW 30 MIN: CPT | Performed by: NURSE PRACTITIONER

## 2025-06-02 NOTE — PROGRESS NOTES
Labs drawn via port    Miriam Smith is aware of future appt on 6/3 at 1000.     AVS printed and given to Miriam Smith:   No (Declined by Miriam Smith)

## 2025-06-02 NOTE — PROGRESS NOTES
Subjective    HPI:     Miriam Smith is a 75 y.o. female accompanied by her daughter. She is a  5 Para 4, with  x 4. She is single. She is not sexually active.  She denies  and Gyn complaints.     She has a ileostomy that was performed in December due to rectal cancer. Currently receiving Chemo for rectal cancer. She has 4 more sessions and then radiation will begin.  Daughter states that oncologist is concerned that the rectal cancer may have spread into the vagina. She is being followed by Dr. Perea.     She denies depression/anxiety.  Medical, surgical and family history reviewed.     Visit Vitals  /50 (BP Location: Right arm, Patient Position: Sitting, Cuff Size: Standard)   Pulse 88   Wt 46 kg (101 lb 6.4 oz)   SpO2 (!) 88%   BMI 19.80 kg/m²   OB Status Postmenopausal   Smoking Status Never   BSA 1.4 m²       Gynecologic History    No LMP recorded. Patient is postmenopausal.    Last mammogram: 3/24/2025. Results were: normal    Colonoscopy: 2024 - Single malignant-appearing, friable, fungating and ulcerated mass (traversable) measuring 15 cm in the rectum and anal region, covering one half of the circumference; performed cold forceps biopsy with partial removal. The mass was extending up to the dentate line.     DXA scan: 3/12/2025 - osteoporosis    Obstetric and Medical History    OB History    Para Term  AB Living   5    1 4   SAB IAB Ectopic Multiple Live Births   1    4      # Outcome Date GA Lbr Jesse/2nd Weight Sex Type Anes PTL Lv   5             4             3             2             1 SAB                Past Medical History[1]    Past Surgical History[2]    The following portions of the patient's history were reviewed and updated as appropriate: allergies, current medications, past family history, past medical history, past social history, past surgical history, and problem list.    Review of Systems    Pertinent items are  noted in HPI.      Objective    Physical Exam  Constitutional:       Appearance: Normal appearance. She is well-developed.   Genitourinary:      Vulva, bladder and urethral meatus normal.      Genitourinary Comments: Unable to visualize cervix. There is a soft mass noted along the vaginal wall. There is a small amount of bleeding noted. See procedure note of biopsy collected.      Right Labia: No rash, tenderness, lesions, skin changes or Bartholin's cyst.     Left Labia: No tenderness, lesions, skin changes, Bartholin's cyst or rash.     No labial fusion noted.      No inguinal adenopathy present in the right or left side.     Pelvic exam was performed with patient in the lithotomy position.   Breasts:     Breasts are symmetrical.      Right: No inverted nipple, mass, nipple discharge, skin change or tenderness.      Left: No inverted nipple, mass, nipple discharge, skin change or tenderness.   HENT:      Head: Normocephalic and atraumatic.   Neck:      Thyroid: No thyromegaly.     Cardiovascular:      Rate and Rhythm: Normal rate and regular rhythm.      Heart sounds: Normal heart sounds, S1 normal and S2 normal.   Pulmonary:      Effort: Pulmonary effort is normal.      Breath sounds: Normal breath sounds.   Abdominal:      General: Bowel sounds are normal. There is no distension.      Palpations: Abdomen is soft. There is no mass.      Tenderness: There is no abdominal tenderness. There is no guarding.      Hernia: There is no hernia in the left inguinal area or right inguinal area.     Musculoskeletal:      Cervical back: Neck supple.   Lymphadenopathy:      Cervical: No cervical adenopathy.      Upper Body:      Right upper body: No supraclavicular or axillary adenopathy.      Left upper body: No supraclavicular or axillary adenopathy.      Lower Body: No right inguinal adenopathy. No left inguinal adenopathy.     Neurological:      Mental Status: She is alert.     Skin:     General: Skin is warm and dry.       Findings: No rash.     Psychiatric:         Attention and Perception: Attention and perception normal.         Mood and Affect: Mood and affect normal.         Speech: Speech normal.         Behavior: Behavior is cooperative.         Thought Content: Thought content normal.         Cognition and Memory: Cognition and memory normal.         Judgment: Judgment normal.   Vitals and nursing note reviewed.          Assessment and Plan    Diagnoses and all orders for this visit:    Vaginal mass  -     Tissue Exam  -     Biopsy    Other orders  -     Ambulatory Referral to Obstetrics / Gynecology          Dr. Rosales called to bedside for a second opinion of my findings. She agreed that it appears that the rectal cancer has extended into the vaginal wall. Recommended biopsy of mass at time of visit and to consult with GynOnc.     We reviewed exam findings with the patient and daughter. Verbal consent received for biopsy. See procedure note.     I explained that I will be contact with GynOnc with the findings given her current dx of rectal Ca and see what the next step would be for her. She will receive either a call from myself or from a staff member from the oncology office.     All questions have been answered to her satisfaction.               [1]   Past Medical History:  Diagnosis Date    Diabetes mellitus (HCC)     Hypercholesteremia     Rectal cancer (HCC)    [2]   Past Surgical History:  Procedure Laterality Date    COLONOSCOPY      ILEOSTOMY N/A 12/24/2024    Procedure: LAPAROSCOPIC DIVERTING sigmoid end-loop colostomy;  Surgeon: SAI Bermudez MD;  Location: [facility];  Service: Colorectal    INCISION AND DRAINAGE OF WOUND N/A 12/24/2024    Procedure: INCISION AND DRAINAGE (I&D) BUTTOCK abscess;  Surgeon: SAI Bermudez MD;  Location[facility];  Service: Colorectal    IR PORT PLACEMENT  1/6/2025

## 2025-06-02 NOTE — PROGRESS NOTES
Biopsy    Date/Time: 6/2/2025 3:00 PM    Performed by: Susu Rosales MD  Authorized by: CHIP Sosa    Universal Protocol:  Consent: Verbal consent obtained  Risks and benefits: risks, benefits and alternatives were discussed  Consent given by: patient  Timeout called at: 6/2/2025 3:20 PM.  Patient understanding: patient states understanding of the procedure being performed  Patient identity confirmed: verbally with patient    Procedure Details - Lesion Biopsy:     Biopsy tissue type: Vaginal mass.    Malignancy: malignant lesion       White/yellow vaginal soft mass noted, appears to be extending from rectal area. Small sample obtained and sent to pathology.

## 2025-06-02 NOTE — Clinical Note
Davy VAZQUEZ,   I just saw this patient with Dr. Rosales. She is undergoing treatment with Dr. Perea for rectal cancer. Currently receiving chemo, with the plan of starting radiation once she completes the chemo therapy. She was referred to Gyn with concerns that the cancer may have invaded the vagina. On exam there is a soft mass noted. It does appear that the CA has extended into the vaginal wall. A sample of the mass was collected and sent to pathology. Should she be seen by GynOnc?  Thanks for your input on this.   Reilly

## 2025-06-03 ENCOUNTER — HOSPITAL ENCOUNTER (OUTPATIENT)
Dept: INFUSION CENTER | Facility: HOSPITAL | Age: 75
Discharge: HOME | End: 2025-06-03
Attending: INTERNAL MEDICINE
Payer: SUBSIDIZED

## 2025-06-03 ENCOUNTER — OFFICE VISIT (OUTPATIENT)
Dept: HEMATOLOGY ONCOLOGY | Facility: MEDICAL CENTER | Age: 75
End: 2025-06-03

## 2025-06-03 VITALS
HEART RATE: 83 BPM | WEIGHT: 97.88 LBS | RESPIRATION RATE: 16 BRPM | DIASTOLIC BLOOD PRESSURE: 58 MMHG | HEIGHT: 60 IN | SYSTOLIC BLOOD PRESSURE: 112 MMHG | BODY MASS INDEX: 19.22 KG/M2 | OXYGEN SATURATION: 97 % | TEMPERATURE: 99.1 F

## 2025-06-03 DIAGNOSIS — C20 RECTAL CANCER (HCC): Primary | ICD-10-CM

## 2025-06-03 PROCEDURE — G0498 CHEMO EXTEND IV INFUS W/PUMP: HCPCS

## 2025-06-03 PROCEDURE — 96367 TX/PROPH/DG ADDL SEQ IV INF: CPT

## 2025-06-03 PROCEDURE — 96368 THER/DIAG CONCURRENT INF: CPT

## 2025-06-03 PROCEDURE — 96415 CHEMO IV INFUSION ADDL HR: CPT

## 2025-06-03 PROCEDURE — 96413 CHEMO IV INFUSION 1 HR: CPT

## 2025-06-03 PROCEDURE — 96411 CHEMO IV PUSH ADDL DRUG: CPT

## 2025-06-03 PROCEDURE — 99214 OFFICE O/P EST MOD 30 MIN: CPT | Performed by: INTERNAL MEDICINE

## 2025-06-03 RX ORDER — SODIUM CHLORIDE 9 MG/ML
20 INJECTION, SOLUTION INTRAVENOUS ONCE AS NEEDED
Status: DISCONTINUED | OUTPATIENT
Start: 2025-06-03 | End: 2025-06-06 | Stop reason: HOSPADM

## 2025-06-03 RX ORDER — FLUOROURACIL 50 MG/ML
320 INJECTION, SOLUTION INTRAVENOUS ONCE
Status: COMPLETED | OUTPATIENT
Start: 2025-06-03 | End: 2025-06-03

## 2025-06-03 RX ORDER — DEXTROSE MONOHYDRATE 50 MG/ML
20 INJECTION, SOLUTION INTRAVENOUS ONCE
Status: COMPLETED | OUTPATIENT
Start: 2025-06-03 | End: 2025-06-03

## 2025-06-03 RX ADMIN — LEUCOVORIN CALCIUM 461 MG: 500 INJECTION, POWDER, LYOPHILIZED, FOR SOLUTION INTRAMUSCULAR; INTRAVENOUS at 11:35

## 2025-06-03 RX ADMIN — DEXAMETHASONE SODIUM PHOSPHATE: 10 INJECTION, SOLUTION INTRAMUSCULAR; INTRAVENOUS at 10:37

## 2025-06-03 RX ADMIN — DEXTROSE 20 ML/HR: 50 INJECTION, SOLUTION INTRAVENOUS at 11:35

## 2025-06-03 RX ADMIN — OXALIPLATIN 97.9 MG: 5 INJECTION, SOLUTION INTRAVENOUS at 11:35

## 2025-06-03 RX ADMIN — FLUOROURACIL 460 MG: 50 INJECTION, SOLUTION INTRAVENOUS at 13:42

## 2025-06-03 RX ADMIN — SODIUM CHLORIDE 20 ML/HR: 0.9 INJECTION, SOLUTION INTRAVENOUS at 10:38

## 2025-06-03 NOTE — PROGRESS NOTES
Miriam Smith  tolerated treatment well with no complications. Continuous infusion pump set up with all clamps open.     Miriam Smith is aware of future appt on 06/05 at 1200.     AVS printed and given to Miriam Smith:  Yes

## 2025-06-03 NOTE — PROGRESS NOTES
Miriam PALLAVI José Miguel Smith  1950  [facility]  [address] [place] [id number]    DISCUSSION/SUMMARY:    Approximately 35 minutes was spent with the patient/daughter/son-in-law in direct consultation/evaluation and with reviewing the chart.  Patient was seen in the chemotherapy infusion room    75 year-old female recently admitted to the hospital with abdominal pain and weight loss.  Workup demonstrated a rectal mass (the mass was originally biopsied in [facility]).  Issues:    Gynecology.  Patient was seen by gynecology yesterday.  GYN exam demonstrated a soft mass in the vaginal wall with a small amount of bleeding.  Biopsy was performed.  The obvious concern is additional rectal cancer, possibly another malignancy.  Pathology results are pending.  See below.    Treatment continuation (??).  For approximately 10 minutes I was able to speak to the patient's daughter and one of the family member (not sure if this is a son or son-in-law) separately.  Family is concerned about the new mass found in the vagina and family is thinking of taking the patient back to [facility].  I have suggested to the daughter that we first wait for the pathology results before making any decisions.  In the meantime, patient will continue with treatment for the rectal cancer.    Rectal cancer.  Pathology results from [facility] are listed below, there results demonstrated adenocarcinoma.  Patient was recently seen by colorectal surgery.     Patient was previously admitted to [facility], abscess was drained, underwent diverting ileostomy.  Patient states that the ileostomy is working well, no obvious bleeding.    Staging: Recent MRI demonstrated T4b, N2, low rectal carcinoma, + sphincter involvement, no suspicious extra mesorectal lymph nodes, no evidence of extramural venous invasion.  AJCC staging eighth edition = IIIC.  Other prior recent scans did not demonstrate evidence  of metastatic disease.    NCCN guidelines 4.2024 state that for patients with T4 any N, preliminary treatment is to check pMMR.  This may be an option if the vaginal mass is rectal cancer.      At least for now, the plan is to continue with 16 weeks of FOLFOX chemotherapy.  Patient is on cycle 8/8.  Patient is tolerating the regimen relatively well; blood work is okay/acceptable.  As above, the plan is to continue with chemotherapy, wait for the pathology results and then make decisions.    Family wishes to at least initiate contact with radiation oncology.  Consult placed.  The original plan was chemotherapy, then concurrent chemo RT and then reevaluation for surgery.  This may change depending upon the above.    Mrs. Valdez states having all required medications at home.    Patient denies any pain control issues.    Port is in place, no issues.    Medical issues.  Recent CAT scan the chest demonstrated severe coronary artery calcification.  Patient is still working on finding a PCP.    Patient is to follow-up in 4 weeks.  Patient can be seen in the infusion center..  Patient/daughter know to call the office if there are any other oncology questions or concerns.    Carefully review your medication list and verify that the list is accurate and up-to-date. Please call the hematology/oncology office if there are medications missing from the list, medications on the list that you are not currently taking or if there is a dosage or instruction that is different from how you're taking that medication.    Patient goals and areas of care: Continue with chemotherapy, clarify the vaginal issues  Barriers to care: Multiple serious comorbidities  Patient is able to self-care with help of family members  _____________________________________________________________________________________    Chief Complaint   Patient presents with    Rectal cancer on chemotherapy     History of Present Illness: 75-year-old female first seen  in the hospital on November 13, 2024.  Patient was admitted with weight loss, low abdominal pain.  Workup demonstrated a rectal mass.  Patient was being seen by GI and workup was consistent with advanced rectal cancer.  Patient has also been seen by colorectal surgery.  More recently Mrs. José Miguel Smith was subsequently admitted to [facility].  Patient was reevaluated, required drainage from the rectal lesion.  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    Patient was seen in the [facility] chemotherapy infusion room today.  Mrs. Valdez stated feeling okay, about the same as before.  Appetite is okay, patient has lost 20+ pounds.  Fatigue is about the same as before, patient's biggest complaint.  No fevers or signs of infection.  No obvious GI bleeding.  Colostomy seems to be working well.  No headaches, blurred vision or dizziness.  No respiratory issues.  No problems with the port.  Patient states that she is tolerating the chemotherapy, fatigue is about the same as before.    Review of Systems   Constitutional:  Positive for activity change, appetite change, fatigue and unexpected weight change.   HENT: Negative.     Eyes: Negative.    Respiratory: Negative.     Cardiovascular: Negative.    Gastrointestinal: Negative.    Endocrine: Negative.    Genitourinary: Negative.    Musculoskeletal: Negative.    Skin: Negative.    Allergic/Immunologic: Negative.    Neurological: Negative.    Hematological: Negative.    Psychiatric/Behavioral: Negative.     All other systems reviewed and are negative.    Patient Active Problem List   Diagnosis    Rectal mass    Type 2 diabetes mellitus (HCC)    Rectal bleeding    History of anal fissures    Electrolyte abnormality    Pain, rectal    Iron deficiency anemia    Leukocytosis    Rectal cancer (HCC)    Cancer associated pain    Palliative care by specialist    Rectal pain    Perirectal abscess    Constipation    S/P colostomy  (HCC)    Palliative care encounter    Cancer related pain    Age-related osteoporosis with current pathological fracture    Other closed intra-articular fracture of distal end of right radius with routine healing, subsequent encounter     Past Medical History:   Diagnosis Date    Diabetes mellitus (HCC)     Hypercholesteremia     Rectal cancer (HCC)      Past Surgical History:   Procedure Laterality Date    COLONOSCOPY      ILEOSTOMY N/A 12/24/2024    Procedure: LAPAROSCOPIC DIVERTING sigmoid end-loop colostomy;  Surgeon: SAI Bermudez MD;  Location: [facility];  Service: Colorectal    INCISION AND DRAINAGE OF WOUND N/A 12/24/2024    Procedure: INCISION AND DRAINAGE (I&D) BUTTOCK abscess;  Surgeon: SAI Bermudez MD;  Location: [facility];  Service: Colorectal    IR PORT PLACEMENT  1/6/2025     Family History   Problem Relation Name Age of Onset    Other (accident) Mother      Stroke Father      Colon cancer Sister       Social History     Socioeconomic History    Marital status: /Civil Union     Spouse name: Not on file    Number of children: Not on file    Years of education: Not on file    Highest education level: Not on file   Occupational History    Not on file   Tobacco Use    Smoking status: Never    Smokeless tobacco: Never   Vaping Use    Vaping status: Never Used   Substance and Sexual Activity    Alcohol use: Not Currently    Drug use: Not Currently    Sexual activity: Not Currently   Other Topics Concern    Not on file   Social History Narrative    Not on file     Social Drivers of Health     Financial Resource Strain: Low Risk  (12/19/2024)    Overall Financial Resource Strain (CARDIA)     Difficulty of Paying Living Expenses: Not hard at all   Food Insecurity: No Food Insecurity (1/9/2025)    Nursing - Inadequate Food Risk Classification     Worried About Running Out of Food in the Last Year: Never true     Ran Out of Food in the Last Year: Never true     Ran Out of Food in the Last Year:  Never true   Transportation Needs: No Transportation Needs (2025)    Nursing - Transportation Risk Classification     Lack of Transportation: Not on file     Lack of Transportation: No   Physical Activity: Not on file   Stress: Not on file   Social Connections: Not on file   Intimate Partner Violence: Unknown (2025)    Nursing IPS     Feels Physically and Emotionally Safe: Not on file     Physically Hurt by Someone: Not on file     Humiliated or Emotionally Abused by Someone: Not on file     Physically Hurt by Someone: No     Hurt or Threatened by Someone: No   Housing Stability: Unknown (2025)    Nursing: Inadequate Housing Risk Classification     Has Housing: Not on file     Worried About Losing Housing: Not on file     Unable to Get Utilities: Not on file     Unable to Pay for Housing in the Last Year: No     Has Housin       Current Outpatient Medications:     atorvastatin (LIPITOR) 40 mg tablet, Take 1 tablet (40 mg total) by mouth daily, Disp: 30 tablet, Rfl: 3    benzonatate (TESSALON PERLES) 100 mg capsule, Take 1 capsule (100 mg total) by mouth 3 (three) times a day as needed for cough (Patient not taking: Reported on 2025), Disp: 20 capsule, Rfl: 0    Blood Glucose Monitoring Suppl (OneTouch Verio Reflect) w/Device KIT, Check blood sugars twice daily. Please substitute with appropriate alternative as covered by patient's insurance. Dx: E11.65, Disp: 1 kit, Rfl: 0    calcium carbonate (OS-MARY) 600 MG tablet, Take 1 tablet (600 mg total) by mouth 2 (two) times a day with meals, Disp: 60 tablet, Rfl: 0    Cholecalciferol (VITAMIN D3) 1,000 units tablet, Take 1 tablet (1,000 Units total) by mouth daily, Disp: 90 tablet, Rfl: 1    fluorouracil 2,765 mg in CADD/Elastomeric Infusion Device, Infuse 2,765 mg (960 mg/m2/day x 1.44 m2) into a catheter in a vein via infusion device over 46 hours for 2 days  Infusion planned for [place] [facility], 2025., Disp: 1 each, Rfl: 1    glucose blood (OneTouch Verio)  test strip, Check blood sugars twice daily. Please substitute with appropriate alternative as covered by patient's insurance. Dx: E11.65, Disp: 200 each, Rfl: 3    lidocaine-prilocaine (EMLA) cream, Apply a thin layer over PAC site prior to access prn, Disp: 30 g, Rfl: 5    loratadine (CLARITIN) 10 mg tablet, Take 1 tablet (10 mg total) by mouth daily (Patient not taking: Reported on 6/2/2025), Disp: 30 tablet, Rfl: 0    metFORMIN (GLUCOPHAGE) 1000 MG tablet, take 1 tablet by mouth EVERY MORNING WITH BREAKFAST, Disp: 30 tablet, Rfl: 0    ondansetron (ZOFRAN) 8 mg tablet, Take 1 tablet (8 mg total) by mouth every 8 (eight) hours as needed for nausea or vomiting, Disp: 20 tablet, Rfl: 5    OneTouch Delica Lancets 33G MISC, Check blood sugars twice daily. Please substitute with appropriate alternative as covered by patient's insurance. Dx: E11.65, Disp: 200 each, Rfl: 3    oxyCODONE (Roxicodone) 5 immediate release tablet, Take 1 tablet (5 mg total) by mouth every 4 (four) hours as needed for moderate pain Max Daily Amount: 30 mg, Disp: 90 tablet, Rfl: 0    polyethylene glycol (MIRALAX) 17 g packet, Take 17 g by mouth daily, Disp: 510 g, Rfl: 0    senna-docusate sodium (SENOKOT S) 8.6-50 mg per tablet, Take 1 tablet by mouth 2 (two) times a day, Disp: 60 tablet, Rfl: 0    sitaGLIPtin (JANUVIA) 50 mg tablet, Take 1 tablet (50 mg total) by mouth daily (Patient not taking: Reported on 6/2/2025), Disp: 30 tablet, Rfl: 5  No current facility-administered medications for this visit.    Facility-Administered Medications Ordered in Other Visits:     alteplase (CATHFLO) injection 2 mg, 2 mg, Intracatheter, Q1MIN PRN, Twan Perea MD    alteplase (CATHFLO) injection 2 mg, 2 mg, Intracatheter, Q1MIN PRN, Twan Perea MD    sodium chloride 0.9 % infusion, 20 mL/hr, Intravenous, Once PRN, Twan Perea MD, Stopped at 06/03/25 1135    Allergies   Allergen Reactions    Motrin [Ibuprofen] Itching    Tylenol [Acetaminophen] Itching     Penicillins Rash     Rash and bruise    Sulfa Antibiotics Rash     Price velia syndrome     There were no vitals filed for this visit.  Physical Exam  Constitutional:       Appearance: She is well-developed.   HENT:      Head: Normocephalic and atraumatic.      Right Ear: External ear normal.      Left Ear: External ear normal.     Eyes:      Conjunctiva/sclera: Conjunctivae normal.      Pupils: Pupils are equal, round, and reactive to light.       Cardiovascular:      Rate and Rhythm: Normal rate and regular rhythm.      Heart sounds: Normal heart sounds.   Pulmonary:      Effort: Pulmonary effort is normal.      Breath sounds: Normal breath sounds.      Comments: Fair to good air entry bilaterally, clear  Abdominal:      General: Bowel sounds are normal.      Palpations: Abdomen is soft.      Comments: + Bowel sounds, minimal tenderness, no guarding, colostomy in place     Musculoskeletal:         General: Normal range of motion.      Cervical back: Normal range of motion and neck supple.     Skin:     General: Skin is warm.      Comments: Slightly pale, warm, moist, no petechiae or ecchymoses     Neurological:      Mental Status: She is alert and oriented to person, place, and time.      Deep Tendon Reflexes: Reflexes are normal and symmetric.     Psychiatric:         Behavior: Behavior normal.         Thought Content: Thought content normal.         Judgment: Judgment normal.     Extremities: No lower extreme edema bilaterally, no cords, pulses 1+  Lymphatics: No adenopathy in the neck, supraclavicular region, axilla bilaterally    Performance Status: 1 - Symptomatic but completely ambulatory    Labs    6/2/2025 WBC = 10.25 hemoglobin = 10.8 hematocrit = 34.5 platelet = 164 neutrophil = 74% BUN = 11 creatinine = 0.61 glucose = 219 calcium = 9.2 AST = 11 ALT = 9 alkaline phosphatase = 86 total protein = 6.7 total bilirubin = 0.32    11/13/2024 CEA = 5.6    Imaging    3/3/2025 spine 3 views  thoracic    IMPRESSION:     Slight loss of stature at T11 with superior endplate compression deformity which is new since the CT scan of 1/8/2025 suggesting subacute injury. Anterolisthesis C4 on C5.    1/8/2025 CAT scan abdomen pelvis with contrast    IMPRESSION:     Status post diverting sigmoid loop colostomy with a large amount of stool throughout the entire colon to the level of the ostomy.     Large locally invasive lower rectal cancer again shown with decrease in the size of fluid collection in the left ischioanal fat.  Small volume ascites in the abdomen and pelvis, new from the prior study.    12/6/2024 MRI pelvis rectal cancer staging without contrast    Overall MRI stage: T4b, N2, Low rectal cancer  MRF: Not applicable for T4 tumor.  Sphincter involvement: Yes.  Suspicious extra mesorectal lymph nodes: No.  EMVI: No.           11/12/2024 CT chest without contrast.  Impression stated no lung metastases, severe coronary artery calcification indicating atherosclerotic heart disease, cholelithiasis.    11/10/2024 CT abdomen pelvis with contrast    IMPRESSION:     Heterogeneous peripherally enhancing mass/malignancy involving the rectum and infiltrating the left ischiorectal and gluteal fat measuring approximately 7.7 x 7.5 x 8.6 cm with areas of fluid density compatible with internal necrosis.    Pathology    6/2/2025 vaginal tissue examination - active/in process.     Case Report   Surgical Pathology Report                         Case: [id number]                                   Authorizing Provider:  Morris Tom MD         Collected:           11/12/2024 2979               Ordering Location:     [facility] Received:            11/12/2024 2989                                      [address]                                                                       Pathologist:           Nemo Peterson MD                                                         Specimen:    Rectum, rectal mass  "r/o malignancy , cancer                                                Final Diagnosis   A. 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 Deeper sections of the specimen have been evaluated. A higher grade lesion cannot be excluded due to the superficial nature of the biopsy; repeat biopsy is recommended as clinically indicated.                    "

## 2025-06-04 ENCOUNTER — TELEPHONE (OUTPATIENT)
Age: 75
End: 2025-06-04

## 2025-06-04 ENCOUNTER — TELEPHONE (OUTPATIENT)
Dept: HEMATOLOGY ONCOLOGY | Facility: MEDICAL CENTER | Age: 75
End: 2025-06-04

## 2025-06-04 NOTE — TELEPHONE ENCOUNTER
Per  Dereje Puente from [employer] patient will need to be referred to a facility in [place] where she will qualify for 100% financial assistance.    Dr Perea aware

## 2025-06-04 NOTE — TELEPHONE ENCOUNTER
Pema from [facility] called and they received referral but they need OV notes, all reports from treatment, radiation, imaging,labs faxed to [phone] thank you

## 2025-06-05 ENCOUNTER — HOSPITAL ENCOUNTER (OUTPATIENT)
Dept: INFUSION CENTER | Facility: HOSPITAL | Age: 75
Discharge: HOME | End: 2025-06-05
Attending: INTERNAL MEDICINE

## 2025-06-05 DIAGNOSIS — C20 RECTAL CANCER (HCC): Primary | ICD-10-CM

## 2025-06-05 PROBLEM — T45.1X5A CHEMOTHERAPY INDUCED NEUTROPENIA (HCC): Status: ACTIVE | Noted: 2025-06-05

## 2025-06-05 PROBLEM — D70.1 CHEMOTHERAPY INDUCED NEUTROPENIA (HCC): Status: ACTIVE | Noted: 2025-06-05

## 2025-06-05 PROCEDURE — 88305 TISSUE EXAM BY PATHOLOGIST: CPT | Performed by: PATHOLOGY

## 2025-06-05 PROCEDURE — 88341 IMHCHEM/IMCYTCHM EA ADD ANTB: CPT | Performed by: PATHOLOGY

## 2025-06-05 PROCEDURE — 88342 IMHCHEM/IMCYTCHM 1ST ANTB: CPT | Performed by: PATHOLOGY

## 2025-06-05 NOTE — PROGRESS NOTES
Miriam Smith  tolerated treatment well with no complications. Patient presents for chemo ball disconnect. Ball appears deflated after infusing for 46hrs. Port flushed and de accessed per order. Patient offers no complaints.     Miriam Smith is aware of future appt on 06/06 at 1200.     AVS printed and given to Miriam Smith:  Yes

## 2025-06-05 NOTE — PROGRESS NOTES
CLARIFY DX RESPONSE NOTE    Chemotherapy induced neutropenia added to the patient's problem list, thank you

## 2025-06-06 ENCOUNTER — HOSPITAL ENCOUNTER (OUTPATIENT)
Dept: INFUSION CENTER | Facility: HOSPITAL | Age: 75
End: 2025-06-06
Attending: INTERNAL MEDICINE
Payer: SUBSIDIZED

## 2025-06-06 ENCOUNTER — TELEPHONE (OUTPATIENT)
Dept: GYNECOLOGIC ONCOLOGY | Facility: CLINIC | Age: 75
End: 2025-06-06

## 2025-06-06 DIAGNOSIS — C20 RECTAL CANCER (HCC): Primary | ICD-10-CM

## 2025-06-06 PROCEDURE — 96372 THER/PROPH/DIAG INJ SC/IM: CPT

## 2025-06-06 RX ADMIN — PEGFILGRASTIM 3 MG: 6 INJECTION SUBCUTANEOUS at 11:53

## 2025-06-06 NOTE — TELEPHONE ENCOUNTER
I spoke with the patient's son in-law, Rocky, and scheduled a consult appointment with Dr. Carroll for 6/13/2025 at 11:15AM in [place].

## 2025-06-06 NOTE — PROGRESS NOTES
Miriam Smith  tolerated treatment well with no complications.      Miriam Smith is aware of future appt on 7/3 at 1430.     AVS printed and given to Miriam Smith:  Declined by Miriam Smith

## 2025-06-08 DIAGNOSIS — K62.89 RECTAL PAIN: ICD-10-CM

## 2025-06-08 DIAGNOSIS — G89.3 CANCER ASSOCIATED PAIN: ICD-10-CM

## 2025-06-08 DIAGNOSIS — K62.89 RECTAL MASS: ICD-10-CM

## 2025-06-08 DIAGNOSIS — Z51.5 PALLIATIVE CARE BY SPECIALIST: ICD-10-CM

## 2025-06-09 ENCOUNTER — TELEPHONE (OUTPATIENT)
Age: 75
End: 2025-06-09

## 2025-06-09 RX ORDER — OXYCODONE HYDROCHLORIDE 5 MG/1
5 TABLET ORAL EVERY 4 HOURS PRN
Qty: 90 TABLET | Refills: 0 | Status: SHIPPED | OUTPATIENT
Start: 2025-06-09

## 2025-06-09 NOTE — TELEPHONE ENCOUNTER
Rocky calling in, stated that he was reaching out on behalf of the patient and would like a call back. He would like to know if the next part of the patient's treatment; radiation oncology would also be covered by [facility] or not, he would like a call back to discuss this. Please call him at [phone]

## 2025-06-10 ENCOUNTER — DOCUMENTATION (OUTPATIENT)
Dept: HEMATOLOGY ONCOLOGY | Facility: CLINIC | Age: 75
End: 2025-06-10

## 2025-06-10 DIAGNOSIS — E11.9 TYPE 2 DIABETES MELLITUS WITHOUT COMPLICATION, WITHOUT LONG-TERM CURRENT USE OF INSULIN (HCC): ICD-10-CM

## 2025-06-10 NOTE — PROGRESS NOTES
Call placed to Rocky to discuss concerns related to radiation therapy. No response, per message received mailbox is full unable to leave message.  Patient is enrolled with  Financial Assistance Program [facility] (4/29/2024 - 8/20/2025) covers all visits

## 2025-06-11 DIAGNOSIS — E11.9 TYPE 2 DIABETES MELLITUS WITHOUT COMPLICATION, UNSPECIFIED WHETHER LONG TERM INSULIN USE (HCC): ICD-10-CM

## 2025-06-11 RX ORDER — ATORVASTATIN CALCIUM 40 MG/1
40 TABLET, FILM COATED ORAL DAILY
Qty: 30 TABLET | Refills: 3 | Status: SHIPPED | OUTPATIENT
Start: 2025-06-11

## 2025-06-13 ENCOUNTER — TELEPHONE (OUTPATIENT)
Dept: GYNECOLOGIC ONCOLOGY | Facility: CLINIC | Age: 75
End: 2025-06-13

## 2025-06-13 ENCOUNTER — CONSULT (OUTPATIENT)
Dept: GYNECOLOGIC ONCOLOGY | Facility: CLINIC | Age: 75
End: 2025-06-13

## 2025-06-13 VITALS
OXYGEN SATURATION: 97 % | HEART RATE: 88 BPM | BODY MASS INDEX: 19.32 KG/M2 | TEMPERATURE: 98.5 F | RESPIRATION RATE: 18 BRPM | DIASTOLIC BLOOD PRESSURE: 60 MMHG | WEIGHT: 98.4 LBS | SYSTOLIC BLOOD PRESSURE: 124 MMHG | HEIGHT: 60 IN

## 2025-06-13 DIAGNOSIS — E11.9 TYPE 2 DIABETES MELLITUS WITHOUT COMPLICATION, WITHOUT LONG-TERM CURRENT USE OF INSULIN (HCC): ICD-10-CM

## 2025-06-13 DIAGNOSIS — M80.00XA AGE-RELATED OSTEOPOROSIS WITH CURRENT PATHOLOGICAL FRACTURE, INITIAL ENCOUNTER: Primary | ICD-10-CM

## 2025-06-13 DIAGNOSIS — M80.00XA AGE-RELATED OSTEOPOROSIS WITH CURRENT PATHOLOGICAL FRACTURE, INITIAL ENCOUNTER: ICD-10-CM

## 2025-06-13 DIAGNOSIS — E43 SEVERE PROTEIN-CALORIE MALNUTRITION (HCC): ICD-10-CM

## 2025-06-13 DIAGNOSIS — C20 RECTAL CANCER (HCC): Primary | ICD-10-CM

## 2025-06-13 DIAGNOSIS — Z93.3 S/P COLOSTOMY (HCC): ICD-10-CM

## 2025-06-13 PROBLEM — S52.571D OTHER CLOSED INTRA-ARTICULAR FRACTURE OF DISTAL END OF RIGHT RADIUS WITH ROUTINE HEALING, SUBSEQUENT ENCOUNTER: Status: RESOLVED | Noted: 2025-03-26 | Resolved: 2025-06-13

## 2025-06-13 PROBLEM — Z87.19 HISTORY OF ANAL FISSURES: Status: RESOLVED | Noted: 2024-11-11 | Resolved: 2025-06-13

## 2025-06-13 PROBLEM — G89.3 CANCER ASSOCIATED PAIN: Status: RESOLVED | Noted: 2024-12-18 | Resolved: 2025-06-13

## 2025-06-13 PROBLEM — E87.8 ELECTROLYTE ABNORMALITY: Status: RESOLVED | Noted: 2024-11-11 | Resolved: 2025-06-13

## 2025-06-13 PROBLEM — K62.5 RECTAL BLEEDING: Status: RESOLVED | Noted: 2024-11-11 | Resolved: 2025-06-13

## 2025-06-13 PROBLEM — D72.829 LEUKOCYTOSIS: Status: RESOLVED | Noted: 2024-11-11 | Resolved: 2025-06-13

## 2025-06-13 PROBLEM — K59.00 CONSTIPATION: Status: RESOLVED | Noted: 2025-01-08 | Resolved: 2025-06-13

## 2025-06-13 PROBLEM — Z51.5 PALLIATIVE CARE BY SPECIALIST: Status: RESOLVED | Noted: 2024-12-18 | Resolved: 2025-06-13

## 2025-06-13 PROBLEM — K62.89 PAIN, RECTAL: Status: RESOLVED | Noted: 2024-11-11 | Resolved: 2025-06-13

## 2025-06-13 PROBLEM — K62.89 RECTAL MASS: Status: RESOLVED | Noted: 2024-11-10 | Resolved: 2025-06-13

## 2025-06-13 PROBLEM — K62.89 RECTAL PAIN: Status: RESOLVED | Noted: 2024-12-18 | Resolved: 2025-06-13

## 2025-06-13 PROBLEM — K61.1 PERIRECTAL ABSCESS: Status: RESOLVED | Noted: 2024-12-23 | Resolved: 2025-06-13

## 2025-06-13 PROCEDURE — 99459 PELVIC EXAMINATION: CPT | Performed by: OBSTETRICS & GYNECOLOGY

## 2025-06-13 PROCEDURE — 99245 OFF/OP CONSLTJ NEW/EST HI 55: CPT | Performed by: OBSTETRICS & GYNECOLOGY

## 2025-06-13 RX ORDER — SODIUM CHLORIDE 9 MG/ML
20 INJECTION, SOLUTION INTRAVENOUS ONCE
OUTPATIENT
Start: 2025-07-03

## 2025-06-13 NOTE — ASSESSMENT & PLAN NOTE
Due to cancer cachexia syndrome.  Patient currently taking Glucerna once a day.  I have encouraged her to increase to 2 to 3/day if/as tolerated.  We discussed taking these slowly throughout the day as opposed to 1 with each meal.  Patient verbalized understanding.    Malnutrition Findings:    As evidenced by significant loss of muscle mass along gluteal area, supraclavicular fossae, temples and triceps folds.              BMI Findings:           Body mass index is 19.22 kg/m².

## 2025-06-13 NOTE — ASSESSMENT & PLAN NOTE
No evidence of complications or parastomal hernias.  Patient is competent managing her appliance/colostomy.  Continue to monitor.

## 2025-06-13 NOTE — TELEPHONE ENCOUNTER
----- Message from Ester HALL sent at 6/13/2025  3:11 PM EDT -----  I scheduled Miriam for 2:30 7/3  ----- Message -----  From: CHIP Del Angel  Sent: 6/13/2025   2:29 PM EDT  To: Twan Perea MD; [place] Dr. Carly Sherman saw this patient this AM and ordered Zometa for her osteoporosis.Can I schedule this treatment for 7/3 when she is already scheduled to come in for a port flush?Thank you,Vicky

## 2025-06-13 NOTE — PROGRESS NOTES
Name: Miriam Smith      : 1950      MRN: [id number]  Encounter Provider: Chandan Carroll MD  Encounter Date: 2025   Encounter department: [facility] [place]  :  Assessment & Plan  Rectal cancer (HCC)  I have independently obtained history from patient and daughter.  I have also reviewed prior records including prior surgical report, surgical pathology, CT scan of the abdomen and pelvis.    Briefly, patient with local regionally advanced rectal cancer with vaginal involvement, erosion of ischiorectal fossa and fistulization to left gluteal region.  With good response to neoadjuvant chemotherapy and plan for subsequent radiotherapy and consideration of subsequent surgical resection.    Given vaginal tumor involvement, I discussed with her indication for joint procedure for en bloc resection of all gross residual tumor after completion of neoadjuvant therapies.  She understands goal of resection is to hopefully obtain clear margins and also minimize presence of tumor which would cause ongoing drainage, bleeding, discharge, etc.    I explained my involvement in such surgery.  Determination as to whether surgery is indicated and timing for such intervention would be made by colorectal surgeons in collaboration with other oncologic experts, namely Dr. Perea for medical oncology and radiation oncology in charge of her case.  Once and if surgery is recommended, I have recommended and she has consented to proceed with exam under anesthesia, cystoscopy and bilateral ureteral catheters/stents placement, posterior versus total pelvic exenteration with hysterectomy, bilateral salpingo-oophorectomy, vaginectomy, and all other indicated procedures.  Given anticipated size of perineal defect I did mention potential need to obtain plastic surgery consultation for assistance with closure which may require flaps.    The patient was counseled regarding indications, risks,  benefits and alternatives to surgical management.  We discussed risks including but not limited to bleeding and potential need for blood transfusions, infection, pain, injury to surrounding organs such as bladder, intestines, ureters and neurovascular structures.  Patient understands potential risks associated with stress of surgery and general anesthesia including but not limited to acute cardiac events, venous thromboembolism, etc.  Patient consents for blood transfusions if those are deemed necessary during the course of care.  She understands risks include but are not limited to hypersensitivity reactions and infection with blood-borne pathogens.  Patient verbalizes understanding, agrees and wants to proceed.  Informed consent form signed. All questions answered to patient's satisfaction.           Age-related osteoporosis with current pathological fracture, initial encounter  I have discussed with patient applications of osteoporosis and recent pathologic fracture associated with fall.  Patient with thoracic spine compression fracture which is now not symptomatic.  Discussed potential benefit of zoledronic acid use to prevent subsequent fractures and complications.  Discussed pros and cons and benefits of use.  Patient agrees.  We will coordinate infusion with upcoming chemotherapy.  Orders:  •  DXA bone density spine hip and pelvis; Future    Type 2 diabetes mellitus without complication, without long-term current use of insulin (HCC)    Lab Results   Component Value Date    HGBA1C 6.8 (A) 05/08/2025     Patient aware of importance of optimization of glycemic control perioperatively.  Strategies discussed.  Defer management to primary care specialists.       S/P colostomy (HCC)  No evidence of complications or parastomal hernias.  Patient is competent managing her appliance/colostomy.  Continue to monitor.       Severe protein-calorie malnutrition (HCC)  Due to cancer cachexia syndrome.  Patient currently  taking Glucerna once a day.  I have encouraged her to increase to 2 to 3/day if/as tolerated.  We discussed taking these slowly throughout the day as opposed to 1 with each meal.  Patient verbalized understanding.    Malnutrition Findings:    As evidenced by significant loss of muscle mass along gluteal area, supraclavicular fossae, temples and triceps folds.              BMI Findings:           Body mass index is 19.22 kg/m².                  History of Present Illness   Reason for Visit / CC: Evaluation management for rectal cancer with vaginal involvement   Miriam Smith is a 75 y.o. female   75-year-old  with 4 prior vaginal deliveries, type 2 diabetes with hyperglycemia and locally advanced rectal cancer cT4, cN2, cM0 diagnosed in 2024 in [place].  She relocated to [facility] where her daughter resides in order to seek treatment here in the area.  Initially she underwent fecal diversion by means of laparoscopic loop colostomy given tumor/fecal burden with fistulization and abscess of the ischial rectal space.  Now undergoing neoadjuvant chemotherapy with FOLFOX under the direction of Dr. Twan Perea.  She has reportedly had a good clinical response.  Given presence of vaginal involvement, patient was referred to us for counseling and management recommendations.  Denies active vaginal bleeding.  Reports ongoing vaginal and perineal drainage and discharge.  Normal bladder function.  Colostomy working well.  Reports much improved appetite.  Continues to lose weight.    Note is made of thoracic spinal compression fracture noted at last CT scan.  Patient reports this occurred after fall.  DEXA scan has been ordered but was never performed.  Treatment for osteoporosis has not been ordered.      Pertinent Medical History   Is mellitus, rectal cancer, osteoporosis with thoracic spine pathologic fracture, anemia, hyperlipidemia.        Oncology History   Cancer Staging   Rectal cancer  (HCC)  Staging form: Colon and Rectum, AJCC 8th Edition  - Clinical stage from 1/21/2025: cT4, cN2, cM0 - Signed by Twan Perea MD on 1/21/2025  Stage prefix: Initial diagnosis  Histologic grade (G): GX  Histologic grading system: 4 grade system  Oncology History   Rectal cancer (HCC)   12/17/2024 Initial Diagnosis    Rectal cancer (HCC)     1/21/2025 -  Cancer Staged    Staging form: Colon and Rectum, AJCC 8th Edition  - Clinical stage from 1/21/2025: cT4, cN2, cM0 - Signed by Twan Perea MD on 1/21/2025  Stage prefix: Initial diagnosis  Histologic grade (G): GX  Histologic grading system: 4 grade system       2/19/2025 -  Chemotherapy    fluorouracil (ADRUCIL), 320 mg/m2 = 460 mg (80 % of original dose 400 mg/m2), 8 of 8 cycles  Dose modification: 320 mg/m2 (80 % of original dose 400 mg/m2, Cycle 1, Reason: Other (see comments))  Administration: 460 mg (2/19/2025), 460 mg (3/5/2025), 460 mg (3/19/2025), 460 mg (4/2/2025), 460 mg (4/16/2025), 460 mg (4/30/2025), 460 mg (5/20/2025), 460 mg (6/3/2025)  leucovorin calcium IVPB, 320 mg/m2 = 461 mg (80 % of original dose 400 mg/m2), 8 of 8 cycles  Dose modification: 320 mg/m2 (80 % of original dose 400 mg/m2, Cycle 1, Reason: Other (see comments))  Administration: 460 mg (2/19/2025), 461 mg (3/5/2025), 460 mg (3/19/2025), 461 mg (4/2/2025), 461 mg (4/16/2025), 461 mg (4/30/2025), 461 mg (5/20/2025), 461 mg (6/3/2025)  oxaliplatin (ELOXATIN) chemo infusion, 68 mg/m2 = 97.9 mg (80 % of original dose 85 mg/m2), 8 of 8 cycles  Dose modification: 68 mg/m2 (80 % of original dose 85 mg/m2, Cycle 1, Reason: Other (see comments))  Administration: 97.9 mg (2/19/2025), 97.9 mg (3/5/2025), 97.9 mg (3/19/2025), 97.9 mg (4/2/2025), 97.9 mg (4/16/2025), 97.9 mg (4/30/2025), 97.9 mg (5/20/2025), 97.9 mg (6/3/2025)  fluorouracil (ADRUCIL) ambulatory infusion Soln, 960 mg/m2/day = 2,765 mg (80 % of original dose 1,200 mg/m2/day), 8 of 8 cycles  Dose modification: 960 mg/m2/day (80 %  of original dose 1,200 mg/m2/day, Cycle 1, Reason: Other (see comments))        Review of Systems A complete review of systems is negative other than that noted above in the HPI.       Objective   /60 (BP Location: Left arm, Patient Position: Sitting, Cuff Size: Child)   Pulse 88   Temp 98.5 °F (36.9 °C)   Resp 18   Ht 5' (1.524 m)   Wt 44.6 kg (98 lb 6.4 oz)   SpO2 97%   BMI 19.22 kg/m²     Body mass index is 19.22 kg/m².  Pain Screening:  Pain Score: 0-No pain  ECOG ECOG Performance Status: 1 - Restricted in physically strenuous activity but ambulatory and able to carry out work of a light or sedentary nature, e.g., light house work, office work   Physical Exam  Vitals reviewed. Exam conducted with a chaperone present.   Constitutional:       General: She is not in acute distress.     Appearance: She is ill-appearing.      Comments: Appears cachectic and chronically ill but not in acute distress.     Cardiovascular:      Rate and Rhythm: Normal rate and regular rhythm.   Pulmonary:      Effort: Pulmonary effort is normal. No respiratory distress.      Breath sounds: No stridor.   Genitourinary:     Comments: Normal external genitalia with evidence of atrophy but no lesions or bleeding.  Posterior perineum demonstrates the presence of an approximately 1.5 cm defect located in the skin of the left ischial region with extensive granulation tissue consistent with prior point of spontaneous drainage/fistula.  Rectovaginal exam demonstrates large tumor with gross invasion through posterior vagina and involvement of lateral walls but no definitive involvement of the anterior vaginal wall.  No active bleeding.  Discharge noted in relation to tumor necrosis.  No foul odor or signs of infection noted.    Musculoskeletal:      Right lower leg: No edema.      Left lower leg: No edema.     Neurological:      Mental Status: She is alert.          Labs: I have reviewed pertinent labs.   No results found for:  "\"\"  Lab Results   Component Value Date/Time    Potassium 4.0 06/02/2025 09:31 AM    Chloride 101 06/02/2025 09:31 AM    CO2 25 06/02/2025 09:31 AM    BUN 11 06/02/2025 09:31 AM    Creatinine 0.61 06/02/2025 09:31 AM    Glucose, Fasting 106 (H) 03/03/2025 08:33 AM    Calcium 9.2 06/02/2025 09:31 AM    Corrected Calcium 9.5 05/19/2025 09:06 AM    AST 11 (L) 06/02/2025 09:31 AM    ALT 9 06/02/2025 09:31 AM    Alkaline Phosphatase 86 06/02/2025 09:31 AM    eGFR 88 06/02/2025 09:31 AM     Lab Results   Component Value Date/Time    WBC 10.25 (H) 06/02/2025 09:31 AM    Hemoglobin 10.8 (L) 06/02/2025 09:31 AM    Hematocrit 34.5 (L) 06/02/2025 09:31 AM    MCV 96 06/02/2025 09:31 AM    Platelets 164 06/02/2025 09:31 AM     Lab Results   Component Value Date/Time    Absolute Neutrophils 7.63 (H) 06/02/2025 09:31 AM        Trend:  No results found for: \"\"    Radiology Results Review: I have reviewed radiology reports from [facility] including: CT abdomen/pelvis.  Other Study Results Review : Pathology reports reviewed.      Chandan Carroll MD, NAMRATA, FACOG, FACS  6/13/2025  12:41 PM      "

## 2025-06-13 NOTE — ASSESSMENT & PLAN NOTE
I have discussed with patient applications of osteoporosis and recent pathologic fracture associated with fall.  Patient with thoracic spine compression fracture which is now not symptomatic.  Discussed potential benefit of zoledronic acid use to prevent subsequent fractures and complications.  Discussed pros and cons and benefits of use.  Patient agrees.  We will coordinate infusion with upcoming chemotherapy.  Orders:  •  DXA bone density spine hip and pelvis; Future

## 2025-06-13 NOTE — LETTER
2025     Twan Perea MD  [facility] [id number]    Patient: Miriam Smith   YOB: 1950   Date of Visit: 2025       Dear MD Trino Sigala MD Daniel Joseph Bowers, MD:    Thank you for referring Miriam Smith to me for evaluation. Below are my notes for this consultation.    If you have questions, please do not hesitate to call me. I look forward to following your patient along with you.         Sincerely,        Chandan Carroll MD        CC: MD Trino Olmos MD Israel Zighelboim, MD  2025 12:40 PM  Incomplete  Name: Miriam Smith      : 1950      MRN: [id number]  Encounter Provider: Chandan Carroll MD  Encounter Date: 2025   Encounter department: [facility] [place]  :  Assessment & Plan  Rectal cancer (HCC)  I have independently obtained history from patient and daughter.  I have also reviewed prior records including prior surgical report, surgical pathology, CT scan of the abdomen and pelvis.    Briefly, patient with local regionally advanced rectal cancer with vaginal involvement, erosion of ischiorectal fossa and fistulization to left gluteal region.  With good response to neoadjuvant chemotherapy and plan for subsequent radiotherapy and consideration of subsequent surgical resection.    Given vaginal tumor involvement, I discussed with her indication for joint procedure for en bloc resection of all gross residual tumor after completion of neoadjuvant therapies.  She understands goal of resection is to hopefully obtain clear margins and also minimize presence of tumor which would cause ongoing drainage, bleeding, discharge, etc.    I explained my involvement in such surgery.  Determination as to whether surgery is indicated and timing for such intervention would be made by colorectal surgeons in collaboration with other  oncologic experts, namely Dr. Perea for medical oncology and radiation oncology in charge of her case.  Once and if surgery is recommended, I have recommended and she has consented to proceed with exam under anesthesia, cystoscopy and bilateral ureteral catheters/stents placement, posterior versus total pelvic exenteration with hysterectomy, bilateral salpingo-oophorectomy, vaginectomy, and all other indicated procedures.  Given anticipated size of perineal defect I did mention potential need to obtain plastic surgery consultation for assistance with closure which may require flaps.    The patient was counseled regarding indications, risks, benefits and alternatives to surgical management.  We discussed risks including but not limited to bleeding and potential need for blood transfusions, infection, pain, injury to surrounding organs such as bladder, intestines, ureters and neurovascular structures.  Patient understands potential risks associated with stress of surgery and general anesthesia including but not limited to acute cardiac events, venous thromboembolism, etc.  Patient consents for blood transfusions if those are deemed necessary during the course of care.  She understands risks include but are not limited to hypersensitivity reactions and infection with blood-borne pathogens.  Patient verbalizes understanding, agrees and wants to proceed.  Informed consent form signed. All questions answered to patient's satisfaction.             Age-related osteoporosis with current pathological fracture, initial encounter  I have discussed with patient applications of osteoporosis and recent pathologic fracture associated with fall.  Patient with thoracic spine compression fracture which is now not symptomatic.  Discussed potential benefit of zoledronic acid use to prevent subsequent fractures and complications.  Discussed pros and cons and benefits of use.  Patient agrees.  We will coordinate infusion with upcoming  chemotherapy.    Orders:  •  DXA bone density spine hip and pelvis; Future    Type 2 diabetes mellitus without complication, without long-term current use of insulin (HCC)    Lab Results   Component Value Date    HGBA1C 6.8 (A) 2025     Patient aware of importance of optimization of glycemic control perioperatively.  Strategies discussed.  Defer management to primary care specialists.         S/P colostomy (HCC)  No evidence of complications or parastomal hernias.  Patient is competent managing her appliance/colostomy.  Continue to monitor.         Severe protein-calorie malnutrition (HCC)  Due to cancer cachexia syndrome.  Patient currently taking Glucerna once a day.  I have encouraged her to increase to 2 to 3/day if/as tolerated.  We discussed taking these slowly throughout the day as opposed to 1 with each meal.  Patient verbalized understanding.    Malnutrition Findings:    As evidenced by significant loss of muscle mass along gluteal area, supraclavicular fossae, temples and triceps folds.              BMI Findings:           Body mass index is 19.22 kg/m².                    History of Present Illness  Reason for Visit / CC: Evaluation management for rectal cancer with vaginal involvement   Miriam Smith is a 75 y.o. female   75-year-old  with 4 prior vaginal deliveries, type 2 diabetes with hyperglycemia and locally advanced rectal cancer cT4, cN2, cM0 diagnosed in 2024 in [place].  She relocated to [facility] where her daughter resides in order to seek treatment here in the area.  Initially she underwent fecal diversion by means of laparoscopic loop colostomy given tumor/fecal burden with fistulization and abscess of the ischial rectal space.  Now undergoing neoadjuvant chemotherapy with FOLFOX under the direction of Dr. Twan Perea.  She has reportedly had a good clinical response.  Given presence of vaginal involvement, patient was referred to us for counseling and  management recommendations.  Denies active vaginal bleeding.  Reports ongoing vaginal and perineal drainage and discharge.  Normal bladder function.  Colostomy working well.  Reports much improved appetite.  Continues to lose weight.    Note is made of thoracic spinal compression fracture noted at last CT scan.  Patient reports this occurred after fall.  DEXA scan has been ordered but was never performed.  Treatment for osteoporosis has not been ordered.      Pertinent Medical History  Is mellitus, rectal cancer, osteoporosis with thoracic spine pathologic fracture, anemia, hyperlipidemia.        Oncology History  Cancer Staging   Rectal cancer (HCC)  Staging form: Colon and Rectum, AJCC 8th Edition  - Clinical stage from 1/21/2025: cT4, cN2, cM0 - Signed by Twan Perea MD on 1/21/2025  Stage prefix: Initial diagnosis  Histologic grade (G): GX  Histologic grading system: 4 grade system  Oncology History   Rectal cancer ([facility])   12/17/2024 Initial Diagnosis    Rectal cancer ([facility])     1/21/2025 -  Cancer Staged    Staging form: Colon and Rectum, AJCC 8th Edition  - Clinical stage from 1/21/2025: cT4, cN2, cM0 - Signed by Twan Perea MD on 1/21/2025  Stage prefix: Initial diagnosis  Histologic grade (G): GX  Histologic grading system: 4 grade system       2/19/2025 -  Chemotherapy    fluorouracil (ADRUCIL), 320 mg/m2 = 460 mg (80 % of original dose 400 mg/m2), 8 of 8 cycles  Dose modification: 320 mg/m2 (80 % of original dose 400 mg/m2, Cycle 1, Reason: Other (see comments))  Administration: 460 mg (2/19/2025), 460 mg (3/5/2025), 460 mg (3/19/2025), 460 mg (4/2/2025), 460 mg (4/16/2025), 460 mg (4/30/2025), 460 mg (5/20/2025), 460 mg (6/3/2025)  leucovorin calcium IVPB, 320 mg/m2 = 461 mg (80 % of original dose 400 mg/m2), 8 of 8 cycles  Dose modification: 320 mg/m2 (80 % of original dose 400 mg/m2, Cycle 1, Reason: Other (see comments))  Administration: 460 mg (2/19/2025), 461 mg (3/5/2025), 460 mg (3/19/2025), 461  mg (4/2/2025), 461 mg (4/16/2025), 461 mg (4/30/2025), 461 mg (5/20/2025), 461 mg (6/3/2025)  oxaliplatin (ELOXATIN) chemo infusion, 68 mg/m2 = 97.9 mg (80 % of original dose 85 mg/m2), 8 of 8 cycles  Dose modification: 68 mg/m2 (80 % of original dose 85 mg/m2, Cycle 1, Reason: Other (see comments))  Administration: 97.9 mg (2/19/2025), 97.9 mg (3/5/2025), 97.9 mg (3/19/2025), 97.9 mg (4/2/2025), 97.9 mg (4/16/2025), 97.9 mg (4/30/2025), 97.9 mg (5/20/2025), 97.9 mg (6/3/2025)  fluorouracil (ADRUCIL) ambulatory infusion Soln, 960 mg/m2/day = 2,765 mg (80 % of original dose 1,200 mg/m2/day), 8 of 8 cycles  Dose modification: 960 mg/m2/day (80 % of original dose 1,200 mg/m2/day, Cycle 1, Reason: Other (see comments))        Review of Systems A complete review of systems is negative other than that noted above in the HPI.       Objective  /60 (BP Location: Left arm, Patient Position: Sitting, Cuff Size: Child)   Pulse 88   Temp 98.5 °F (36.9 °C)   Resp 18   Ht 5' (1.524 m)   Wt 44.6 kg (98 lb 6.4 oz)   SpO2 97%   BMI 19.22 kg/m²     Body mass index is 19.22 kg/m².  Pain Screening:  Pain Score: 0-No pain  ECOG ECOG Performance Status: 1 - Restricted in physically strenuous activity but ambulatory and able to carry out work of a light or sedentary nature, e.g., light house work, office work   Physical Exam  Vitals reviewed. Exam conducted with a chaperone present.   Constitutional:       General: She is not in acute distress.     Appearance: She is ill-appearing.      Comments: Appears cachectic and chronically ill but not in acute distress.     Cardiovascular:      Rate and Rhythm: Normal rate and regular rhythm.   Pulmonary:      Effort: Pulmonary effort is normal. No respiratory distress.      Breath sounds: No stridor.   Genitourinary:     Comments: Normal external genitalia with evidence of atrophy but no lesions or bleeding.  Posterior perineum demonstrates the presence of an approximately 1.5 cm  "defect located in the skin of the left ischial region with extensive granulation tissue consistent with prior point of spontaneous drainage/fistula.  Rectovaginal exam demonstrates large tumor with gross invasion through posterior vagina and involvement of lateral walls but no definitive involvement of the anterior vaginal wall.  No active bleeding.  Discharge noted in relation to tumor necrosis.  No foul odor or signs of infection noted.    Musculoskeletal:      Right lower leg: No edema.      Left lower leg: No edema.     Neurological:      Mental Status: She is alert.          Labs: I have reviewed pertinent labs.   No results found for: \"\"  Lab Results   Component Value Date/Time    Potassium 4.0 06/02/2025 09:31 AM    Chloride 101 06/02/2025 09:31 AM    CO2 25 06/02/2025 09:31 AM    BUN 11 06/02/2025 09:31 AM    Creatinine 0.61 06/02/2025 09:31 AM    Glucose, Fasting 106 (H) 03/03/2025 08:33 AM    Calcium 9.2 06/02/2025 09:31 AM    Corrected Calcium 9.5 05/19/2025 09:06 AM    AST 11 (L) 06/02/2025 09:31 AM    ALT 9 06/02/2025 09:31 AM    Alkaline Phosphatase 86 06/02/2025 09:31 AM    eGFR 88 06/02/2025 09:31 AM     Lab Results   Component Value Date/Time    WBC 10.25 (H) 06/02/2025 09:31 AM    Hemoglobin 10.8 (L) 06/02/2025 09:31 AM    Hematocrit 34.5 (L) 06/02/2025 09:31 AM    MCV 96 06/02/2025 09:31 AM    Platelets 164 06/02/2025 09:31 AM     Lab Results   Component Value Date/Time    Absolute Neutrophils 7.63 (H) 06/02/2025 09:31 AM        Trend:  No results found for: \"\"    Radiology Results Review: I have reviewed radiology reports from [facility] including: CT abdomen/pelvis.  Other Study Results Review : Pathology reports reviewed.      Chandan Carroll MD, NAMRATA, FACOG, FACS  6/13/2025  12:39 PM       "

## 2025-06-13 NOTE — ASSESSMENT & PLAN NOTE
Lab Results   Component Value Date    HGBA1C 6.8 (A) 05/08/2025     Patient aware of importance of optimization of glycemic control perioperatively.  Strategies discussed.  Defer management to primary care specialists.

## 2025-06-13 NOTE — ASSESSMENT & PLAN NOTE
I have independently obtained history from patient and daughter.  I have also reviewed prior records including prior surgical report, surgical pathology, CT scan of the abdomen and pelvis.    Briefly, patient with local regionally advanced rectal cancer with vaginal involvement, erosion of ischiorectal fossa and fistulization to left gluteal region.  With good response to neoadjuvant chemotherapy and plan for subsequent radiotherapy and consideration of subsequent surgical resection.    Given vaginal tumor involvement, I discussed with her indication for joint procedure for en bloc resection of all gross residual tumor after completion of neoadjuvant therapies.  She understands goal of resection is to hopefully obtain clear margins and also minimize presence of tumor which would cause ongoing drainage, bleeding, discharge, etc.    I explained my involvement in such surgery.  Determination as to whether surgery is indicated and timing for such intervention would be made by colorectal surgeons in collaboration with other oncologic experts, namely Dr. Perea for medical oncology and radiation oncology in charge of her case.  Once and if surgery is recommended, I have recommended and she has consented to proceed with exam under anesthesia, cystoscopy and bilateral ureteral catheters/stents placement, posterior versus total pelvic exenteration with hysterectomy, bilateral salpingo-oophorectomy, vaginectomy, and all other indicated procedures.  Given anticipated size of perineal defect I did mention potential need to obtain plastic surgery consultation for assistance with closure which may require flaps.    The patient was counseled regarding indications, risks, benefits and alternatives to surgical management.  We discussed risks including but not limited to bleeding and potential need for blood transfusions, infection, pain, injury to surrounding organs such as bladder, intestines, ureters and neurovascular structures.   Patient understands potential risks associated with stress of surgery and general anesthesia including but not limited to acute cardiac events, venous thromboembolism, etc.  Patient consents for blood transfusions if those are deemed necessary during the course of care.  She understands risks include but are not limited to hypersensitivity reactions and infection with blood-borne pathogens.  Patient verbalizes understanding, agrees and wants to proceed.  Informed consent form signed. All questions answered to patient's satisfaction.

## 2025-06-16 ENCOUNTER — TELEPHONE (OUTPATIENT)
Age: 75
End: 2025-06-16

## 2025-06-16 NOTE — TELEPHONE ENCOUNTER
"Patient's son-in-law Rocky calling to report they tried to get an appointment with Radiation Oncology at [place] but reports encountered \"a rude person\" and did get an appointment set.  He reports will try to speak to someone else to get an appointment and will return call to this office for a referral to a different Radiation Oncology service.  I apologized that he had a difficult experience, he reports will call again.      "

## 2025-06-16 NOTE — TELEPHONE ENCOUNTER
Pema from Dr Goldberg in RT at [place] has been trying to reach patient for consult appt  Left voicemail for son in law Rocky to call Pema at [phone]

## 2025-06-16 NOTE — TELEPHONE ENCOUNTER
Call received by Rocky, identifiers were provided- listed on communication consent.    Rocky called requesting to speak to Jane in regards to patients Radiation treatments and surgery.     Per Rocky patient was on [facility] Care for her chemotherapy as she has no insurance, wanted to know if RT will also be covered. Advised Rocky that Carolina Calderon tried to reach out to him on 6/10 to further discuss financial assistance program. Advised that all visits are covered from 4/29/24-8/20/25.     Rocky will like a call back to make sure surgery after RT is going to be covered as well as this program is only good till 8/20/25.     Please call Rocky.     Thanks.

## 2025-06-16 NOTE — TELEPHONE ENCOUNTER
Dr Perea referred pt to [facility] and Pema from the office has called the pt multiple times and they never answer or call back.  She just wanted to let the doctor know.    Pema can be reached at [phone] if you have any questions

## 2025-06-30 DIAGNOSIS — E11.9 TYPE 2 DIABETES MELLITUS WITHOUT COMPLICATION, WITHOUT LONG-TERM CURRENT USE OF INSULIN (HCC): ICD-10-CM

## 2025-06-30 DIAGNOSIS — Z51.5 PALLIATIVE CARE BY SPECIALIST: ICD-10-CM

## 2025-06-30 DIAGNOSIS — G89.3 CANCER ASSOCIATED PAIN: ICD-10-CM

## 2025-06-30 DIAGNOSIS — K62.89 RECTAL PAIN: ICD-10-CM

## 2025-06-30 DIAGNOSIS — K62.89 RECTAL MASS: ICD-10-CM

## 2025-07-01 DIAGNOSIS — Z51.5 PALLIATIVE CARE BY SPECIALIST: ICD-10-CM

## 2025-07-01 DIAGNOSIS — K62.89 RECTAL PAIN: ICD-10-CM

## 2025-07-01 DIAGNOSIS — G89.3 CANCER ASSOCIATED PAIN: ICD-10-CM

## 2025-07-01 DIAGNOSIS — K62.89 RECTAL MASS: ICD-10-CM

## 2025-07-01 RX ORDER — OXYCODONE HYDROCHLORIDE 5 MG/1
5 TABLET ORAL EVERY 4 HOURS PRN
Qty: 90 TABLET | Refills: 0 | Status: SHIPPED | OUTPATIENT
Start: 2025-07-01 | End: 2025-07-03 | Stop reason: SDUPTHER

## 2025-07-01 RX ORDER — OXYCODONE HYDROCHLORIDE 5 MG/1
5 TABLET ORAL EVERY 4 HOURS PRN
Qty: 90 TABLET | Refills: 0 | Status: CANCELLED | OUTPATIENT
Start: 2025-07-01

## 2025-07-01 NOTE — TELEPHONE ENCOUNTER
Refill must be reviewed and completed by the office or provider. The refill is unable to be approved or denied by the medication management team.      Unable to complete PDMP. Will forward to clinical staff to review.     
Chart and PDMP reviewed, refill appropriate. Sent to patient's preferred pharmacy.  
Last appointment: 5/29/2025    Next scheduled appointment: 7/10/2025    Pharmacy: [facility] attached       PDMP review:    
Coarse breath sounds, otherwise clear

## 2025-07-03 ENCOUNTER — HOSPITAL ENCOUNTER (OUTPATIENT)
Dept: INFUSION CENTER | Facility: HOSPITAL | Age: 75
Discharge: HOME | End: 2025-07-03
Attending: OBSTETRICS & GYNECOLOGY

## 2025-07-07 ENCOUNTER — TELEPHONE (OUTPATIENT)
Dept: PALLIATIVE MEDICINE | Facility: CLINIC | Age: 75
End: 2025-07-07

## 2025-07-07 ENCOUNTER — TELEPHONE (OUTPATIENT)
Age: 75
End: 2025-07-07

## 2025-07-07 NOTE — TELEPHONE ENCOUNTER
Left a message for patient to call office back to reschedule 7/10 appointment with Palliative Care. Dr Alba will not be available.

## 2025-07-07 NOTE — TELEPHONE ENCOUNTER
Spoke with patient's son in law Rocky  Patient will return to [place] for treatment and Rocky is asking how her tissue can be sent to [place] for review  Patient's son in law given medical record number to send chart information  Will reach out to navigation team for assistance in retrieving tissue

## 2025-07-07 NOTE — TELEPHONE ENCOUNTER
Rocky called, asking to speak with Jane about his mother in law. He did not want to leave details as to what it was pertaining to, just asking for a call back

## 2025-07-07 NOTE — TELEPHONE ENCOUNTER
Per Leadership ok to be seen not doing interviews until 2:30pm. Daughter was made aware come to scheduled appt. Done task.

## 2025-07-08 NOTE — TELEPHONE ENCOUNTER
Call received by Rocky, identifiers were provided- listed on communication consent.     Rcoky called requesting to speak to Jane. Advised Jane is [facility] and will return tomorrow. Per Rocky patient will be continuing her treatment in [place] and Jane provided medical records number to send over records. Rocky called in today in regards to retrieving tissue from biopsy. Advised Jane sent it over to navigation and they will be reaching out to him with updates.     Rocky will like a call back to further discuss.       Thank you!

## 2025-07-08 NOTE — TELEPHONE ENCOUNTER
Called Rocky [facility] informing him he can reach out to medical records but usually the hospital providing care will contact us.

## 2025-07-10 ENCOUNTER — OFFICE VISIT (OUTPATIENT)
Dept: PALLIATIVE MEDICINE | Facility: CLINIC | Age: 75
End: 2025-07-10

## 2025-07-10 VITALS
TEMPERATURE: 97.5 F | BODY MASS INDEX: 20.12 KG/M2 | HEIGHT: 60 IN | SYSTOLIC BLOOD PRESSURE: 120 MMHG | HEART RATE: 76 BPM | DIASTOLIC BLOOD PRESSURE: 60 MMHG | WEIGHT: 102.5 LBS | OXYGEN SATURATION: 97 %

## 2025-07-10 DIAGNOSIS — G89.3 CANCER RELATED PAIN: ICD-10-CM

## 2025-07-10 DIAGNOSIS — Z51.5 PALLIATIVE CARE ENCOUNTER: Primary | ICD-10-CM

## 2025-07-10 DIAGNOSIS — Z93.3 S/P COLOSTOMY (HCC): ICD-10-CM

## 2025-07-10 DIAGNOSIS — C20 RECTAL CANCER (HCC): ICD-10-CM

## 2025-07-10 PROCEDURE — 99214 OFFICE O/P EST MOD 30 MIN: CPT | Performed by: STUDENT IN AN ORGANIZED HEALTH CARE EDUCATION/TRAINING PROGRAM

## 2025-07-10 NOTE — ASSESSMENT & PLAN NOTE
Psychosocial   Supportive listening provided  Normalized experience of patient/family  Provided anxiety containment     Referrals Placed / Medical Equipment Ordered  -None    Follow-Up Recommendations  -Follow-up with PCP and current medical specialists  -Follow-up with palliative care: None at this time. Patient to return to [place] July 20th in which [facility] patient has medical team in [place] awaiting to assume patient's care

## 2025-07-10 NOTE — ASSESSMENT & PLAN NOTE
Doing well with pain management with current opioid regimen.    Continue plan:  1) OxyIR 5mg q4 hours PRN  2) Bowel regimen to avoid OIC  3) Medication safety issues - Do not drive under the influence of narcotics (including opioids), watch for adverse effects including confusion / altered mental status / respiratory depression (slowed breathing), keep medications stored in a safe/locked environment, do not use alcohol while opioids or other narcotics are in your system. Do not travel with more than the minimum number of tablets or capsules required for the trip.

## 2025-07-10 NOTE — PROGRESS NOTES
Name: Miriam Smith      : 1950      MRN: [id number]  Encounter Provider: Derek Alba DO  Encounter Date: 7/10/2025   Encounter department: [facility]  :  Assessment & Plan  Palliative care encounter  Psychosocial   Supportive listening provided  Normalized experience of patient/family  Provided anxiety containment     Referrals Placed / Medical Equipment Ordered  -None    Follow-Up Recommendations  -Follow-up with PCP and current medical specialists  -Follow-up with palliative care: None at this time. Patient to return to [place]  in which DIL states patient has medical team in [place] awaiting to assume patient's care         Rectal cancer (HCC)  DIL serving as  for patient.  Patient to return to [place]  to resume care.  Tolerating PO intake.  Doing well without intractable nausea or vomiting at this time.  Appetite is good without concerns.       S/P colostomy (HCC)  Functioning.  No concerns or complaints from patient at this time.         Cancer related pain  Doing well with pain management with current opioid regimen.    Continue plan:  1) OxyIR 5mg q4 hours PRN  2) Bowel regimen to avoid OIC  3) Medication safety issues - Do not drive under the influence of narcotics (including opioids), watch for adverse effects including confusion / altered mental status / respiratory depression (slowed breathing), keep medications stored in a safe/locked environment, do not use alcohol while opioids or other narcotics are in your system. Do not travel with more than the minimum number of tablets or capsules required for the trip.         PDMP Review: I have reviewed the patient's controlled substance dispensing history in the Prescription Drug Monitoring Program in compliance with the [place] regulations before prescribing any controlled substances.    History of Present Illness   Miriam Smith is a 75 y.o. female who presents for  follow-up.    Patient is seen today in office. Alert, oriented, pleasantly conversant.  Patient is seen in [place].    Appetite has been good.    Pain well controlled with OxyIR 5mg q4 hours PRN. Tolerating well without issue and has remained effective for pain control.    Patient is well supported by daughter and NED.    Medical History Reviewed by provider this encounter:  Tobacco  Allergies  Meds  Problems  Med Hx  Surg Hx  Fam Hx     .  Past Medical History   Past Medical History[1]  Past Surgical History[2]  Family History[3]   reports that she has never smoked. She has never used smokeless tobacco. She reports that she does not currently use alcohol. She reports that she does not currently use drugs.  Current Outpatient Medications   Medication Instructions    atorvastatin (LIPITOR) 40 mg, Oral, Daily    benzonatate (TESSALON PERLES) 100 mg, Oral, 3 times daily PRN    Blood Glucose Monitoring Suppl (OneTouch Verio Reflect) w/Device KIT Check blood sugars twice daily. Please substitute with appropriate alternative as covered by patient's insurance. Dx: E11.65    calcium carbonate (OS-MARY) 600 mg, Oral, 2 times daily with meals    Cholecalciferol (VITAMIN D3) 1,000 Units, Oral, Daily    glucose blood (OneTouch Verio) test strip Check blood sugars twice daily. Please substitute with appropriate alternative as covered by patient's insurance. Dx: E11.65    lidocaine-prilocaine (EMLA) cream Apply a thin layer over PAC site prior to access prn    loratadine (CLARITIN) 10 mg, Oral, Daily    metFORMIN (GLUCOPHAGE) 1,000 mg, Oral, Daily with breakfast    ondansetron (ZOFRAN) 8 mg, Oral, Every 8 hours PRN    OneTouch Delica Lancets 33G MISC Check blood sugars twice daily. Please substitute with appropriate alternative as covered by patient's insurance. Dx: E11.65    oxyCODONE (ROXICODONE) 5 mg, Oral, Every 4 hours PRN    polyethylene glycol (MIRALAX) 17 g, Oral, Daily    senna-docusate sodium (SENOKOT S) 8.6-50 mg per  tablet 1 tablet, Oral, 2 times daily    sitaGLIPtin (JANUVIA) 50 mg, Oral, Daily   Allergies[4]   Medications Ordered Prior to Encounter[5]   Social History[6]     Objective   /60 (BP Location: Left arm, Patient Position: Sitting, Cuff Size: Standard)   Pulse 76   Temp 97.5 °F (36.4 °C) (Temporal)   Ht 5' (1.524 m)   Wt 46.5 kg (102 lb 8 oz)   SpO2 97%   BMI 20.02 kg/m²     Physical Exam  Vitals reviewed.   Constitutional:       General: She is not in acute distress.     Appearance: She is not ill-appearing, toxic-appearing or diaphoretic.   HENT:      Head: Normocephalic and atraumatic.      Nose: Nose normal.     Eyes:      General:         Right eye: No discharge.         Left eye: No discharge.       Cardiovascular:      Rate and Rhythm: Normal rate.   Pulmonary:      Effort: Pulmonary effort is normal. No respiratory distress.   Abdominal:      General: There is no distension.     Musculoskeletal:         General: No swelling.     Skin:     General: Skin is warm and dry.      Coloration: Skin is not jaundiced or pale.     Neurological:      General: No focal deficit present.      Mental Status: She is alert. Mental status is at baseline.     Psychiatric:         Mood and Affect: Mood normal.         Behavior: Behavior normal.         Thought Content: Thought content normal.         Judgment: Judgment normal.         Recent labs:  Lab Results   Component Value Date/Time    SODIUM 137 06/02/2025 09:31 AM    K 4.0 06/02/2025 09:31 AM    BUN 11 06/02/2025 09:31 AM    CREATININE 0.61 06/02/2025 09:31 AM    GLUC 219 (H) 06/02/2025 09:31 AM    CALCIUM 9.2 06/02/2025 09:31 AM    AST 11 (L) 06/02/2025 09:31 AM    ALT 9 06/02/2025 09:31 AM    ALB 3.7 06/02/2025 09:31 AM    TP 6.7 06/02/2025 09:31 AM    EGFR 88 06/02/2025 09:31 AM     Lab Results   Component Value Date/Time    HGB 10.8 (L) 06/02/2025 09:31 AM    WBC 10.25 (H) 06/02/2025 09:31 AM     06/02/2025 09:31 AM    INR 1.17 11/12/2024 04:36 AM      Lab Results   Component Value Date/Time    [id number] 2.328 01/08/2025 02:49 PM       Recent Imaging:  Procedure: XR wrist 3+ vw right  Result Date: 4/26/2025  Impression: Healing nondisplaced distal radius fracture. Electronically signed: 04/26/2025 10:46 AM Jh Olivarez MPH,MD    Procedure: Mammo screening bilateral w 3d and cad  Result Date: 3/28/2025  Impression: No mammographic evidence of malignancy. ASSESSMENT/BI-RADS CATEGORY: Left: 1 - Negative Right: 1 - Negative Overall: 1 - Negative RECOMMENDATION:      - Routine screening mammogram in 1 year for both breasts. Workstation ID: [id number] Signed by:  Keely Gage MD     Procedure: XR wrist 3+ vw right  Result Date: 3/26/2025  Impression: Subacute nondisplaced fracture of the distal radius. . Electronically signed: 03/26/2025 05:56 PM Colton Jean MD      Administrative Statements   I have spent a total time of 26 minutes in caring for this patient on the day of the visit/encounter including Risks and benefits of tx options, Instructions for management, Patient and family education, Importance of tx compliance, Risk factor reductions, Impressions, Counseling / Coordination of care, Documenting in the medical record, Reviewing/placing orders in the medical record (including tests, medications, and/or procedures), and Obtaining or reviewing history  .   Topics discussed with the patient / family include symptom assessment and management, medication review, psychosocial support, goals of care, supportive listening, and anticipatory guidance.       [1]   Past Medical History:  Diagnosis Date    Diabetes mellitus (HCC)     Hypercholesteremia     Rectal cancer (HCC)    [2]   Past Surgical History:  Procedure Laterality Date    COLONOSCOPY      ILEOSTOMY N/A 12/24/2024    Procedure: LAPAROSCOPIC DIVERTING sigmoid end-loop colostomy;  Surgeon: SAI Bermudez MD;  Location: [facility];  Service: Colorectal    INCISION AND DRAINAGE OF  WOUND N/A 12/24/2024    Procedure: INCISION AND DRAINAGE (I&D) BUTTOCK abscess;  Surgeon: SAI Bermudez MD;  Location: [facility];  Service: Colorectal    IR PORT PLACEMENT  1/6/2025   [3]   Family History  Problem Relation Name Age of Onset    Other (accident) Mother      Stroke Father      Colon cancer Sister     [4]   Allergies  Allergen Reactions    Motrin [Ibuprofen] Itching    Tylenol [Acetaminophen] Itching    Penicillins Rash     Rash and bruise    Sulfa Antibiotics Rash     Price velia syndrome   [5]   Current Outpatient Medications on File Prior to Visit   Medication Sig Dispense Refill    atorvastatin (LIPITOR) 40 mg tablet Take 1 tablet (40 mg total) by mouth daily 30 tablet 3    Blood Glucose Monitoring Suppl (OneTouch Verio Reflect) w/Device KIT Check blood sugars twice daily. Please substitute with appropriate alternative as covered by patient's insurance. Dx: E11.65 1 kit 0    calcium carbonate (OS-MARY) 600 MG tablet Take 1 tablet (600 mg total) by mouth 2 (two) times a day with meals 60 tablet 0    Cholecalciferol (VITAMIN D3) 1,000 units tablet Take 1 tablet (1,000 Units total) by mouth daily 90 tablet 1    glucose blood (OneTouch Verio) test strip Check blood sugars twice daily. Please substitute with appropriate alternative as covered by patient's insurance. Dx: E11.65 200 each 3    lidocaine-prilocaine (EMLA) cream Apply a thin layer over PAC site prior to access prn 30 g 5    metFORMIN (GLUCOPHAGE) 1000 MG tablet Take 1 tablet (1,000 mg total) by mouth daily with breakfast 30 tablet 0    ondansetron (ZOFRAN) 8 mg tablet Take 1 tablet (8 mg total) by mouth every 8 (eight) hours as needed for nausea or vomiting 20 tablet 5    OneTouch Delica Lancets 33G MISC Check blood sugars twice daily. Please substitute with appropriate alternative as covered by patient's insurance. Dx: E11.65 200 each 3    oxyCODONE (Roxicodone) 5 immediate release tablet Take 1 tablet (5 mg total) by mouth every 4  (four) hours as needed for moderate pain Max Daily Amount: 30 mg 90 tablet 0    polyethylene glycol (MIRALAX) 17 g packet Take 17 g by mouth daily 510 g 0    senna-docusate sodium (SENOKOT S) 8.6-50 mg per tablet Take 1 tablet by mouth 2 (two) times a day 60 tablet 0    benzonatate (TESSALON PERLES) 100 mg capsule Take 1 capsule (100 mg total) by mouth 3 (three) times a day as needed for cough (Patient not taking: Reported on 7/10/2025) 20 capsule 0    loratadine (CLARITIN) 10 mg tablet Take 1 tablet (10 mg total) by mouth daily (Patient not taking: Reported on 7/10/2025) 30 tablet 0    sitaGLIPtin (JANUVIA) 50 mg tablet Take 1 tablet (50 mg total) by mouth daily (Patient not taking: Reported on 7/10/2025) 30 tablet 5     No current facility-administered medications on file prior to visit.   [6]   Social History  Tobacco Use    Smoking status: Never    Smokeless tobacco: Never   Vaping Use    Vaping status: Never Used   Substance and Sexual Activity    Alcohol use: Not Currently    Drug use: Not Currently    Sexual activity: Not Currently

## 2025-07-10 NOTE — ASSESSMENT & PLAN NOTE
LILIAN serving as  for patient.  Patient to return to [place] July 20 to resume care.  Tolerating PO intake.  Doing well without intractable nausea or vomiting at this time.  Appetite is good without concerns.

## 2025-07-11 NOTE — PATIENT INSTRUCTIONS
It was a pleasure speaking with you today.    As discussed:  -Continue your medications as prescribed.  -Continue with a bowel regimen to avoid constipation.    Follow-up in: as needed - patient will be returning to [place] for resumption of cancer directed cares. Patient to call me next week for refill of her pain medications as she is still finding the oxycodone to be helpful for managing pain.    Please do not hesitate to call me sooner should you have any questions/concerns or needs.    Medication safety issues - Do not drive under the influence of narcotics (including opioids), watch for adverse effects including confusion / altered mental status / respiratory depression (slowed breathing), keep medications stored in a safe/locked environment, do not use alcohol while opioids or other narcotics are in your system. Do not travel with more than the minimum number of tablets or capsules required for the trip.    ER Precautions  Watch for red flag symptoms including, but not limited to fevers, chills, chest pain, shortness of breath, intractable nausea/vomiting/diarrhea, or acute intractable pain (especially if pain is new or has changed).

## 2025-07-16 DIAGNOSIS — G89.3 CANCER ASSOCIATED PAIN: ICD-10-CM

## 2025-07-16 DIAGNOSIS — K62.89 RECTAL PAIN: ICD-10-CM

## 2025-07-16 DIAGNOSIS — Z51.5 PALLIATIVE CARE BY SPECIALIST: ICD-10-CM

## 2025-07-16 DIAGNOSIS — K62.89 RECTAL MASS: ICD-10-CM

## 2025-07-16 RX ORDER — OXYCODONE HYDROCHLORIDE 5 MG/1
5 TABLET ORAL EVERY 4 HOURS PRN
Qty: 90 TABLET | Refills: 0 | Status: SHIPPED | OUTPATIENT
Start: 2025-07-16

## 2025-07-24 DIAGNOSIS — E11.9 TYPE 2 DIABETES MELLITUS WITHOUT COMPLICATION, WITHOUT LONG-TERM CURRENT USE OF INSULIN (HCC): ICD-10-CM

## (undated) DEVICE — 3M™ DURAPORE™ SURGICAL TAPE 1538-3, 3 INCH X 10 YARD (7,5CM X 9,1M), 4 ROLLS/BOX: Brand: 3M™ DURAPORE™

## (undated) DEVICE — PLUMEPEN PRO 10FT

## (undated) DEVICE — TROCAR: Brand: KII SLEEVE

## (undated) DEVICE — PROXIMATE RELOADABLE LINEAR CUTTER WITH SAFETY LOCK-OUT, 75MM: Brand: PROXIMATE

## (undated) DEVICE — BETHLEHEM UNIVERSAL MINOR GEN: Brand: CARDINAL HEALTH

## (undated) DEVICE — PREMIUM DRY TRAY LF: Brand: MEDLINE INDUSTRIES, INC.

## (undated) DEVICE — POOLE SUCTION HANDLE: Brand: CARDINAL HEALTH

## (undated) DEVICE — GLOVE INDICATOR PI UNDERGLOVE SZ 8 BLUE

## (undated) DEVICE — PAD GROUNDING DUAL ADULT

## (undated) DEVICE — KERLIX BANDAGE ROLL: Brand: KERLIX

## (undated) DEVICE — TUBING SUCTION 5MM X 12 FT

## (undated) DEVICE — EXOFIN PRECISION PEN HIGH VISCOSITY TOPICAL SKIN ADHESIVE: Brand: EXOFIN PRECISION PEN, 1G

## (undated) DEVICE — INTENDED FOR TISSUE SEPARATION, AND OTHER PROCEDURES THAT REQUIRE A SHARP SURGICAL BLADE TO PUNCTURE OR CUT.: Brand: BARD-PARKER SAFETY BLADES SIZE 10, STERILE

## (undated) DEVICE — ENSEAL X1 TISSUE SEALER, CURVED JAW, 37 CM SHAFT LENGTH: Brand: ENSEAL

## (undated) DEVICE — TINCTURE OF BENZOIN SKIN PREP SPRAY IS A SKIN PREP SPRAY THAT ENHANCES THE ADHESION OF TAPE/APPLIANCE WHILE PROTECTING SENSITIVE SKIN FROM ADHESIVES AND BODY FLUIDS.: Brand: TINCTURE OF BENZOIN SPRAY 4OZ US

## (undated) DEVICE — GLOVE SRG BIOGEL 7.5